# Patient Record
Sex: MALE | Race: WHITE | NOT HISPANIC OR LATINO | Employment: OTHER | URBAN - NONMETROPOLITAN AREA
[De-identification: names, ages, dates, MRNs, and addresses within clinical notes are randomized per-mention and may not be internally consistent; named-entity substitution may affect disease eponyms.]

---

## 2017-03-20 RX ORDER — PRASUGREL HCL 10 MG
10 TABLET ORAL DAILY
Qty: 30 TABLET | Refills: 5 | Status: SHIPPED | OUTPATIENT
Start: 2017-03-20 | End: 2017-06-26 | Stop reason: DRUGHIGH

## 2017-03-22 RX ORDER — PRASUGREL HCL 10 MG
10 TABLET ORAL DAILY
Qty: 90 TABLET | Refills: 3 | Status: CANCELLED | OUTPATIENT
Start: 2017-03-22

## 2017-03-22 NOTE — TELEPHONE ENCOUNTER
Pt is asking if he can d/c effient since its been over a year since the stent.  Either that or change it to the plavix so it will be cheaper.  The effient is putting him in the donut hole to quick..  Griffin Galindo CMA       Disregard the note about signing the rx for pt assist.  Pt did not qualify for pt assist, makes to much.  Thanks

## 2017-03-28 ENCOUNTER — HOSPITAL ENCOUNTER (OUTPATIENT)
Dept: ULTRASOUND IMAGING | Facility: HOSPITAL | Age: 77
Discharge: HOME | End: 2017-03-28
Admitting: PHYSICIAN ASSISTANT

## 2017-03-28 DIAGNOSIS — I71.40 AAA (ABDOMINAL AORTIC ANEURYSM) WITHOUT RUPTURE (HCC): ICD-10-CM

## 2017-03-28 PROCEDURE — 76775 US EXAM ABDO BACK WALL LIM: CPT

## 2017-04-26 ENCOUNTER — OFFICE VISIT (OUTPATIENT)
Dept: OTOLARYNGOLOGY | Facility: CLINIC | Age: 77
End: 2017-04-26

## 2017-04-26 VITALS
HEIGHT: 69 IN | WEIGHT: 199 LBS | TEMPERATURE: 97.6 F | SYSTOLIC BLOOD PRESSURE: 143 MMHG | DIASTOLIC BLOOD PRESSURE: 81 MMHG | BODY MASS INDEX: 29.47 KG/M2 | HEART RATE: 77 BPM

## 2017-04-26 DIAGNOSIS — R60.9 SUBMANDIBULAR GLAND SWELLING: Primary | ICD-10-CM

## 2017-04-26 PROCEDURE — 99213 OFFICE O/P EST LOW 20 MIN: CPT | Performed by: OTOLARYNGOLOGY

## 2017-04-26 NOTE — PROGRESS NOTES
Patient Care Team:  Adi Jack MD as PCP - General (Internal Medicine)  Edgar Ocasio MD as PCP - Family Medicine  ANN Shipley as Physician Assistant (Otolaryngology)  Terry Romo MD as Consulting Physician (Otolaryngology)    Chief Complaint   Patient presents with   • Follow-up     6 month follow up left salivary gland       Subjective   History of Present Illness:  Zeb Adams is a  77 y.o.  male who is here for follow up. He has had continued problems with swelling. The symptoms are localized to the left submandibular area. He has had variable symptoms. The symptoms have been intermittant for the last several years . There have been no identified factors that aggravate the symptoms . The symptoms are improved by antibiotics, steroids.  He denies  pain and fever.    Review of Systems:  Review of Systems   Constitutional: Negative for chills and fever.   HENT:        See HPI   Eyes: Negative.    Respiratory: Negative.    Cardiovascular: Negative.    Gastrointestinal: Negative.    Allergic/Immunologic: Negative.    Neurological: Negative.    Hematological: Negative.    Psychiatric/Behavioral: Negative.        Past History:  Past Medical History:   Diagnosis Date   • Allergic rhinitis    • Arthritis    • Asthma    • Benign neoplasm of submandibular gland    • BPH with urinary obstruction    • Chronic laryngitis    • Chronic rhinitis    • Epistaxis    • Esophageal stricture    • GERD (gastroesophageal reflux disease)    • Hiatal hernia    • Hypercholesterolemia    • Hypertension    • Impotence of organic origin    • LPRD (laryngopharyngeal reflux disease)    • Poor urinary stream    • RLS (restless legs syndrome)    • Sialoadenitis      Past Surgical History:   Procedure Laterality Date   • CORONARY ANGIOPLASTY WITH STENT PLACEMENT     • SUBMANDIBULAR GLAND EXCISION     • TONSILLECTOMY     • TURBINOPLASTY     • VASECTOMY       Family History   Problem Relation Age of  Onset   • Diabetes Father    • Heart failure Father    • Diabetes Brother    • Heart failure Brother      Social History   Substance Use Topics   • Smoking status: Former Smoker   • Smokeless tobacco: None      Comment: 30 yrs ago   • Alcohol use No       Current Outpatient Prescriptions:   •  aspirin 81 MG tablet, Take 81 mg by mouth daily.  , Disp: , Rfl:   •  atorvastatin (LIPITOR) 10 MG tablet, Daily., Disp: , Rfl:   •  cetirizine (ZyrTEC) 10 MG tablet, Take 10 mg by mouth Daily., Disp: , Rfl:   •  clonazePAM (KlonoPIN) 1 MG tablet, 2 (Two) Times a Day., Disp: , Rfl:   •  EFFIENT 10 MG tablet, Take 1 tablet by mouth Daily., Disp: 30 tablet, Rfl: 5  •  losartan (COZAAR) 100 MG tablet, , Disp: , Rfl:   •  pantoprazole (PROTONIX) 40 MG EC tablet, Take 1 tablet by mouth 2 times daily., Disp: , Rfl:   Allergies:  Penicillins and Sulfa antibiotics    Objective     Vital Signs:  Temp:  [97.6 °F (36.4 °C)] 97.6 °F (36.4 °C)  Heart Rate:  [77] 77  BP: (143)/(81) 143/81    Physical Exam:  CONSTITUTIONAL: well nourished, well-developed, alert, oriented, in no acute distress   COMMUNICATION AND VOICE: able to communicate normally, normal voice quality  HEAD: normocephalic, no lesions, atraumatic, no tenderness, no masses   FACE: appearance normal, no lesions, no tenderness, no deformities, facial motion symmetric  EYES: ocular motility normal, eyelids normal, orbits normal, no proptosis, conjunctiva normal , pupils equal, round   EARS:  Hearing: hearing to conversational voice intact bilaterally   External Ears: normal bilaterally, no lesions  NOSE:  External Nose: external nasal structure normal, no tenderness on palpation, no nasal discharge, no lesions, no evidence of trauma, nostrils patent   ORAL:  Lips: upper and lower lips without lesion   FLOOR OF MOUTH: minimal secretions  NECK:  Inspection and Palpation: left submandibular swelling and firmness  CHEST/RESPIRATORY: normal respiratory effort   CARDIOVASCULAR: no  cyanosis or edema   NEUROLOGICAL/PSYCHIATRIC: oriented to time, place and person, mood normal, affect appropriate, CN II-XII intact grossly    Assessment   1. Submandibular gland swelling        Plan   Medical and surgical options were discussed including observation, excision vs referral for sialoendoscopy. Risks, benefits and alternatives were discussed and questions were answered. After considering the options, the patient decided to proceed with observation  Continue current management plan.  -----INSTRUCTIONS-----  Warm compresses three times a day with massage.  Use saliva producing measures with sour candy or the use of lemon with water.    Return in about 6 months (around 10/26/2017).    Terry Romo MD  04/26/17  9:37 AM

## 2017-06-07 ENCOUNTER — OFFICE VISIT (OUTPATIENT)
Dept: GASTROENTEROLOGY | Age: 77
End: 2017-06-07
Payer: MEDICARE

## 2017-06-07 VITALS
SYSTOLIC BLOOD PRESSURE: 136 MMHG | BODY MASS INDEX: 28.35 KG/M2 | OXYGEN SATURATION: 98 % | WEIGHT: 198 LBS | DIASTOLIC BLOOD PRESSURE: 70 MMHG | HEIGHT: 70 IN | HEART RATE: 68 BPM

## 2017-06-07 DIAGNOSIS — K92.1 MELENA: Primary | ICD-10-CM

## 2017-06-07 DIAGNOSIS — R13.10 DYSPHAGIA, UNSPECIFIED: ICD-10-CM

## 2017-06-07 DIAGNOSIS — Z87.19 HISTORY OF ESOPHAGEAL STRICTURE: ICD-10-CM

## 2017-06-07 DIAGNOSIS — D50.9 IRON DEFICIENCY ANEMIA, UNSPECIFIED IRON DEFICIENCY ANEMIA TYPE: ICD-10-CM

## 2017-06-07 DIAGNOSIS — Z86.010 HISTORY OF COLON POLYPS: ICD-10-CM

## 2017-06-07 DIAGNOSIS — Z79.01 ANTICOAGULATED: ICD-10-CM

## 2017-06-07 DIAGNOSIS — R19.4 CHANGE IN BOWEL HABIT: ICD-10-CM

## 2017-06-07 DIAGNOSIS — K59.00 CONSTIPATION, UNSPECIFIED CONSTIPATION TYPE: ICD-10-CM

## 2017-06-07 DIAGNOSIS — K21.9 CHRONIC GERD: ICD-10-CM

## 2017-06-07 RX ORDER — CLONAZEPAM 2 MG/1
2 TABLET ORAL NIGHTLY PRN
COMMUNITY

## 2017-06-07 ASSESSMENT — ENCOUNTER SYMPTOMS
DIARRHEA: 0
BLOOD IN STOOL: 0
CHEST TIGHTNESS: 0
VOMITING: 0
ABDOMINAL PAIN: 0
CONSTIPATION: 0
COUGH: 0
VOICE CHANGE: 0
SHORTNESS OF BREATH: 0
ABDOMINAL DISTENTION: 0
NAUSEA: 0
SORE THROAT: 0
BACK PAIN: 0
RECTAL PAIN: 0

## 2017-06-09 ENCOUNTER — OFFICE VISIT (OUTPATIENT)
Dept: CARDIOLOGY | Facility: CLINIC | Age: 77
End: 2017-06-09

## 2017-06-09 VITALS
SYSTOLIC BLOOD PRESSURE: 134 MMHG | WEIGHT: 196 LBS | DIASTOLIC BLOOD PRESSURE: 68 MMHG | HEIGHT: 70 IN | BODY MASS INDEX: 28.06 KG/M2 | HEART RATE: 50 BPM

## 2017-06-09 DIAGNOSIS — I25.10 CORONARY ARTERY DISEASE INVOLVING NATIVE CORONARY ARTERY OF NATIVE HEART WITHOUT ANGINA PECTORIS: Primary | ICD-10-CM

## 2017-06-09 DIAGNOSIS — E78.2 MIXED HYPERLIPIDEMIA: ICD-10-CM

## 2017-06-09 PROCEDURE — 93000 ELECTROCARDIOGRAM COMPLETE: CPT | Performed by: INTERNAL MEDICINE

## 2017-06-09 PROCEDURE — 99214 OFFICE O/P EST MOD 30 MIN: CPT | Performed by: INTERNAL MEDICINE

## 2017-06-09 NOTE — PROGRESS NOTES
Referring Provider: Adi Jack MD    Reason for Follow-up Visit: CAD    Subjective .   Chief Complaint:   Chief Complaint   Patient presents with   • Follow-up     yearly   • Coronary Artery Disease     pt has stents and is on effient.  has chest pain every now and then that will be sharp that comes and goes fast.     • Surgical Clearance     for a colonoscopy by [facility].  will need to be off effient.       History of present illness:  Zeb Adams is a 77 y.o. yo male with history of CAD, s/p stent to RCA in the past. Has recently been found to be anemic and felt most likely do to GI bleeding. Needs cardiac clearance for upper and lower endoscopy. Denies chest pain        History  Past Medical History:   Diagnosis Date   • Allergic rhinitis    • Anxiety    • Arthritis    • Asthma    • Benign neoplasm of submandibular gland    • BPH with urinary obstruction    • Chest pain    • Chronic laryngitis    • Chronic rhinitis    • Epistaxis    • Esophageal stricture    • GERD (gastroesophageal reflux disease)    • Hiatal hernia    • Hypercholesterolemia    • Hypertension    • Impotence of organic origin    • LPRD (laryngopharyngeal reflux disease)    • Poor urinary stream    • Presence of stent in coronary artery in patient with coronary artery disease    • RLS (restless legs syndrome)    • Sialoadenitis    • SOB (shortness of breath) on exertion    ,   Past Surgical History:   Procedure Laterality Date   • CORONARY ANGIOPLASTY WITH STENT PLACEMENT     • SUBMANDIBULAR GLAND EXCISION     • TONSILLECTOMY     • TURBINOPLASTY     • VASECTOMY       and a reveral   ,   Family History   Problem Relation Age of Onset   • Diabetes Father    • Heart failure Father    • Diabetes Brother    • Heart failure Brother    • Heart attack Brother    • Coronary artery disease Other    • Heart attack Other    • Heart disease Mother    • Hyperlipidemia Mother    • No Known Problems Maternal Grandmother    • Heart disease  "Maternal Grandfather    • No Known Problems Paternal Grandmother    • No Known Problems Paternal Grandfather    ,   Social History   Substance Use Topics   • Smoking status: Former Smoker     Start date: 1956     Quit date: 1985   • Smokeless tobacco: Never Used      Comment: 30 yrs ago   • Alcohol use No   ,     Medications  Current Outpatient Prescriptions   Medication Sig Dispense Refill   • aspirin 81 MG tablet Take 81 mg by mouth daily.       • atorvastatin (LIPITOR) 10 MG tablet Daily.     • cetirizine (ZyrTEC) 10 MG tablet Take 10 mg by mouth Daily.     • clonazePAM (KlonoPIN) 1 MG tablet At Night As Needed.     • EFFIENT 10 MG tablet Take 1 tablet by mouth Daily. 30 tablet 5   • losartan (COZAAR) 100 MG tablet Take 100 mg by mouth Daily.     • pantoprazole (PROTONIX) 40 MG EC tablet Take 1 tablet by mouth 2 times daily.       No current facility-administered medications for this visit.        Allergies:  Penicillins and Sulfa antibiotics    Review of Systems  Review of Systems   Constitution: Positive for malaise/fatigue.   HENT: Negative for nosebleeds.    Cardiovascular: Positive for dyspnea on exertion. Negative for chest pain, claudication, irregular heartbeat, leg swelling, near-syncope, orthopnea, palpitations, paroxysmal nocturnal dyspnea and syncope.   Respiratory: Negative for cough, hemoptysis and shortness of breath.    Gastrointestinal: Negative for dysphagia, hematemesis and melena.   Genitourinary: Negative for hematuria.       Objective     Physical Exam:  /68 (BP Location: Left arm, Patient Position: Sitting, Cuff Size: Adult)  Pulse 50  Ht 70\" (177.8 cm)  Wt 196 lb (88.9 kg)  BMI 28.12 kg/m2  Physical Exam   Constitutional: He is oriented to person, place, and time. He appears well-nourished. No distress.   HENT:   Head: Normocephalic.   Eyes: No scleral icterus.   Neck: Normal range of motion. Neck supple.   Cardiovascular: Normal rate, regular rhythm and normal heart sounds.  " Exam reveals no gallop and no friction rub.    No murmur heard.  Pulmonary/Chest: Effort normal and breath sounds normal. No respiratory distress. He has no wheezes. He has no rales.   Abdominal: Soft. Bowel sounds are normal. He exhibits no distension. There is no tenderness.   Musculoskeletal: He exhibits no edema.   Neurological: He is alert and oriented to person, place, and time.   Skin: Skin is warm and dry. He is not diaphoretic. No erythema.   Psychiatric: He has a normal mood and affect. His behavior is normal.       Results Review:    ECG 12 Lead  Date/Time: 6/9/2017 11:10 AM  Performed by: SULEMAN GOLDSTEIN  Authorized by: SULEMAN GOLDSTEIN   Comparison: compared with previous ECG   Similar to previous ECG  Rhythm: sinus rhythm  Ectopy: infrequent PVCs  Rate: normal  Conduction: conduction normal  ST Segments: ST segments normal  T Waves: T waves normal  QRS axis: normal  Clinical impression: normal ECG            Office Visit on 12/16/2016   Component Date Value Ref Range Status   • Color 12/16/2016 Yellow  Yellow, Straw, Dark Yellow, Karen Final   • Clarity, UA 12/16/2016 Clear  Clear Final   • Glucose, UA 12/16/2016 Negative  Negative mg/dL Final   • Bilirubin 12/16/2016 Negative  Negative Final   • Ketones, UA 12/16/2016 Negative  Negative Final   • Specific Gravity  12/16/2016 1.020  1.005 - 1.030 Final   • Blood, UA 12/16/2016 Negative  Negative Final   • pH, Urine 12/16/2016 5.0  5.0 - 8.0 Final   • Protein, POC 12/16/2016 Negative  Negative mg/dL Final   • Urobilinogen, UA 12/16/2016 Normal  Normal Final   • Leukocytes 12/16/2016 Negative  Negative Final   • Nitrite, UA 12/16/2016 Negative  Negative Final       Assessment/Plan   Suleman was seen today for follow-up, coronary artery disease and surgical clearance.    Diagnoses and all orders for this visit:    Coronary artery disease involving native coronary artery of native heart without angina pectoris, ok to stop effient for 5-7 days prior to endoscopy.  Cont asa    Mixed hyperlipidemia, Patient's statin therapy is followed by PCP.    Anemia, for colonoscopy and upper endoscopy    Other orders  -     ECG 12 Lead

## 2017-06-13 ENCOUNTER — TELEPHONE (OUTPATIENT)
Dept: GASTROENTEROLOGY | Age: 77
End: 2017-06-13

## 2017-06-26 NOTE — TELEPHONE ENCOUNTER
Pt informed.  A new Rx will be sent to [facility] for Effient 5 mg 1 po qd.  Pt will only cut one in half at a time until they are gone then start the new Rx.  Pt stated understanding.  Griffin Galindo, CMA

## 2017-06-26 NOTE — TELEPHONE ENCOUNTER
Pt called asking if he can cut the effient 10 mg in half after he gets back on it after the colonoscopy and endoscopy.  He said you mention it at last ov.  Please advise.  Griffin Galindo, CMA

## 2017-06-27 RX ORDER — PRASUGREL 5 MG/1
5 TABLET, FILM COATED ORAL DAILY
Qty: 30 TABLET | Refills: 11 | Status: SHIPPED | OUTPATIENT
Start: 2017-06-27 | End: 2018-01-22

## 2017-07-07 ENCOUNTER — TELEPHONE (OUTPATIENT)
Dept: CARDIOLOGY | Facility: CLINIC | Age: 77
End: 2017-07-07

## 2017-07-07 NOTE — TELEPHONE ENCOUNTER
Pt saw ANN Partida at Dr. Jack's office for evaluation of red spots on legs.  Deb called asking if pt can come off the Effient and Aspirin because of extensive bruising and red spots on legs. Pt was stented March 2016.  Please advise.  Griffin Galindo, CMA

## 2017-07-13 ENCOUNTER — HOSPITAL ENCOUNTER (OUTPATIENT)
Age: 77
Setting detail: OUTPATIENT SURGERY
Discharge: HOME | End: 2017-07-13
Attending: INTERNAL MEDICINE | Admitting: INTERNAL MEDICINE
Payer: MEDICARE

## 2017-07-13 ENCOUNTER — ANESTHESIA (OUTPATIENT)
Dept: ENDOSCOPY | Age: 77
End: 2017-07-13
Payer: MEDICARE

## 2017-07-13 ENCOUNTER — ANESTHESIA EVENT (OUTPATIENT)
Dept: ENDOSCOPY | Age: 77
End: 2017-07-13
Payer: MEDICARE

## 2017-07-13 VITALS
SYSTOLIC BLOOD PRESSURE: 122 MMHG | HEART RATE: 52 BPM | OXYGEN SATURATION: 97 % | BODY MASS INDEX: 28.2 KG/M2 | WEIGHT: 197 LBS | RESPIRATION RATE: 18 BRPM | DIASTOLIC BLOOD PRESSURE: 78 MMHG | TEMPERATURE: 97.4 F | HEIGHT: 70 IN

## 2017-07-13 VITALS
RESPIRATION RATE: 17 BRPM | SYSTOLIC BLOOD PRESSURE: 112 MMHG | DIASTOLIC BLOOD PRESSURE: 63 MMHG | OXYGEN SATURATION: 92 %

## 2017-07-13 PROBLEM — R13.19 ESOPHAGEAL DYSPHAGIA: Status: ACTIVE | Noted: 2017-07-13

## 2017-07-13 PROCEDURE — 43255 EGD CONTROL BLEEDING ANY: CPT | Performed by: INTERNAL MEDICINE

## 2017-07-13 PROCEDURE — 3700000000 HC ANESTHESIA ATTENDED CARE: Performed by: INTERNAL MEDICINE

## 2017-07-13 PROCEDURE — 3700000001 HC ADD 15 MINUTES (ANESTHESIA): Performed by: INTERNAL MEDICINE

## 2017-07-13 PROCEDURE — 2720000001 HC MISC SURG SUPPLY STERILE $51-500: Performed by: INTERNAL MEDICINE

## 2017-07-13 PROCEDURE — 3609009900 HC COLONOSCOPY W/CONTROL BLEEDING ANY METHOD: Performed by: INTERNAL MEDICINE

## 2017-07-13 PROCEDURE — 2500000003 HC RX 250 WO HCPCS: Performed by: INTERNAL MEDICINE

## 2017-07-13 PROCEDURE — 2500000003 HC RX 250 WO HCPCS: Performed by: NURSE ANESTHETIST, CERTIFIED REGISTERED

## 2017-07-13 PROCEDURE — 2580000003 HC RX 258: Performed by: INTERNAL MEDICINE

## 2017-07-13 PROCEDURE — 7100000011 HC PHASE II RECOVERY - ADDTL 15 MIN: Performed by: INTERNAL MEDICINE

## 2017-07-13 PROCEDURE — 3609013200 HC EGD W/ ABLATION: Performed by: INTERNAL MEDICINE

## 2017-07-13 PROCEDURE — 7100000010 HC PHASE II RECOVERY - FIRST 15 MIN: Performed by: INTERNAL MEDICINE

## 2017-07-13 PROCEDURE — 6360000002 HC RX W HCPCS: Performed by: NURSE ANESTHETIST, CERTIFIED REGISTERED

## 2017-07-13 PROCEDURE — 3609012800 HC EGD DIAGNOSTIC ONLY: Performed by: INTERNAL MEDICINE

## 2017-07-13 PROCEDURE — 45382 COLONOSCOPY W/CONTROL BLEED: CPT | Performed by: INTERNAL MEDICINE

## 2017-07-13 RX ORDER — SODIUM CHLORIDE, SODIUM LACTATE, POTASSIUM CHLORIDE, CALCIUM CHLORIDE 600; 310; 30; 20 MG/100ML; MG/100ML; MG/100ML; MG/100ML
INJECTION, SOLUTION INTRAVENOUS CONTINUOUS
Status: DISCONTINUED | OUTPATIENT
Start: 2017-07-13 | End: 2017-07-13 | Stop reason: HOSPADM

## 2017-07-13 RX ORDER — LIDOCAINE HYDROCHLORIDE 10 MG/ML
INJECTION, SOLUTION EPIDURAL; INFILTRATION; INTRACAUDAL; PERINEURAL PRN
Status: DISCONTINUED | OUTPATIENT
Start: 2017-07-13 | End: 2017-07-13 | Stop reason: SDUPTHER

## 2017-07-13 RX ORDER — SUCRALFATE 1 G/1
1 TABLET ORAL 3 TIMES DAILY
Qty: 90 TABLET | Refills: 3 | Status: SHIPPED | OUTPATIENT
Start: 2017-07-13 | End: 2017-11-09

## 2017-07-13 RX ORDER — LIDOCAINE HYDROCHLORIDE 10 MG/ML
1 INJECTION, SOLUTION EPIDURAL; INFILTRATION; INTRACAUDAL; PERINEURAL ONCE
Status: COMPLETED | OUTPATIENT
Start: 2017-07-13 | End: 2017-07-13

## 2017-07-13 RX ORDER — PROPOFOL 10 MG/ML
INJECTION, EMULSION INTRAVENOUS PRN
Status: DISCONTINUED | OUTPATIENT
Start: 2017-07-13 | End: 2017-07-13 | Stop reason: SDUPTHER

## 2017-07-13 RX ADMIN — PROPOFOL 400 MG: 10 INJECTION, EMULSION INTRAVENOUS at 11:12

## 2017-07-13 RX ADMIN — LIDOCAINE HYDROCHLORIDE 1 ML: 10 INJECTION, SOLUTION EPIDURAL; INFILTRATION; INTRACAUDAL; PERINEURAL at 10:57

## 2017-07-13 RX ADMIN — LIDOCAINE HYDROCHLORIDE 5 ML: 10 INJECTION, SOLUTION EPIDURAL; INFILTRATION; INTRACAUDAL; PERINEURAL at 11:12

## 2017-07-13 RX ADMIN — SODIUM CHLORIDE, POTASSIUM CHLORIDE, SODIUM LACTATE AND CALCIUM CHLORIDE: 600; 310; 30; 20 INJECTION, SOLUTION INTRAVENOUS at 10:56

## 2017-07-13 ASSESSMENT — PAIN SCALES - GENERAL
PAINLEVEL_OUTOF10: 0
PAINLEVEL_OUTOF10: 0

## 2017-07-13 ASSESSMENT — PAIN - FUNCTIONAL ASSESSMENT: PAIN_FUNCTIONAL_ASSESSMENT: 0-10

## 2017-07-14 ENCOUNTER — TELEPHONE (OUTPATIENT)
Dept: GASTROENTEROLOGY | Age: 77
End: 2017-07-14

## 2017-08-10 ENCOUNTER — TELEPHONE (OUTPATIENT)
Dept: GASTROENTEROLOGY | Age: 77
End: 2017-08-10

## 2017-08-15 ENCOUNTER — TELEPHONE (OUTPATIENT)
Dept: GASTROENTEROLOGY | Age: 77
End: 2017-08-15

## 2017-08-16 ENCOUNTER — TELEPHONE (OUTPATIENT)
Dept: GASTROENTEROLOGY | Age: 77
End: 2017-08-16

## 2017-10-04 NOTE — TELEPHONE ENCOUNTER
Contact patient - let him know what has happened. We have heard from the company and they were unsuccessful in attempting to get the study from the reader. Let's get him rescheduled. Let him know he's not responsible for the cost of this one.

## 2017-10-04 NOTE — TELEPHONE ENCOUNTER
I called the patient and explained what happened and that we will get him rescheduled at no cost to him, he was very understanding and said things happen and he will be happy to reschedule no problem, I will forward this information to [address] [place] [address] Rd.   and have her call the patient to reschedule the M2A Pill Cam.  cma

## 2017-10-11 ENCOUNTER — TELEPHONE (OUTPATIENT)
Dept: GASTROENTEROLOGY | Age: 77
End: 2017-10-11

## 2017-10-11 NOTE — TELEPHONE ENCOUNTER
Called patient today to get him scheduled for the repeat M2A. He does not want to reschedule at this time, he and his wife both are having surgery next month. I told him I could get him in before the surgeries, but he does not want to at this time. He said possibly in December or January.   I have created a sched form and put in the folder to call later

## 2017-10-14 PROBLEM — R19.8 ALTERED BOWEL FUNCTION: Status: ACTIVE | Noted: 2017-06-07

## 2017-10-25 ENCOUNTER — OFFICE VISIT (OUTPATIENT)
Dept: OTOLARYNGOLOGY | Facility: CLINIC | Age: 77
End: 2017-10-25

## 2017-10-25 VITALS
DIASTOLIC BLOOD PRESSURE: 88 MMHG | SYSTOLIC BLOOD PRESSURE: 162 MMHG | BODY MASS INDEX: 27.94 KG/M2 | HEIGHT: 70 IN | TEMPERATURE: 97.9 F | WEIGHT: 195.2 LBS

## 2017-10-25 DIAGNOSIS — R60.9 SUBMANDIBULAR GLAND SWELLING: Primary | ICD-10-CM

## 2017-10-25 DIAGNOSIS — B02.29 POSTHERPETIC NEURALGIA: ICD-10-CM

## 2017-10-25 PROCEDURE — 99213 OFFICE O/P EST LOW 20 MIN: CPT | Performed by: OTOLARYNGOLOGY

## 2017-10-25 NOTE — PROGRESS NOTES
Patient Care Team:  Adi Jack MD as PCP - General (Internal Medicine)  Edgar Ocasio MD as PCP - Family Medicine  ANN Shipley as Physician Assistant (Otolaryngology)  Terry Romo MD as Consulting Physician (Otolaryngology)  Zeb Mark MD as Cardiologist (Cardiology)  JANE Casey as Referring Physician (Family Medicine)    Chief Complaint   Patient presents with   • Follow-up       Subjective   HPI   Zeb Adams is a  77 y.o. male who is here for follow up. He has had a recent outbreak of shingles on the right face and has had residual neuralgia. He tried Lyrica but could not tolerate it.     Review of Systems:  Reviewed per patient intake note    Past History:  Past Medical History:   Diagnosis Date   • Allergic rhinitis    • Anxiety    • Arthritis    • Asthma    • Benign neoplasm of submandibular gland    • BPH with urinary obstruction    • Chest pain    • Chronic laryngitis    • Chronic rhinitis    • Epistaxis    • Esophageal stricture    • GERD (gastroesophageal reflux disease)    • Hiatal hernia    • Hypercholesterolemia    • Hypertension    • Impotence of organic origin    • LPRD (laryngopharyngeal reflux disease)    • Poor urinary stream    • Presence of stent in coronary artery in patient with coronary artery disease    • RLS (restless legs syndrome)    • Sialoadenitis    • SOB (shortness of breath) on exertion      Past Surgical History:   Procedure Laterality Date   • CORONARY ANGIOPLASTY WITH STENT PLACEMENT     • SUBMANDIBULAR GLAND EXCISION     • TONSILLECTOMY     • TURBINOPLASTY     • VASECTOMY       and a reveral     Family History   Problem Relation Age of Onset   • Diabetes Father    • Heart failure Father    • Diabetes Brother    • Heart failure Brother    • Heart attack Brother    • Coronary artery disease Other    • Heart attack Other    • Heart disease Mother    • Hyperlipidemia Mother    • No Known Problems Maternal Grandmother    •  Heart disease Maternal Grandfather    • No Known Problems Paternal Grandmother    • No Known Problems Paternal Grandfather      Social History   Substance Use Topics   • Smoking status: Former Smoker     Start date: 1956     Quit date: 1985   • Smokeless tobacco: Never Used      Comment: 30 yrs ago   • Alcohol use No       Current Outpatient Prescriptions:   •  aspirin 81 MG tablet, Take 81 mg by mouth daily.  , Disp: , Rfl:   •  atorvastatin (LIPITOR) 10 MG tablet, Daily., Disp: , Rfl:   •  cetirizine (ZyrTEC) 10 MG tablet, Take 10 mg by mouth Daily., Disp: , Rfl:   •  clonazePAM (KlonoPIN) 1 MG tablet, At Night As Needed., Disp: , Rfl:   •  losartan (COZAAR) 100 MG tablet, Take 100 mg by mouth Daily., Disp: , Rfl:   •  pantoprazole (PROTONIX) 40 MG EC tablet, Take 1 tablet by mouth 2 times daily., Disp: , Rfl:   •  prasugrel (EFFIENT) 5 MG tablet, Take 1 tablet by mouth Daily., Disp: 30 tablet, Rfl: 11  Allergies:  Penicillins and Sulfa antibiotics    Objective     Vital Signs:  Temp:  [97.9 °F (36.6 °C)] 97.9 °F (36.6 °C)  BP: (162)/(88) 162/88    Physical Exam  CONSTITUTIONAL: well nourished, well-developed, alert, oriented, in no acute distress   COMMUNICATION AND VOICE: able to communicate normally, normal voice quality  HEAD: normocephalic, no lesions, atraumatic, no tenderness, no masses   FACE: appearance normal, no lesions, no tenderness, no deformities, facial motion symmetric  SALIVARY GLANDS: parotid glands with no swelling present, moderate left submandibular enlargement and firmness  EYES: ocular motility normal, eyelids normal, orbits normal, no proptosis, conjunctiva normal , pupils equal, round  HEARING: response to conversational voice normal bilaterally   EXTERNAL EARS: auricles without lesions  EXTERNAL NOSE: structure normal, no tenderness on palpation, no nasal discharge, no lesions, no evidence of trauma, nostrils patent  LIPS: structure normal, no tenderness on palpation, no lesions, no  evidence of trauma  NECK: neck appearance normal  LYMPH NODES: no lymphadenopathy  CHEST/RESPIRATORY: respiratory effort normal  CARDIOVASCULAR: extremities without cyanosis or edema, no overt jugulovenous distension present  NEUROLOGIC/PSYCHIATRIC: oriented appropriately for age, mood normal, affect appropriate, cranial nerves intact grossly unless specifically mentioned above         Assessment   1. Submandibular gland swelling    2. Postherpetic neuralgia        Plan   Medical and surgical options were discussed including observation and left submandibular gland excision. Risks, benefits and alternatives were discussed and questions were answered. After considering the options, the patient decided to proceed with observation.    QUALITY MEASURES  Body Mass Index Screening and Follow-Up Plan  Body mass index is 28.01 kg/(m^2).  High BMI Plan  General weight loss/lifestyle modification strategies discussed (elicit support from others; identify saboteurs; non-food rewards, etc).  Informal exercise measures discussed, e.g. taking stairs instead of elevator.      Tobacco Use: Screening and Cessation Intervention  Smoking status: Former Smoker                                                              Packs/day: 0.00      Years: 0.00         Start date: 1956     Quit date: 1985  Smokeless status: Never Used                      Comment: 30 yrs ago        Return in about 6 months (around 4/25/2018).    Terry Romo MD  10/25/17  1:09 PM

## 2017-10-25 NOTE — PROGRESS NOTES
Patient Intake Note    Review of Systems  Review of Systems   Constitutional: Negative for chills, fatigue and fever.   HENT:        See HPI   Respiratory: Positive for shortness of breath. Negative for cough, choking and wheezing.    Cardiovascular: Negative.    Gastrointestinal: Negative for constipation, diarrhea, nausea and vomiting.   Allergic/Immunologic: Positive for environmental allergies. Negative for food allergies.   Neurological: Positive for headaches. Negative for dizziness and light-headedness.   Hematological: Bruises/bleeds easily.   Psychiatric/Behavioral: Positive for sleep disturbance (night sweats).         Allison Aguirre  10/25/2017  9:13 AM

## 2017-11-09 ENCOUNTER — OFFICE VISIT (OUTPATIENT)
Dept: VASCULAR SURGERY | Age: 77
End: 2017-11-09
Payer: MEDICARE

## 2017-11-09 ENCOUNTER — HOSPITAL ENCOUNTER (OUTPATIENT)
Dept: VASCULAR LAB | Age: 77
Discharge: HOME | End: 2017-11-09
Payer: MEDICARE

## 2017-11-09 VITALS
TEMPERATURE: 97.5 F | DIASTOLIC BLOOD PRESSURE: 74 MMHG | HEART RATE: 74 BPM | SYSTOLIC BLOOD PRESSURE: 118 MMHG | RESPIRATION RATE: 18 BRPM

## 2017-11-09 DIAGNOSIS — I65.23 BILATERAL CAROTID ARTERY STENOSIS: Primary | ICD-10-CM

## 2017-11-09 DIAGNOSIS — I65.23 BILATERAL CAROTID ARTERY STENOSIS: ICD-10-CM

## 2017-11-09 PROCEDURE — 1123F ACP DISCUSS/DSCN MKR DOCD: CPT | Performed by: NURSE PRACTITIONER

## 2017-11-09 PROCEDURE — 99212 OFFICE O/P EST SF 10 MIN: CPT | Performed by: NURSE PRACTITIONER

## 2017-11-09 PROCEDURE — 1036F TOBACCO NON-USER: CPT | Performed by: NURSE PRACTITIONER

## 2017-11-09 PROCEDURE — G8598 ASA/ANTIPLAT THER USED: HCPCS | Performed by: NURSE PRACTITIONER

## 2017-11-09 PROCEDURE — G8484 FLU IMMUNIZE NO ADMIN: HCPCS | Performed by: NURSE PRACTITIONER

## 2017-11-09 PROCEDURE — 93880 EXTRACRANIAL BILAT STUDY: CPT

## 2017-11-09 PROCEDURE — 4040F PNEUMOC VAC/ADMIN/RCVD: CPT | Performed by: NURSE PRACTITIONER

## 2017-11-09 PROCEDURE — G8427 DOCREV CUR MEDS BY ELIG CLIN: HCPCS | Performed by: NURSE PRACTITIONER

## 2017-11-09 PROCEDURE — G8417 CALC BMI ABV UP PARAM F/U: HCPCS | Performed by: NURSE PRACTITIONER

## 2017-11-09 NOTE — PROGRESS NOTES
GASTROINTESTINAL ENDOSCOPY  2006    gastritis, nonobst Schatzki's ring, lg hiatal hernia    UPPER GASTROINTESTINAL ENDOSCOPY  2017    Dr Garcia/control of hemorrhage with gold probe cauterization of AVM-cecal avm, small bowel avm, hiatal hernia, margarito's erosions    UPPER GASTROINTESTINAL ENDOSCOPY  2017    Dr Garcia/control of hemorrhage with gold probe cauterization of AVM-cecal avm, small bowel avm, hiatal hernia, margarito's erosions    VASECTOMY      VASECTOMY  26 years ago    and had it reversed.  VASECTOMY REVERSAL       Family History   Problem Relation Age of Onset    Hypertension Mother     High Cholesterol Mother     Heart Failure Mother      blockages, attack    Heart Failure Father      blockages, attack,  66    Heart Failure Brother      blockages, attack,  64     High Cholesterol Brother     Stroke Brother     Heart Disease Brother     Hypertension Brother     Other Other      mother , age 80 fall    Colon Cancer Neg Hx     Colon Polyps Neg Hx     Esophageal Cancer Neg Hx     Liver Cancer Neg Hx     Liver Disease Neg Hx     Rectal Cancer Neg Hx     Stomach Cancer Neg Hx      Social History   Substance Use Topics    Smoking status: Former Smoker     Quit date: 1986    Smokeless tobacco: Never Used    Alcohol use No           Review of Systems    Constitutional  no significant activity change, appetite change, or unexpected weight change. No fever or chills. No diaphoresis or significant fatigue. HENT  no significant rhinorrhea or epistaxis. No tinnitus or significant hearing loss. Eyes  no sudden vision change or amaurosis. Respiratory  no significant shortness of breath, wheezing, or stridor. No apnea, cough, or chest tightness associated with shortness of breath. Cardiovascular  no chest pain, syncope, or significant dizziness. No palpitations or significant leg swelling. No claudication.   Gastrointestinal  no abdominal swelling or pain. No blood in stool. No severe constipation, diarrhea, nausea, or vomiting. Genitourinary  No difficulty urinating, dysuria, frequency, or urgency. No flank pain or hematuria. Musculoskeletal  no back pain, gait disturbance, or myalgia. Skin  no color change, rash, pallor, or new wound. Neurologic  no dizziness, facial asymmetry, or light headedness. No seizures. No speech difficulty or lateralizing weakness. Hematologic  no easy bruising or excessive bleeding. Psychiatric  no severe anxiety or nervousness. No confusion. All other review of systems are negative. Physical Exam    Vitals:    11/09/17 0853 11/09/17 0855   BP: 119/68 118/74   Site: Left Arm Right Arm   Position: Sitting Sitting   Cuff Size: Medium Adult Medium Adult   Pulse: 73 74   Resp: 18 18   Temp: 97.5 °F (36.4 °C)          Constitutional  well developed, well nourished. No diaphoresis or acute distress. HENT  head normocephalic. Right external ear canal appears normal.  Left external ear canal appears normal.  Septum appears midline. Eyes  conjunctiva normal.  EOMS normal.  No exudate. No icterus. Neck- ROM appears normal, no tracheal deviation. Cardiovascular  Regular rate and rhythm. Heart sounds are normal.  No murmur, rub, or gallop. Carotid pulses are 2+ to palpation bilaterally without bruit. Pulmonary  effort appears normal.  No respiratory distress. Lungs - Breath sounds normal. No wheezes or rales. Extremities - Radial and brachial pulses are 2+ to palpation bilaterally. Neurologic  alert and oriented X 3. Physiologic. Tongue midline. Face symmetric. Skin  warm, dry, and intact. No rash, erythema, or pallor.   Psychiatric  mood, affect, and behavior appear normal.  Judgment and thought processes appear normal.    Risk factors for atherosclerosis of all vascular beds have been reviewed with the patient including:  Family history, tobacco abuse in all forms,

## 2017-12-06 ENCOUNTER — TRANSCRIBE ORDERS (OUTPATIENT)
Dept: ADMINISTRATIVE | Facility: HOSPITAL | Age: 77
End: 2017-12-06

## 2017-12-06 DIAGNOSIS — R59.0 LOCALIZED ENLARGED LYMPH NODES: Primary | ICD-10-CM

## 2017-12-18 ENCOUNTER — OFFICE VISIT (OUTPATIENT)
Dept: UROLOGY | Facility: CLINIC | Age: 77
End: 2017-12-18

## 2017-12-18 VITALS
TEMPERATURE: 97.5 F | HEIGHT: 69 IN | SYSTOLIC BLOOD PRESSURE: 118 MMHG | DIASTOLIC BLOOD PRESSURE: 84 MMHG | WEIGHT: 200.2 LBS | BODY MASS INDEX: 29.65 KG/M2

## 2017-12-18 DIAGNOSIS — N28.1 RENAL CYST: ICD-10-CM

## 2017-12-18 DIAGNOSIS — N50.9 SCROTAL LESION: ICD-10-CM

## 2017-12-18 DIAGNOSIS — N13.8 BPH WITH URINARY OBSTRUCTION: Primary | ICD-10-CM

## 2017-12-18 DIAGNOSIS — N40.1 BPH WITH URINARY OBSTRUCTION: Primary | ICD-10-CM

## 2017-12-18 DIAGNOSIS — N52.9 IMPOTENCE OF ORGANIC ORIGIN: ICD-10-CM

## 2017-12-18 LAB
BILIRUB BLD-MCNC: NEGATIVE MG/DL
CLARITY, POC: CLEAR
COLOR UR: YELLOW
GLUCOSE UR STRIP-MCNC: NEGATIVE MG/DL
KETONES UR QL: NEGATIVE
LEUKOCYTE EST, POC: NEGATIVE
NITRITE UR-MCNC: NEGATIVE MG/ML
PH UR: 5 [PH] (ref 5–8)
PROT UR STRIP-MCNC: NEGATIVE MG/DL
RBC # UR STRIP: NEGATIVE /UL
SP GR UR: 1.02 (ref 1–1.03)
UROBILINOGEN UR QL: NORMAL

## 2017-12-18 PROCEDURE — 81003 URINALYSIS AUTO W/O SCOPE: CPT | Performed by: UROLOGY

## 2017-12-18 PROCEDURE — 99213 OFFICE O/P EST LOW 20 MIN: CPT | Performed by: UROLOGY

## 2017-12-18 NOTE — PROGRESS NOTES
Subjective    Mr. Adams is 77 y.o. male    Chief Complaint: BPH/ED    History of Present Illness     Benign Prostatic Hypertrophy  Patient complains of lower urinary tract symptoms. He reports frequency, incomplete emptying, intermittency, nocturia two times a night, straining and weak stream. He denies urgency. Patient states symptoms are of mild severity. Onset of symptoms was several years ago and was gradual in onset. His AUA Symptom Score is, 8/35.He reports a history of no complicating symptoms. He denies flank pain, gross hematuria, kidney stones and recurrent UTI.  Patient has tried Watchful waiting with improvement. Last PSA was not drawn .     Erectile Dysfunction  Patient complains of erectile dysfunction. Onset of dysfunction was several years ago and was gradual in onset.  Patient states the nature of difficulty is both attaining and maintaining erection. Full erections occur never. Partial erections occur never. Libido is not affected. Risk factors for ED include cardiovascular disease. Patient denies history of pelvic radiation.  Previous treatment of ED includes JULIA.    The following portions of the patient's history were reviewed and updated as appropriate: allergies, current medications, past family history, past medical history, past social history, past surgical history and problem list.    Review of Systems   Constitutional: Negative for chills and fever.   Gastrointestinal: Negative for abdominal pain, anal bleeding and blood in stool.   Genitourinary: Negative for flank pain and hematuria.         Current Outpatient Prescriptions:   •  aspirin 81 MG tablet, Take 81 mg by mouth daily.  , Disp: , Rfl:   •  atorvastatin (LIPITOR) 10 MG tablet, Daily., Disp: , Rfl:   •  cetirizine (ZyrTEC) 10 MG tablet, Take 10 mg by mouth Daily., Disp: , Rfl:   •  clonazePAM (KlonoPIN) 1 MG tablet, At Night As Needed., Disp: , Rfl:   •  losartan (COZAAR) 100 MG tablet, Take 100 mg by mouth Daily., Disp: , Rfl:    •  pantoprazole (PROTONIX) 40 MG EC tablet, Take 1 tablet by mouth 2 times daily., Disp: , Rfl:   •  prasugrel (EFFIENT) 5 MG tablet, Take 1 tablet by mouth Daily., Disp: 30 tablet, Rfl: 11    Past Medical History:   Diagnosis Date   • Allergic rhinitis    • Anxiety    • Arthritis    • Asthma    • Benign neoplasm of submandibular gland    • BPH with urinary obstruction    • Chest pain    • Chronic laryngitis    • Chronic rhinitis    • Epistaxis    • Esophageal stricture    • GERD (gastroesophageal reflux disease)    • Hiatal hernia    • Hypercholesterolemia    • Hypertension    • Impotence of organic origin    • LPRD (laryngopharyngeal reflux disease)    • Poor urinary stream    • Presence of stent in coronary artery in patient with coronary artery disease    • RLS (restless legs syndrome)    • Sialoadenitis    • SOB (shortness of breath) on exertion        Past Surgical History:   Procedure Laterality Date   • CORONARY ANGIOPLASTY WITH STENT PLACEMENT     • SUBMANDIBULAR GLAND EXCISION     • TONSILLECTOMY     • TURBINOPLASTY     • VASECTOMY       and a reveral       Social History     Social History   • Marital status:      Spouse name: N/A   • Number of children: N/A   • Years of education: N/A     Social History Main Topics   • Smoking status: Former Smoker     Start date: 1956     Quit date: 1985   • Smokeless tobacco: Never Used      Comment: 30 yrs ago   • Alcohol use No   • Drug use: No   • Sexual activity: Defer     Other Topics Concern   • None     Social History Narrative       Family History   Problem Relation Age of Onset   • Diabetes Father    • Heart failure Father    • Diabetes Brother    • Heart failure Brother    • Heart attack Brother    • Coronary artery disease Other    • Heart attack Other    • Heart disease Mother    • Hyperlipidemia Mother    • No Known Problems Maternal Grandmother    • Heart disease Maternal Grandfather    • No Known Problems Paternal Grandmother    • No Known  "Problems Paternal Grandfather        Objective    /84  Temp 97.5 °F (36.4 °C)  Ht 175.3 cm (69\")  Wt 90.8 kg (200 lb 3.2 oz)  BMI 29.56 kg/m2    Physical Exam   Constitutional: He is oriented to person, place, and time. He appears well-developed and well-nourished.   Pulmonary/Chest: Effort normal.   Abdominal: Soft. He exhibits no distension and no mass. There is no tenderness. There is no rebound and no guarding. No hernia.   Genitourinary: Penis normal. Rectal exam shows no mass, no tenderness and anal tone normal. Enlarged: for the age of the patient. Right testis shows no mass, no swelling and no tenderness. Left testis shows no mass, no swelling and no tenderness. No hypospadias. No discharge found.   Genitourinary Comments:  The urethral meatus normal in position without evidence of stricture. Epididymis without mass or tenderness. Vas Deferens is palpably normal. Scrotal < 5mm midline area of irritation   Neurological: He is alert and oriented to person, place, and time.   Vitals reviewed.          Results for orders placed or performed in visit on 12/18/17   POC Urinalysis Dipstick, Automated   Result Value Ref Range    Color Yellow Yellow, Straw, Dark Yellow, Karen    Clarity, UA Clear Clear    Glucose, UA Negative Negative, 1000 mg/dL (3+) mg/dL    Bilirubin Negative Negative    Ketones, UA Negative Negative    Specific Gravity  1.020 1.005 - 1.030    Blood, UA Negative Negative    pH, Urine 5.0 5.0 - 8.0    Protein, POC Negative Negative mg/dL    Urobilinogen, UA Normal Normal    Leukocytes Negative Negative    Nitrite, UA Negative Negative     Assessment and Plan    Diagnoses and all orders for this visit:    BPH with urinary obstruction  -     POC Urinalysis Dipstick, Automated    Impotence of organic origin    Renal cyst            Small area of irritation in the midline scrotum this is not a cyst.  He has been placing lotions or creams on this area I asked him to stop that.  His voiding " symptoms are stable.  He will follow-up in 1 year.

## 2018-01-05 ENCOUNTER — NURSE ONLY (OUTPATIENT)
Dept: GASTROENTEROLOGY | Age: 78
End: 2018-01-05
Payer: MEDICARE

## 2018-01-05 DIAGNOSIS — D50.9 IRON DEFICIENCY ANEMIA, UNSPECIFIED IRON DEFICIENCY ANEMIA TYPE: Primary | ICD-10-CM

## 2018-01-05 PROCEDURE — 91110 GI TRC IMG INTRAL ESOPH-ILE: CPT | Performed by: INTERNAL MEDICINE

## 2018-01-05 NOTE — PROGRESS NOTES
Patient presented to the office, as scheduled, for M2A pill cam ingestion and equipment placement. Instructions, risks, and benefits were discussed. All questions were answered and patient acknowledged understanding. Consent was signed and dated. The sensor belt was then placed around the mid abdomen and attached the recorder device to it and placed in shoulder holster. The pill cam was then opened and activated (light on recorder blinking blue). The patient successfully swallowed the pill cam with a small amount of water mixed with simethicone drops. When patient returns, the recorder will be uploaded to the computer and the appointed physician will be notified for the report to be read and resulted.        Cap ID: [id number]  Lot: [id number]    Instructions are as follows:   Clear liquids 2 hours after ingestion of pill cam  Light snack 4 hours after ingestion of pill cam  Avoid any MRI machines  Resume ADL's as normal throughout the day  Watch for Pill cam to be expelled, if not seen in stool further testing may be required   Return to office no later than 4:30pm  Call the office if the blue light stops blinking or all lights disappear [phone]

## 2018-01-08 ENCOUNTER — TELEPHONE (OUTPATIENT)
Dept: GASTROENTEROLOGY | Age: 78
End: 2018-01-08

## 2018-01-10 ENCOUNTER — HOSPITAL ENCOUNTER (OUTPATIENT)
Dept: CT IMAGING | Facility: HOSPITAL | Age: 78
Discharge: HOME | End: 2018-01-10
Attending: INTERNAL MEDICINE | Admitting: INTERNAL MEDICINE

## 2018-01-10 DIAGNOSIS — R59.0 LOCALIZED ENLARGED LYMPH NODES: ICD-10-CM

## 2018-01-10 LAB
CREAT BLD-MCNC: 1.56 MG/DL (ref 0.5–1.4)
CREAT BLDA-MCNC: 1.6 MG/DL (ref 0.6–1.3)
GFR SERPL CREATININE-BSD FRML MDRD: 43 ML/MIN/1.73

## 2018-01-10 PROCEDURE — 0 IOPAMIDOL 61 % SOLUTION: Performed by: INTERNAL MEDICINE

## 2018-01-10 PROCEDURE — 71260 CT THORAX DX C+: CPT

## 2018-01-10 PROCEDURE — 82565 ASSAY OF CREATININE: CPT

## 2018-01-10 PROCEDURE — 82565 ASSAY OF CREATININE: CPT | Performed by: INTERNAL MEDICINE

## 2018-01-10 RX ADMIN — IOPAMIDOL 100 ML: 612 INJECTION, SOLUTION INTRAVENOUS at 09:45

## 2018-01-17 ENCOUNTER — TELEPHONE (OUTPATIENT)
Dept: GASTROENTEROLOGY | Age: 78
End: 2018-01-17

## 2018-01-17 NOTE — TELEPHONE ENCOUNTER
Summary and Recommendations  no signs or stigmata of small bowel blood loss. if evidence of gi blood loss persist, I would consider a repeat EGD /  [address] as he did have evidence of blood loss on both of those procedures in 7/2017. certain areas may need to be  cauterized again. Patient called the office this AM from [phone] asking for his recent M2A Pill Cam results, see the above report I read to the patient. Patient told me he has not noticed any signs of blood loss and wanted me to know he has been able to d/c his blood thinner per his prescribing Doctor.  Vel cma

## 2018-01-22 ENCOUNTER — APPOINTMENT (OUTPATIENT)
Dept: PREADMISSION TESTING | Facility: HOSPITAL | Age: 78
End: 2018-01-22

## 2018-01-22 ENCOUNTER — HOSPITAL ENCOUNTER (OUTPATIENT)
Dept: GENERAL RADIOLOGY | Facility: HOSPITAL | Age: 78
Discharge: HOME | End: 2018-01-22
Admitting: INTERNAL MEDICINE

## 2018-01-22 VITALS
HEART RATE: 76 BPM | OXYGEN SATURATION: 97 % | BODY MASS INDEX: 29.03 KG/M2 | WEIGHT: 195.99 LBS | DIASTOLIC BLOOD PRESSURE: 86 MMHG | HEIGHT: 69 IN | SYSTOLIC BLOOD PRESSURE: 149 MMHG

## 2018-01-22 LAB
ANION GAP SERPL CALCULATED.3IONS-SCNC: 11 MMOL/L (ref 4–13)
BUN BLD-MCNC: 25 MG/DL (ref 5–21)
BUN/CREAT SERPL: 14.2 (ref 7–25)
CALCIUM SPEC-SCNC: 8.6 MG/DL (ref 8.4–10.4)
CHLORIDE SERPL-SCNC: 108 MMOL/L (ref 98–110)
CO2 SERPL-SCNC: 24 MMOL/L (ref 24–31)
CREAT BLD-MCNC: 1.76 MG/DL (ref 0.5–1.4)
DEPRECATED RDW RBC AUTO: 53.6 FL (ref 40–54)
ERYTHROCYTE [DISTWIDTH] IN BLOOD BY AUTOMATED COUNT: 16.9 % (ref 12–15)
GFR SERPL CREATININE-BSD FRML MDRD: 38 ML/MIN/1.73
GLUCOSE BLD-MCNC: 75 MG/DL (ref 70–100)
HCT VFR BLD AUTO: 33.5 % (ref 40–52)
HGB BLD-MCNC: 10.5 G/DL (ref 14–18)
MCH RBC QN AUTO: 27.2 PG (ref 28–32)
MCHC RBC AUTO-ENTMCNC: 31.3 G/DL (ref 33–36)
MCV RBC AUTO: 86.8 FL (ref 82–95)
PLATELET # BLD AUTO: 207 10*3/MM3 (ref 130–400)
PMV BLD AUTO: 11.3 FL (ref 6–12)
POTASSIUM BLD-SCNC: 3.8 MMOL/L (ref 3.5–5.3)
RBC # BLD AUTO: 3.86 10*6/MM3 (ref 4.8–5.9)
SODIUM BLD-SCNC: 143 MMOL/L (ref 135–145)
WBC NRBC COR # BLD: 5.02 10*3/MM3 (ref 4.8–10.8)

## 2018-01-22 PROCEDURE — 80048 BASIC METABOLIC PNL TOTAL CA: CPT | Performed by: INTERNAL MEDICINE

## 2018-01-22 PROCEDURE — 71046 X-RAY EXAM CHEST 2 VIEWS: CPT

## 2018-01-22 PROCEDURE — 93010 ELECTROCARDIOGRAM REPORT: CPT | Performed by: INTERNAL MEDICINE

## 2018-01-22 PROCEDURE — 93005 ELECTROCARDIOGRAM TRACING: CPT

## 2018-01-22 PROCEDURE — 36415 COLL VENOUS BLD VENIPUNCTURE: CPT

## 2018-01-22 PROCEDURE — 85027 COMPLETE CBC AUTOMATED: CPT | Performed by: INTERNAL MEDICINE

## 2018-01-22 NOTE — DISCHARGE INSTRUCTIONS
DAY OF SURGERY INSTRUCTIONS    Attending:   Cecilio Hurtado MD  Bronchoscopy With Endobronchial Ultrasound-N/A  Cecilio Hurtado MD    DATE OF SURGERY: 1-25-18    ARRIVAL TIME: AS DIRECTED BY OFFICE    DAY OF SURGERY TAKE ONLY THESE MEDICATIONS: NONE            BEFORE YOU COME TO THE HOSPITAL  (Pre-op instructions)  • Do not eat, drink, smoke or chew gum after midnight the night before surgery.  This also includes no mints.  • Morning of surgery take only the medicines you have been instructed with a sip of water unless otherwise instructed  by your physician.  • Do not shave, wear makeup or dark nail polish.  • Remove all jewelry including rings.  • Leave anything you consider valuable at home.  • Leave your suitcase in the car until after your surgery.  • Bring the following with you if applicable:  o Picture ID and insurance, Medicare or Medicaid cards  o Co-pay/deductible required by insurance (cash, check, credit card)  o Copy of advance directive, living will or power-of- documents if not brought to PAT  o CPAP or BIPAP mask and tubing  o Relaxation aids (MP3 player, book, magazine)  • On the day of surgery check in at registration located at the main entrance of the hospital.       Outpatient Surgery Guidelines, Adult  Outpatient procedures are those for which the person having the procedure is allowed to go home the same day as the procedure. Various procedures are done on an outpatient basis. You should follow some general guidelines if you will be having an outpatient procedure.  LET YOUR HEALTH CARE PROVIDER KNOW ABOUT:  · Any allergies you have.  · All medicines you are taking, including vitamins, herbs, eye drops, creams, and over-the-counter medicines.  · Previous problems you or members of your family have had with the use of anesthetics.  · Any blood disorders you have.  · Previous surgeries you have had.  · Medical conditions you have.  RISKS AND COMPLICATIONS  Your health care  provider will discuss possible risks and complications with you before surgery. Common risks and complications include:    · Problems due to the use of anesthetics.  · Blood loss and replacement (does not apply to minor surgical procedures).  · Temporary increase in pain due to surgery.  · Uncorrected pain or problems that the surgery was meant to correct.  · Infection.  · New damage.  BEFORE THE PROCEDURE  · Ask your health care provider about changing or stopping your regular medicines. You may need to stop taking certain medicines in the days or weeks before the procedure.  · Stop smoking at least 2 weeks before surgery. This lowers your risk for complications during and after surgery. Ask your health care provider for help with this if needed.  · Eat your usual meals and a light supper the day before surgery. Continue fluid intake. Do not drink alcohol.  · Do not eat or drink after midnight the night before your surgery.   · Arrange for someone to take you home and to stay with you for 24 hours after the procedure. Medicine given for your procedure may affect your ability to drive or to care for yourself.  · Call your health care provider's office if you develop an illness or problem that may prevent you from safely having your procedure.  AFTER THE PROCEDURE  After surgery, you will be taken to a recovery area, where your progress will be monitored. If there are no complications, you will be allowed to go home when you are awake, stable, and taking fluids well. You may have numbness around the surgical site. Healing will take some time. You will have tenderness at the surgical site and may have some swelling and bruising. You may also have some nausea.  HOME CARE INSTRUCTIONS  · Do not drive for 24 hours, or as directed by your health care provider. Do not drive while taking prescription pain medicines.  · Do not drink alcohol for 24 hours.  · Do not make important decisions or sign legal documents for 24  hours.  · You may resume a normal diet and activities as directed.  · Do not lift anything heavier than 10 pounds (4.5 kg) or play contact sports until your health care provider says it is okay.  · Change your bandages (dressings) as directed.  · Only take over-the-counter or prescription medicines as directed by your health care provider.  · Follow up with your health care provider as directed.  SEEK MEDICAL CARE IF:  · You have increased bleeding (more than a small spot) from the surgical site.  · You have redness, swelling, or increasing pain in the wound.  · You see pus coming from the wound.  · You have a fever.  · You notice a bad smell coming from the wound or dressing.  · You feel lightheaded or faint.  · You develop a rash.  · You have trouble breathing.  · You develop allergies.  MAKE SURE YOU:  · Understand these instructions.  · Will watch your condition.  · Will get help right away if you are not doing well or get worse.     This information is not intended to replace advice given to you by your health care provider. Make sure you discuss any questions you have with your health care provider.     Document Released: 09/12/2002 Document Revised: 05/03/2016 Document Reviewed: 05/22/2014  [employer] Interactive Patient Education ©2016 [employer]       Fall Prevention in Hospitals, Adult  As a hospital patient, your condition and the treatments you receive can increase your risk for falls. Some additional risk factors for falls in a hospital include:  · Being in an unfamiliar environment.  · Being on bed rest.  · Your surgery.  · Taking certain medicines.  · Your tubing requirements, such as intravenous (IV) therapy or catheters.  It is important that you learn how to decrease fall risks while at the hospital. Below are important tips that can help prevent falls.  SAFETY TIPS FOR PREVENTING FALLS  Talk about your risk of falling.  · Ask your health care provider why you are at risk for falling. Is it your  medicine, illness, tubing placement, or something else?  · Make a plan with your health care provider to keep you safe from falls.  · Ask your health care provider or pharmacist about side effects of your medicines. Some medicines can make you dizzy or affect your coordination.  Ask for help.  · Ask for help before getting out of bed. You may need to press your call button.  · Ask for assistance in getting safely to the toilet.  · Ask for a walker or cane to be put at your bedside. Ask that most of the side rails on your bed be placed up before your health care provider leaves the room.  · Ask family or friends to sit with you.  · Ask for things that are out of your reach, such as your glasses, hearing aids, telephone, bedside table, or call button.  Follow these tips to avoid falling:  · Stay lying or seated, rather than standing, while waiting for help.  · Wear rubber-soled slippers or shoes whenever you walk in the hospital.  · Avoid quick, sudden movements.  ¨ Change positions slowly.  ¨ Sit on the side of your bed before standing.  ¨ Stand up slowly and wait before you start to walk.  · Let your health care provider know if there is a spill on the floor.  · Pay careful attention to the medical equipment, electrical cords, and tubes around you.  · When you need help, use your call button by your bed or in the bathroom. Wait for one of your health care providers to help you.  · If you feel dizzy or unsure of your footing, return to bed and wait for assistance.  · Avoid being distracted by the TV, telephone, or another person in your room.  · Do not lean or support yourself on rolling objects, such as IV poles or bedside tables.     This information is not intended to replace advice given to you by your health care provider. Make sure you discuss any questions you have with your health care provider.     Document Released: 12/15/2001 Document Revised: 01/08/2016 Document Reviewed: 08/25/2013  [employer]  Patient Education ©2016 [employer]       Surgical Site Infections FAQs  What is a Surgical Site Infection (SSI)?  A surgical site infection is an infection that occurs after surgery in the part of the body where the surgery took place. Most patients who have surgery do not develop an infection. However, infections develop in about 1 to 3 out of every 100 patients who have surgery.  Some of the common symptoms of a surgical site infection are:  · Redness and pain around the area where you had surgery  · Drainage of cloudy fluid from your surgical wound  · Fever  Can SSIs be treated?  Yes. Most surgical site infections can be treated with antibiotics. The antibiotic given to you depends on the bacteria (germs) causing the infection. Sometimes patients with SSIs also need another surgery to treat the infection.  What are some of the things that hospitals are doing to prevent SSIs?  To prevent SSIs, doctors, nurses, and other healthcare providers:  · Clean their hands and arms up to their elbows with an antiseptic agent just before the surgery.  · Clean their hands with soap and water or an alcohol-based hand rub before and after caring for each patient.  · May remove some of your hair immediately before your surgery using electric clippers if the hair is in the same area where the procedure will occur. They should not shave you with a razor.  · Wear special hair covers, masks, gowns, and gloves during surgery to keep the surgery area clean.  · Give you antibiotics before your surgery starts. In most cases, you should get antibiotics within 60 minutes before the surgery starts and the antibiotics should be stopped within 24 hours after surgery.  · Clean the skin at the site of your surgery with a special soap that kills germs.  What can I do to help prevent SSIs?  Before your surgery:  · Tell your doctor about other medical problems you may have. Health problems such as allergies, diabetes, and obesity could affect  your surgery and your treatment.  · Quit smoking. Patients who smoke get more infections. Talk to your doctor about how you can quit before your surgery.  · Do not shave near where you will have surgery. Shaving with a razor can irritate your skin and make it easier to develop an infection.  At the time of your surgery:  · Speak up if someone tries to shave you with a razor before surgery. Ask why you need to be shaved and talk with your surgeon if you have any concerns.  · Ask if you will get antibiotics before surgery.  After your surgery:  · Make sure that your healthcare providers clean their hands before examining you, either with soap and water or an alcohol-based hand rub.  · If you do not see your providers clean their hands, please ask them to do so.  · Family and friends who visit you should not touch the surgical wound or dressings.  · Family and friends should clean their hands with soap and water or an alcohol-based hand rub before and after visiting you. If you do not see them clean their hands, ask them to clean their hands.  What do I need to do when I go home from the hospital?  · Before you go home, your doctor or nurse should explain everything you need to know about taking care of your wound. Make sure you understand how to care for your wound before you leave the hospital.  · Always clean your hands before and after caring for your wound.  · Before you go home, make sure you know who to contact if you have questions or problems after you get home.  · If you have any symptoms of an infection, such as redness and pain at the surgery site, drainage, or fever, call your doctor immediately.  If you have additional questions, please ask your doctor or nurse.  Developed and co-sponsored by The Society for Healthcare Epidemiology of [place] (SHEA); Infectious Diseases Society of [place] (IDSA); American Hospital Association; Association for Professionals in Infection Control and Epidemiology (APIC);  Centers for Disease Control and Prevention (CDC); and The Joint Commission.     This information is not intended to replace advice given to you by your health care provider. Make sure you discuss any questions you have with your health care provider.     Document Released: 12/23/2014 Document Revised: 01/08/2016 Document Reviewed: 03/02/2016  [employer] Interactive Patient Education ©2016 [employer]     PATIENT/FAMILY/RESPONSIBLE PARTY VERBALIZES UNDERSTANDING OF ABOVE EDUCATION.  COPY OF PAIN SCALE GIVEN AND REVIEWED WITH VERBALIZED UNDERSTANDING.

## 2018-01-25 ENCOUNTER — ANESTHESIA (OUTPATIENT)
Dept: PERIOP | Facility: HOSPITAL | Age: 78
End: 2018-01-25

## 2018-01-25 ENCOUNTER — ANESTHESIA EVENT (OUTPATIENT)
Dept: PERIOP | Facility: HOSPITAL | Age: 78
End: 2018-01-25

## 2018-01-25 ENCOUNTER — HOSPITAL ENCOUNTER (OUTPATIENT)
Facility: HOSPITAL | Age: 78
Setting detail: HOSPITAL OUTPATIENT SURGERY
Discharge: HOME | End: 2018-01-25
Attending: INTERNAL MEDICINE | Admitting: INTERNAL MEDICINE

## 2018-01-25 VITALS
RESPIRATION RATE: 16 BRPM | TEMPERATURE: 98 F | SYSTOLIC BLOOD PRESSURE: 125 MMHG | OXYGEN SATURATION: 97 % | DIASTOLIC BLOOD PRESSURE: 76 MMHG | HEART RATE: 71 BPM

## 2018-01-25 DIAGNOSIS — R59.0 MEDIASTINAL ADENOPATHY: ICD-10-CM

## 2018-01-25 LAB — KOH PREP NAIL: NORMAL

## 2018-01-25 PROCEDURE — 88312 SPECIAL STAINS GROUP 1: CPT | Performed by: INTERNAL MEDICINE

## 2018-01-25 PROCEDURE — 87102 FUNGUS ISOLATION CULTURE: CPT | Performed by: INTERNAL MEDICINE

## 2018-01-25 PROCEDURE — 25010000002 FENTANYL CITRATE (PF) 100 MCG/2ML SOLUTION: Performed by: NURSE ANESTHETIST, CERTIFIED REGISTERED

## 2018-01-25 PROCEDURE — 87220 TISSUE EXAM FOR FUNGI: CPT | Performed by: INTERNAL MEDICINE

## 2018-01-25 PROCEDURE — 88334 PATH CONSLTJ SURG CYTO XM EA: CPT | Performed by: INTERNAL MEDICINE

## 2018-01-25 PROCEDURE — 25010000002 PROPOFOL 10 MG/ML EMULSION: Performed by: NURSE ANESTHETIST, CERTIFIED REGISTERED

## 2018-01-25 PROCEDURE — 87116 MYCOBACTERIA CULTURE: CPT | Performed by: INTERNAL MEDICINE

## 2018-01-25 PROCEDURE — 87205 SMEAR GRAM STAIN: CPT | Performed by: INTERNAL MEDICINE

## 2018-01-25 PROCEDURE — 88305 TISSUE EXAM BY PATHOLOGIST: CPT | Performed by: INTERNAL MEDICINE

## 2018-01-25 PROCEDURE — 88173 CYTOPATH EVAL FNA REPORT: CPT | Performed by: INTERNAL MEDICINE

## 2018-01-25 PROCEDURE — C1726 CATH, BAL DIL, NON-VASCULAR: HCPCS | Performed by: INTERNAL MEDICINE

## 2018-01-25 PROCEDURE — 25010000002 SUCCINYLCHOLINE PER 20 MG: Performed by: NURSE ANESTHETIST, CERTIFIED REGISTERED

## 2018-01-25 PROCEDURE — 25010000002 ONDANSETRON PER 1 MG: Performed by: NURSE ANESTHETIST, CERTIFIED REGISTERED

## 2018-01-25 PROCEDURE — 88172 CYTP DX EVAL FNA 1ST EA SITE: CPT | Performed by: INTERNAL MEDICINE

## 2018-01-25 PROCEDURE — 88184 FLOWCYTOMETRY/ TC 1 MARKER: CPT | Performed by: INTERNAL MEDICINE

## 2018-01-25 PROCEDURE — 88185 FLOWCYTOMETRY/TC ADD-ON: CPT | Performed by: INTERNAL MEDICINE

## 2018-01-25 PROCEDURE — 87070 CULTURE OTHR SPECIMN AEROBIC: CPT | Performed by: INTERNAL MEDICINE

## 2018-01-25 PROCEDURE — 87206 SMEAR FLUORESCENT/ACID STAI: CPT | Performed by: INTERNAL MEDICINE

## 2018-01-25 PROCEDURE — 88104 CYTOPATH FL NONGYN SMEARS: CPT | Performed by: INTERNAL MEDICINE

## 2018-01-25 RX ORDER — FLUMAZENIL 0.1 MG/ML
0.2 INJECTION INTRAVENOUS AS NEEDED
Status: DISCONTINUED | OUTPATIENT
Start: 2018-01-25 | End: 2018-01-25 | Stop reason: HOSPADM

## 2018-01-25 RX ORDER — SODIUM CHLORIDE, SODIUM LACTATE, POTASSIUM CHLORIDE, CALCIUM CHLORIDE 600; 310; 30; 20 MG/100ML; MG/100ML; MG/100ML; MG/100ML
100 INJECTION, SOLUTION INTRAVENOUS CONTINUOUS
Status: DISCONTINUED | OUTPATIENT
Start: 2018-01-25 | End: 2018-01-25 | Stop reason: HOSPADM

## 2018-01-25 RX ORDER — ONDANSETRON 2 MG/ML
INJECTION INTRAMUSCULAR; INTRAVENOUS AS NEEDED
Status: DISCONTINUED | OUTPATIENT
Start: 2018-01-25 | End: 2018-01-25 | Stop reason: SURG

## 2018-01-25 RX ORDER — IPRATROPIUM BROMIDE AND ALBUTEROL SULFATE 2.5; .5 MG/3ML; MG/3ML
3 SOLUTION RESPIRATORY (INHALATION) ONCE AS NEEDED
Status: DISCONTINUED | OUTPATIENT
Start: 2018-01-25 | End: 2018-01-25 | Stop reason: HOSPADM

## 2018-01-25 RX ORDER — ONDANSETRON 2 MG/ML
4 INJECTION INTRAMUSCULAR; INTRAVENOUS AS NEEDED
Status: DISCONTINUED | OUTPATIENT
Start: 2018-01-25 | End: 2018-01-25 | Stop reason: HOSPADM

## 2018-01-25 RX ORDER — MEPERIDINE HYDROCHLORIDE 25 MG/ML
12.5 INJECTION INTRAMUSCULAR; INTRAVENOUS; SUBCUTANEOUS
Status: DISCONTINUED | OUTPATIENT
Start: 2018-01-25 | End: 2018-01-25 | Stop reason: HOSPADM

## 2018-01-25 RX ORDER — SODIUM CHLORIDE, SODIUM LACTATE, POTASSIUM CHLORIDE, CALCIUM CHLORIDE 600; 310; 30; 20 MG/100ML; MG/100ML; MG/100ML; MG/100ML
1000 INJECTION, SOLUTION INTRAVENOUS CONTINUOUS
Status: DISCONTINUED | OUTPATIENT
Start: 2018-01-25 | End: 2018-01-25 | Stop reason: HOSPADM

## 2018-01-25 RX ORDER — SODIUM CHLORIDE 0.9 % (FLUSH) 0.9 %
3 SYRINGE (ML) INJECTION AS NEEDED
Status: DISCONTINUED | OUTPATIENT
Start: 2018-01-25 | End: 2018-01-25 | Stop reason: HOSPADM

## 2018-01-25 RX ORDER — NALOXONE HCL 0.4 MG/ML
0.04 VIAL (ML) INJECTION AS NEEDED
Status: DISCONTINUED | OUTPATIENT
Start: 2018-01-25 | End: 2018-01-25 | Stop reason: HOSPADM

## 2018-01-25 RX ORDER — SODIUM CHLORIDE 0.9 % (FLUSH) 0.9 %
1-10 SYRINGE (ML) INJECTION AS NEEDED
Status: DISCONTINUED | OUTPATIENT
Start: 2018-01-25 | End: 2018-01-25 | Stop reason: HOSPADM

## 2018-01-25 RX ORDER — LIDOCAINE HYDROCHLORIDE 20 MG/ML
INJECTION, SOLUTION INFILTRATION; PERINEURAL AS NEEDED
Status: DISCONTINUED | OUTPATIENT
Start: 2018-01-25 | End: 2018-01-25 | Stop reason: SURG

## 2018-01-25 RX ORDER — SUCCINYLCHOLINE CHLORIDE 20 MG/ML
INJECTION INTRAMUSCULAR; INTRAVENOUS AS NEEDED
Status: DISCONTINUED | OUTPATIENT
Start: 2018-01-25 | End: 2018-01-25 | Stop reason: SURG

## 2018-01-25 RX ORDER — HYDRALAZINE HYDROCHLORIDE 20 MG/ML
5 INJECTION INTRAMUSCULAR; INTRAVENOUS
Status: DISCONTINUED | OUTPATIENT
Start: 2018-01-25 | End: 2018-01-25 | Stop reason: HOSPADM

## 2018-01-25 RX ORDER — FENTANYL CITRATE 50 UG/ML
INJECTION, SOLUTION INTRAMUSCULAR; INTRAVENOUS AS NEEDED
Status: DISCONTINUED | OUTPATIENT
Start: 2018-01-25 | End: 2018-01-25 | Stop reason: SURG

## 2018-01-25 RX ORDER — MORPHINE SULFATE 2 MG/ML
2 INJECTION, SOLUTION INTRAMUSCULAR; INTRAVENOUS AS NEEDED
Status: DISCONTINUED | OUTPATIENT
Start: 2018-01-25 | End: 2018-01-25 | Stop reason: HOSPADM

## 2018-01-25 RX ORDER — PROPOFOL 10 MG/ML
VIAL (ML) INTRAVENOUS AS NEEDED
Status: DISCONTINUED | OUTPATIENT
Start: 2018-01-25 | End: 2018-01-25 | Stop reason: SURG

## 2018-01-25 RX ORDER — ROCURONIUM BROMIDE 10 MG/ML
INJECTION, SOLUTION INTRAVENOUS AS NEEDED
Status: DISCONTINUED | OUTPATIENT
Start: 2018-01-25 | End: 2018-01-25 | Stop reason: SURG

## 2018-01-25 RX ORDER — LIDOCAINE HYDROCHLORIDE 20 MG/ML
5 INJECTION, SOLUTION EPIDURAL; INFILTRATION; INTRACAUDAL; PERINEURAL ONCE
Status: COMPLETED | OUTPATIENT
Start: 2018-01-25 | End: 2018-01-25

## 2018-01-25 RX ORDER — METOCLOPRAMIDE HYDROCHLORIDE 5 MG/ML
5 INJECTION INTRAMUSCULAR; INTRAVENOUS
Status: DISCONTINUED | OUTPATIENT
Start: 2018-01-25 | End: 2018-01-25 | Stop reason: HOSPADM

## 2018-01-25 RX ORDER — LABETALOL HYDROCHLORIDE 5 MG/ML
5 INJECTION, SOLUTION INTRAVENOUS
Status: DISCONTINUED | OUTPATIENT
Start: 2018-01-25 | End: 2018-01-25 | Stop reason: HOSPADM

## 2018-01-25 RX ADMIN — LIDOCAINE HYDROCHLORIDE 0.5 ML: 10 INJECTION, SOLUTION EPIDURAL; INFILTRATION; INTRACAUDAL; PERINEURAL at 11:35

## 2018-01-25 RX ADMIN — PROPOFOL 150 MG: 10 INJECTION, EMULSION INTRAVENOUS at 13:08

## 2018-01-25 RX ADMIN — FENTANYL CITRATE 50 MCG: 50 INJECTION, SOLUTION INTRAMUSCULAR; INTRAVENOUS at 13:23

## 2018-01-25 RX ADMIN — SODIUM CHLORIDE, POTASSIUM CHLORIDE, SODIUM LACTATE AND CALCIUM CHLORIDE 1000 ML: 600; 310; 30; 20 INJECTION, SOLUTION INTRAVENOUS at 11:36

## 2018-01-25 RX ADMIN — ROCURONIUM BROMIDE 10 MG: 10 INJECTION INTRAVENOUS at 13:08

## 2018-01-25 RX ADMIN — LIDOCAINE HYDROCHLORIDE 100 MG: 20 INJECTION, SOLUTION INFILTRATION; PERINEURAL at 13:08

## 2018-01-25 RX ADMIN — FENTANYL CITRATE 50 MCG: 50 INJECTION, SOLUTION INTRAMUSCULAR; INTRAVENOUS at 13:06

## 2018-01-25 RX ADMIN — SUCCINYLCHOLINE CHLORIDE 100 MG: 20 INJECTION, SOLUTION INTRAMUSCULAR; INTRAVENOUS at 13:08

## 2018-01-25 RX ADMIN — ONDANSETRON HYDROCHLORIDE 4 MG: 2 SOLUTION INTRAMUSCULAR; INTRAVENOUS at 13:40

## 2018-01-25 RX ADMIN — LIDOCAINE HYDROCHLORIDE 5 ML: 20 INJECTION, SOLUTION EPIDURAL; INFILTRATION; INTRACAUDAL; PERINEURAL at 12:56

## 2018-01-25 NOTE — DISCHARGE INSTRUCTIONS

## 2018-01-25 NOTE — PLAN OF CARE
Problem: Patient Care Overview (Adult)  Goal: Plan of Care Review  Outcome: Ongoing (interventions implemented as appropriate)   01/25/18 1225   Coping/Psychosocial Response Interventions   Plan Of Care Reviewed With patient   Patient Care Overview   Progress no change       Problem: Perioperative Period (Pediatric)  Goal: Signs and Symptoms of Listed Potential Problems Will be Absent or Manageable (Perioperative Period)  Outcome: Ongoing (interventions implemented as appropriate)   01/25/18 1225   Perioperative Period   Problems Assessed (Perioperative Period) pain;perioperative injury;infection;situational response   Problems Present (Perioperative Period) situational response

## 2018-01-25 NOTE — OP NOTE
Bronchoscopy Procedure Note    Date of Operation: 01/25/18    Pre-op Diagnosis: Mediastinal adenopathy  Post-op Diagnosis: Same    Surgeon: Cecilio Hurtado MD    Mallampati class: 1  ASA class: 2    Anesthesia:General.    Operation: Flexible fiberoptic bronchoscopy, diagnostic without c-arm    with endobronchial ultrasonography  with TBNA mediastinal node was performed    Findings: The patient's airways were unremarkable.  A station 7 node was localized with endobronchial ultrasound and multiple transbronchial needle aspirates were obtained.    Specimen:   ID Type Source Tests Collected by Time Destination   A :  Tissue Bronchus TISSUE EXAM Cecilio Hurtado MD 1/25/2018 1340        Estimated Blood Loss: Minimal    Complications: None    Indications and History:  The patient is a 77 y.o.  male with thoracic adenopathy which had been noted on previous imaging studies but appeared to be progressing.  The risks, benefits, complications, treatment options and expected outcomes were discussed with the patient.  The possibilities of reaction to medication, pulmonary aspiration, perforation of a viscus, bleeding, failure to diagnose a condition and creating a complication requiring transfusion or operation were discussed with the patient who freely signed the consent.  Time out was taken prior to the procedure.    Description of Procedure:    Patient was bronchoscoped in the holding area adjacent to surgery under general endotracheal anesthesia.  After general endotracheal anesthesia was obtained, the standard bronchoscope was passed via the endotracheal tube into the distal trachea.  The distal trachea and aurelio were unremarkable.  The left upper, left lower, right upper, right middle, and right lower lobe also unremarkable.  The standard bronchoscope was then removed in the EBUS bronchoscope was then passed into the distal trachea.  A station 7 lymph node was localized with endobronchial ultrasound and  multiple transbronchial needle aspirates were obtained.  The EBUS bronchoscope was then removed the balloon intact and the standard bronchoscope was passed back into the distal trachea via the patient's endotracheal tube and bronchial washings were obtained.  The patient toward the procedure well and there were no immediate complications.        The Patient was taken to the PACU in satisfactory condition.      Cecilio Hurtado MD at 1:41 PM, 1/25/2018

## 2018-01-25 NOTE — PLAN OF CARE
Problem: Patient Care Overview (Adult)  Goal: Plan of Care Review  Outcome: Outcome(s) achieved Date Met: 01/25/18 01/25/18 6369   Coping/Psychosocial Response Interventions   Plan Of Care Reviewed With patient;spouse   Patient Care Overview   Progress improving   Outcome Evaluation   Outcome Summary/Follow up Plan Patient meets discharge criteria and is ready for discharge.     Goal: Adult Individualization and Mutuality  Outcome: Outcome(s) achieved Date Met: 01/25/18    Goal: Discharge Needs Assessment  Outcome: Outcome(s) achieved Date Met: 01/25/18      Problem: Perioperative Period (Pediatric)  Goal: Signs and Symptoms of Listed Potential Problems Will be Absent or Manageable (Perioperative Period)  Outcome: Outcome(s) achieved Date Met: 01/25/18

## 2018-01-25 NOTE — PLAN OF CARE
Problem: Patient Care Overview (Adult)  Goal: Plan of Care Review  Outcome: Ongoing (interventions implemented as appropriate)   01/25/18 1323   Patient Care Overview   Progress improving   Outcome Evaluation   Outcome Summary/Follow up Plan pt tolerating procedure well

## 2018-01-25 NOTE — PLAN OF CARE
Problem: Patient Care Overview (Adult)  Goal: Plan of Care Review  Outcome: Ongoing (interventions implemented as appropriate)   01/25/18 1414   Coping/Psychosocial Response Interventions   Plan Of Care Reviewed With patient   Patient Care Overview   Progress improving   Outcome Evaluation   Outcome Summary/Follow up Plan Alert, no complaints. PACU dc criteria met.       Problem: Perioperative Period (Pediatric)  Goal: Signs and Symptoms of Listed Potential Problems Will be Absent or Manageable (Perioperative Period)  Outcome: Ongoing (interventions implemented as appropriate)

## 2018-01-27 LAB
BACTERIA SPEC RESP CULT: NORMAL
BACTERIA SPEC RESP CULT: NORMAL
GRAM STN SPEC: NORMAL

## 2018-01-29 NOTE — PROGRESS NOTES
Pill cam has been imported into EPIC, under , from [employer]. Patient has been billed for [phone].

## 2018-02-01 ENCOUNTER — TRANSCRIBE ORDERS (OUTPATIENT)
Dept: ADMINISTRATIVE | Facility: HOSPITAL | Age: 78
End: 2018-02-01

## 2018-02-01 DIAGNOSIS — R59.0 MEDIASTINAL LYMPHADENOPATHY: Primary | ICD-10-CM

## 2018-02-06 NOTE — ANESTHESIA POSTPROCEDURE EVALUATION
Patient: Zeb Adams    Procedure Summary     Date Anesthesia Start Anesthesia Stop Room / Location    01/25/18 1300 1348 [facility] / [facility]       Procedure Diagnosis Surgeon Provider    BRONCHOSCOPY WITH ENDOBRONCHIAL ULTRASOUND (Bilateral Bronchus) (MEDIASTINAL ADENOPATHY ) MD Drew Warren CRNA          Anesthesia Type: general  Last vitals  BP   125/76 (01/25/18 1445)   Temp   98 °F (36.7 °C) (01/25/18 1415)   Pulse   71 (01/25/18 1445)   Resp   16 (01/25/18 1445)     SpO2   97 % (01/25/18 1445)     Post Anesthesia Care and Evaluation    Anesthetic complications: No anesthetic complications    Comments: Patient discharged from PACU per protocol and RN.   Blood pressure 125/76, pulse 71, temperature 98 °F (36.7 °C), temperature source Temporal Artery , resp. rate 16, SpO2 97 %.

## 2018-02-09 ENCOUNTER — TRANSCRIBE ORDERS (OUTPATIENT)
Dept: ADMINISTRATIVE | Facility: HOSPITAL | Age: 78
End: 2018-02-09

## 2018-02-09 DIAGNOSIS — R11.0 NAUSEA: Primary | ICD-10-CM

## 2018-02-14 ENCOUNTER — HOSPITAL ENCOUNTER (OUTPATIENT)
Dept: ULTRASOUND IMAGING | Facility: HOSPITAL | Age: 78
Discharge: HOME | End: 2018-02-14
Attending: INTERNAL MEDICINE | Admitting: INTERNAL MEDICINE

## 2018-02-14 ENCOUNTER — HOSPITAL ENCOUNTER (OUTPATIENT)
Dept: NUCLEAR MEDICINE | Facility: HOSPITAL | Age: 78
Discharge: HOME | End: 2018-02-14
Attending: INTERNAL MEDICINE

## 2018-02-14 DIAGNOSIS — R11.0 NAUSEA: ICD-10-CM

## 2018-02-14 PROCEDURE — 0 TECHNETIUM TC 99M MEBROFENIN KIT: Performed by: INTERNAL MEDICINE

## 2018-02-14 PROCEDURE — A9537 TC99M MEBROFENIN: HCPCS | Performed by: INTERNAL MEDICINE

## 2018-02-14 PROCEDURE — 78227 HEPATOBIL SYST IMAGE W/DRUG: CPT

## 2018-02-14 PROCEDURE — 76705 ECHO EXAM OF ABDOMEN: CPT

## 2018-02-14 RX ORDER — KIT FOR THE PREPARATION OF TECHNETIUM TC 99M MEBROFENIN 45 MG/10ML
1 INJECTION, POWDER, LYOPHILIZED, FOR SOLUTION INTRAVENOUS
Status: COMPLETED | OUTPATIENT
Start: 2018-02-14 | End: 2018-02-14

## 2018-02-14 RX ADMIN — MEBROFENIN 1 DOSE: 45 INJECTION, POWDER, LYOPHILIZED, FOR SOLUTION INTRAVENOUS at 11:00

## 2018-02-28 ENCOUNTER — HOSPITAL ENCOUNTER (OUTPATIENT)
Dept: PULMONOLOGY | Facility: HOSPITAL | Age: 78
Discharge: HOME | End: 2018-02-28
Attending: INTERNAL MEDICINE | Admitting: INTERNAL MEDICINE

## 2018-02-28 DIAGNOSIS — R59.0 MEDIASTINAL LYMPHADENOPATHY: ICD-10-CM

## 2018-02-28 PROCEDURE — 94060 EVALUATION OF WHEEZING: CPT

## 2018-02-28 PROCEDURE — 94729 DIFFUSING CAPACITY: CPT

## 2018-02-28 PROCEDURE — 94727 GAS DIL/WSHOT DETER LNG VOL: CPT

## 2018-02-28 PROCEDURE — 63710000001 ALBUTEROL PER 1 MG: Performed by: INTERNAL MEDICINE

## 2018-02-28 PROCEDURE — A9270 NON-COVERED ITEM OR SERVICE: HCPCS | Performed by: INTERNAL MEDICINE

## 2018-02-28 RX ORDER — ALBUTEROL SULFATE 2.5 MG/3ML
2.5 SOLUTION RESPIRATORY (INHALATION) ONCE
Status: COMPLETED | OUTPATIENT
Start: 2018-02-28 | End: 2018-02-28

## 2018-02-28 RX ADMIN — ALBUTEROL SULFATE 2.5 MG: 2.5 SOLUTION RESPIRATORY (INHALATION) at 14:37

## 2018-03-08 LAB
FUNGUS WND CULT: NORMAL
MYCOBACTERIUM SPEC CULT: NORMAL
NIGHT BLUE STAIN TISS: NORMAL
NIGHT BLUE STAIN TISS: NORMAL

## 2018-04-23 NOTE — PROGRESS NOTES
Terry Romo MD     Chief Complaint   Patient presents with   • Follow-up     Submandibular gland swelling   • Laryngitis       HPI   Zeb Adams is a  78 y.o. male who is here for follow up. He is status post resection of the right submandibular gland in the past. He has had difficulty with chronic left sided submandibular gland swelling. He hsa had stable left submandibular gland swelling. He has had health issues with a reaction to Lyrica recently.    Review of Systems:  Reviewed per patient intake note    Past History:  Past medical and surgical history, family history and social history reviewed and updated when appropriate.  Current medications and allergies reviewed and updated when appropriate.  Allergies:  Lyrica [pregabalin]; Penicillins; and Sulfa antibiotics    Vital Signs:   Temp:  [97.1 °F (36.2 °C)] 97.1 °F (36.2 °C)  BP: (112)/(72) 112/72    Physical Exam   CONSTITUTIONAL: well nourished, well-developed, alert, oriented, in no acute distress   COMMUNICATION AND VOICE: able to communicate normally, normal voice quality  HEAD: normocephalic, no lesions, atraumatic, no tenderness, no masses   FACE: appearance normal, no lesions, no tenderness, no deformities, facial motion symmetric  SALIVARY GLANDS: left submandibular gland enlargement and firmness- stable, right gland resected  EYES: ocular motility normal, eyelids normal, orbits normal, no proptosis, conjunctiva normal , pupils equal, round  HEARING: response to conversational voice normal bilaterally   EXTERNAL EARS: auricles without lesions  EXTERNAL NOSE: structure normal, no tenderness on palpation, no nasal discharge, no lesions, no evidence of trauma, nostrils patent  LIPS: structure normal, no tenderness on palpation, no lesions, no evidence of trauma  NECK: neck appearance normal  LYMPH NODES: no lymphadenopathy  CHEST/RESPIRATORY: respiratory effort normal  CARDIOVASCULAR: extremities without cyanosis or edema, no overt  jugulovenous distension present  NEUROLOGIC/PSYCHIATRIC: oriented appropriately for age, mood normal, affect appropriate, cranial nerves intact grossly unless specifically mentioned above         Assessment   1. Submandibular gland swelling        Plan    Conservative management.    Return in about 1 year (around 4/25/2019).    Terry Romo MD  04/25/18  10:12 AM

## 2018-04-25 ENCOUNTER — OFFICE VISIT (OUTPATIENT)
Dept: OTOLARYNGOLOGY | Facility: CLINIC | Age: 78
End: 2018-04-25

## 2018-04-25 VITALS
DIASTOLIC BLOOD PRESSURE: 72 MMHG | TEMPERATURE: 97.1 F | BODY MASS INDEX: 26.96 KG/M2 | HEIGHT: 69 IN | WEIGHT: 182 LBS | SYSTOLIC BLOOD PRESSURE: 112 MMHG

## 2018-04-25 DIAGNOSIS — R60.9 SUBMANDIBULAR GLAND SWELLING: Primary | ICD-10-CM

## 2018-04-25 PROCEDURE — 99213 OFFICE O/P EST LOW 20 MIN: CPT | Performed by: OTOLARYNGOLOGY

## 2018-04-25 NOTE — PROGRESS NOTES
Allison Aguirre   Patient Intake Note    Review of Systems  Review of Systems   Constitutional: Positive for fatigue. Negative for chills and fever.   HENT:        See HPI   Respiratory: Positive for cough, choking and wheezing. Negative for shortness of breath.    Cardiovascular: Negative.    Gastrointestinal: Negative for constipation, diarrhea, nausea and vomiting.   Allergic/Immunologic: Negative for environmental allergies and food allergies.   Neurological: Negative for dizziness, light-headedness and headaches.   Hematological: Does not bruise/bleed easily.   Psychiatric/Behavioral: Negative for sleep disturbance.       QUALITY MEASURES    Body Mass Index Screening and Follow-Up Plan  Body mass index is 27.27 kg/m².  Patient's Body mass index is 27.27 kg/m². BMI is above normal parameters. Follow-up plan includes:  exercise counseling.    Tobacco Use: Screening and Cessation Intervention  Smoking status: Former Smoker                                                              Packs/day: 0.00      Years: 0.00         Start date: 1956     Quit date: 1985  Smokeless tobacco: Never Used                      Comment: 30 yrs ago        Allison Aguirre  4/25/2018  9:41 AM

## 2018-04-27 ENCOUNTER — TRANSCRIBE ORDERS (OUTPATIENT)
Dept: ADMINISTRATIVE | Facility: HOSPITAL | Age: 78
End: 2018-04-27

## 2018-04-27 DIAGNOSIS — R63.4 ABNORMAL WEIGHT LOSS: ICD-10-CM

## 2018-04-27 DIAGNOSIS — R59.9 SWELLING OF LYMPH NODES: Primary | ICD-10-CM

## 2018-04-27 DIAGNOSIS — D72.819 LEUKOPENIA, UNSPECIFIED TYPE: ICD-10-CM

## 2018-04-30 ENCOUNTER — HOSPITAL ENCOUNTER (OUTPATIENT)
Dept: CT IMAGING | Facility: HOSPITAL | Age: 78
Discharge: HOME | End: 2018-04-30
Attending: INTERNAL MEDICINE | Admitting: INTERNAL MEDICINE

## 2018-04-30 DIAGNOSIS — D72.819 LEUKOPENIA, UNSPECIFIED TYPE: ICD-10-CM

## 2018-04-30 DIAGNOSIS — R63.4 ABNORMAL WEIGHT LOSS: ICD-10-CM

## 2018-04-30 DIAGNOSIS — R59.9 SWELLING OF LYMPH NODES: ICD-10-CM

## 2018-04-30 LAB
CREAT BLD-MCNC: 1.96 MG/DL (ref 0.5–1.4)
CREAT BLDA-MCNC: 1.9 MG/DL (ref 0.6–1.3)
GFR SERPL CREATININE-BSD FRML MDRD: 33 ML/MIN/1.73

## 2018-04-30 PROCEDURE — 25010000002 IOPAMIDOL 61 % SOLUTION: Performed by: INTERNAL MEDICINE

## 2018-04-30 PROCEDURE — 0 IOHEXOL 300 MG/ML SOLUTION: Performed by: INTERNAL MEDICINE

## 2018-04-30 PROCEDURE — 82565 ASSAY OF CREATININE: CPT | Performed by: INTERNAL MEDICINE

## 2018-04-30 PROCEDURE — 70491 CT SOFT TISSUE NECK W/DYE: CPT

## 2018-04-30 PROCEDURE — 74177 CT ABD & PELVIS W/CONTRAST: CPT

## 2018-04-30 PROCEDURE — 82565 ASSAY OF CREATININE: CPT

## 2018-04-30 RX ADMIN — IOHEXOL 50 ML: 300 INJECTION, SOLUTION INTRAVENOUS at 09:00

## 2018-04-30 RX ADMIN — IOPAMIDOL 75 ML: 612 INJECTION, SOLUTION INTRAVENOUS at 09:00

## 2018-05-01 ENCOUNTER — TRANSCRIBE ORDERS (OUTPATIENT)
Dept: ADMINISTRATIVE | Facility: HOSPITAL | Age: 78
End: 2018-05-01

## 2018-05-01 ENCOUNTER — TELEPHONE (OUTPATIENT)
Dept: CARDIOLOGY | Facility: CLINIC | Age: 78
End: 2018-05-01

## 2018-05-01 NOTE — TELEPHONE ENCOUNTER
Pt s/p cath w/ placement of 2 TAYLOR on 3/11/16. Dr. Noyola wanting to know if it is ok for pt to have MRI? Please advise.

## 2018-05-02 ENCOUNTER — TELEPHONE (OUTPATIENT)
Dept: CARDIOLOGY | Facility: CLINIC | Age: 78
End: 2018-05-02

## 2018-05-02 NOTE — TELEPHONE ENCOUNTER
Maria Antonia at Dr. Noyola's office called asking if pts stents are MRI compatible.  Please advise.  Griffin Galindo, CMA

## 2018-05-03 ENCOUNTER — TRANSCRIBE ORDERS (OUTPATIENT)
Dept: ADMINISTRATIVE | Facility: HOSPITAL | Age: 78
End: 2018-05-03

## 2018-05-03 DIAGNOSIS — R19.09 OTHER INTRA-ABDOMINAL AND PELVIC SWELLING, MASS AND LUMP: Primary | ICD-10-CM

## 2018-05-08 ENCOUNTER — HOSPITAL ENCOUNTER (OUTPATIENT)
Dept: MRI IMAGING | Facility: HOSPITAL | Age: 78
Discharge: HOME | End: 2018-05-08
Attending: INTERNAL MEDICINE

## 2018-05-08 ENCOUNTER — HOSPITAL ENCOUNTER (OUTPATIENT)
Dept: MRI IMAGING | Facility: HOSPITAL | Age: 78
Discharge: HOME | End: 2018-05-08
Attending: INTERNAL MEDICINE | Admitting: INTERNAL MEDICINE

## 2018-05-08 DIAGNOSIS — R19.09 OTHER INTRA-ABDOMINAL AND PELVIC SWELLING, MASS AND LUMP: ICD-10-CM

## 2018-05-08 PROCEDURE — 72197 MRI PELVIS W/O & W/DYE: CPT

## 2018-05-08 PROCEDURE — A9577 INJ MULTIHANCE: HCPCS | Performed by: INTERNAL MEDICINE

## 2018-05-08 PROCEDURE — 74183 MRI ABD W/O CNTR FLWD CNTR: CPT

## 2018-05-08 PROCEDURE — 0 GADOBENATE DIMEGLUMINE 529 MG/ML SOLUTION: Performed by: INTERNAL MEDICINE

## 2018-05-08 RX ADMIN — GADOBENATE DIMEGLUMINE 18 ML: 529 INJECTION, SOLUTION INTRAVENOUS at 12:10

## 2018-05-08 RX ADMIN — GADOBENATE DIMEGLUMINE 0.1 ML: 529 INJECTION, SOLUTION INTRAVENOUS at 12:10

## 2018-06-04 ENCOUNTER — APPOINTMENT (OUTPATIENT)
Dept: LAB | Facility: HOSPITAL | Age: 78
End: 2018-06-04
Attending: INTERNAL MEDICINE

## 2018-06-04 ENCOUNTER — TRANSCRIBE ORDERS (OUTPATIENT)
Dept: ADMINISTRATIVE | Facility: HOSPITAL | Age: 78
End: 2018-06-04

## 2018-06-04 DIAGNOSIS — R31.9 HEMATURIA, UNSPECIFIED TYPE: Primary | ICD-10-CM

## 2018-06-04 LAB
BILIRUB UR QL STRIP: NEGATIVE
CLARITY UR: CLEAR
COLOR UR: YELLOW
GLUCOSE UR STRIP-MCNC: NEGATIVE MG/DL
HGB UR QL STRIP.AUTO: NEGATIVE
KETONES UR QL STRIP: NEGATIVE
LEUKOCYTE ESTERASE UR QL STRIP.AUTO: NEGATIVE
NITRITE UR QL STRIP: NEGATIVE
PH UR STRIP.AUTO: <=5 [PH] (ref 5–8)
PROT UR QL STRIP: NEGATIVE
SP GR UR STRIP: 1.02 (ref 1–1.03)
UROBILINOGEN UR QL STRIP: NORMAL

## 2018-06-04 PROCEDURE — 87086 URINE CULTURE/COLONY COUNT: CPT | Performed by: INTERNAL MEDICINE

## 2018-06-04 PROCEDURE — 81003 URINALYSIS AUTO W/O SCOPE: CPT | Performed by: INTERNAL MEDICINE

## 2018-06-06 LAB — BACTERIA SPEC AEROBE CULT: NORMAL

## 2018-06-11 ENCOUNTER — OFFICE VISIT (OUTPATIENT)
Dept: CARDIOLOGY | Facility: CLINIC | Age: 78
End: 2018-06-11

## 2018-06-11 VITALS
HEIGHT: 70 IN | DIASTOLIC BLOOD PRESSURE: 76 MMHG | OXYGEN SATURATION: 99 % | HEART RATE: 81 BPM | BODY MASS INDEX: 25.48 KG/M2 | SYSTOLIC BLOOD PRESSURE: 130 MMHG | WEIGHT: 178 LBS

## 2018-06-11 DIAGNOSIS — E78.2 MIXED HYPERLIPIDEMIA: ICD-10-CM

## 2018-06-11 DIAGNOSIS — I25.10 CORONARY ARTERY DISEASE INVOLVING NATIVE CORONARY ARTERY OF NATIVE HEART WITHOUT ANGINA PECTORIS: Primary | ICD-10-CM

## 2018-06-11 DIAGNOSIS — B02.29 POSTHERPETIC NEURALGIA: ICD-10-CM

## 2018-06-11 PROCEDURE — 99213 OFFICE O/P EST LOW 20 MIN: CPT | Performed by: INTERNAL MEDICINE

## 2018-06-11 PROCEDURE — 93000 ELECTROCARDIOGRAM COMPLETE: CPT | Performed by: INTERNAL MEDICINE

## 2018-06-11 RX ORDER — AMLODIPINE BESYLATE 5 MG/1
5 TABLET ORAL DAILY
COMMUNITY

## 2018-06-11 NOTE — PROGRESS NOTES
Referring Provider: Adi Jack MD    Reason for Follow-up Visit: CAD    Subjective .   Chief Complaint:   Chief Complaint   Patient presents with   • Follow-up     yearly   • Coronary Artery Disease     doing well with heart.  having some other issues.  no chest pain.  has sob and a lot of fatigue.   • Hyperlipidemia     gets this checked by pcp.  will get his next labs done in July.       History of present illness:  Zeb Adams is a 78 y.o. yo male with history of CAD, s/p stent placement to the RCA in the past. Denies any chest pain. Has a linear mass in the back that is scheduled for evaluation at [facility]. Recovering from shingles       History  Past Medical History:   Diagnosis Date   • Allergic rhinitis    • Anxiety    • Arthritis    • Asthma    • Benign neoplasm of submandibular gland    • BPH with urinary obstruction    • Chest pain    • Chronic laryngitis    • Chronic rhinitis    • Epistaxis    • Esophageal stricture    • GERD (gastroesophageal reflux disease)    • Hiatal hernia    • Histoplasmosis    • Hypercholesterolemia    • Hypertension    • Impotence of organic origin    • LPRD (laryngopharyngeal reflux disease)    • Lung collapse    • Mediastinal adenopathy    • Poor urinary stream    • Presence of stent in coronary artery in patient with coronary artery disease    • RLS (restless legs syndrome)    • Sialoadenitis    • Skin cancer     SCALP   • SOB (shortness of breath) on exertion    • Stroke     sometime in his life he has had a mini stroke found on a ct scan ewcently   ,   Past Surgical History:   Procedure Laterality Date   • BONE MARROW ASPIRATE W/ LUMBAR PUNCTURE     • BRONCHOSCOPY Bilateral 1/25/2018    Procedure: BRONCHOSCOPY WITH ENDOBRONCHIAL ULTRASOUND;  Surgeon: Cecilio Hurtado MD;  Location: [facility];  Service:    • CARDIAC SURGERY     • CORONARY ANGIOPLASTY WITH STENT PLACEMENT  2016   • SUBMANDIBULAR GLAND EXCISION     • TONSILLECTOMY     • TURBINOPLASTY     •  VASECTOMY       and a reveral   ,   Family History   Problem Relation Age of Onset   • Diabetes Father    • Heart failure Father    • Diabetes Brother    • Heart failure Brother    • Heart attack Brother    • Coronary artery disease Other    • Heart attack Other    • Heart disease Mother    • Hyperlipidemia Mother    • No Known Problems Maternal Grandmother    • Heart disease Maternal Grandfather    • No Known Problems Paternal Grandmother    • No Known Problems Paternal Grandfather    ,   Social History   Substance Use Topics   • Smoking status: Former Smoker     Start date: 1956     Quit date: 1985   • Smokeless tobacco: Never Used      Comment: 30 yrs ago   • Alcohol use No   ,     Medications  Current Outpatient Prescriptions   Medication Sig Dispense Refill   • amLODIPine (NORVASC) 5 MG tablet Take 5 mg by mouth Daily.     • aspirin 81 MG tablet Take 81 mg by mouth daily.  LAST DOSE FRIDAY JAN19TH     • atorvastatin (LIPITOR) 10 MG tablet Take 10 mg by mouth Daily.     • cetirizine (ZyrTEC) 10 MG tablet Take 10 mg by mouth Daily As Needed for Allergies.     • clonazePAM (KlonoPIN) 1 MG tablet Take 1 mg by mouth At Night As Needed (RSL).     • pantoprazole (PROTONIX) 40 MG EC tablet Take 1 tablet by mouth 2 times daily.     • TESTOSTERONE IM Inject  into the shoulder, thigh, or buttocks.     • losartan (COZAAR) 100 MG tablet Take 100 mg by mouth Daily.     • Multiple Vitamins-Minerals (OCUVITE PO) Take 1 tablet by mouth Daily. ON HOLD SINCE JAN 19TH       No current facility-administered medications for this visit.        Allergies:  Lyrica [pregabalin]; Penicillins; and Sulfa antibiotics    Review of Systems  Review of Systems   HENT: Negative for nosebleeds.    Cardiovascular: Positive for dyspnea on exertion. Negative for chest pain, claudication, irregular heartbeat, leg swelling, near-syncope, orthopnea, palpitations, paroxysmal nocturnal dyspnea and syncope.   Respiratory: Positive for shortness of breath.  "Negative for cough and hemoptysis.    Gastrointestinal: Negative for dysphagia, hematemesis and melena.   Genitourinary: Negative for hematuria.   All other systems reviewed and are negative.      Objective     Physical Exam:  /76 (BP Location: Left arm, Patient Position: Sitting, Cuff Size: Adult)   Pulse 81   Ht 176.5 cm (69.5\")   Wt 80.7 kg (178 lb)   SpO2 99%   BMI 25.91 kg/m²   Physical Exam   Constitutional: He is oriented to person, place, and time. He appears well-nourished. No distress.   HENT:   Head: Normocephalic.   Eyes: No scleral icterus.   Neck: Normal range of motion. Neck supple.   Cardiovascular: Normal rate, regular rhythm and normal heart sounds.  Exam reveals no gallop and no friction rub.    No murmur heard.  Pulmonary/Chest: Effort normal and breath sounds normal. No respiratory distress. He has no wheezes. He has no rales.   Abdominal: Soft. Bowel sounds are normal. He exhibits no distension. There is no tenderness.   Musculoskeletal: He exhibits no edema.   Neurological: He is alert and oriented to person, place, and time.   Skin: Skin is warm and dry. He is not diaphoretic. No erythema.   Psychiatric: He has a normal mood and affect. His behavior is normal.       Results Review:    ECG 12 Lead  Date/Time: 6/11/2018 9:58 AM  Performed by: SULEMAN GOLDSTEIN  Authorized by: SULEMAN GOLDSTEIN   Comparison: compared with previous ECG   Similar to previous ECG  Rhythm: sinus rhythm  Rate: normal  Conduction: conduction normal  ST Segments: ST segments normal  T Waves: T waves normal  QRS axis: normal  Clinical impression: normal ECG            Transcribe Orders on 06/04/2018   Component Date Value Ref Range Status   • Color, UA 06/04/2018 Yellow  Yellow, Straw Final   • Appearance, UA 06/04/2018 Clear  Clear Final   • pH, UA 06/04/2018 <=5.0  5.0 - 8.0 Final   • Specific Gravity, UA 06/04/2018 1.018  1.005 - 1.030 Final   • Glucose, UA 06/04/2018 Negative  Negative Final   • Ketones, UA " 06/04/2018 Negative  Negative Final   • Bilirubin, UA 06/04/2018 Negative  Negative Final   • Blood, UA 06/04/2018 Negative  Negative Final   • Protein, UA 06/04/2018 Negative  Negative Final   • Leuk Esterase, UA 06/04/2018 Negative  Negative Final   • Nitrite, UA 06/04/2018 Negative  Negative Final   • Urobilinogen, UA 06/04/2018 0.2 E.U./dL  0.2 - 1.0 E.U./dL Final   • Urine Culture 06/04/2018 No growth at 2 days   Final       Assessment/Plan   Zeb was seen today for follow-up, coronary artery disease and hyperlipidemia.    Diagnoses and all orders for this visit:    Coronary artery disease involving native coronary artery of native heart without angina pectoris, The patient denies chest pain, shortness of breath, dyspnea on exertion, orthopnea, PND, edema. There is no evidence of ongoing ischemia    Mixed hyperlipidemia, Patient's statin therapy is followed by PCP.    Postherpetic neuralgia, improving    Other orders  -     ECG 12 Lead        Patient's Body mass index is 25.91 kg/m². BMI is above normal parameters. Recommendations include: exercise counseling.

## 2018-06-13 ENCOUNTER — HOSPITAL ENCOUNTER (OUTPATIENT)
Dept: CT IMAGING | Facility: HOSPITAL | Age: 78
Discharge: HOME | End: 2018-06-13
Attending: INTERNAL MEDICINE | Admitting: INTERNAL MEDICINE

## 2018-06-13 ENCOUNTER — TRANSCRIBE ORDERS (OUTPATIENT)
Dept: ADMINISTRATIVE | Facility: HOSPITAL | Age: 78
End: 2018-06-13

## 2018-06-13 DIAGNOSIS — R59.9 ENLARGED LYMPH NODE: Primary | ICD-10-CM

## 2018-06-13 DIAGNOSIS — R59.9 ENLARGED LYMPH NODE: ICD-10-CM

## 2018-06-13 LAB
CREAT BLD-MCNC: 1.68 MG/DL (ref 0.5–1.4)
CREAT BLDA-MCNC: 1.9 MG/DL (ref 0.6–1.3)
GFR SERPL CREATININE-BSD FRML MDRD: 40 ML/MIN/1.73

## 2018-06-13 PROCEDURE — 82565 ASSAY OF CREATININE: CPT | Performed by: INTERNAL MEDICINE

## 2018-06-13 PROCEDURE — 25010000002 IOPAMIDOL 61 % SOLUTION: Performed by: INTERNAL MEDICINE

## 2018-06-13 PROCEDURE — 71260 CT THORAX DX C+: CPT

## 2018-06-13 PROCEDURE — 82565 ASSAY OF CREATININE: CPT

## 2018-06-13 RX ADMIN — IOPAMIDOL 82 ML: 612 INJECTION, SOLUTION INTRAVENOUS at 15:15

## 2018-06-29 ENCOUNTER — OFFICE VISIT (OUTPATIENT)
Dept: OTOLARYNGOLOGY | Facility: CLINIC | Age: 78
End: 2018-06-29

## 2018-06-29 VITALS
TEMPERATURE: 97.8 F | HEIGHT: 69 IN | BODY MASS INDEX: 26.07 KG/M2 | WEIGHT: 176 LBS | DIASTOLIC BLOOD PRESSURE: 84 MMHG | SYSTOLIC BLOOD PRESSURE: 142 MMHG

## 2018-06-29 DIAGNOSIS — D17.79 LIPOMA OF OTHER SPECIFIED SITES: ICD-10-CM

## 2018-06-29 DIAGNOSIS — K11.20 SUBMANDIBULAR SIALOADENITIS: Primary | ICD-10-CM

## 2018-06-29 PROCEDURE — 99213 OFFICE O/P EST LOW 20 MIN: CPT | Performed by: OTOLARYNGOLOGY

## 2018-06-29 NOTE — PROGRESS NOTES
Allison Aguirre   Patient Intake Note    Review of Systems  Review of Systems   Constitutional: Negative for chills, fatigue and fever.   HENT:        See HPI   Respiratory: Negative for cough, choking, shortness of breath and wheezing.    Cardiovascular: Negative.    Gastrointestinal: Negative for constipation, diarrhea, nausea and vomiting.   Allergic/Immunologic: Negative for environmental allergies and food allergies.   Neurological: Negative for dizziness, light-headedness and headaches.   Hematological: Does not bruise/bleed easily.   Psychiatric/Behavioral: Negative for sleep disturbance.       QUALITY MEASURES    Body Mass Index Screening and Follow-Up Plan  Body mass index is 25.99 kg/m².      Tobacco Use: Screening and Cessation Intervention  Smoking status: Former Smoker                                                              Packs/day: 0.00      Years: 0.00         Start date: 1956     Quit date: 1985  Smokeless tobacco: Never Used                      Comment: 30 yrs ago        Allison Aguirre  6/29/2018  10:55 AM

## 2018-06-29 NOTE — PROGRESS NOTES
Terry Romo MD     Chief Complaint   Patient presents with   • Swollen Glands     Submandibular gland swelling, left side       HPI   Zeb Adams is a  78 y.o. male who is here for follow up. 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  He is status post resection of the right gland which showed inflammatory changes.  We have been observing the left side for at least the last 5 years.  He has episodes of the gland going up and down in size.  It does respond to antibiotics and steroids in the past.  Most recently, he has been evaluated for a retroperitoneal lipomatous mass and has seen [place] doctors for this.  He has been evaluated by Dr. Arthur and Dr. Doyle.  They were concerned that the left gland might need to be resected prior to treatment of their area.  Oriented the notes from Dr. Doyle, they were going to observe the mass and repeat the CT scan to see if there is any progression prior to engaging in aggressive surgery.    Review of Systems:  Reviewed per patient intake note    Past History:  Past medical and surgical history, family history and social history reviewed and updated when appropriate.  Current medications and allergies reviewed and updated when appropriate.  Allergies:  Lyrica [pregabalin]; Penicillins; and Sulfa antibiotics    Vital Signs:   Temp:  [97.8 °F (36.6 °C)] 97.8 °F (36.6 °C)  BP: (142)/(84) 142/84    Physical Exam   CONSTITUTIONAL: well nourished, well-developed, alert, oriented, in no acute distress   COMMUNICATION AND VOICE: able to communicate normally, normal voice quality  HEAD: normocephalic, no lesions, atraumatic, no tenderness, no masses   FACE: appearance normal, no lesions, no tenderness, no deformities, facial motion symmetric  SALIVARY GLANDS: There is continued enlargement of the left submandibular gland which to my evaluation is actually slightly less prominent than previous examinations.  It is  firm in nature but circumscribed.  He is status post resection of the right submandibular gland  EYES: ocular motility normal, eyelids normal, orbits normal, no proptosis, conjunctiva normal , pupils equal, round  HEARING: response to conversational voice normal bilaterally   EXTERNAL EARS: auricles without lesions  EXTERNAL NOSE: structure normal, no tenderness on palpation, no nasal discharge, no lesions, no evidence of trauma, nostrils patent  LIPS: structure normal, no tenderness on palpation, no lesions, no evidence of trauma  NECK: As above described in salivary glands  LYMPH NODES: no lymphadenopathy  CHEST/RESPIRATORY: respiratory effort normal  CARDIOVASCULAR: extremities without cyanosis or edema, no overt jugulovenous distension present  NEUROLOGIC/PSYCHIATRIC: oriented appropriately for age, mood normal, affect appropriate, cranial nerves intact grossly unless specifically mentioned above         Assessment   1. Submandibular sialoadenitis    2. Lipoma of other specified sites        Plan    I do not feel that there is any overt change from previous examinations of the last 5 years of the submandibular gland.  However, we will try to contact [place] to see if they feel strongly that the gland needs to be removed.  If so, we can get him set up to have the resection of the other gland.  I discussed with him that this may put him at risk to have more dryness of his mouth.  If they do not feel strongly about resection, we will continue to observe it.    Return for follow up as previously scheduled.    Terry Romo MD  06/29/18  11:44 AM

## 2018-07-27 LAB
CYTO UR: NORMAL
LAB AP CASE REPORT: NORMAL
Lab: NORMAL
Lab: NORMAL
PATH REPORT.FINAL DX SPEC: NORMAL
PATH REPORT.GROSS SPEC: NORMAL

## 2018-08-02 ENCOUNTER — TRANSCRIBE ORDERS (OUTPATIENT)
Dept: ADMINISTRATIVE | Facility: HOSPITAL | Age: 78
End: 2018-08-02

## 2018-08-02 DIAGNOSIS — R19.09 OTHER INTRA-ABDOMINAL AND PELVIC SWELLING, MASS AND LUMP: ICD-10-CM

## 2018-08-02 DIAGNOSIS — R59.9 SWELLING OF LYMPH NODES: Primary | ICD-10-CM

## 2018-08-03 ENCOUNTER — HOSPITAL ENCOUNTER (OUTPATIENT)
Dept: CT IMAGING | Facility: HOSPITAL | Age: 78
Discharge: HOME | End: 2018-08-03
Attending: INTERNAL MEDICINE | Admitting: INTERNAL MEDICINE

## 2018-08-03 DIAGNOSIS — R19.09 OTHER INTRA-ABDOMINAL AND PELVIC SWELLING, MASS AND LUMP: ICD-10-CM

## 2018-08-03 DIAGNOSIS — R59.9 SWELLING OF LYMPH NODES: ICD-10-CM

## 2018-08-03 PROCEDURE — 74176 CT ABD & PELVIS W/O CONTRAST: CPT

## 2018-08-03 PROCEDURE — 71250 CT THORAX DX C-: CPT

## 2018-08-15 ENCOUNTER — APPOINTMENT (OUTPATIENT)
Dept: CT IMAGING | Facility: HOSPITAL | Age: 78
End: 2018-08-15
Attending: INTERNAL MEDICINE

## 2018-08-22 ENCOUNTER — TRANSCRIBE ORDERS (OUTPATIENT)
Dept: SLEEP MEDICINE | Facility: HOSPITAL | Age: 78
End: 2018-08-22

## 2018-08-22 DIAGNOSIS — R19.09 GROIN SWELLING: ICD-10-CM

## 2018-08-22 DIAGNOSIS — R59.9 SWELLING OF LYMPH NODES: Primary | ICD-10-CM

## 2018-09-06 ENCOUNTER — HOSPITAL ENCOUNTER (OUTPATIENT)
Dept: GENERAL RADIOLOGY | Facility: HOSPITAL | Age: 78
Discharge: HOME | End: 2018-09-06
Admitting: PHYSICIAN ASSISTANT

## 2018-09-06 ENCOUNTER — TRANSCRIBE ORDERS (OUTPATIENT)
Dept: ADMINISTRATIVE | Facility: HOSPITAL | Age: 78
End: 2018-09-06

## 2018-09-06 DIAGNOSIS — R07.9 CHEST PAIN, UNSPECIFIED TYPE: ICD-10-CM

## 2018-09-06 DIAGNOSIS — R07.9 CHEST PAIN, UNSPECIFIED TYPE: Primary | ICD-10-CM

## 2018-09-06 PROCEDURE — 71101 X-RAY EXAM UNILAT RIBS/CHEST: CPT

## 2018-09-14 ENCOUNTER — APPOINTMENT (OUTPATIENT)
Dept: CT IMAGING | Facility: HOSPITAL | Age: 78
End: 2018-09-14

## 2018-09-14 ENCOUNTER — HOSPITAL ENCOUNTER (EMERGENCY)
Facility: HOSPITAL | Age: 78
Discharge: HOME | End: 2018-09-14
Attending: EMERGENCY MEDICINE | Admitting: EMERGENCY MEDICINE

## 2018-09-14 VITALS
SYSTOLIC BLOOD PRESSURE: 144 MMHG | OXYGEN SATURATION: 98 % | RESPIRATION RATE: 15 BRPM | BODY MASS INDEX: 25.92 KG/M2 | WEIGHT: 175 LBS | TEMPERATURE: 98.1 F | HEART RATE: 73 BPM | HEIGHT: 69 IN | DIASTOLIC BLOOD PRESSURE: 85 MMHG

## 2018-09-14 DIAGNOSIS — N20.1 LEFT URETERAL STONE: ICD-10-CM

## 2018-09-14 DIAGNOSIS — N18.9 CHRONIC RENAL IMPAIRMENT, UNSPECIFIED CKD STAGE: ICD-10-CM

## 2018-09-14 DIAGNOSIS — R10.32 LEFT LOWER QUADRANT PAIN: Primary | ICD-10-CM

## 2018-09-14 LAB
ALBUMIN SERPL-MCNC: 3.7 G/DL (ref 3.5–5)
ALBUMIN/GLOB SERPL: 0.6 G/DL (ref 1.1–2.5)
ALP SERPL-CCNC: 93 U/L (ref 24–120)
ALT SERPL W P-5'-P-CCNC: 23 U/L (ref 0–54)
ANION GAP SERPL CALCULATED.3IONS-SCNC: 9 MMOL/L (ref 4–13)
APTT PPP: 27.6 SECONDS (ref 24.1–34.8)
AST SERPL-CCNC: 34 U/L (ref 7–45)
BACTERIA UR QL AUTO: ABNORMAL /HPF
BASOPHILS # BLD AUTO: 0.03 10*3/MM3 (ref 0–0.2)
BASOPHILS NFR BLD AUTO: 0.4 % (ref 0–2)
BILIRUB SERPL-MCNC: 0.6 MG/DL (ref 0.1–1)
BILIRUB UR QL STRIP: NEGATIVE
BUN BLD-MCNC: 32 MG/DL (ref 5–21)
BUN/CREAT SERPL: 16.4 (ref 7–25)
CALCIUM SPEC-SCNC: 8.8 MG/DL (ref 8.4–10.4)
CHLORIDE SERPL-SCNC: 105 MMOL/L (ref 98–110)
CLARITY UR: CLEAR
CO2 SERPL-SCNC: 24 MMOL/L (ref 24–31)
COLOR UR: YELLOW
CREAT BLD-MCNC: 1.95 MG/DL (ref 0.5–1.4)
DEPRECATED RDW RBC AUTO: 48.9 FL (ref 40–54)
EOSINOPHIL # BLD AUTO: 0.08 10*3/MM3 (ref 0–0.7)
EOSINOPHIL NFR BLD AUTO: 1 % (ref 0–4)
ERYTHROCYTE [DISTWIDTH] IN BLOOD BY AUTOMATED COUNT: 15.8 % (ref 12–15)
GFR SERPL CREATININE-BSD FRML MDRD: 33 ML/MIN/1.73
GLOBULIN UR ELPH-MCNC: 5.9 GM/DL
GLUCOSE BLD-MCNC: 112 MG/DL (ref 70–100)
GLUCOSE UR STRIP-MCNC: NEGATIVE MG/DL
HCT VFR BLD AUTO: 36.8 % (ref 40–52)
HGB BLD-MCNC: 12.1 G/DL (ref 14–18)
HGB UR QL STRIP.AUTO: ABNORMAL
HYALINE CASTS UR QL AUTO: ABNORMAL /LPF
IMM GRANULOCYTES # BLD: 0.05 10*3/MM3 (ref 0–0.03)
IMM GRANULOCYTES NFR BLD: 0.6 % (ref 0–5)
INR PPP: 1.12 (ref 0.91–1.09)
KETONES UR QL STRIP: NEGATIVE
LEUKOCYTE ESTERASE UR QL STRIP.AUTO: NEGATIVE
LIPASE SERPL-CCNC: 79 U/L (ref 23–203)
LYMPHOCYTES # BLD AUTO: 0.37 10*3/MM3 (ref 0.72–4.86)
LYMPHOCYTES NFR BLD AUTO: 4.7 % (ref 15–45)
MCH RBC QN AUTO: 28.9 PG (ref 28–32)
MCHC RBC AUTO-ENTMCNC: 32.9 G/DL (ref 33–36)
MCV RBC AUTO: 87.8 FL (ref 82–95)
MONOCYTES # BLD AUTO: 0.94 10*3/MM3 (ref 0.19–1.3)
MONOCYTES NFR BLD AUTO: 11.8 % (ref 4–12)
NEUTROPHILS # BLD AUTO: 6.48 10*3/MM3 (ref 1.87–8.4)
NEUTROPHILS NFR BLD AUTO: 81.5 % (ref 39–78)
NITRITE UR QL STRIP: NEGATIVE
NRBC BLD MANUAL-RTO: 0 /100 WBC (ref 0–0)
PH UR STRIP.AUTO: 6 [PH] (ref 5–8)
PLATELET # BLD AUTO: 267 10*3/MM3 (ref 130–400)
PMV BLD AUTO: 10.4 FL (ref 6–12)
POTASSIUM BLD-SCNC: 4.2 MMOL/L (ref 3.5–5.3)
PROT SERPL-MCNC: 9.6 G/DL (ref 6.3–8.7)
PROT UR QL STRIP: ABNORMAL
PROTHROMBIN TIME: 14.8 SECONDS (ref 11.9–14.6)
RBC # BLD AUTO: 4.19 10*6/MM3 (ref 4.8–5.9)
RBC # UR: ABNORMAL /HPF
REF LAB TEST METHOD: ABNORMAL
SODIUM BLD-SCNC: 138 MMOL/L (ref 135–145)
SP GR UR STRIP: 1.02 (ref 1–1.03)
SQUAMOUS #/AREA URNS HPF: ABNORMAL /HPF
TROPONIN I SERPL-MCNC: <0.012 NG/ML (ref 0–0.03)
UROBILINOGEN UR QL STRIP: ABNORMAL
WBC NRBC COR # BLD: 7.95 10*3/MM3 (ref 4.8–10.8)
WBC UR QL AUTO: ABNORMAL /HPF

## 2018-09-14 PROCEDURE — 85610 PROTHROMBIN TIME: CPT | Performed by: EMERGENCY MEDICINE

## 2018-09-14 PROCEDURE — 93010 ELECTROCARDIOGRAM REPORT: CPT | Performed by: INTERNAL MEDICINE

## 2018-09-14 PROCEDURE — 74176 CT ABD & PELVIS W/O CONTRAST: CPT

## 2018-09-14 PROCEDURE — 25010000002 HYDROMORPHONE PER 4 MG: Performed by: EMERGENCY MEDICINE

## 2018-09-14 PROCEDURE — 25010000002 ONDANSETRON PER 1 MG: Performed by: EMERGENCY MEDICINE

## 2018-09-14 PROCEDURE — 80053 COMPREHEN METABOLIC PANEL: CPT | Performed by: EMERGENCY MEDICINE

## 2018-09-14 PROCEDURE — 99284 EMERGENCY DEPT VISIT MOD MDM: CPT

## 2018-09-14 PROCEDURE — 85025 COMPLETE CBC W/AUTO DIFF WBC: CPT | Performed by: EMERGENCY MEDICINE

## 2018-09-14 PROCEDURE — 84484 ASSAY OF TROPONIN QUANT: CPT | Performed by: EMERGENCY MEDICINE

## 2018-09-14 PROCEDURE — 25010000002 IOPAMIDOL 61 % SOLUTION: Performed by: EMERGENCY MEDICINE

## 2018-09-14 PROCEDURE — 96375 TX/PRO/DX INJ NEW DRUG ADDON: CPT

## 2018-09-14 PROCEDURE — 96376 TX/PRO/DX INJ SAME DRUG ADON: CPT

## 2018-09-14 PROCEDURE — 83690 ASSAY OF LIPASE: CPT | Performed by: EMERGENCY MEDICINE

## 2018-09-14 PROCEDURE — 81001 URINALYSIS AUTO W/SCOPE: CPT | Performed by: EMERGENCY MEDICINE

## 2018-09-14 PROCEDURE — 96374 THER/PROPH/DIAG INJ IV PUSH: CPT

## 2018-09-14 PROCEDURE — 85730 THROMBOPLASTIN TIME PARTIAL: CPT | Performed by: EMERGENCY MEDICINE

## 2018-09-14 PROCEDURE — 93005 ELECTROCARDIOGRAM TRACING: CPT | Performed by: EMERGENCY MEDICINE

## 2018-09-14 RX ORDER — ONDANSETRON 2 MG/ML
4 INJECTION INTRAMUSCULAR; INTRAVENOUS ONCE
Status: COMPLETED | OUTPATIENT
Start: 2018-09-14 | End: 2018-09-14

## 2018-09-14 RX ORDER — HYDROMORPHONE HYDROCHLORIDE 1 MG/ML
1 INJECTION, SOLUTION INTRAMUSCULAR; INTRAVENOUS; SUBCUTANEOUS ONCE
Status: COMPLETED | OUTPATIENT
Start: 2018-09-14 | End: 2018-09-14

## 2018-09-14 RX ORDER — HYDROCODONE BITARTRATE AND ACETAMINOPHEN 7.5; 325 MG/1; MG/1
1 TABLET ORAL EVERY 8 HOURS PRN
Qty: 12 TABLET | Refills: 0 | Status: ON HOLD | OUTPATIENT
Start: 2018-09-14 | End: 2019-03-26

## 2018-09-14 RX ORDER — ONDANSETRON 4 MG/1
4 TABLET, ORALLY DISINTEGRATING ORAL EVERY 8 HOURS PRN
Qty: 12 TABLET | Refills: 0 | Status: ON HOLD | OUTPATIENT
Start: 2018-09-14 | End: 2019-03-26

## 2018-09-14 RX ADMIN — IOPAMIDOL 100 ML: 612 INJECTION, SOLUTION INTRAVENOUS at 18:35

## 2018-09-14 RX ADMIN — HYDROMORPHONE HYDROCHLORIDE 1 MG: 1 INJECTION, SOLUTION INTRAMUSCULAR; INTRAVENOUS; SUBCUTANEOUS at 17:51

## 2018-09-14 RX ADMIN — HYDROMORPHONE HYDROCHLORIDE 1 MG: 1 INJECTION, SOLUTION INTRAMUSCULAR; INTRAVENOUS; SUBCUTANEOUS at 18:55

## 2018-09-14 RX ADMIN — ONDANSETRON HYDROCHLORIDE 4 MG: 2 INJECTION, SOLUTION INTRAMUSCULAR; INTRAVENOUS at 17:14

## 2018-09-14 RX ADMIN — SODIUM CHLORIDE 1000 ML: 9 INJECTION, SOLUTION INTRAVENOUS at 17:51

## 2018-09-14 RX ADMIN — ONDANSETRON HYDROCHLORIDE 4 MG: 2 INJECTION, SOLUTION INTRAMUSCULAR; INTRAVENOUS at 18:53

## 2018-09-14 NOTE — ED PROVIDER NOTES
Subjective   78-year-old male presenting to the emergency department with abdominal pain.  Patient has a history of hypertension hyperlipidemia CAD as well as recent diagnosis of malignancy concerning for left-sided renal carcinoma patient is supposed to follow-up in [place] this coming week.  Patient states that today he started to have left-sided abdominal pain radiating from the left flank as well as nausea and vomiting.  Patient denies any chest pain shortness breath fevers but does say he has been feeling cold.            Review of Systems   Constitutional: Negative.    HENT: Negative.    Eyes: Negative.    Respiratory: Negative.    Cardiovascular: Negative.    Gastrointestinal: Positive for abdominal pain, nausea and vomiting.   Endocrine: Negative.    Genitourinary: Negative.    Musculoskeletal: Negative.    Skin: Negative.    Allergic/Immunologic: Negative.    Neurological: Negative.    Hematological: Negative.    Psychiatric/Behavioral: Negative.    All other systems reviewed and are negative.      Past Medical History:   Diagnosis Date   • Allergic rhinitis    • Anxiety    • Arthritis    • Asthma    • Benign neoplasm of submandibular gland    • BPH with urinary obstruction    • Chest pain    • Chronic laryngitis    • Chronic rhinitis    • Epistaxis    • Esophageal stricture    • GERD (gastroesophageal reflux disease)    • Hiatal hernia    • Histoplasmosis    • Hypercholesterolemia    • Hypertension    • Impotence of organic origin    • Kidney disease     stage 3   • LPRD (laryngopharyngeal reflux disease)    • Lung collapse    • Mediastinal adenopathy    • Poor urinary stream    • Presence of stent in coronary artery in patient with coronary artery disease    • RLS (restless legs syndrome)    • Sialoadenitis    • Skin cancer     SCALP   • SOB (shortness of breath) on exertion    • Stroke (CMS/HCC)     sometime in his life he has had a mini stroke found on a ct scan ewcently       Allergies   Allergen  Reactions   • Lyrica [Pregabalin] Shortness Of Breath   • Penicillins Rash   • Sulfa Antibiotics Rash       Past Surgical History:   Procedure Laterality Date   • BONE MARROW ASPIRATE W/ LUMBAR PUNCTURE     • BRONCHOSCOPY Bilateral 1/25/2018    Procedure: BRONCHOSCOPY WITH ENDOBRONCHIAL ULTRASOUND;  Surgeon: Cecilio Hurtado MD;  Location: [facility];  Service:    • CARDIAC SURGERY     • CORONARY ANGIOPLASTY WITH STENT PLACEMENT  2016   • SUBMANDIBULAR GLAND EXCISION     • TONSILLECTOMY     • TURBINOPLASTY     • VASECTOMY       and a reveral       Family History   Problem Relation Age of Onset   • Diabetes Father    • Heart failure Father    • Diabetes Brother    • Heart failure Brother    • Heart attack Brother    • Coronary artery disease Other    • Heart attack Other    • Heart disease Mother    • Hyperlipidemia Mother    • No Known Problems Maternal Grandmother    • Heart disease Maternal Grandfather    • No Known Problems Paternal Grandmother    • No Known Problems Paternal Grandfather        Social History     Social History   • Marital status:      Social History Main Topics   • Smoking status: Former Smoker     Start date: 1956     Quit date: 1985   • Smokeless tobacco: Never Used      Comment: 30 yrs ago   • Alcohol use No   • Drug use: No   • Sexual activity: Defer     Other Topics Concern   • Not on file           Objective   Physical Exam   Constitutional: He is oriented to person, place, and time. He appears well-developed and well-nourished.   HENT:   Head: Normocephalic and atraumatic.   Nose: Nose normal.   Eyes: Pupils are equal, round, and reactive to light. EOM are normal.   Neck: Normal range of motion. Neck supple.   Cardiovascular: Normal rate, regular rhythm and normal heart sounds.    Pulmonary/Chest: Effort normal and breath sounds normal.   Abdominal: Soft. He exhibits no distension. There is tenderness. There is no rebound and no guarding.   Musculoskeletal: Normal range of  motion.   Neurological: He is alert and oriented to person, place, and time. No cranial nerve deficit or sensory deficit.   Skin: Skin is warm and dry. Capillary refill takes less than 2 seconds.   Psychiatric: He has a normal mood and affect. His behavior is normal.   Nursing note and vitals reviewed.      Procedures           ED Course  ED Course as of Sep 14 2002   Fri Sep 14, 2018   1818 Patient remains well.  Awaiting CT abdomen and pelvis.  Patient we signed out to Dr. Gardiner.  [AP]      ED Course User Index  [AP] Yumiko Hudson MD      Lab Results (last 24 hours)     Procedure Component Value Units Date/Time    CBC & Differential [[id number]] Collected:  09/14/18 1704    Specimen:  Blood Updated:  09/14/18 1711    Narrative:       The following orders were created for panel order CBC & Differential.  Procedure                               Abnormality         Status                     ---------                               -----------         ------                     CBC Auto Differential[[id number]]        Abnormal            Final result                 Please view results for these tests on the individual orders.    Comprehensive Metabolic Panel [[id number]]  (Abnormal) Collected:  09/14/18 1704    Specimen:  Blood Updated:  09/14/18 1721     Glucose 112 (H) mg/dL      BUN 32 (H) mg/dL      Creatinine 1.95 (H) mg/dL      Sodium 138 mmol/L      Potassium 4.2 mmol/L      Chloride 105 mmol/L      CO2 24.0 mmol/L      Calcium 8.8 mg/dL      Total Protein 9.6 (H) g/dL      Albumin 3.70 g/dL      ALT (SGPT) 23 U/L      AST (SGOT) 34 U/L      Alkaline Phosphatase 93 U/L      Total Bilirubin 0.6 mg/dL      eGFR Non African Amer 33 (L) mL/min/1.73      Globulin 5.9 gm/dL      A/G Ratio 0.6 (L) g/dL      BUN/Creatinine Ratio 16.4     Anion Gap 9.0 mmol/L     Narrative:       The MDRD GFR formula is only valid for adults with stable renal function between ages 18 and 70.    Protime-INR [[id number]]  (Abnormal)  Collected:  09/14/18 1704    Specimen:  Blood Updated:  09/14/18 1719     Protime 14.8 (H) Seconds      INR 1.12 (H)    aPTT [[id number]]  (Normal) Collected:  09/14/18 1704    Specimen:  Blood Updated:  09/14/18 1719     PTT 27.6 seconds     Lipase [[id number]]  (Normal) Collected:  09/14/18 1704    Specimen:  Blood Updated:  09/14/18 1721     Lipase 79 U/L     Troponin [[id number]]  (Normal) Collected:  09/14/18 1704    Specimen:  Blood Updated:  09/14/18 1733     Troponin I <0.012 ng/mL     CBC Auto Differential [[id number]]  (Abnormal) Collected:  09/14/18 1704    Specimen:  Blood Updated:  09/14/18 1711     WBC 7.95 10*3/mm3      RBC 4.19 (L) 10*6/mm3      Hemoglobin 12.1 (L) g/dL      Hematocrit 36.8 (L) %      MCV 87.8 fL      MCH 28.9 pg      MCHC 32.9 (L) g/dL      RDW 15.8 (H) %      RDW-SD 48.9 fl      MPV 10.4 fL      Platelets 267 10*3/mm3      Neutrophil % 81.5 (H) %      Lymphocyte % 4.7 (L) %      Monocyte % 11.8 %      Eosinophil % 1.0 %      Basophil % 0.4 %      Immature Grans % 0.6 %      Neutrophils, Absolute 6.48 10*3/mm3      Lymphocytes, Absolute 0.37 (L) 10*3/mm3      Monocytes, Absolute 0.94 10*3/mm3      Eosinophils, Absolute 0.08 10*3/mm3      Basophils, Absolute 0.03 10*3/mm3      Immature Grans, Absolute 0.05 (H) 10*3/mm3      nRBC 0.0 /100 WBC     Urinalysis With Culture If Indicated - Urine, Clean Catch [[id number]]  (Abnormal) Collected:  09/14/18 1822    Specimen:  Urine from Urine, Clean Catch Updated:  09/14/18 1830     Color, UA Yellow     Appearance, UA Clear     pH, UA 6.0     Specific Gravity, UA 1.019     Glucose, UA Negative     Ketones, UA Negative     Bilirubin, UA Negative     Blood, UA Moderate (2+) (A)     Protein, UA Trace (A)     Leuk Esterase, UA Negative     Nitrite, UA Negative     Urobilinogen, UA 0.2 E.U./dL    Urinalysis, Microscopic Only - Urine, Clean Catch [[id number]]  (Abnormal) Collected:  09/14/18 1822    Specimen:  Urine from Urine, Clean Catch Updated:   09/14/18 1830     RBC, UA 21-30 (A) /HPF      WBC, UA 0-2 (A) /HPF      Bacteria, UA None Seen /HPF      Squamous Epithelial Cells, UA None Seen /HPF      Hyaline Casts, UA None Seen /LPF      Methodology Automated Microscopy        CT Abdomen Pelvis Without Contrast   Final Result   1. 2-3 mm partially obstructing stone in the mid left ureter with   associated mild hydroureteronephrosis and perinephric stranding.    2. Otherwise stable exam.           This report was finalized on 09/14/2018 19:40 by Dr Ramiro Hampton, .        Pain controlled with treatment here in the ER.  Labs showed elevated BUN and creatinine that was baseline for the patient as well as hematuria.  No signs of infection.  CT scan showed a 2-3 mm partially obstructing stone in the mid left ureter with associated mild hydroureteronephrosis.  Patient is appropriate for outpatient treatment.  He will be discharged home with a short course of Lortab and Zofran.  Flomax held due to sulfa allergy.  Patient has an appointment at [facility] on Monday with his nephrologist and is also to follow up with urology as well.  Return for any worsening or new pain, fever, vomiting or other concerns.    [facility] report was not available.  I believe the medical necessity for and safety in prescribing the controlled substance substantially outweighs the risk of unlawful use or diversion of the controlled substance.            MDM      Final diagnoses:   Left lower quadrant pain   Left ureteral stone   Chronic renal impairment, unspecified CKD stage            Agueda Taylor MD  09/14/18 2004

## 2018-09-15 NOTE — ED NOTES
Dr. Taylor at bedside to discuss results with patient and family.     Radha Conrad, RN  09/14/18 1949

## 2018-09-28 ENCOUNTER — TRANSCRIBE ORDERS (OUTPATIENT)
Dept: ADMINISTRATIVE | Facility: HOSPITAL | Age: 78
End: 2018-09-28

## 2018-09-28 DIAGNOSIS — Z01.810 PRE-OPERATIVE CARDIOVASCULAR EXAMINATION: Primary | ICD-10-CM

## 2018-09-28 DIAGNOSIS — I25.10 ARTERIOSCLEROTIC CARDIOVASCULAR DISEASE (ASCVD): ICD-10-CM

## 2018-10-02 ENCOUNTER — APPOINTMENT (OUTPATIENT)
Dept: CARDIOLOGY | Facility: HOSPITAL | Age: 78
End: 2018-10-02

## 2018-10-02 ENCOUNTER — HOSPITAL ENCOUNTER (OUTPATIENT)
Dept: CARDIOLOGY | Facility: HOSPITAL | Age: 78
End: 2018-10-02

## 2018-10-02 ENCOUNTER — TRANSCRIBE ORDERS (OUTPATIENT)
Dept: ADMINISTRATIVE | Facility: HOSPITAL | Age: 78
End: 2018-10-02

## 2018-10-02 ENCOUNTER — HOSPITAL ENCOUNTER (OUTPATIENT)
Dept: CARDIOLOGY | Facility: HOSPITAL | Age: 78
Discharge: HOME | End: 2018-10-02
Admitting: INTERNAL MEDICINE

## 2018-10-02 VITALS
SYSTOLIC BLOOD PRESSURE: 138 MMHG | HEIGHT: 69 IN | DIASTOLIC BLOOD PRESSURE: 76 MMHG | HEART RATE: 78 BPM | WEIGHT: 175.04 LBS | BODY MASS INDEX: 25.93 KG/M2

## 2018-10-02 DIAGNOSIS — C49.9 LIPOSARCOMA (HCC): Primary | ICD-10-CM

## 2018-10-02 DIAGNOSIS — I25.10 CAD, MULTIPLE VESSEL: ICD-10-CM

## 2018-10-02 DIAGNOSIS — I10 HYPERTENSION, UNSPECIFIED TYPE: ICD-10-CM

## 2018-10-02 LAB
BH CV STRESS BP STAGE 1: NORMAL
BH CV STRESS BP STAGE 2: NORMAL
BH CV STRESS DURATION MIN STAGE 1: 3
BH CV STRESS DURATION MIN STAGE 2: 2
BH CV STRESS DURATION SEC STAGE 1: 0
BH CV STRESS DURATION SEC STAGE 2: 4
BH CV STRESS GRADE STAGE 1: 10
BH CV STRESS GRADE STAGE 2: 12
BH CV STRESS HR STAGE 1: 107
BH CV STRESS HR STAGE 2: 123
BH CV STRESS METS STAGE 1: 5
BH CV STRESS METS STAGE 2: 7.5
BH CV STRESS PROTOCOL 1: NORMAL
BH CV STRESS RECOVERY BP: NORMAL MMHG
BH CV STRESS RECOVERY HR: 81 BPM
BH CV STRESS SPEED STAGE 1: 1.7
BH CV STRESS SPEED STAGE 2: 2.5
BH CV STRESS STAGE 1: 1
BH CV STRESS STAGE 2: 2
MAXIMAL PREDICTED HEART RATE: 142 BPM
PERCENT MAX PREDICTED HR: 86.62 %
STRESS BASELINE BP: NORMAL MMHG
STRESS BASELINE HR: 78 BPM
STRESS PERCENT HR: 102 %
STRESS POST ESTIMATED WORKLOAD: 7.5 METS
STRESS POST EXERCISE DUR MIN: 5 MIN
STRESS POST EXERCISE DUR SEC: 4 SEC
STRESS POST PEAK BP: NORMAL MMHG
STRESS POST PEAK HR: 123 BPM
STRESS TARGET HR: 121 BPM

## 2018-10-02 PROCEDURE — 93018 CV STRESS TEST I&R ONLY: CPT | Performed by: INTERNAL MEDICINE

## 2018-10-02 PROCEDURE — 93352 ADMIN ECG CONTRAST AGENT: CPT | Performed by: INTERNAL MEDICINE

## 2018-10-02 PROCEDURE — 93017 CV STRESS TEST TRACING ONLY: CPT

## 2018-10-02 PROCEDURE — 93350 STRESS TTE ONLY: CPT

## 2018-10-02 PROCEDURE — 25010000002 PERFLUTREN 6.52 MG/ML SUSPENSION: Performed by: INTERNAL MEDICINE

## 2018-10-02 PROCEDURE — 93350 STRESS TTE ONLY: CPT | Performed by: INTERNAL MEDICINE

## 2018-10-02 RX ADMIN — PERFLUTREN 8.48 MG: 6.52 INJECTION, SUSPENSION INTRAVENOUS at 09:51

## 2018-10-08 ENCOUNTER — TRANSCRIBE ORDERS (OUTPATIENT)
Dept: ADMINISTRATIVE | Facility: HOSPITAL | Age: 78
End: 2018-10-08

## 2018-10-08 DIAGNOSIS — R50.9 FEVER, UNSPECIFIED FEVER CAUSE: Primary | ICD-10-CM

## 2018-10-28 ENCOUNTER — HOSPITAL ENCOUNTER (EMERGENCY)
Facility: HOSPITAL | Age: 78
Discharge: HOME | End: 2018-10-29
Attending: EMERGENCY MEDICINE | Admitting: EMERGENCY MEDICINE

## 2018-10-28 ENCOUNTER — APPOINTMENT (OUTPATIENT)
Dept: GENERAL RADIOLOGY | Facility: HOSPITAL | Age: 78
End: 2018-10-28

## 2018-10-28 DIAGNOSIS — R09.1 PLEURISY: Primary | ICD-10-CM

## 2018-10-28 LAB
ALBUMIN SERPL-MCNC: 3.3 G/DL (ref 3.5–5)
ALBUMIN/GLOB SERPL: 0.6 G/DL (ref 1.1–2.5)
ALP SERPL-CCNC: 104 U/L (ref 24–120)
ALT SERPL W P-5'-P-CCNC: 15 U/L (ref 0–54)
ANION GAP SERPL CALCULATED.3IONS-SCNC: 10 MMOL/L (ref 4–13)
AST SERPL-CCNC: 40 U/L (ref 7–45)
BASOPHILS # BLD AUTO: 0.03 10*3/MM3 (ref 0–0.2)
BASOPHILS NFR BLD AUTO: 0.6 % (ref 0–2)
BILIRUB SERPL-MCNC: 0.5 MG/DL (ref 0.1–1)
BUN BLD-MCNC: 27 MG/DL (ref 5–21)
BUN/CREAT SERPL: 14.7 (ref 7–25)
CALCIUM SPEC-SCNC: 8.6 MG/DL (ref 8.4–10.4)
CHLORIDE SERPL-SCNC: 104 MMOL/L (ref 98–110)
CO2 SERPL-SCNC: 25 MMOL/L (ref 24–31)
CREAT BLD-MCNC: 1.84 MG/DL (ref 0.5–1.4)
DEPRECATED RDW RBC AUTO: 50 FL (ref 40–54)
EOSINOPHIL # BLD AUTO: 0.59 10*3/MM3 (ref 0–0.7)
EOSINOPHIL NFR BLD AUTO: 11.5 % (ref 0–4)
ERYTHROCYTE [DISTWIDTH] IN BLOOD BY AUTOMATED COUNT: 15.7 % (ref 12–15)
GFR SERPL CREATININE-BSD FRML MDRD: 36 ML/MIN/1.73
GLOBULIN UR ELPH-MCNC: 5.5 GM/DL
GLUCOSE BLD-MCNC: 132 MG/DL (ref 70–100)
HCT VFR BLD AUTO: 29.9 % (ref 40–52)
HGB BLD-MCNC: 9.7 G/DL (ref 14–18)
IMM GRANULOCYTES # BLD: 0.02 10*3/MM3 (ref 0–0.03)
IMM GRANULOCYTES NFR BLD: 0.4 % (ref 0–5)
INR PPP: 1.13 (ref 0.91–1.09)
LYMPHOCYTES # BLD AUTO: 0.3 10*3/MM3 (ref 0.72–4.86)
LYMPHOCYTES NFR BLD AUTO: 5.8 % (ref 15–45)
MCH RBC QN AUTO: 28.4 PG (ref 28–32)
MCHC RBC AUTO-ENTMCNC: 32.4 G/DL (ref 33–36)
MCV RBC AUTO: 87.4 FL (ref 82–95)
MONOCYTES # BLD AUTO: 0.66 10*3/MM3 (ref 0.19–1.3)
MONOCYTES NFR BLD AUTO: 12.9 % (ref 4–12)
NEUTROPHILS # BLD AUTO: 3.53 10*3/MM3 (ref 1.87–8.4)
NEUTROPHILS NFR BLD AUTO: 68.8 % (ref 39–78)
NRBC BLD MANUAL-RTO: 0 /100 WBC (ref 0–0)
PLATELET # BLD AUTO: 208 10*3/MM3 (ref 130–400)
PMV BLD AUTO: 10.9 FL (ref 6–12)
POTASSIUM BLD-SCNC: 3.7 MMOL/L (ref 3.5–5.3)
PROT SERPL-MCNC: 8.8 G/DL (ref 6.3–8.7)
PROTHROMBIN TIME: 14.9 SECONDS (ref 11.9–14.6)
RBC # BLD AUTO: 3.42 10*6/MM3 (ref 4.8–5.9)
SODIUM BLD-SCNC: 139 MMOL/L (ref 135–145)
WBC NRBC COR # BLD: 5.13 10*3/MM3 (ref 4.8–10.8)

## 2018-10-28 PROCEDURE — 93010 ELECTROCARDIOGRAM REPORT: CPT | Performed by: INTERNAL MEDICINE

## 2018-10-28 PROCEDURE — 93005 ELECTROCARDIOGRAM TRACING: CPT

## 2018-10-28 PROCEDURE — 82803 BLOOD GASES ANY COMBINATION: CPT

## 2018-10-28 PROCEDURE — 93005 ELECTROCARDIOGRAM TRACING: CPT | Performed by: EMERGENCY MEDICINE

## 2018-10-28 PROCEDURE — 85025 COMPLETE CBC W/AUTO DIFF WBC: CPT

## 2018-10-28 PROCEDURE — 99284 EMERGENCY DEPT VISIT MOD MDM: CPT

## 2018-10-28 PROCEDURE — 71045 X-RAY EXAM CHEST 1 VIEW: CPT

## 2018-10-28 PROCEDURE — 80053 COMPREHEN METABOLIC PANEL: CPT

## 2018-10-28 PROCEDURE — 36600 WITHDRAWAL OF ARTERIAL BLOOD: CPT

## 2018-10-28 PROCEDURE — 85610 PROTHROMBIN TIME: CPT

## 2018-10-28 PROCEDURE — 84484 ASSAY OF TROPONIN QUANT: CPT

## 2018-10-28 PROCEDURE — 83880 ASSAY OF NATRIURETIC PEPTIDE: CPT | Performed by: EMERGENCY MEDICINE

## 2018-10-28 RX ORDER — ASPIRIN 81 MG/1
324 TABLET, CHEWABLE ORAL ONCE
Status: DISCONTINUED | OUTPATIENT
Start: 2018-10-28 | End: 2018-10-29 | Stop reason: HOSPADM

## 2018-10-28 RX ORDER — SODIUM CHLORIDE 0.9 % (FLUSH) 0.9 %
10 SYRINGE (ML) INJECTION AS NEEDED
Status: DISCONTINUED | OUTPATIENT
Start: 2018-10-28 | End: 2018-10-29 | Stop reason: HOSPADM

## 2018-10-29 ENCOUNTER — APPOINTMENT (OUTPATIENT)
Dept: GENERAL RADIOLOGY | Facility: HOSPITAL | Age: 78
End: 2018-10-29

## 2018-10-29 ENCOUNTER — APPOINTMENT (OUTPATIENT)
Dept: NUCLEAR MEDICINE | Facility: HOSPITAL | Age: 78
End: 2018-10-29

## 2018-10-29 VITALS
HEART RATE: 75 BPM | SYSTOLIC BLOOD PRESSURE: 122 MMHG | RESPIRATION RATE: 20 BRPM | TEMPERATURE: 98.6 F | OXYGEN SATURATION: 94 % | HEIGHT: 69 IN | DIASTOLIC BLOOD PRESSURE: 90 MMHG | WEIGHT: 171 LBS | BODY MASS INDEX: 25.33 KG/M2

## 2018-10-29 LAB
ARTERIAL PATENCY WRIST A: POSITIVE
ATMOSPHERIC PRESS: 753 MMHG
BASE EXCESS BLDA CALC-SCNC: -2.5 MMOL/L (ref 0–2)
BDY SITE: ABNORMAL
BODY TEMPERATURE: 37 C
GAS FLOW AIRWAY: 2 LPM
HCO3 BLDA-SCNC: 21.2 MMOL/L (ref 20–26)
HOLD SPECIMEN: NORMAL
HOLD SPECIMEN: NORMAL
Lab: ABNORMAL
MODALITY: ABNORMAL
NT-PROBNP SERPL-MCNC: 82.4 PG/ML (ref 0–1800)
PCO2 BLDA: 32 MM HG (ref 35–45)
PH BLDA: 7.43 PH UNITS (ref 7.35–7.45)
PO2 BLDA: 80.1 MM HG (ref 83–108)
SAO2 % BLDCOA: 96.7 % (ref 94–99)
TROPONIN I SERPL-MCNC: <0.012 NG/ML (ref 0–0.03)
TROPONIN I SERPL-MCNC: <0.012 NG/ML (ref 0–0.03)
VENTILATOR MODE: ABNORMAL
WHOLE BLOOD HOLD SPECIMEN: NORMAL
WHOLE BLOOD HOLD SPECIMEN: NORMAL

## 2018-10-29 PROCEDURE — 0 TECHNETIUM ALBUMIN AGGREGATED: Performed by: PHYSICIAN ASSISTANT

## 2018-10-29 PROCEDURE — 25010000002 MORPHINE SULFATE (PF) 2 MG/ML SOLUTION: Performed by: EMERGENCY MEDICINE

## 2018-10-29 PROCEDURE — A9540 TC99M MAA: HCPCS | Performed by: PHYSICIAN ASSISTANT

## 2018-10-29 PROCEDURE — 93005 ELECTROCARDIOGRAM TRACING: CPT | Performed by: EMERGENCY MEDICINE

## 2018-10-29 PROCEDURE — 96375 TX/PRO/DX INJ NEW DRUG ADDON: CPT

## 2018-10-29 PROCEDURE — 78582 LUNG VENTILAT&PERFUS IMAGING: CPT

## 2018-10-29 PROCEDURE — 25010000002 ONDANSETRON PER 1 MG: Performed by: EMERGENCY MEDICINE

## 2018-10-29 PROCEDURE — 84484 ASSAY OF TROPONIN QUANT: CPT | Performed by: EMERGENCY MEDICINE

## 2018-10-29 PROCEDURE — 71100 X-RAY EXAM RIBS UNI 2 VIEWS: CPT

## 2018-10-29 PROCEDURE — 93010 ELECTROCARDIOGRAM REPORT: CPT | Performed by: INTERNAL MEDICINE

## 2018-10-29 PROCEDURE — 96374 THER/PROPH/DIAG INJ IV PUSH: CPT

## 2018-10-29 PROCEDURE — 0 XENON XE 133: Performed by: PHYSICIAN ASSISTANT

## 2018-10-29 PROCEDURE — A9558 XE133 XENON 10MCI: HCPCS | Performed by: PHYSICIAN ASSISTANT

## 2018-10-29 RX ORDER — ONDANSETRON 2 MG/ML
4 INJECTION INTRAMUSCULAR; INTRAVENOUS ONCE
Status: COMPLETED | OUTPATIENT
Start: 2018-10-29 | End: 2018-10-29

## 2018-10-29 RX ORDER — MEPERIDINE HYDROCHLORIDE 25 MG/ML
25 INJECTION INTRAMUSCULAR; INTRAVENOUS; SUBCUTANEOUS ONCE
Status: COMPLETED | OUTPATIENT
Start: 2018-10-29 | End: 2018-10-29

## 2018-10-29 RX ORDER — HYDROCODONE BITARTRATE AND ACETAMINOPHEN 7.5; 325 MG/1; MG/1
1 TABLET ORAL EVERY 8 HOURS PRN
Qty: 12 TABLET | Refills: 0 | Status: ON HOLD | OUTPATIENT
Start: 2018-10-29 | End: 2019-03-26

## 2018-10-29 RX ORDER — ONDANSETRON 4 MG/1
4 TABLET, FILM COATED ORAL EVERY 8 HOURS PRN
Qty: 12 TABLET | Refills: 0 | Status: ON HOLD | OUTPATIENT
Start: 2018-10-29 | End: 2019-03-26

## 2018-10-29 RX ORDER — MORPHINE SULFATE 2 MG/ML
2 INJECTION, SOLUTION INTRAMUSCULAR; INTRAVENOUS ONCE
Status: COMPLETED | OUTPATIENT
Start: 2018-10-29 | End: 2018-10-29

## 2018-10-29 RX ADMIN — XENON XE-133 14 MILLICURIE: 10 GAS RESPIRATORY (INHALATION) at 01:10

## 2018-10-29 RX ADMIN — MEPERIDINE HYDROCHLORIDE 25 MG: 25 INJECTION INTRAMUSCULAR; INTRAVENOUS; SUBCUTANEOUS at 01:43

## 2018-10-29 RX ADMIN — MORPHINE SULFATE 2 MG: 2 INJECTION, SOLUTION INTRAMUSCULAR; INTRAVENOUS at 00:31

## 2018-10-29 RX ADMIN — Medication 1 DOSE: at 01:14

## 2018-10-29 RX ADMIN — ONDANSETRON HYDROCHLORIDE 4 MG: 2 SOLUTION INTRAMUSCULAR; INTRAVENOUS at 00:31

## 2018-11-12 ENCOUNTER — HOSPITAL ENCOUNTER (OUTPATIENT)
Dept: PREOP | Facility: HOSPITAL | Age: 78
Discharge: HOME | End: 2018-11-12
Admitting: PHYSICIAN ASSISTANT

## 2018-11-12 ENCOUNTER — HOSPITAL ENCOUNTER (EMERGENCY)
Facility: HOSPITAL | Age: 78
Discharge: ANOTHER HOSPITAL | End: 2018-11-12
Attending: EMERGENCY MEDICINE | Admitting: EMERGENCY MEDICINE

## 2018-11-12 ENCOUNTER — TRANSCRIBE ORDERS (OUTPATIENT)
Dept: ADMINISTRATIVE | Facility: HOSPITAL | Age: 78
End: 2018-11-12

## 2018-11-12 ENCOUNTER — APPOINTMENT (OUTPATIENT)
Dept: LAB | Facility: HOSPITAL | Age: 78
End: 2018-11-12

## 2018-11-12 VITALS
WEIGHT: 161 LBS | OXYGEN SATURATION: 98 % | RESPIRATION RATE: 16 BRPM | HEIGHT: 69 IN | TEMPERATURE: 98.2 F | HEART RATE: 78 BPM | BODY MASS INDEX: 23.85 KG/M2 | DIASTOLIC BLOOD PRESSURE: 67 MMHG | SYSTOLIC BLOOD PRESSURE: 124 MMHG

## 2018-11-12 DIAGNOSIS — I12.9 HYPERTENSIVE NEPHROPATHY: ICD-10-CM

## 2018-11-12 DIAGNOSIS — C65.2 MALIGNANT NEOPLASM OF LEFT RENAL PELVIS (HCC): Primary | ICD-10-CM

## 2018-11-12 DIAGNOSIS — N17.9 ACUTE KIDNEY INJURY (HCC): Primary | ICD-10-CM

## 2018-11-12 DIAGNOSIS — R34 OLIGURIA AND ANURIA: ICD-10-CM

## 2018-11-12 DIAGNOSIS — N18.30 CHRONIC KIDNEY DISEASE, STAGE III (MODERATE) (HCC): ICD-10-CM

## 2018-11-12 LAB
ALBUMIN SERPL-MCNC: 3.5 G/DL (ref 3.5–5)
ALBUMIN/GLOB SERPL: 0.7 G/DL (ref 1.1–2.5)
ALP SERPL-CCNC: 108 U/L (ref 24–120)
ALT SERPL W P-5'-P-CCNC: <15 U/L (ref 0–54)
ANION GAP SERPL CALCULATED.3IONS-SCNC: 12 MMOL/L (ref 4–13)
ANION GAP SERPL CALCULATED.3IONS-SCNC: 13 MMOL/L (ref 4–13)
APTT PPP: 32.2 SECONDS (ref 24.1–34.8)
AST SERPL-CCNC: 44 U/L (ref 7–45)
BACTERIA UR QL AUTO: ABNORMAL /HPF
BASOPHILS # BLD AUTO: 0.03 10*3/MM3 (ref 0–0.2)
BASOPHILS # BLD AUTO: 0.03 10*3/MM3 (ref 0–0.2)
BASOPHILS NFR BLD AUTO: 0.5 % (ref 0–2)
BASOPHILS NFR BLD AUTO: 0.5 % (ref 0–2)
BILIRUB SERPL-MCNC: 0.5 MG/DL (ref 0.1–1)
BILIRUB UR QL STRIP: NEGATIVE
BUN BLD-MCNC: 47 MG/DL (ref 5–21)
BUN BLD-MCNC: 47 MG/DL (ref 5–21)
BUN/CREAT SERPL: 7.4 (ref 7–25)
BUN/CREAT SERPL: 7.5 (ref 7–25)
CALCIUM SPEC-SCNC: 8.8 MG/DL (ref 8.4–10.4)
CALCIUM SPEC-SCNC: 8.9 MG/DL (ref 8.4–10.4)
CHLORIDE SERPL-SCNC: 95 MMOL/L (ref 98–110)
CHLORIDE SERPL-SCNC: 95 MMOL/L (ref 98–110)
CLARITY UR: CLEAR
CO2 SERPL-SCNC: 22 MMOL/L (ref 24–31)
CO2 SERPL-SCNC: 23 MMOL/L (ref 24–31)
COLOR UR: YELLOW
CREAT BLD-MCNC: 6.26 MG/DL (ref 0.5–1.4)
CREAT BLD-MCNC: 6.34 MG/DL (ref 0.5–1.4)
DEPRECATED RDW RBC AUTO: 50.4 FL (ref 40–54)
DEPRECATED RDW RBC AUTO: 50.4 FL (ref 40–54)
EOSINOPHIL # BLD AUTO: 0.3 10*3/MM3 (ref 0–0.7)
EOSINOPHIL # BLD AUTO: 0.36 10*3/MM3 (ref 0–0.7)
EOSINOPHIL NFR BLD AUTO: 5 % (ref 0–4)
EOSINOPHIL NFR BLD AUTO: 5.7 % (ref 0–4)
ERYTHROCYTE [DISTWIDTH] IN BLOOD BY AUTOMATED COUNT: 15.9 % (ref 12–15)
ERYTHROCYTE [DISTWIDTH] IN BLOOD BY AUTOMATED COUNT: 16 % (ref 12–15)
GFR SERPL CREATININE-BSD FRML MDRD: 9 ML/MIN/1.73
GFR SERPL CREATININE-BSD FRML MDRD: 9 ML/MIN/1.73
GFR SERPL CREATININE-BSD FRML MDRD: ABNORMAL ML/MIN/1.73
GFR SERPL CREATININE-BSD FRML MDRD: ABNORMAL ML/MIN/1.73
GLOBULIN UR ELPH-MCNC: 5 GM/DL
GLUCOSE BLD-MCNC: 104 MG/DL (ref 70–100)
GLUCOSE BLD-MCNC: 99 MG/DL (ref 70–100)
GLUCOSE UR STRIP-MCNC: NEGATIVE MG/DL
HCT VFR BLD AUTO: 29.4 % (ref 40–52)
HCT VFR BLD AUTO: 31.5 % (ref 40–52)
HGB BLD-MCNC: 10.4 G/DL (ref 14–18)
HGB BLD-MCNC: 9.6 G/DL (ref 14–18)
HGB UR QL STRIP.AUTO: ABNORMAL
HYALINE CASTS UR QL AUTO: ABNORMAL /LPF
IMM GRANULOCYTES # BLD: 0.03 10*3/MM3 (ref 0–0.03)
IMM GRANULOCYTES # BLD: 0.03 10*3/MM3 (ref 0–0.03)
IMM GRANULOCYTES NFR BLD: 0.5 % (ref 0–5)
IMM GRANULOCYTES NFR BLD: 0.5 % (ref 0–5)
INR PPP: 1.31 (ref 0.91–1.09)
KETONES UR QL STRIP: NEGATIVE
LEUKOCYTE ESTERASE UR QL STRIP.AUTO: NEGATIVE
LYMPHOCYTES # BLD AUTO: 0.25 10*3/MM3 (ref 0.72–4.86)
LYMPHOCYTES # BLD AUTO: 0.26 10*3/MM3 (ref 0.72–4.86)
LYMPHOCYTES NFR BLD AUTO: 3.9 % (ref 15–45)
LYMPHOCYTES NFR BLD AUTO: 4.3 % (ref 15–45)
MCH RBC QN AUTO: 28.2 PG (ref 28–32)
MCH RBC QN AUTO: 28.4 PG (ref 28–32)
MCHC RBC AUTO-ENTMCNC: 32.7 G/DL (ref 33–36)
MCHC RBC AUTO-ENTMCNC: 33 G/DL (ref 33–36)
MCV RBC AUTO: 86.1 FL (ref 82–95)
MCV RBC AUTO: 86.5 FL (ref 82–95)
MONOCYTES # BLD AUTO: 0.55 10*3/MM3 (ref 0.19–1.3)
MONOCYTES # BLD AUTO: 0.73 10*3/MM3 (ref 0.19–1.3)
MONOCYTES NFR BLD AUTO: 11.5 % (ref 4–12)
MONOCYTES NFR BLD AUTO: 9.2 % (ref 4–12)
NEUTROPHILS # BLD AUTO: 4.81 10*3/MM3 (ref 1.87–8.4)
NEUTROPHILS # BLD AUTO: 4.96 10*3/MM3 (ref 1.87–8.4)
NEUTROPHILS NFR BLD AUTO: 77.9 % (ref 39–78)
NEUTROPHILS NFR BLD AUTO: 80.5 % (ref 39–78)
NITRITE UR QL STRIP: NEGATIVE
NRBC BLD MANUAL-RTO: 0 /100 WBC (ref 0–0)
NRBC BLD MANUAL-RTO: 0 /100 WBC (ref 0–0)
PH UR STRIP.AUTO: 5.5 [PH] (ref 5–8)
PLATELET # BLD AUTO: 345 10*3/MM3 (ref 130–400)
PLATELET # BLD AUTO: 352 10*3/MM3 (ref 130–400)
PMV BLD AUTO: 10.4 FL (ref 6–12)
PMV BLD AUTO: 10.6 FL (ref 6–12)
POTASSIUM BLD-SCNC: 4.6 MMOL/L (ref 3.5–5.3)
POTASSIUM BLD-SCNC: 4.9 MMOL/L (ref 3.5–5.3)
PROT SERPL-MCNC: 8.5 G/DL (ref 6.3–8.7)
PROT UR QL STRIP: ABNORMAL
PROTHROMBIN TIME: 16.7 SECONDS (ref 11.9–14.6)
RBC # BLD AUTO: 3.4 10*6/MM3 (ref 4.8–5.9)
RBC # BLD AUTO: 3.66 10*6/MM3 (ref 4.8–5.9)
RBC # UR: ABNORMAL /HPF
REF LAB TEST METHOD: ABNORMAL
SODIUM BLD-SCNC: 130 MMOL/L (ref 135–145)
SODIUM BLD-SCNC: 130 MMOL/L (ref 135–145)
SP GR UR STRIP: 1.02 (ref 1–1.03)
SQUAMOUS #/AREA URNS HPF: ABNORMAL /HPF
UROBILINOGEN UR QL STRIP: ABNORMAL
WBC NRBC COR # BLD: 5.98 10*3/MM3 (ref 4.8–10.8)
WBC NRBC COR # BLD: 6.36 10*3/MM3 (ref 4.8–10.8)
WBC UR QL AUTO: ABNORMAL /HPF

## 2018-11-12 PROCEDURE — 80053 COMPREHEN METABOLIC PANEL: CPT | Performed by: PHYSICIAN ASSISTANT

## 2018-11-12 PROCEDURE — 99284 EMERGENCY DEPT VISIT MOD MDM: CPT

## 2018-11-12 PROCEDURE — 85730 THROMBOPLASTIN TIME PARTIAL: CPT | Performed by: EMERGENCY MEDICINE

## 2018-11-12 PROCEDURE — 36415 COLL VENOUS BLD VENIPUNCTURE: CPT

## 2018-11-12 PROCEDURE — 81001 URINALYSIS AUTO W/SCOPE: CPT | Performed by: EMERGENCY MEDICINE

## 2018-11-12 PROCEDURE — 85025 COMPLETE CBC W/AUTO DIFF WBC: CPT | Performed by: EMERGENCY MEDICINE

## 2018-11-12 PROCEDURE — 51798 US URINE CAPACITY MEASURE: CPT

## 2018-11-12 PROCEDURE — 96360 HYDRATION IV INFUSION INIT: CPT

## 2018-11-12 PROCEDURE — 85610 PROTHROMBIN TIME: CPT | Performed by: EMERGENCY MEDICINE

## 2018-11-12 PROCEDURE — 85025 COMPLETE CBC W/AUTO DIFF WBC: CPT | Performed by: PHYSICIAN ASSISTANT

## 2018-11-12 RX ORDER — SODIUM CHLORIDE 0.9 % (FLUSH) 0.9 %
10 SYRINGE (ML) INJECTION AS NEEDED
Status: DISCONTINUED | OUTPATIENT
Start: 2018-11-12 | End: 2018-11-12 | Stop reason: HOSPADM

## 2018-11-12 RX ADMIN — SODIUM CHLORIDE 500 ML: 9 INJECTION, SOLUTION INTRAVENOUS at 17:40

## 2018-11-12 NOTE — ED PROVIDER NOTES
Subjective   History of Present Illness    78-year-old male presenting with decreased urination.    Patient with a recently complicated surgical course. Patient underwent a nephrectomy at [facility] for a possible malignancy. Since discharge patient has had decreased fluid intake and decreased urination. Patient reports normal postoperative pain without any acute worsening.    Patient had outpatient labs drawn today and was noted that he had a new onset renal failure and so was referred to the emergency department for further evaluation.    Patient denies any associated fevers, chills, diarrhea, confusion, chest pain, trouble breathing    Review of Systems   Constitutional: Positive for activity change, appetite change and fatigue. Negative for fever.   HENT: Negative for nosebleeds.    Eyes: Negative for redness.   Respiratory: Negative for shortness of breath.    Cardiovascular: Negative for chest pain.   Gastrointestinal: Negative for abdominal pain.   Genitourinary: Positive for decreased urine volume. Negative for flank pain.   Musculoskeletal: Negative for gait problem.   Skin: Positive for wound.   Neurological: Negative for syncope and weakness.   Psychiatric/Behavioral: The patient is not hyperactive.        Past Medical History:   Diagnosis Date   • Allergic rhinitis    • Anxiety    • Arthritis    • Asthma    • Benign neoplasm of submandibular gland    • BPH with urinary obstruction    • Chest pain    • Chronic laryngitis    • Chronic rhinitis    • Epistaxis    • Esophageal stricture    • GERD (gastroesophageal reflux disease)    • Hiatal hernia    • Histoplasmosis    • Hypercholesterolemia    • Hypertension    • Impotence of organic origin    • Kidney disease     stage 3   • LPRD (laryngopharyngeal reflux disease)    • Lung collapse    • Mediastinal adenopathy    • Poor urinary stream    • Presence of stent in coronary artery in patient with coronary artery disease    • RLS (restless legs  syndrome)    • Sialoadenitis    • Skin cancer     SCALP   • SOB (shortness of breath) on exertion    • Stroke (CMS/HCC)     sometime in his life he has had a mini stroke found on a ct scan ewcently       Allergies   Allergen Reactions   • Lyrica [Pregabalin] Shortness Of Breath   • Penicillins Rash   • Sulfa Antibiotics Rash       Past Surgical History:   Procedure Laterality Date   • BONE MARROW ASPIRATE W/ LUMBAR PUNCTURE     • CARDIAC SURGERY     • CORONARY ANGIOPLASTY WITH STENT PLACEMENT     • NEPHRECTOMY     • SUBMANDIBULAR GLAND EXCISION     • TONSILLECTOMY     • TURBINOPLASTY     • VASECTOMY       and a reveral       Family History   Problem Relation Age of Onset   • Diabetes Father    • Heart failure Father    • Diabetes Brother    • Heart failure Brother    • Heart attack Brother    • Coronary artery disease Other    • Heart attack Other    • Heart disease Mother    • Hyperlipidemia Mother    • No Known Problems Maternal Grandmother    • Heart disease Maternal Grandfather    • No Known Problems Paternal Grandmother    • No Known Problems Paternal Grandfather        Social History     Socioeconomic History   • Marital status:      Spouse name: Not on file   • Number of children: Not on file   • Years of education: Not on file   • Highest education level: Not on file   Tobacco Use   • Smoking status: Former Smoker     Start date:      Last attempt to quit:      Years since quittin.8   • Smokeless tobacco: Never Used   • Tobacco comment: 30 yrs ago   Substance and Sexual Activity   • Alcohol use: No   • Drug use: No   • Sexual activity: Defer           Objective   Physical Exam   Constitutional: He is oriented to person, place, and time. He appears well-developed and well-nourished.   HENT:   Head: Normocephalic and atraumatic.   Eyes: Conjunctivae are normal.   Neck: Normal range of motion.   Cardiovascular: Normal rate, regular rhythm and intact distal pulses.   Pulmonary/Chest:  Effort normal and breath sounds normal. No respiratory distress.   Abdominal: Soft. He exhibits no distension. There is no tenderness.   Midline surgical scar with staples in place. No areas of fluctuance or discharge   Musculoskeletal: He exhibits no edema.   Neurological: He is alert and oriented to person, place, and time.   Skin: Skin is warm and dry.   Psychiatric: He has a normal mood and affect.   Nursing note and vitals reviewed.      Procedures           ED Course                  MDM  Number of Diagnoses or Management Options  Diagnosis management comments: 88-year-old male presenting with acute renal failure. Differential includes obstructive intrinsic prerenal etiologies., Complicated surgical course and will need specialized care from his surgeons at [facility]. Patient surgery recommended transfer, which was arranged. Patient and family agreeable to transfer.       Amount and/or Complexity of Data Reviewed  Clinical lab tests: reviewed  Decide to obtain previous medical records or to obtain history from someone other than the patient: yes  Discuss the patient with other providers: yes    Risk of Complications, Morbidity, and/or Mortality  Presenting problems: high    Patient Progress  Patient progress: stable        Final diagnoses:   Acute kidney injury (CMS/[facility])            Tono Grayson MD  11/12/18 [id number]

## 2018-11-15 ENCOUNTER — TRANSCRIBE ORDERS (OUTPATIENT)
Dept: ADMINISTRATIVE | Facility: HOSPITAL | Age: 78
End: 2018-11-15

## 2018-11-15 DIAGNOSIS — C48.0 MALIGNANT NEOPLASM OF RETROPERITONEUM (HCC): Primary | ICD-10-CM

## 2018-11-16 ENCOUNTER — APPOINTMENT (OUTPATIENT)
Dept: LAB | Facility: HOSPITAL | Age: 78
End: 2018-11-16

## 2018-11-16 ENCOUNTER — TRANSCRIBE ORDERS (OUTPATIENT)
Dept: LAB | Facility: HOSPITAL | Age: 78
End: 2018-11-16

## 2018-11-16 DIAGNOSIS — C48.0 MALIGNANT NEOPLASM OF RETROPERITONEUM (HCC): Primary | ICD-10-CM

## 2018-11-16 LAB
ALBUMIN SERPL-MCNC: 3.4 G/DL (ref 3.5–5)
ALBUMIN/GLOB SERPL: 0.7 G/DL (ref 1.1–2.5)
ALP SERPL-CCNC: 101 U/L (ref 24–120)
ALT SERPL W P-5'-P-CCNC: <15 U/L (ref 0–54)
ANION GAP SERPL CALCULATED.3IONS-SCNC: 10 MMOL/L (ref 4–13)
AST SERPL-CCNC: 30 U/L (ref 7–45)
BASOPHILS # BLD AUTO: 0.03 10*3/MM3 (ref 0–0.2)
BASOPHILS NFR BLD AUTO: 0.6 % (ref 0–2)
BILIRUB SERPL-MCNC: 0.6 MG/DL (ref 0.1–1)
BUN BLD-MCNC: 36 MG/DL (ref 5–21)
BUN/CREAT SERPL: 11.4 (ref 7–25)
CALCIUM SPEC-SCNC: 8.6 MG/DL (ref 8.4–10.4)
CHLORIDE SERPL-SCNC: 98 MMOL/L (ref 98–110)
CO2 SERPL-SCNC: 25 MMOL/L (ref 24–31)
CREAT BLD-MCNC: 3.15 MG/DL (ref 0.5–1.4)
DEPRECATED RDW RBC AUTO: 50.1 FL (ref 40–54)
EOSINOPHIL # BLD AUTO: 0.24 10*3/MM3 (ref 0–0.7)
EOSINOPHIL NFR BLD AUTO: 5.1 % (ref 0–4)
ERYTHROCYTE [DISTWIDTH] IN BLOOD BY AUTOMATED COUNT: 15.9 % (ref 12–15)
GFR SERPL CREATININE-BSD FRML MDRD: 19 ML/MIN/1.73
GLOBULIN UR ELPH-MCNC: 4.7 GM/DL
GLUCOSE BLD-MCNC: 103 MG/DL (ref 70–100)
HCT VFR BLD AUTO: 28.4 % (ref 40–52)
HGB BLD-MCNC: 9.4 G/DL (ref 14–18)
IMM GRANULOCYTES # BLD: 0.03 10*3/MM3 (ref 0–0.03)
IMM GRANULOCYTES NFR BLD: 0.6 % (ref 0–5)
LYMPHOCYTES # BLD AUTO: 0.24 10*3/MM3 (ref 0.72–4.86)
LYMPHOCYTES NFR BLD AUTO: 5.1 % (ref 15–45)
MCH RBC QN AUTO: 28.5 PG (ref 28–32)
MCHC RBC AUTO-ENTMCNC: 33.1 G/DL (ref 33–36)
MCV RBC AUTO: 86.1 FL (ref 82–95)
MONOCYTES # BLD AUTO: 0.59 10*3/MM3 (ref 0.19–1.3)
MONOCYTES NFR BLD AUTO: 12.5 % (ref 4–12)
NEUTROPHILS # BLD AUTO: 3.58 10*3/MM3 (ref 1.87–8.4)
NEUTROPHILS NFR BLD AUTO: 76.1 % (ref 39–78)
NRBC BLD MANUAL-RTO: 0 /100 WBC (ref 0–0)
PLATELET # BLD AUTO: 264 10*3/MM3 (ref 130–400)
PMV BLD AUTO: 10.6 FL (ref 6–12)
POTASSIUM BLD-SCNC: 4.4 MMOL/L (ref 3.5–5.3)
PROT SERPL-MCNC: 8.1 G/DL (ref 6.3–8.7)
RBC # BLD AUTO: 3.3 10*6/MM3 (ref 4.8–5.9)
SODIUM BLD-SCNC: 133 MMOL/L (ref 135–145)
WBC NRBC COR # BLD: 4.71 10*3/MM3 (ref 4.8–10.8)

## 2018-11-16 PROCEDURE — 80053 COMPREHEN METABOLIC PANEL: CPT | Performed by: TRANSPLANT SURGERY

## 2018-11-16 PROCEDURE — 36415 COLL VENOUS BLD VENIPUNCTURE: CPT

## 2018-11-16 PROCEDURE — 85025 COMPLETE CBC W/AUTO DIFF WBC: CPT | Performed by: TRANSPLANT SURGERY

## 2018-11-23 ENCOUNTER — APPOINTMENT (OUTPATIENT)
Dept: CT IMAGING | Facility: HOSPITAL | Age: 78
End: 2018-11-23

## 2018-11-23 ENCOUNTER — HOSPITAL ENCOUNTER (EMERGENCY)
Facility: HOSPITAL | Age: 78
Discharge: ANOTHER HOSPITAL | End: 2018-11-23
Admitting: EMERGENCY MEDICINE

## 2018-11-23 VITALS
RESPIRATION RATE: 16 BRPM | SYSTOLIC BLOOD PRESSURE: 138 MMHG | HEART RATE: 90 BPM | WEIGHT: 161 LBS | TEMPERATURE: 97.5 F | HEIGHT: 69 IN | OXYGEN SATURATION: 96 % | DIASTOLIC BLOOD PRESSURE: 79 MMHG | BODY MASS INDEX: 23.85 KG/M2

## 2018-11-23 DIAGNOSIS — N20.0 KIDNEY STONE: ICD-10-CM

## 2018-11-23 DIAGNOSIS — N28.82 DILATION OF RIGHT URETER: Primary | ICD-10-CM

## 2018-11-23 LAB
ALBUMIN SERPL-MCNC: 3.2 G/DL (ref 3.5–5)
ALBUMIN/GLOB SERPL: 0.7 G/DL (ref 1.1–2.5)
ALP SERPL-CCNC: 94 U/L (ref 24–120)
ALT SERPL W P-5'-P-CCNC: <15 U/L (ref 0–54)
ANION GAP SERPL CALCULATED.3IONS-SCNC: 11 MMOL/L (ref 4–13)
AST SERPL-CCNC: 30 U/L (ref 7–45)
BASOPHILS # BLD AUTO: 0.04 10*3/MM3 (ref 0–0.2)
BASOPHILS NFR BLD AUTO: 0.9 % (ref 0–2)
BILIRUB SERPL-MCNC: 0.5 MG/DL (ref 0.1–1)
BUN BLD-MCNC: 34 MG/DL (ref 5–21)
BUN/CREAT SERPL: 9 (ref 7–25)
CALCIUM SPEC-SCNC: 8.8 MG/DL (ref 8.4–10.4)
CHLORIDE SERPL-SCNC: 98 MMOL/L (ref 98–110)
CO2 SERPL-SCNC: 25 MMOL/L (ref 24–31)
CREAT BLD-MCNC: 3.76 MG/DL (ref 0.5–1.4)
DEPRECATED RDW RBC AUTO: 47.8 FL (ref 40–54)
EOSINOPHIL # BLD AUTO: 0.36 10*3/MM3 (ref 0–0.7)
EOSINOPHIL NFR BLD AUTO: 8.4 % (ref 0–4)
ERYTHROCYTE [DISTWIDTH] IN BLOOD BY AUTOMATED COUNT: 15.6 % (ref 12–15)
GFR SERPL CREATININE-BSD FRML MDRD: 16 ML/MIN/1.73
GLOBULIN UR ELPH-MCNC: 4.5 GM/DL
GLUCOSE BLD-MCNC: 93 MG/DL (ref 70–100)
HCT VFR BLD AUTO: 26.6 % (ref 40–52)
HGB BLD-MCNC: 8.8 G/DL (ref 14–18)
HOLD SPECIMEN: NORMAL
HOLD SPECIMEN: NORMAL
LYMPHOCYTES # BLD AUTO: 0.21 10*3/MM3 (ref 0.72–4.86)
LYMPHOCYTES NFR BLD AUTO: 4.9 % (ref 15–45)
MCH RBC QN AUTO: 27.9 PG (ref 28–32)
MCHC RBC AUTO-ENTMCNC: 33.1 G/DL (ref 33–36)
MCV RBC AUTO: 84.4 FL (ref 82–95)
MONOCYTES # BLD AUTO: 0.62 10*3/MM3 (ref 0.19–1.3)
MONOCYTES NFR BLD AUTO: 14.4 % (ref 4–12)
NEUTROPHILS # BLD AUTO: 3.04 10*3/MM3 (ref 1.87–8.4)
NEUTROPHILS NFR BLD AUTO: 70.7 % (ref 39–78)
PLATELET # BLD AUTO: 116 10*3/MM3 (ref 130–400)
PMV BLD AUTO: 11.6 FL (ref 6–12)
POTASSIUM BLD-SCNC: 4.1 MMOL/L (ref 3.5–5.3)
PROT SERPL-MCNC: 7.7 G/DL (ref 6.3–8.7)
RBC # BLD AUTO: 3.15 10*6/MM3 (ref 4.8–5.9)
SODIUM BLD-SCNC: 134 MMOL/L (ref 135–145)
WBC NRBC COR # BLD: 4.3 10*3/MM3 (ref 4.8–10.8)
WHOLE BLOOD HOLD SPECIMEN: NORMAL
WHOLE BLOOD HOLD SPECIMEN: NORMAL

## 2018-11-23 PROCEDURE — 80053 COMPREHEN METABOLIC PANEL: CPT | Performed by: NURSE PRACTITIONER

## 2018-11-23 PROCEDURE — 99284 EMERGENCY DEPT VISIT MOD MDM: CPT

## 2018-11-23 PROCEDURE — 96360 HYDRATION IV INFUSION INIT: CPT

## 2018-11-23 PROCEDURE — 74176 CT ABD & PELVIS W/O CONTRAST: CPT

## 2018-11-23 PROCEDURE — 85025 COMPLETE CBC W/AUTO DIFF WBC: CPT | Performed by: NURSE PRACTITIONER

## 2018-11-23 RX ORDER — SODIUM CHLORIDE 0.9 % (FLUSH) 0.9 %
10 SYRINGE (ML) INJECTION AS NEEDED
Status: DISCONTINUED | OUTPATIENT
Start: 2018-11-23 | End: 2018-11-23 | Stop reason: HOSPADM

## 2018-11-23 RX ORDER — DIAZEPAM 5 MG/1
5 TABLET ORAL ONCE
Status: COMPLETED | OUTPATIENT
Start: 2018-11-23 | End: 2018-11-23

## 2018-11-23 RX ORDER — HYDROCODONE BITARTRATE AND ACETAMINOPHEN 5; 325 MG/1; MG/1
1 TABLET ORAL ONCE
Status: COMPLETED | OUTPATIENT
Start: 2018-11-23 | End: 2018-11-23

## 2018-11-23 RX ADMIN — DIAZEPAM 5 MG: 5 TABLET ORAL at 14:31

## 2018-11-23 RX ADMIN — HYDROCODONE BITARTRATE AND ACETAMINOPHEN 1 TABLET: 5; 325 TABLET ORAL at 13:53

## 2018-11-23 RX ADMIN — SODIUM CHLORIDE 1000 ML: 9 INJECTION, SOLUTION INTRAVENOUS at 13:00

## 2018-11-23 NOTE — ED NOTES
Catheter clamped to collect urine specimen. Will continue to monitor.     Indy Garcia, RN  11/23/18 0035

## 2018-11-23 NOTE — ED PROVIDER NOTES
"Subjective   Mr. Adams is a 78-year-old male patient who presents today with complaints of sudden decrease in urinary output and feeling of bladder distention/uncomfortable sensation to bilateral flanks since last night.  Patient reports he felt nauseous about 8 PM, drink a bottle of water.  He did go to bed, around midnight he noted he did not have any urinary output in his Mclaughlin catheter and drank another bottle of water.  He woke up again this morning with very minimal output in the urinary catheter.  At 8:30 they attempted to contact the managing physicians, due to the holiday weekend they have yet to have a telephone call back.  They decided to emergency room as he still not had urinary output.  Patient is currently being managed by [facility].  He underwent a nephrectomy October 31.  He had become dehydrated  after surgery and saw here on 11/12, finding acute kidney injury and at that time he had a small kidney stone which he did past as well as some urinary retention.  He was transferred to [facility] again released on November 13.  He is been following with his primary care office for repeat blood work to ensure kidney function returning to normal.  Patient family states that he has made good progress.  He does have an appointment next week to have the Mclaughlin catheter removed.  Patient states he normally will have \"a full bad\" overnight.  Patient and family bedside and reported that he was encouraged to drink water for any complaints of nausea as this could be signs of worsening kidney function and dehydration.        History provided by:  Relative and patient      Review of Systems   Constitutional: Negative for chills, fatigue and fever.   HENT: Negative for congestion, rhinorrhea, sore throat and voice change.    Eyes: Negative for discharge and visual disturbance.   Respiratory: Negative for cough, shortness of breath and wheezing.    Cardiovascular: Negative for chest pain. "   Gastrointestinal: Positive for abdominal pain (bilateral flank pain/bladder pain). Negative for diarrhea, nausea and vomiting.   Endocrine: Negative.    Genitourinary: Positive for decreased urine volume. Negative for difficulty urinating.   Musculoskeletal: Negative.  Negative for neck pain.   Skin: Negative.  Negative for wound.   Allergic/Immunologic: Negative.    Neurological: Negative.  Negative for weakness and headaches.   Hematological: Negative.    Psychiatric/Behavioral: Negative.        Past Medical History:   Diagnosis Date   • Allergic rhinitis    • Anxiety    • Arthritis    • Asthma    • Benign neoplasm of submandibular gland    • BPH with urinary obstruction    • Chest pain    • Chronic laryngitis    • Chronic rhinitis    • Epistaxis    • Esophageal stricture    • GERD (gastroesophageal reflux disease)    • Hiatal hernia    • Histoplasmosis    • Hypercholesterolemia    • Hypertension    • Impotence of organic origin    • Kidney disease     stage 3   • LPRD (laryngopharyngeal reflux disease)    • Lung collapse    • Mediastinal adenopathy    • Poor urinary stream    • Presence of stent in coronary artery in patient with coronary artery disease    • RLS (restless legs syndrome)    • Sialoadenitis    • Skin cancer     SCALP   • SOB (shortness of breath) on exertion    • Stroke (CMS/HCC)     sometime in his life he has had a mini stroke found on a ct scan ewcently       Allergies   Allergen Reactions   • Lyrica [Pregabalin] Shortness Of Breath   • Penicillins Rash   • Sulfa Antibiotics Rash       Past Surgical History:   Procedure Laterality Date   • BONE MARROW ASPIRATE W/ LUMBAR PUNCTURE     • CARDIAC SURGERY     • CORONARY ANGIOPLASTY WITH STENT PLACEMENT  2016   • NEPHRECTOMY     • SUBMANDIBULAR GLAND EXCISION     • TONSILLECTOMY     • TURBINOPLASTY     • VASECTOMY       and a reveral       Family History   Problem Relation Age of Onset   • Diabetes Father    • Heart failure Father    • Diabetes  "Brother    • Heart failure Brother    • Heart attack Brother    • Coronary artery disease Other    • Heart attack Other    • Heart disease Mother    • Hyperlipidemia Mother    • No Known Problems Maternal Grandmother    • Heart disease Maternal Grandfather    • No Known Problems Paternal Grandmother    • No Known Problems Paternal Grandfather        Social History     Socioeconomic History   • Marital status:      Spouse name: Not on file   • Number of children: Not on file   • Years of education: Not on file   • Highest education level: Not on file   Tobacco Use   • Smoking status: Former Smoker     Start date:      Last attempt to quit: 1985     Years since quittin.9   • Smokeless tobacco: Never Used   • Tobacco comment: 30 yrs ago   Substance and Sexual Activity   • Alcohol use: No   • Drug use: No   • Sexual activity: Defer       /79   Pulse 90   Temp 97.5 °F (36.4 °C)   Resp 16   Ht 175.3 cm (69\")   Wt 73 kg (161 lb)   SpO2 96%   BMI 23.78 kg/m²       Objective   Physical Exam   Constitutional: He is oriented to person, place, and time. He appears well-developed and well-nourished.   HENT:   Head: Normocephalic and atraumatic.   Right Ear: External ear normal.   Left Ear: External ear normal.   Nose: Nose normal.   Mouth/Throat: Oropharynx is clear and moist.   Eyes: EOM are normal. Pupils are equal, round, and reactive to light.   Neck: Normal range of motion. Neck supple.   Cardiovascular: Normal rate and regular rhythm.   Pulmonary/Chest: Effort normal and breath sounds normal.   Abdominal: Soft. Bowel sounds are normal. He exhibits no distension. There is tenderness (bilateral flank).   Healing midline incision with steri strips in place. No indication of infection   Genitourinary: Penis normal.   Genitourinary Comments: Mclaughlin cath in place   Musculoskeletal: Normal range of motion.   Neurological: He is alert and oriented to person, place, and time.   Skin: Skin is warm and " dry.   Nursing note and vitals reviewed.      Procedures  Labs Reviewed   COMPREHENSIVE METABOLIC PANEL - Abnormal; Notable for the following components:       Result Value    BUN 34 (*)     Creatinine 3.76 (*)     Sodium 134 (*)     Albumin 3.20 (*)     eGFR Non  Amer 16 (*)     A/G Ratio 0.7 (*)     All other components within normal limits    Narrative:     The MDRD GFR formula is only valid for adults with stable renal function between ages 18 and 70.   CBC WITH AUTO DIFFERENTIAL - Abnormal; Notable for the following components:    WBC 4.30 (*)     RBC 3.15 (*)     Hemoglobin 8.8 (*)     Hematocrit 26.6 (*)     MCH 27.9 (*)     RDW 15.6 (*)     Platelets 116 (*)     Lymphocyte % 4.9 (*)     Monocyte % 14.4 (*)     Eosinophil % 8.4 (*)     Lymphocytes, Absolute 0.21 (*)     All other components within normal limits   RAINBOW DRAW    Narrative:     The following orders were created for panel order [facility] Draw.  Procedure                               Abnormality         Status                     ---------                               -----------         ------                     Light Blue Top[[id number]]                                   Final result               Green Top (Gel)[[id number]]                                  Final result               Lavender Top[[id number]]                                     Final result               Red Top[[id number]]                                          Final result                 Please view results for these tests on the individual orders.   URINALYSIS W/ MICROSCOPIC IF INDICATED (NO CULTURE)   OSMOLALITY, URINE   SODIUM, URINE, RANDOM   CBC AND DIFFERENTIAL    Narrative:     The following orders were created for panel order CBC & Differential.  Procedure                               Abnormality         Status                     ---------                               -----------         ------                     Manual Differential[[id number]]                                                          CBC Auto Differential[[id number]]        Abnormal            Final result                 Please view results for these tests on the individual orders.   LIGHT BLUE TOP   GREEN TOP   LAVENDER TOP   RED TOP     CT Abdomen Pelvis Without Contrast   Final Result   1. There is a tiny 2 mm stone in the distal right ureter with mild to   moderate dilatation of the right ureter and mild dilatation of the right   renal collecting system. There are nonobstructive stones also in the   right kidney. There is a 4 cm probable cyst in the upper pole right   kidney.   2. Interim left nephrectomy. Stranding of fat in the retroperitoneum is   likely related to fairly recent surgery.   3. Diverticulosis of the colon. There is inflammation of the fat around   the mid to distal descending colon. This is close to the prior surgical   site and could represent postoperative change. Diverticulitis is not   excluded.   4. There is a 3.5 cm abdominal aortic aneurysm. Other nonacute findings,   as discussed above.       The full report of this exam was immediately signed and available to the   emergency room. The patient is currently in the emergency room.   This report was finalized on 11/23/2018 11:54 by Dr. Julio Delarosa MD.                 ED Course  ED Course as of Nov 23 1439   Fri Nov 23, 2018   1255 BUN: (!) 34 [BA]   1255 Discussed with Dr. Hudson. Will discuss with urology  [BA]   1300 Discussed with Dr. Jerez, Does not feel patient candidate to stay at facility given history and current problem. Will notify urology at [facility]  [BA]   1330 Dr. Hudson discussed with [facility] urology. Will accept direct transfer and plan to take to OR for stone procedure. Patient and family updated, in agreement with plan.  [BA]      ED Course User Index  [BA] Shilpa Mejia, APRN                  [facility]      Final diagnoses:   Dilation of right ureter   Kidney stone            Shilpa Mejia,  APRN  11/23/18 1431

## 2018-11-23 NOTE — ED NOTES
Attempted to call report to Radha Parikh RN stated she would call back     Indy Garcia RN  11/23/18 4950

## 2018-11-29 DIAGNOSIS — I65.23 BILATERAL CAROTID ARTERY STENOSIS: Primary | ICD-10-CM

## 2018-12-03 ENCOUNTER — OFFICE VISIT (OUTPATIENT)
Dept: VASCULAR SURGERY | Age: 78
End: 2018-12-03
Payer: MEDICARE

## 2018-12-03 ENCOUNTER — HOSPITAL ENCOUNTER (OUTPATIENT)
Dept: VASCULAR LAB | Age: 78
Discharge: HOME | End: 2018-12-03
Payer: MEDICARE

## 2018-12-03 VITALS
SYSTOLIC BLOOD PRESSURE: 122 MMHG | RESPIRATION RATE: 18 BRPM | OXYGEN SATURATION: 98 % | DIASTOLIC BLOOD PRESSURE: 68 MMHG | HEART RATE: 87 BPM

## 2018-12-03 DIAGNOSIS — I65.23 BILATERAL CAROTID ARTERY STENOSIS: Primary | ICD-10-CM

## 2018-12-03 DIAGNOSIS — I65.23 BILATERAL CAROTID ARTERY STENOSIS: ICD-10-CM

## 2018-12-03 PROCEDURE — G8427 DOCREV CUR MEDS BY ELIG CLIN: HCPCS | Performed by: PHYSICIAN ASSISTANT

## 2018-12-03 PROCEDURE — 1101F PT FALLS ASSESS-DOCD LE1/YR: CPT | Performed by: PHYSICIAN ASSISTANT

## 2018-12-03 PROCEDURE — G8598 ASA/ANTIPLAT THER USED: HCPCS | Performed by: PHYSICIAN ASSISTANT

## 2018-12-03 PROCEDURE — G8484 FLU IMMUNIZE NO ADMIN: HCPCS | Performed by: PHYSICIAN ASSISTANT

## 2018-12-03 PROCEDURE — 93880 EXTRACRANIAL BILAT STUDY: CPT

## 2018-12-03 PROCEDURE — 99213 OFFICE O/P EST LOW 20 MIN: CPT | Performed by: PHYSICIAN ASSISTANT

## 2018-12-03 PROCEDURE — 1036F TOBACCO NON-USER: CPT | Performed by: PHYSICIAN ASSISTANT

## 2018-12-03 PROCEDURE — 1123F ACP DISCUSS/DSCN MKR DOCD: CPT | Performed by: PHYSICIAN ASSISTANT

## 2018-12-03 PROCEDURE — G8421 BMI NOT CALCULATED: HCPCS | Performed by: PHYSICIAN ASSISTANT

## 2018-12-03 PROCEDURE — 4040F PNEUMOC VAC/ADMIN/RCVD: CPT | Performed by: PHYSICIAN ASSISTANT

## 2018-12-03 RX ORDER — SENNA AND DOCUSATE SODIUM 50; 8.6 MG/1; MG/1
2 TABLET, FILM COATED ORAL 2 TIMES DAILY
COMMUNITY
End: 2020-02-10

## 2018-12-03 RX ORDER — TAMSULOSIN HYDROCHLORIDE 0.4 MG/1
0.4 CAPSULE ORAL DAILY
COMMUNITY
End: 2020-02-10

## 2018-12-03 RX ORDER — AMLODIPINE BESYLATE 5 MG/1
5 TABLET ORAL DAILY
COMMUNITY

## 2018-12-03 RX ORDER — LACTULOSE 10 G/15ML
20 SOLUTION ORAL 3 TIMES DAILY PRN
COMMUNITY
End: 2020-02-10

## 2018-12-03 RX ORDER — FINASTERIDE 5 MG/1
5 TABLET, FILM COATED ORAL DAILY
COMMUNITY
End: 2020-02-10

## 2018-12-03 NOTE — PROGRESS NOTES
tablet Take 5 mg by mouth daily      amLODIPine (NORVASC) 5 MG tablet Take 5 mg by mouth daily      clonazePAM (KLONOPIN) 2 MG tablet Take 2 mg by mouth nightly as needed      atorvastatin (LIPITOR) 10 MG tablet Take 10 mg by mouth daily      cetirizine (ZYRTEC) 5 MG tablet Take 5 mg by mouth daily      aspirin 81 MG tablet Take 81 mg by mouth daily.  pantoprazole (PROTONIX) 40 MG tablet Take 1 tablet by mouth 2 times daily. 60 tablet 11     No current facility-administered medications for this visit. Current Outpatient Prescriptions on File Prior to Visit   Medication Sig Dispense Refill    clonazePAM (KLONOPIN) 2 MG tablet Take 2 mg by mouth nightly as needed      atorvastatin (LIPITOR) 10 MG tablet Take 10 mg by mouth daily      cetirizine (ZYRTEC) 5 MG tablet Take 5 mg by mouth daily      aspirin 81 MG tablet Take 81 mg by mouth daily.  pantoprazole (PROTONIX) 40 MG tablet Take 1 tablet by mouth 2 times daily. 60 tablet 11     No current facility-administered medications on file prior to visit. Allergies: Lyrica [pregabalin];  Penicillins; and Sulfa antibiotics  Past Medical History:   Diagnosis Date    Allergic rhinitis     Asthma     Cancer ([facility] [address]     skin-non-melanoma    Carotid artery occlusion     Colon polyps     GERD (gastroesophageal reflux disease)     HIGH CHOLESTEROL     Hypertension     Iron deficiency anemia     Osteoarthritis     Restless leg syndrome     Rheumatoid arthritis ([facility] [address]     Skin cancer     on left ear lobe     Past Surgical History:   Procedure Laterality Date    COLONOSCOPY  9/15/2009    : negative    COLONOSCOPY  2006, 2003    hyperplastic colon polyps     COLONOSCOPY  11/2012    minimal diverticula, no polyps (5yr)    CORONARY ANGIOPLASTY WITH STENT PLACEMENT  2016    2 stents    OK COLSC FLEXIBLE W/CONTROL BLEEDING ANY METHOD N/A 7/13/2017    Dr Osullivan-w/control of hemorrage, gold probe cautery of avm-internal or pallor. Psychiatric - mood, affect, and behavior appear normal.  Judgment and thought processes appear normal.    Risk factors for atherosclerosis of all vascular beds have been reviewed with the patient including:  Family history, tobacco abuse in all forms, elevated cholesterol, hyperlipidemia, and diabetes. Doppler results:         Impression        There is smooth plaque visualized in the bilateral internal carotid    artery(ies).    There is less than 50% stenosis in the right internal carotid artery.   Eva Clipper is less than 50% stenosis of the left internal carotid artery.    There is normal antegrade flow in the bilateral vertebral artery(ies). Options have been discussed with the patient including continued medical management. Patient has opted to proceed with continued medical management. Assessment    1. Bilateral carotid artery stenosis          Plan    Start/Continue ASA EC 81 mg daily  Strongly encourage start/continue statin therapy  Recommend no smoking  Patient instructed to call or proceed to the emergency room with any symptoms of lateralizing weakness, loss of vision in one eye, or episodes slurred speech.     Follow up in  12  Month(s) with a repeat CDU

## 2019-01-15 LAB
ALBUMIN SERPL-MCNC: 3.2 G/DL (ref 3.5–5.2)
ALP BLD-CCNC: 79 U/L (ref 40–130)
ALT SERPL-CCNC: 5 U/L (ref 5–41)
ANION GAP SERPL CALCULATED.3IONS-SCNC: 13 MMOL/L (ref 7–19)
AST SERPL-CCNC: 5 U/L (ref 5–40)
BASOPHILS ABSOLUTE: 0 K/UL (ref 0–0.2)
BASOPHILS RELATIVE PERCENT: 0.5 % (ref 0–1)
BILIRUB SERPL-MCNC: 0.3 MG/DL (ref 0.2–1.2)
BUN BLDV-MCNC: 35 MG/DL (ref 8–23)
C-REACTIVE PROTEIN: 1.79 MG/DL (ref 0–0.5)
CALCIUM SERPL-MCNC: 8.3 MG/DL (ref 8.8–10.2)
CHLORIDE BLD-SCNC: 105 MMOL/L (ref 98–111)
CO2: 19 MMOL/L (ref 22–29)
CREAT SERPL-MCNC: 2.5 MG/DL (ref 0.5–1.2)
EOSINOPHILS ABSOLUTE: 0.2 K/UL (ref 0–0.6)
EOSINOPHILS RELATIVE PERCENT: 3.8 % (ref 0–5)
GFR NON-AFRICAN AMERICAN: 25
GLUCOSE BLD-MCNC: 110 MG/DL (ref 74–109)
HCT VFR BLD CALC: 23.6 % (ref 42–52)
HEMOGLOBIN: 7.3 G/DL (ref 14–18)
LYMPHOCYTES ABSOLUTE: 0.4 K/UL (ref 1.1–4.5)
LYMPHOCYTES RELATIVE PERCENT: 6.3 % (ref 20–40)
MCH RBC QN AUTO: 28.1 PG (ref 27–31)
MCHC RBC AUTO-ENTMCNC: 30.9 G/DL (ref 33–37)
MCV RBC AUTO: 90.8 FL (ref 80–94)
MONOCYTES ABSOLUTE: 0.6 K/UL (ref 0–0.9)
MONOCYTES RELATIVE PERCENT: 11.1 % (ref 0–10)
NEUTROPHILS ABSOLUTE: 4.3 K/UL (ref 1.5–7.5)
NEUTROPHILS RELATIVE PERCENT: 77.6 % (ref 50–65)
PDW BLD-RTO: 15.7 % (ref 11.5–14.5)
PLATELET # BLD: 190 K/UL (ref 130–400)
PMV BLD AUTO: 11.5 FL (ref 9.4–12.4)
POTASSIUM SERPL-SCNC: 4.5 MMOL/L (ref 3.5–5)
RBC # BLD: 2.6 M/UL (ref 4.7–6.1)
SEDIMENTATION RATE, ERYTHROCYTE: 109 MM/HR (ref 0–15)
SODIUM BLD-SCNC: 137 MMOL/L (ref 136–145)
TOTAL PROTEIN: 8.4 G/DL (ref 6.6–8.7)
WBC # BLD: 5.6 K/UL (ref 4.8–10.8)

## 2019-01-17 LAB
IGG 1: 1530 MG/DL (ref 240–1118)
IGG 2: 421 MG/DL (ref 124–549)
IGG 3: 110 MG/DL (ref 21–134)
IGG 4: 821 MG/DL (ref 1–123)

## 2019-01-18 LAB
QUANTI TB GOLD PLUS: NEGATIVE
QUANTI TB1 MINUS NIL: 0 IU/ML (ref 0–0.34)
QUANTI TB2 MINUS NIL: 0 IU/ML (ref 0–0.34)
QUANTIFERON MITOGEN: 1.81 IU/ML
QUANTIFERON NIL: 0.03 IU/ML

## 2019-01-21 LAB
BASOPHILS ABSOLUTE: 0.1 K/UL (ref 0–0.2)
BASOPHILS RELATIVE PERCENT: 0.9 % (ref 0–1)
EOSINOPHILS ABSOLUTE: 0.3 K/UL (ref 0–0.6)
EOSINOPHILS RELATIVE PERCENT: 4.3 % (ref 0–5)
FERRITIN: 35.7 NG/ML (ref 30–400)
FOLATE: >20 NG/ML (ref 4.5–32.2)
HCT VFR BLD CALC: 26.2 % (ref 42–52)
HEMOGLOBIN: 8 G/DL (ref 14–18)
IRON: 28 UG/DL (ref 59–158)
LYMPHOCYTES ABSOLUTE: 0.3 K/UL (ref 1.1–4.5)
LYMPHOCYTES RELATIVE PERCENT: 4.3 % (ref 20–40)
MCH RBC QN AUTO: 27.5 PG (ref 27–31)
MCHC RBC AUTO-ENTMCNC: 30.5 G/DL (ref 33–37)
MCV RBC AUTO: 90 FL (ref 80–94)
MONOCYTES ABSOLUTE: 0.6 K/UL (ref 0–0.9)
MONOCYTES RELATIVE PERCENT: 10.6 % (ref 0–10)
NEUTROPHILS ABSOLUTE: 4.6 K/UL (ref 1.5–7.5)
NEUTROPHILS RELATIVE PERCENT: 78.9 % (ref 50–65)
PDW BLD-RTO: 15.5 % (ref 11.5–14.5)
PLATELET # BLD: 237 K/UL (ref 130–400)
PMV BLD AUTO: 11 FL (ref 9.4–12.4)
RBC # BLD: 2.91 M/UL (ref 4.7–6.1)
RETICULOCYTE ABSOLUTE COUNT: 0.05 M/UL (ref 0.03–0.12)
RETICULOCYTE COUNT PCT: 1.73 % (ref 0.5–1.5)
TOTAL IRON BINDING CAPACITY: 258 UG/DL (ref 250–400)
VITAMIN B-12: 565 PG/ML (ref 211–946)
WBC # BLD: 5.9 K/UL (ref 4.8–10.8)

## 2019-02-21 ENCOUNTER — TRANSCRIBE ORDERS (OUTPATIENT)
Dept: ADMINISTRATIVE | Facility: HOSPITAL | Age: 79
End: 2019-02-21

## 2019-02-21 DIAGNOSIS — R91.1 LUNG NODULE: Primary | ICD-10-CM

## 2019-02-22 ENCOUNTER — HOSPITAL ENCOUNTER (OUTPATIENT)
Dept: CT IMAGING | Facility: HOSPITAL | Age: 79
Discharge: HOME | End: 2019-02-22
Admitting: INTERNAL MEDICINE

## 2019-02-22 ENCOUNTER — APPOINTMENT (OUTPATIENT)
Dept: CT IMAGING | Facility: HOSPITAL | Age: 79
End: 2019-02-22

## 2019-02-22 PROCEDURE — 74176 CT ABD & PELVIS W/O CONTRAST: CPT

## 2019-02-22 PROCEDURE — 71250 CT THORAX DX C-: CPT

## 2019-02-22 PROCEDURE — 0 IOHEXOL 300 MG/ML SOLUTION: Performed by: INTERNAL MEDICINE

## 2019-02-22 RX ADMIN — IOHEXOL 50 ML: 300 INJECTION, SOLUTION INTRAVENOUS at 13:26

## 2019-03-26 ENCOUNTER — APPOINTMENT (OUTPATIENT)
Dept: GENERAL RADIOLOGY | Facility: HOSPITAL | Age: 79
End: 2019-03-26

## 2019-03-26 ENCOUNTER — APPOINTMENT (OUTPATIENT)
Dept: CT IMAGING | Facility: HOSPITAL | Age: 79
End: 2019-03-26

## 2019-03-26 ENCOUNTER — HOSPITAL ENCOUNTER (OUTPATIENT)
Facility: HOSPITAL | Age: 79
Setting detail: OBSERVATION
Discharge: HOME | End: 2019-03-27
Attending: EMERGENCY MEDICINE | Admitting: SPECIALIST
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
## 2019-03-26 DIAGNOSIS — S62.511A CLOSED DISPLACED FRACTURE OF PROXIMAL PHALANX OF RIGHT THUMB, INITIAL ENCOUNTER: ICD-10-CM

## 2019-03-26 DIAGNOSIS — S22.42XA CLOSED FRACTURE OF MULTIPLE RIBS OF LEFT SIDE, INITIAL ENCOUNTER: ICD-10-CM

## 2019-03-26 DIAGNOSIS — T07.XXXA ABRASIONS OF MULTIPLE SITES: ICD-10-CM

## 2019-03-26 LAB
ALBUMIN SERPL-MCNC: 3.7 G/DL (ref 3.5–5)
ALBUMIN/GLOB SERPL: 1.2 G/DL (ref 1.1–2.5)
ALP SERPL-CCNC: 52 U/L (ref 24–120)
ALT SERPL W P-5'-P-CCNC: 36 U/L (ref 0–54)
ANION GAP SERPL CALCULATED.3IONS-SCNC: 8 MMOL/L (ref 4–13)
ANISOCYTOSIS BLD QL: ABNORMAL
AST SERPL-CCNC: 43 U/L (ref 7–45)
BASOPHILS # BLD MANUAL: 0.1 10*3/MM3 (ref 0–0.2)
BASOPHILS NFR BLD AUTO: 1 % (ref 0–2)
BILIRUB SERPL-MCNC: 0.6 MG/DL (ref 0.1–1)
BUN BLD-MCNC: 37 MG/DL (ref 5–21)
BUN/CREAT SERPL: 19.9 (ref 7–25)
CALCIUM SPEC-SCNC: 9 MG/DL (ref 8.4–10.4)
CHLORIDE SERPL-SCNC: 103 MMOL/L (ref 98–110)
CO2 SERPL-SCNC: 24 MMOL/L (ref 24–31)
CREAT BLD-MCNC: 1.86 MG/DL (ref 0.5–1.4)
DEPRECATED RDW RBC AUTO: 74.9 FL (ref 40–54)
ERYTHROCYTE [DISTWIDTH] IN BLOOD BY AUTOMATED COUNT: 23.7 % (ref 12–15)
GFR SERPL CREATININE-BSD FRML MDRD: 35 ML/MIN/1.73
GIANT PLATELETS: ABNORMAL
GLOBULIN UR ELPH-MCNC: 3 GM/DL
GLUCOSE BLD-MCNC: 144 MG/DL (ref 70–100)
HCT VFR BLD AUTO: 32.3 % (ref 40–52)
HGB BLD-MCNC: 10.2 G/DL (ref 14–18)
LYMPHOCYTES # BLD MANUAL: 0.1 10*3/MM3 (ref 0.72–4.86)
LYMPHOCYTES NFR BLD MANUAL: 1 % (ref 15–45)
LYMPHOCYTES NFR BLD MANUAL: 5.1 % (ref 4–12)
MCH RBC QN AUTO: 28.6 PG (ref 28–32)
MCHC RBC AUTO-ENTMCNC: 31.6 G/DL (ref 33–36)
MCV RBC AUTO: 90.5 FL (ref 82–95)
METAMYELOCYTES NFR BLD MANUAL: 3 % (ref 0–0)
MICROCYTES BLD QL: ABNORMAL
MONOCYTES # BLD AUTO: 0.52 10*3/MM3 (ref 0.19–1.3)
NEUTROPHILS # BLD AUTO: 9.25 10*3/MM3 (ref 1.87–8.4)
NEUTROPHILS NFR BLD MANUAL: 88.9 % (ref 39–78)
NEUTS BAND NFR BLD MANUAL: 1 % (ref 0–10)
PLATELET # BLD AUTO: 196 10*3/MM3 (ref 130–400)
PMV BLD AUTO: 11.3 FL (ref 6–12)
POIKILOCYTOSIS BLD QL SMEAR: ABNORMAL
POTASSIUM BLD-SCNC: 5 MMOL/L (ref 3.5–5.3)
PROT SERPL-MCNC: 6.7 G/DL (ref 6.3–8.7)
RBC # BLD AUTO: 3.57 10*6/MM3 (ref 4.8–5.9)
SODIUM BLD-SCNC: 135 MMOL/L (ref 135–145)
WBC MORPH BLD: NORMAL
WBC NRBC COR # BLD: 10.29 10*3/MM3 (ref 4.8–10.8)

## 2019-03-26 PROCEDURE — 94760 N-INVAS EAR/PLS OXIMETRY 1: CPT

## 2019-03-26 PROCEDURE — 85007 BL SMEAR W/DIFF WBC COUNT: CPT | Performed by: EMERGENCY MEDICINE

## 2019-03-26 PROCEDURE — 93010 ELECTROCARDIOGRAM REPORT: CPT | Performed by: INTERNAL MEDICINE

## 2019-03-26 PROCEDURE — 70450 CT HEAD/BRAIN W/O DYE: CPT

## 2019-03-26 PROCEDURE — 73130 X-RAY EXAM OF HAND: CPT

## 2019-03-26 PROCEDURE — 94799 UNLISTED PULMONARY SVC/PX: CPT

## 2019-03-26 PROCEDURE — G0378 HOSPITAL OBSERVATION PER HR: HCPCS

## 2019-03-26 PROCEDURE — 74176 CT ABD & PELVIS W/O CONTRAST: CPT

## 2019-03-26 PROCEDURE — 25010000002 FENTANYL CITRATE (PF) 100 MCG/2ML SOLUTION: Performed by: EMERGENCY MEDICINE

## 2019-03-26 PROCEDURE — 25010000002 ONDANSETRON PER 1 MG: Performed by: EMERGENCY MEDICINE

## 2019-03-26 PROCEDURE — 25010000002 TDAP 5-2.5-18.5 LF-MCG/0.5 SUSPENSION: Performed by: EMERGENCY MEDICINE

## 2019-03-26 PROCEDURE — 96374 THER/PROPH/DIAG INJ IV PUSH: CPT

## 2019-03-26 PROCEDURE — 80053 COMPREHEN METABOLIC PANEL: CPT | Performed by: EMERGENCY MEDICINE

## 2019-03-26 PROCEDURE — 90715 TDAP VACCINE 7 YRS/> IM: CPT | Performed by: EMERGENCY MEDICINE

## 2019-03-26 PROCEDURE — 99284 EMERGENCY DEPT VISIT MOD MDM: CPT

## 2019-03-26 PROCEDURE — 85025 COMPLETE CBC W/AUTO DIFF WBC: CPT | Performed by: EMERGENCY MEDICINE

## 2019-03-26 PROCEDURE — 71250 CT THORAX DX C-: CPT

## 2019-03-26 PROCEDURE — 90471 IMMUNIZATION ADMIN: CPT | Performed by: EMERGENCY MEDICINE

## 2019-03-26 PROCEDURE — 73562 X-RAY EXAM OF KNEE 3: CPT

## 2019-03-26 PROCEDURE — 25010000002 MORPHINE PER 10 MG: Performed by: SPECIALIST

## 2019-03-26 PROCEDURE — 93005 ELECTROCARDIOGRAM TRACING: CPT | Performed by: EMERGENCY MEDICINE

## 2019-03-26 PROCEDURE — 96375 TX/PRO/DX INJ NEW DRUG ADDON: CPT

## 2019-03-26 PROCEDURE — 25810000003 SODIUM CHLORIDE 0.9 % WITH KCL 20 MEQ 20-0.9 MEQ/L-% SOLUTION: Performed by: SPECIALIST

## 2019-03-26 RX ORDER — HYDROCODONE BITARTRATE AND ACETAMINOPHEN 7.5; 325 MG/1; MG/1
1 TABLET ORAL EVERY 4 HOURS PRN
Status: DISCONTINUED | OUTPATIENT
Start: 2019-03-26 | End: 2019-03-27 | Stop reason: HOSPADM

## 2019-03-26 RX ORDER — NALOXONE HCL 0.4 MG/ML
0.4 VIAL (ML) INJECTION
Status: DISCONTINUED | OUTPATIENT
Start: 2019-03-26 | End: 2019-03-27 | Stop reason: HOSPADM

## 2019-03-26 RX ORDER — MELATONIN
1000 DAILY
COMMUNITY
End: 2021-06-04 | Stop reason: DRUGHIGH

## 2019-03-26 RX ORDER — FENTANYL CITRATE 50 UG/ML
25 INJECTION, SOLUTION INTRAMUSCULAR; INTRAVENOUS ONCE
Status: COMPLETED | OUTPATIENT
Start: 2019-03-26 | End: 2019-03-26

## 2019-03-26 RX ORDER — SODIUM CHLORIDE 0.9 % (FLUSH) 0.9 %
3-10 SYRINGE (ML) INJECTION AS NEEDED
Status: DISCONTINUED | OUTPATIENT
Start: 2019-03-26 | End: 2019-03-27 | Stop reason: HOSPADM

## 2019-03-26 RX ORDER — MORPHINE SULFATE 2 MG/ML
2 INJECTION, SOLUTION INTRAMUSCULAR; INTRAVENOUS
Status: DISCONTINUED | OUTPATIENT
Start: 2019-03-26 | End: 2019-03-27 | Stop reason: HOSPADM

## 2019-03-26 RX ORDER — PHENOL 1.4 %
2000 AEROSOL, SPRAY (ML) MUCOUS MEMBRANE DAILY
COMMUNITY
End: 2022-06-09

## 2019-03-26 RX ORDER — PREDNISONE 20 MG/1
30 TABLET ORAL DAILY
COMMUNITY
Start: 2019-03-26 | End: 2019-03-29

## 2019-03-26 RX ORDER — HYDROCODONE BITARTRATE AND ACETAMINOPHEN 10; 325 MG/1; MG/1
1 TABLET ORAL EVERY 4 HOURS PRN
Status: DISCONTINUED | OUTPATIENT
Start: 2019-03-26 | End: 2019-03-27 | Stop reason: HOSPADM

## 2019-03-26 RX ORDER — SODIUM CHLORIDE 0.9 % (FLUSH) 0.9 %
3 SYRINGE (ML) INJECTION EVERY 12 HOURS SCHEDULED
Status: DISCONTINUED | OUTPATIENT
Start: 2019-03-26 | End: 2019-03-26

## 2019-03-26 RX ORDER — ONDANSETRON 2 MG/ML
4 INJECTION INTRAMUSCULAR; INTRAVENOUS ONCE
Status: COMPLETED | OUTPATIENT
Start: 2019-03-26 | End: 2019-03-26

## 2019-03-26 RX ORDER — MEPERIDINE HYDROCHLORIDE 25 MG/ML
25 INJECTION INTRAMUSCULAR; INTRAVENOUS; SUBCUTANEOUS ONCE
Status: COMPLETED | OUTPATIENT
Start: 2019-03-26 | End: 2019-03-26

## 2019-03-26 RX ORDER — SODIUM CHLORIDE AND POTASSIUM CHLORIDE 150; 900 MG/100ML; MG/100ML
75 INJECTION, SOLUTION INTRAVENOUS CONTINUOUS
Status: DISCONTINUED | OUTPATIENT
Start: 2019-03-26 | End: 2019-03-26

## 2019-03-26 RX ADMIN — MEPERIDINE HYDROCHLORIDE 25 MG: 25 INJECTION INTRAMUSCULAR; INTRAVENOUS; SUBCUTANEOUS at 16:01

## 2019-03-26 RX ADMIN — FENTANYL CITRATE 25 MCG: 50 INJECTION INTRAMUSCULAR; INTRAVENOUS at 14:24

## 2019-03-26 RX ADMIN — TETANUS TOXOID, REDUCED DIPHTHERIA TOXOID AND ACELLULAR PERTUSSIS VACCINE, ADSORBED 0.5 ML: 5; 2.5; 8; 8; 2.5 SUSPENSION INTRAMUSCULAR at 16:02

## 2019-03-26 RX ADMIN — ONDANSETRON HYDROCHLORIDE 4 MG: 2 SOLUTION INTRAMUSCULAR; INTRAVENOUS at 14:25

## 2019-03-26 RX ADMIN — HYDROCODONE BITARTRATE AND ACETAMINOPHEN 1 TABLET: 10; 325 TABLET ORAL at 21:03

## 2019-03-26 RX ADMIN — MORPHINE SULFATE 4 MG: 4 INJECTION INTRAVENOUS at 19:47

## 2019-03-27 ENCOUNTER — APPOINTMENT (OUTPATIENT)
Dept: GENERAL RADIOLOGY | Facility: HOSPITAL | Age: 79
End: 2019-03-27

## 2019-03-27 VITALS
RESPIRATION RATE: 16 BRPM | HEIGHT: 69 IN | DIASTOLIC BLOOD PRESSURE: 75 MMHG | SYSTOLIC BLOOD PRESSURE: 126 MMHG | BODY MASS INDEX: 27.4 KG/M2 | WEIGHT: 185 LBS | OXYGEN SATURATION: 94 % | TEMPERATURE: 98 F | HEART RATE: 78 BPM

## 2019-03-27 LAB
ANION GAP SERPL CALCULATED.3IONS-SCNC: 9 MMOL/L (ref 4–13)
BASOPHILS # BLD AUTO: 0.04 10*3/MM3 (ref 0–0.2)
BASOPHILS NFR BLD AUTO: 0.4 % (ref 0–2)
BUN BLD-MCNC: 37 MG/DL (ref 5–21)
BUN/CREAT SERPL: 20.1 (ref 7–25)
CALCIUM SPEC-SCNC: 9.1 MG/DL (ref 8.4–10.4)
CHLORIDE SERPL-SCNC: 101 MMOL/L (ref 98–110)
CO2 SERPL-SCNC: 25 MMOL/L (ref 24–31)
CREAT BLD-MCNC: 1.84 MG/DL (ref 0.5–1.4)
DEPRECATED RDW RBC AUTO: 74.4 FL (ref 40–54)
EOSINOPHIL # BLD AUTO: 0.02 10*3/MM3 (ref 0–0.7)
EOSINOPHIL NFR BLD AUTO: 0.2 % (ref 0–4)
ERYTHROCYTE [DISTWIDTH] IN BLOOD BY AUTOMATED COUNT: 24.2 % (ref 12–15)
GFR SERPL CREATININE-BSD FRML MDRD: 36 ML/MIN/1.73
GLUCOSE BLD-MCNC: 77 MG/DL (ref 70–100)
HCT VFR BLD AUTO: 31.3 % (ref 40–52)
HGB BLD-MCNC: 9.9 G/DL (ref 14–18)
IMM GRANULOCYTES # BLD AUTO: 0.27 10*3/MM3 (ref 0–0.05)
IMM GRANULOCYTES NFR BLD AUTO: 2.7 % (ref 0–5)
LYMPHOCYTES # BLD AUTO: 0.44 10*3/MM3 (ref 0.72–4.86)
LYMPHOCYTES NFR BLD AUTO: 4.4 % (ref 15–45)
MCH RBC QN AUTO: 27.9 PG (ref 28–32)
MCHC RBC AUTO-ENTMCNC: 31.6 G/DL (ref 33–36)
MCV RBC AUTO: 88.2 FL (ref 82–95)
MONOCYTES # BLD AUTO: 1.01 10*3/MM3 (ref 0.19–1.3)
MONOCYTES NFR BLD AUTO: 10.2 % (ref 4–12)
NEUTROPHILS # BLD AUTO: 8.14 10*3/MM3 (ref 1.87–8.4)
NEUTROPHILS NFR BLD AUTO: 82.1 % (ref 39–78)
NRBC BLD AUTO-RTO: 0 /100 WBC (ref 0–0)
PLATELET # BLD AUTO: 194 10*3/MM3 (ref 130–400)
PMV BLD AUTO: 10.5 FL (ref 6–12)
POTASSIUM BLD-SCNC: 5.1 MMOL/L (ref 3.5–5.3)
RBC # BLD AUTO: 3.55 10*6/MM3 (ref 4.8–5.9)
SODIUM BLD-SCNC: 135 MMOL/L (ref 135–145)
WBC NRBC COR # BLD: 9.92 10*3/MM3 (ref 4.8–10.8)

## 2019-03-27 PROCEDURE — 71045 X-RAY EXAM CHEST 1 VIEW: CPT

## 2019-03-27 PROCEDURE — 25010000002 MORPHINE PER 10 MG: Performed by: SPECIALIST

## 2019-03-27 PROCEDURE — G0378 HOSPITAL OBSERVATION PER HR: HCPCS

## 2019-03-27 PROCEDURE — 94760 N-INVAS EAR/PLS OXIMETRY 1: CPT

## 2019-03-27 PROCEDURE — 94799 UNLISTED PULMONARY SVC/PX: CPT

## 2019-03-27 PROCEDURE — 85025 COMPLETE CBC W/AUTO DIFF WBC: CPT | Performed by: SPECIALIST

## 2019-03-27 PROCEDURE — 96376 TX/PRO/DX INJ SAME DRUG ADON: CPT

## 2019-03-27 PROCEDURE — 80048 BASIC METABOLIC PNL TOTAL CA: CPT | Performed by: SPECIALIST

## 2019-03-27 RX ORDER — OXYCODONE AND ACETAMINOPHEN 10; 325 MG/1; MG/1
1 TABLET ORAL EVERY 4 HOURS PRN
Qty: 30 TABLET | Refills: 0 | OUTPATIENT
Start: 2019-03-27 | End: 2019-05-16

## 2019-03-27 RX ADMIN — MORPHINE SULFATE 4 MG: 4 INJECTION INTRAVENOUS at 00:15

## 2019-03-27 RX ADMIN — HYDROCODONE BITARTRATE AND ACETAMINOPHEN 1 TABLET: 10; 325 TABLET ORAL at 13:07

## 2019-03-27 RX ADMIN — HYDROCODONE BITARTRATE AND ACETAMINOPHEN 1 TABLET: 10; 325 TABLET ORAL at 08:58

## 2019-04-11 ENCOUNTER — HOSPITAL ENCOUNTER (OUTPATIENT)
Dept: GENERAL RADIOLOGY | Age: 79
Discharge: HOME | End: 2019-04-11
Payer: MEDICARE

## 2019-04-11 DIAGNOSIS — R05.9 COUGH: ICD-10-CM

## 2019-04-11 PROCEDURE — 71046 X-RAY EXAM CHEST 2 VIEWS: CPT

## 2019-05-01 ENCOUNTER — OFFICE VISIT (OUTPATIENT)
Dept: OTOLARYNGOLOGY | Facility: CLINIC | Age: 79
End: 2019-05-01

## 2019-05-01 VITALS
BODY MASS INDEX: 28.11 KG/M2 | HEART RATE: 71 BPM | WEIGHT: 189.8 LBS | OXYGEN SATURATION: 98 % | DIASTOLIC BLOOD PRESSURE: 83 MMHG | HEIGHT: 69 IN | TEMPERATURE: 98 F | SYSTOLIC BLOOD PRESSURE: 134 MMHG

## 2019-05-01 DIAGNOSIS — K11.20 SUBMANDIBULAR SIALOADENITIS: Primary | ICD-10-CM

## 2019-05-01 DIAGNOSIS — D89.89 IGG4 RELATED DISEASE (HCC): ICD-10-CM

## 2019-05-01 DIAGNOSIS — R60.9 SUBMANDIBULAR GLAND SWELLING: ICD-10-CM

## 2019-05-01 PROBLEM — D89.84 IGG4 RELATED DISEASE: Status: ACTIVE | Noted: 2019-05-01

## 2019-05-01 PROCEDURE — 99213 OFFICE O/P EST LOW 20 MIN: CPT | Performed by: OTOLARYNGOLOGY

## 2019-05-01 RX ORDER — HYDROCODONE BITARTRATE AND ACETAMINOPHEN 5; 325 MG/1; MG/1
TABLET ORAL
COMMUNITY
Start: 2019-03-13 | End: 2019-05-16

## 2019-05-01 NOTE — PROGRESS NOTES
Mona Eagle MA   Patient Intake Note    Review of Systems  Review of Systems   Constitutional: Negative for chills and fever.   HENT:        See HPI   Eyes: Negative for pain and itching.   Respiratory: Negative for cough and shortness of breath.    Cardiovascular: Negative.    Gastrointestinal: Negative for constipation, diarrhea, nausea and vomiting.   Genitourinary: Negative for frequency.   Allergic/Immunologic: Negative for food allergies.   Hematological: Bruises/bleeds easily.   All other systems reviewed and are negative.      QUALITY MEASURES    Body Mass Index Screening and Follow-Up Plan  Body mass index is 28.03 kg/m².  Patient's Body mass index is 28.03 kg/m². BMI is above normal parameters. Recommendations include: referral to primary care.    Tobacco Use: Screening and Cessation Intervention  Social History    Tobacco Use      Smoking status: Former Smoker        Start date:         Quit date: 1985        Years since quittin.3      Smokeless tobacco: Never Used      Tobacco comment: 30 yrs ago        Mona Eagle MA  2019  8:56 AM

## 2019-05-13 ENCOUNTER — TRANSCRIBE ORDERS (OUTPATIENT)
Dept: ADMINISTRATIVE | Facility: HOSPITAL | Age: 79
End: 2019-05-13

## 2019-05-13 DIAGNOSIS — M25.461 SWELLING OF RIGHT KNEE JOINT: ICD-10-CM

## 2019-05-13 DIAGNOSIS — M79.604 RIGHT LEG PAIN: Primary | ICD-10-CM

## 2019-05-13 DIAGNOSIS — N18.4 CHRONIC KIDNEY DISEASE, STAGE IV (SEVERE) (HCC): ICD-10-CM

## 2019-05-16 ENCOUNTER — HOSPITAL ENCOUNTER (OUTPATIENT)
Dept: ULTRASOUND IMAGING | Facility: HOSPITAL | Age: 79
Discharge: HOME | End: 2019-05-16
Admitting: INTERNAL MEDICINE

## 2019-05-16 ENCOUNTER — HOSPITAL ENCOUNTER (OUTPATIENT)
Dept: GENERAL RADIOLOGY | Facility: HOSPITAL | Age: 79
Discharge: HOME | End: 2019-05-16

## 2019-05-16 ENCOUNTER — TRANSCRIBE ORDERS (OUTPATIENT)
Dept: ADMINISTRATIVE | Facility: HOSPITAL | Age: 79
End: 2019-05-16

## 2019-05-16 ENCOUNTER — HOSPITAL ENCOUNTER (EMERGENCY)
Facility: HOSPITAL | Age: 79
Discharge: HOME | End: 2019-05-16
Attending: EMERGENCY MEDICINE | Admitting: EMERGENCY MEDICINE

## 2019-05-16 VITALS
TEMPERATURE: 98 F | HEART RATE: 84 BPM | BODY MASS INDEX: 27.4 KG/M2 | RESPIRATION RATE: 16 BRPM | SYSTOLIC BLOOD PRESSURE: 149 MMHG | DIASTOLIC BLOOD PRESSURE: 86 MMHG | HEIGHT: 69 IN | OXYGEN SATURATION: 97 % | WEIGHT: 185 LBS

## 2019-05-16 DIAGNOSIS — N18.4 CHRONIC KIDNEY DISEASE, STAGE IV (SEVERE) (HCC): ICD-10-CM

## 2019-05-16 DIAGNOSIS — I82.401 ACUTE DEEP VEIN THROMBOSIS (DVT) OF RIGHT LOWER EXTREMITY, UNSPECIFIED VEIN (HCC): Primary | ICD-10-CM

## 2019-05-16 DIAGNOSIS — N18.4 CHRONIC KIDNEY DISEASE, STAGE IV (SEVERE) (HCC): Primary | ICD-10-CM

## 2019-05-16 DIAGNOSIS — M79.604 RIGHT LEG PAIN: ICD-10-CM

## 2019-05-16 LAB
ANION GAP SERPL CALCULATED.3IONS-SCNC: 10 MMOL/L (ref 4–13)
APTT PPP: 29.5 SECONDS (ref 24.1–35)
BASOPHILS # BLD AUTO: 0.05 10*3/MM3 (ref 0–0.2)
BASOPHILS NFR BLD AUTO: 0.6 % (ref 0–2)
BUN BLD-MCNC: 24 MG/DL (ref 5–21)
BUN/CREAT SERPL: 13.2 (ref 7–25)
CALCIUM SPEC-SCNC: 9.8 MG/DL (ref 8.4–10.4)
CHLORIDE SERPL-SCNC: 101 MMOL/L (ref 98–110)
CO2 SERPL-SCNC: 28 MMOL/L (ref 24–31)
CREAT BLD-MCNC: 1.82 MG/DL (ref 0.5–1.4)
DEPRECATED RDW RBC AUTO: 66.7 FL (ref 40–54)
EOSINOPHIL # BLD AUTO: 0.05 10*3/MM3 (ref 0–0.7)
EOSINOPHIL NFR BLD AUTO: 0.6 % (ref 0–4)
ERYTHROCYTE [DISTWIDTH] IN BLOOD BY AUTOMATED COUNT: 19.7 % (ref 12–15)
GFR SERPL CREATININE-BSD FRML MDRD: 36 ML/MIN/1.73
GLUCOSE BLD-MCNC: 88 MG/DL (ref 70–100)
HCT VFR BLD AUTO: 37.8 % (ref 40–52)
HGB BLD-MCNC: 12.5 G/DL (ref 14–18)
HOLD SPECIMEN: NORMAL
HOLD SPECIMEN: NORMAL
IMM GRANULOCYTES # BLD AUTO: 0.19 10*3/MM3 (ref 0–0.05)
IMM GRANULOCYTES NFR BLD AUTO: 2.2 % (ref 0–5)
INR PPP: 1.04 (ref 0.91–1.09)
LYMPHOCYTES # BLD AUTO: 0.5 10*3/MM3 (ref 0.72–4.86)
LYMPHOCYTES NFR BLD AUTO: 5.7 % (ref 15–45)
MCH RBC QN AUTO: 30.5 PG (ref 28–32)
MCHC RBC AUTO-ENTMCNC: 33.1 G/DL (ref 33–36)
MCV RBC AUTO: 92.2 FL (ref 82–95)
MONOCYTES # BLD AUTO: 0.92 10*3/MM3 (ref 0.19–1.3)
MONOCYTES NFR BLD AUTO: 10.5 % (ref 4–12)
NEUTROPHILS # BLD AUTO: 7.08 10*3/MM3 (ref 1.87–8.4)
NEUTROPHILS NFR BLD AUTO: 80.4 % (ref 39–78)
NRBC BLD AUTO-RTO: 0 /100 WBC (ref 0–0.2)
PLATELET # BLD AUTO: 296 10*3/MM3 (ref 130–400)
PMV BLD AUTO: 9.7 FL (ref 6–12)
POTASSIUM BLD-SCNC: 3.6 MMOL/L (ref 3.5–5.3)
PROTHROMBIN TIME: 13.9 SECONDS (ref 11.9–14.6)
RBC # BLD AUTO: 4.1 10*6/MM3 (ref 4.8–5.9)
SODIUM BLD-SCNC: 139 MMOL/L (ref 135–145)
WBC NRBC COR # BLD: 8.79 10*3/MM3 (ref 4.8–10.8)
WHOLE BLOOD HOLD SPECIMEN: NORMAL
WHOLE BLOOD HOLD SPECIMEN: NORMAL

## 2019-05-16 PROCEDURE — 85730 THROMBOPLASTIN TIME PARTIAL: CPT | Performed by: EMERGENCY MEDICINE

## 2019-05-16 PROCEDURE — 85610 PROTHROMBIN TIME: CPT | Performed by: EMERGENCY MEDICINE

## 2019-05-16 PROCEDURE — 93971 EXTREMITY STUDY: CPT

## 2019-05-16 PROCEDURE — 99284 EMERGENCY DEPT VISIT MOD MDM: CPT

## 2019-05-16 PROCEDURE — 73564 X-RAY EXAM KNEE 4 OR MORE: CPT

## 2019-05-16 PROCEDURE — 85025 COMPLETE CBC W/AUTO DIFF WBC: CPT | Performed by: EMERGENCY MEDICINE

## 2019-05-16 PROCEDURE — 93971 EXTREMITY STUDY: CPT | Performed by: SURGERY

## 2019-05-16 PROCEDURE — 80048 BASIC METABOLIC PNL TOTAL CA: CPT | Performed by: EMERGENCY MEDICINE

## 2019-05-16 PROCEDURE — 72110 X-RAY EXAM L-2 SPINE 4/>VWS: CPT

## 2019-05-16 RX ORDER — HYDROCODONE BITARTRATE AND ACETAMINOPHEN 7.5; 325 MG/1; MG/1
1 TABLET ORAL EVERY 8 HOURS PRN
Qty: 9 TABLET | Refills: 0 | Status: SHIPPED | OUTPATIENT
Start: 2019-05-16

## 2019-05-16 RX ORDER — ACETAMINOPHEN 500 MG
1000 TABLET ORAL ONCE
Status: COMPLETED | OUTPATIENT
Start: 2019-05-16 | End: 2019-05-16

## 2019-05-16 RX ADMIN — ACETAMINOPHEN 1000 MG: 500 TABLET, FILM COATED ORAL at 10:41

## 2019-05-18 ENCOUNTER — TRANSCRIBE ORDERS (OUTPATIENT)
Dept: ADMINISTRATIVE | Facility: HOSPITAL | Age: 79
End: 2019-05-18

## 2019-05-18 DIAGNOSIS — M54.16 LUMBAR RADICULOPATHY: Primary | ICD-10-CM

## 2019-05-20 ENCOUNTER — TRANSCRIBE ORDERS (OUTPATIENT)
Dept: ADMINISTRATIVE | Facility: HOSPITAL | Age: 79
End: 2019-05-20

## 2019-05-20 ENCOUNTER — HOSPITAL ENCOUNTER (OUTPATIENT)
Dept: GENERAL RADIOLOGY | Facility: HOSPITAL | Age: 79
Discharge: HOME | End: 2019-05-20
Admitting: INTERNAL MEDICINE

## 2019-05-20 DIAGNOSIS — M54.16 LUMBAR RADICULOPATHY: ICD-10-CM

## 2019-05-20 DIAGNOSIS — M54.16 LUMBAR RADICULOPATHY: Primary | ICD-10-CM

## 2019-05-20 PROCEDURE — 73502 X-RAY EXAM HIP UNI 2-3 VIEWS: CPT

## 2019-05-22 ENCOUNTER — TRANSCRIBE ORDERS (OUTPATIENT)
Dept: ADMINISTRATIVE | Facility: HOSPITAL | Age: 79
End: 2019-05-22

## 2019-05-22 DIAGNOSIS — I82.401 ACUTE EMBOLISM AND THROMBOSIS OF DEEP VEIN OF RIGHT LOWER EXTREMITY (HCC): Primary | ICD-10-CM

## 2019-05-22 DIAGNOSIS — I82.811 EMBOLISM AND THROMBOSIS OF SUPERFICIAL VEIN OF RIGHT LOWER EXTREMITY: ICD-10-CM

## 2019-06-17 ENCOUNTER — OFFICE VISIT (OUTPATIENT)
Dept: CARDIOLOGY | Facility: CLINIC | Age: 79
End: 2019-06-17

## 2019-06-17 VITALS
OXYGEN SATURATION: 99 % | HEIGHT: 69 IN | BODY MASS INDEX: 28.73 KG/M2 | WEIGHT: 194 LBS | HEART RATE: 80 BPM | SYSTOLIC BLOOD PRESSURE: 125 MMHG | DIASTOLIC BLOOD PRESSURE: 70 MMHG

## 2019-06-17 DIAGNOSIS — I25.10 CORONARY ARTERY DISEASE INVOLVING NATIVE CORONARY ARTERY OF NATIVE HEART WITHOUT ANGINA PECTORIS: Primary | ICD-10-CM

## 2019-06-17 DIAGNOSIS — E78.2 MIXED HYPERLIPIDEMIA: ICD-10-CM

## 2019-06-17 DIAGNOSIS — I82.531 CHRONIC DEEP VEIN THROMBOSIS (DVT) OF POPLITEAL VEIN OF RIGHT LOWER EXTREMITY (HCC): ICD-10-CM

## 2019-06-17 DIAGNOSIS — D89.89 IGG4 RELATED DISEASE (HCC): ICD-10-CM

## 2019-06-17 PROBLEM — I82.509 CHRONIC DEEP VEIN THROMBOSIS (DVT) (HCC): Status: ACTIVE | Noted: 2019-06-17

## 2019-06-17 PROCEDURE — 99214 OFFICE O/P EST MOD 30 MIN: CPT | Performed by: INTERNAL MEDICINE

## 2019-06-17 PROCEDURE — 93000 ELECTROCARDIOGRAM COMPLETE: CPT | Performed by: INTERNAL MEDICINE

## 2019-06-17 RX ORDER — PREDNISONE 1 MG/1
3 TABLET ORAL DAILY
COMMUNITY

## 2019-06-17 NOTE — PROGRESS NOTES
Referring Provider: Adi Jack MD    Reason for Follow-up Visit: CAD    Subjective .   Chief Complaint:   Chief Complaint   Patient presents with   • Follow-up     yearly   • Coronary Artery Disease     pt states he is doing well with his heart.  no complaints.   • Hyperlipidemia     pt had labs at PCP last Janurary 2019  LDL 93  he is on Liptor 20 mg.   • Shortness of Breath     pt states with all the problems with his kidney he is having some sob, iron is low and he has a blood clot taking  Eliquis       History of present illness:  Zeb Adams is a 79 y.o. yo male with history of CAD, s/p TAYLOR to the RCA in 2016. 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 He denies any CP or SOB        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)    • Impotence of organic origin    • Kidney disease     stage 3   • LPRD (laryngopharyngeal reflux disease)    • Lung collapse    • Mediastinal adenopathy    • Poor urinary stream    • Presence of stent in coronary artery in patient with coronary artery disease    • RLS (restless legs syndrome)    • Sialoadenitis    • Skin cancer     SCALP   • SOB (shortness of breath) on exertion    • Stroke (CMS/HCC)     sometime in his life he has had a mini stroke found on a ct scan ewcently   ,   Past Surgical History:   Procedure Laterality Date   • BONE MARROW ASPIRATE W/ LUMBAR PUNCTURE     • BRONCHOSCOPY Bilateral 1/25/2018    Procedure: BRONCHOSCOPY WITH ENDOBRONCHIAL ULTRASOUND;  Surgeon: Cecilio Hurtado MD;  Location: [facility] OR;   Service:    • CARDIAC SURGERY     • CORONARY ANGIOPLASTY WITH STENT PLACEMENT     • CORONARY ANGIOPLASTY WITH STENT PLACEMENT     • KIDNEY SURGERY     • NEPHRECTOMY     • NEPHRECTOMY RADICAL Left    • SUBMANDIBULAR GLAND EXCISION     • TONSILLECTOMY     • TURBINOPLASTY     • VASECTOMY       and a reveral   ,   Family History   Problem Relation Age of Onset   • Diabetes Father    • Heart failure Father    • Diabetes Brother    • Heart failure Brother    • Heart attack Brother    • Coronary artery disease Other    • Heart attack Other    • Heart disease Mother    • Hyperlipidemia Mother    • No Known Problems Maternal Grandmother    • Heart disease Maternal Grandfather    • No Known Problems Paternal Grandmother    • No Known Problems Paternal Grandfather    ,   Social History     Tobacco Use   • Smoking status: Former Smoker     Start date:      Last attempt to quit:      Years since quittin.4   • Smokeless tobacco: Never Used   • Tobacco comment: 30 yrs ago   Substance Use Topics   • Alcohol use: No   • Drug use: No   ,     Medications  Current Outpatient Medications   Medication Sig Dispense Refill   • amLODIPine (NORVASC) 5 MG tablet Take 5 mg by mouth Daily.     • apixaban (ELIQUIS) 5 MG tablet tablet Take 5 mg by mouth 2 (Two) Times a Day.     • aspirin 81 MG tablet Take 81 mg by mouth daily.  LAST DOSE      • atorvastatin (LIPITOR) 10 MG tablet Take 10 mg by mouth Daily.     • calcium carbonate (OS-JEAN PAUL) 600 MG tablet Take 2,000 mg by mouth Daily.     • cetirizine (ZyrTEC) 10 MG tablet Take 10 mg by mouth Daily As Needed for Allergies.     • cholecalciferol (VITAMIN D3) 1000 units tablet Take 1,000 Units by mouth Daily.     • clonazePAM (KlonoPIN) 1 MG tablet Take 1 mg by mouth At Night As Needed (RSL).     • HYDROcodone-acetaminophen (NORCO) 7.5-325 MG per tablet Take 1 tablet by mouth Every 8 (Eight) Hours As Needed for Severe Pain . 9 tablet 0   • pantoprazole (PROTONIX) 40 MG  "EC tablet Take 1 tablet by mouth 2 times daily.     • predniSONE (DELTASONE) 10 MG tablet Take 10 mg by mouth Daily.       No current facility-administered medications for this visit.        Allergies:  Lyrica [pregabalin]; Penicillins; and Sulfa antibiotics    Review of Systems  Review of Systems   HENT: Negative for nosebleeds.    Cardiovascular: Negative for chest pain, claudication, dyspnea on exertion, irregular heartbeat, leg swelling, near-syncope, orthopnea, palpitations, paroxysmal nocturnal dyspnea and syncope.   Respiratory: Negative for cough, hemoptysis and shortness of breath.    Gastrointestinal: Negative for dysphagia, hematemesis and melena.   Genitourinary: Negative for hematuria.   Neurological: Positive for paresthesias (right lower extremity sciatica).   All other systems reviewed and are negative.      Objective     Physical Exam:  /70 (BP Location: Left arm, Patient Position: Sitting, Cuff Size: Adult)   Pulse 80   Ht 175.3 cm (69\")   Wt 88 kg (194 lb)   SpO2 99%   BMI 28.65 kg/m²   Physical Exam   Constitutional: He is oriented to person, place, and time. He appears well-nourished. No distress.   HENT:   Head: Normocephalic.   Eyes: No scleral icterus.   Neck: Normal range of motion. Neck supple.   Cardiovascular: Normal rate, regular rhythm and normal heart sounds. Exam reveals no gallop and no friction rub.   No murmur heard.  Pulmonary/Chest: Effort normal and breath sounds normal. No respiratory distress. He has no wheezes. He has no rales.   Abdominal: Soft. Bowel sounds are normal. He exhibits no distension. There is no tenderness.   Musculoskeletal: He exhibits no edema.   Neurological: He is alert and oriented to person, place, and time.   Skin: Skin is warm and dry. He is not diaphoretic. No erythema.   Psychiatric: He has a normal mood and affect. His behavior is normal.       Results Review:    ECG 12 Lead  Date/Time: 6/17/2019 8:37 AM  Performed by: Zeb Mark, " MD  Authorized by: Zeb Mark MD   Comparison: compared with previous ECG   Similar to previous ECG  Rhythm: sinus rhythm  Rate: normal  Conduction: conduction normal  ST Segments: ST segments normal  T Waves: T waves normal  QRS axis: normal    Clinical impression: normal ECG            Admission on 05/16/2019, Discharged on 05/16/2019   Component Date Value Ref Range Status   • Extra Tube 05/16/2019 hold for add-on   Final    Auto resulted   • Extra Tube 05/16/2019 Hold for add-ons.   Final    Auto resulted.   • Extra Tube 05/16/2019 hold for add-on   Final    Auto resulted   • Extra Tube 05/16/2019 Hold for add-ons.   Final    Auto resulted.   • Protime 05/16/2019 13.9  11.9 - 14.6 Seconds Final   • INR 05/16/2019 1.04  0.91 - 1.09 Final   • PTT 05/16/2019 29.5  24.1 - 35.0 seconds Final   • Glucose 05/16/2019 88  70 - 100 mg/dL Final   • BUN 05/16/2019 24* 5 - 21 mg/dL Final   • Creatinine 05/16/2019 1.82* 0.50 - 1.40 mg/dL Final   • Sodium 05/16/2019 139  135 - 145 mmol/L Final   • Potassium 05/16/2019 3.6  3.5 - 5.3 mmol/L Final   • Chloride 05/16/2019 101  98 - 110 mmol/L Final   • CO2 05/16/2019 28.0  24.0 - 31.0 mmol/L Final   • Calcium 05/16/2019 9.8  8.4 - 10.4 mg/dL Final   • eGFR Non African Amer 05/16/2019 36* >60 mL/min/1.73 Final   • BUN/Creatinine Ratio 05/16/2019 13.2  7.0 - 25.0 Final   • Anion Gap 05/16/2019 10.0  4.0 - 13.0 mmol/L Final   • WBC 05/16/2019 8.79  4.80 - 10.80 10*3/mm3 Final   • RBC 05/16/2019 4.10* 4.80 - 5.90 10*6/mm3 Final   • Hemoglobin 05/16/2019 12.5* 14.0 - 18.0 g/dL Final   • Hematocrit 05/16/2019 37.8* 40.0 - 52.0 % Final   • MCV 05/16/2019 92.2  82.0 - 95.0 fL Final   • MCH 05/16/2019 30.5  28.0 - 32.0 pg Final   • MCHC 05/16/2019 33.1  33.0 - 36.0 g/dL Final   • RDW 05/16/2019 19.7* 12.0 - 15.0 % Final   • RDW-SD 05/16/2019 66.7* 40.0 - 54.0 fl Final   • MPV 05/16/2019 9.7  6.0 - 12.0 fL Final   • Platelets 05/16/2019 296  130 - 400 10*3/mm3 Final   • Neutrophil %  05/16/2019 80.4* 39.0 - 78.0 % Final   • Lymphocyte % 05/16/2019 5.7* 15.0 - 45.0 % Final   • Monocyte % 05/16/2019 10.5  4.0 - 12.0 % Final   • Eosinophil % 05/16/2019 0.6  0.0 - 4.0 % Final   • Basophil % 05/16/2019 0.6  0.0 - 2.0 % Final   • Immature Grans % 05/16/2019 2.2  0.0 - 5.0 % Final   • Neutrophils, Absolute 05/16/2019 7.08  1.87 - 8.40 10*3/mm3 Final   • Lymphocytes, Absolute 05/16/2019 0.50* 0.72 - 4.86 10*3/mm3 Final   • Monocytes, Absolute 05/16/2019 0.92  0.19 - 1.30 10*3/mm3 Final   • Eosinophils, Absolute 05/16/2019 0.05  0.00 - 0.70 10*3/mm3 Final   • Basophils, Absolute 05/16/2019 0.05  0.00 - 0.20 10*3/mm3 Final   • Immature Grans, Absolute 05/16/2019 0.19* 0.00 - 0.05 10*3/mm3 Final   • nRBC 05/16/2019 0.0  0.0 - 0.2 /100 WBC Final       Assessment/Plan   Zeb was seen today for follow-up, coronary artery disease, hyperlipidemia and shortness of breath.    Diagnoses and all orders for this visit:    Coronary artery disease involving native coronary artery of native heart without angina pectoris, The patient denies chest pain, dyspnea on exertion, orthopnea, PND, edema. There is no evidence of ongoing ischemia    Mixed hyperlipidemia, LDL is a little high, managed by Dr. Jack    Chronic deep vein thrombosis (DVT) of popliteal vein of right lower extremity (CMS/HCC), on eliquis, followed by Dr. 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 BMI is within normal parameters. No follow-up required..

## 2019-06-20 ENCOUNTER — TRANSCRIBE ORDERS (OUTPATIENT)
Dept: ADMINISTRATIVE | Facility: HOSPITAL | Age: 79
End: 2019-06-20

## 2019-06-20 DIAGNOSIS — H05.113: Primary | ICD-10-CM

## 2019-06-24 ENCOUNTER — TRANSCRIBE ORDERS (OUTPATIENT)
Dept: ADMINISTRATIVE | Facility: HOSPITAL | Age: 79
End: 2019-06-24

## 2019-06-24 DIAGNOSIS — H05.113: Primary | ICD-10-CM

## 2019-06-25 ENCOUNTER — HOSPITAL ENCOUNTER (OUTPATIENT)
Dept: MRI IMAGING | Facility: HOSPITAL | Age: 79
Discharge: HOME | End: 2019-06-25
Admitting: INTERNAL MEDICINE

## 2019-06-25 ENCOUNTER — HOSPITAL ENCOUNTER (OUTPATIENT)
Dept: MRI IMAGING | Facility: HOSPITAL | Age: 79
Discharge: HOME | End: 2019-06-25

## 2019-06-25 DIAGNOSIS — H05.113: ICD-10-CM

## 2019-06-25 PROCEDURE — 70540 MRI ORBIT/FACE/NECK W/O DYE: CPT

## 2019-06-25 PROCEDURE — 70551 MRI BRAIN STEM W/O DYE: CPT

## 2019-07-03 ENCOUNTER — TRANSCRIBE ORDERS (OUTPATIENT)
Dept: ADMINISTRATIVE | Facility: HOSPITAL | Age: 79
End: 2019-07-03

## 2019-07-03 ENCOUNTER — HOSPITAL ENCOUNTER (OUTPATIENT)
Dept: INFUSION THERAPY | Age: 79
Discharge: HOME | End: 2019-07-03
Payer: MEDICARE

## 2019-07-03 DIAGNOSIS — R59.9 ENLARGED LYMPH NODE: ICD-10-CM

## 2019-07-03 DIAGNOSIS — R91.1 PULMONARY NODULE: Primary | ICD-10-CM

## 2019-07-03 PROCEDURE — 83550 IRON BINDING TEST: CPT

## 2019-07-03 PROCEDURE — 80053 COMPREHEN METABOLIC PANEL: CPT

## 2019-07-03 PROCEDURE — 36415 COLL VENOUS BLD VENIPUNCTURE: CPT

## 2019-07-03 PROCEDURE — 82728 ASSAY OF FERRITIN: CPT

## 2019-07-03 PROCEDURE — 83540 ASSAY OF IRON: CPT

## 2019-07-03 PROCEDURE — 99211 OFF/OP EST MAY X REQ PHY/QHP: CPT

## 2019-07-03 PROCEDURE — 85025 COMPLETE CBC W/AUTO DIFF WBC: CPT

## 2019-07-08 RX ORDER — METHYLPREDNISOLONE SODIUM SUCCINATE 125 MG/2ML
125 INJECTION, POWDER, LYOPHILIZED, FOR SOLUTION INTRAMUSCULAR; INTRAVENOUS PRN
Status: CANCELLED | OUTPATIENT
Start: 2019-07-10

## 2019-07-08 RX ORDER — DIPHENHYDRAMINE HYDROCHLORIDE 50 MG/ML
50 INJECTION INTRAMUSCULAR; INTRAVENOUS PRN
Status: CANCELLED | OUTPATIENT
Start: 2019-07-10

## 2019-07-10 ENCOUNTER — HOSPITAL ENCOUNTER (OUTPATIENT)
Dept: INFUSION THERAPY | Age: 79
Discharge: HOME | End: 2019-07-10
Payer: MEDICARE

## 2019-07-10 DIAGNOSIS — D50.0 IRON DEFICIENCY ANEMIA DUE TO CHRONIC BLOOD LOSS: Primary | ICD-10-CM

## 2019-07-10 DIAGNOSIS — D50.9 IRON DEFICIENCY ANEMIA, UNSPECIFIED IRON DEFICIENCY ANEMIA TYPE: ICD-10-CM

## 2019-07-10 PROCEDURE — 6360000002 HC RX W HCPCS: Performed by: INTERNAL MEDICINE

## 2019-07-10 PROCEDURE — 85025 COMPLETE CBC W/AUTO DIFF WBC: CPT

## 2019-07-10 PROCEDURE — 2580000003 HC RX 258: Performed by: INTERNAL MEDICINE

## 2019-07-10 PROCEDURE — 96365 THER/PROPH/DIAG IV INF INIT: CPT

## 2019-07-10 RX ORDER — METHYLPREDNISOLONE SODIUM SUCCINATE 125 MG/2ML
125 INJECTION, POWDER, LYOPHILIZED, FOR SOLUTION INTRAMUSCULAR; INTRAVENOUS PRN
Status: CANCELLED | OUTPATIENT
Start: 2019-07-17

## 2019-07-10 RX ORDER — DIPHENHYDRAMINE HYDROCHLORIDE 50 MG/ML
50 INJECTION INTRAMUSCULAR; INTRAVENOUS PRN
Status: CANCELLED | OUTPATIENT
Start: 2019-07-17

## 2019-07-17 ENCOUNTER — HOSPITAL ENCOUNTER (OUTPATIENT)
Dept: INFUSION THERAPY | Age: 79
Discharge: HOME | End: 2019-07-17
Payer: MEDICARE

## 2019-07-17 DIAGNOSIS — D50.0 IRON DEFICIENCY ANEMIA DUE TO CHRONIC BLOOD LOSS: Primary | ICD-10-CM

## 2019-07-17 DIAGNOSIS — D50.9 IRON DEFICIENCY ANEMIA, UNSPECIFIED IRON DEFICIENCY ANEMIA TYPE: ICD-10-CM

## 2019-07-17 PROCEDURE — 6360000002 HC RX W HCPCS: Performed by: INTERNAL MEDICINE

## 2019-07-17 PROCEDURE — 96365 THER/PROPH/DIAG IV INF INIT: CPT

## 2019-07-17 PROCEDURE — 85025 COMPLETE CBC W/AUTO DIFF WBC: CPT

## 2019-07-17 PROCEDURE — 2580000003 HC RX 258: Performed by: INTERNAL MEDICINE

## 2019-07-17 RX ORDER — DIPHENHYDRAMINE HYDROCHLORIDE 50 MG/ML
50 INJECTION INTRAMUSCULAR; INTRAVENOUS PRN
Status: DISCONTINUED | OUTPATIENT
Start: 2019-07-17 | End: 2019-07-19 | Stop reason: HOSPADM

## 2019-07-17 RX ORDER — DIPHENHYDRAMINE HYDROCHLORIDE 50 MG/ML
50 INJECTION INTRAMUSCULAR; INTRAVENOUS PRN
Status: CANCELLED | OUTPATIENT
Start: 2019-07-17

## 2019-07-17 RX ORDER — METHYLPREDNISOLONE SODIUM SUCCINATE 125 MG/2ML
125 INJECTION, POWDER, LYOPHILIZED, FOR SOLUTION INTRAMUSCULAR; INTRAVENOUS PRN
Status: CANCELLED | OUTPATIENT
Start: 2019-07-17

## 2019-07-17 RX ORDER — METHYLPREDNISOLONE SODIUM SUCCINATE 125 MG/2ML
125 INJECTION, POWDER, LYOPHILIZED, FOR SOLUTION INTRAMUSCULAR; INTRAVENOUS PRN
Status: DISCONTINUED | OUTPATIENT
Start: 2019-07-17 | End: 2019-07-19 | Stop reason: HOSPADM

## 2019-08-01 ENCOUNTER — HOSPITAL ENCOUNTER (OUTPATIENT)
Dept: ULTRASOUND IMAGING | Facility: HOSPITAL | Age: 79
Discharge: HOME | End: 2019-08-01
Admitting: INTERNAL MEDICINE

## 2019-08-01 DIAGNOSIS — I82.811 EMBOLISM AND THROMBOSIS OF SUPERFICIAL VEIN OF RIGHT LOWER EXTREMITY: ICD-10-CM

## 2019-08-01 PROCEDURE — 93971 EXTREMITY STUDY: CPT | Performed by: SURGERY

## 2019-08-01 PROCEDURE — 93971 EXTREMITY STUDY: CPT

## 2019-10-02 VITALS
BODY MASS INDEX: 28.63 KG/M2 | DIASTOLIC BLOOD PRESSURE: 80 MMHG | HEART RATE: 88 BPM | HEIGHT: 70 IN | WEIGHT: 200 LBS | SYSTOLIC BLOOD PRESSURE: 146 MMHG

## 2019-10-02 DIAGNOSIS — M06.9 RHEUMATOID ARTHRITIS, INVOLVING UNSPECIFIED SITE, UNSPECIFIED RHEUMATOID FACTOR PRESENCE: ICD-10-CM

## 2019-10-02 DIAGNOSIS — D47.2 MONOCLONAL GAMMOPATHY: ICD-10-CM

## 2019-10-02 DIAGNOSIS — D69.6 THROMBOCYTOPENIA, UNSPECIFIED (HCC): ICD-10-CM

## 2019-10-02 DIAGNOSIS — N18.30 CHRONIC KIDNEY DISEASE, STAGE III (MODERATE) (HCC): ICD-10-CM

## 2019-10-02 DIAGNOSIS — R63.4 WEIGHT LOSS, ABNORMAL: ICD-10-CM

## 2019-10-02 DIAGNOSIS — D72.821 MONOCYTOSIS (SYMPTOMATIC): ICD-10-CM

## 2019-10-02 DIAGNOSIS — D80.3 SELECTIVE DEFICIENCY OF IMMUNOGLOBULIN G (IGG) SUBCLASSES (HCC): ICD-10-CM

## 2019-10-02 DIAGNOSIS — D72.819 LEUKOPENIA, UNSPECIFIED TYPE: ICD-10-CM

## 2019-10-02 DIAGNOSIS — D50.8 OTHER IRON DEFICIENCY ANEMIAS: ICD-10-CM

## 2019-10-02 DIAGNOSIS — D64.9 ANEMIA, UNSPECIFIED TYPE: ICD-10-CM

## 2019-10-02 DIAGNOSIS — R59.9 ENLARGED LYMPH NODES, UNSPECIFIED: ICD-10-CM

## 2019-10-02 DIAGNOSIS — D64.9 NORMOCYTIC NORMOCHROMIC ANEMIA: ICD-10-CM

## 2019-10-03 ENCOUNTER — LAB (OUTPATIENT)
Dept: LAB | Facility: HOSPITAL | Age: 79
End: 2019-10-03

## 2019-10-03 ENCOUNTER — TRANSCRIBE ORDERS (OUTPATIENT)
Dept: ADMINISTRATIVE | Facility: HOSPITAL | Age: 79
End: 2019-10-03

## 2019-10-03 ENCOUNTER — HOSPITAL ENCOUNTER (OUTPATIENT)
Dept: CT IMAGING | Facility: HOSPITAL | Age: 79
Discharge: HOME | End: 2019-10-03
Admitting: INTERNAL MEDICINE

## 2019-10-03 DIAGNOSIS — N18.30 CHRONIC KIDNEY DISEASE, STAGE III (MODERATE) (HCC): ICD-10-CM

## 2019-10-03 DIAGNOSIS — I10 ESSENTIAL HYPERTENSION, MALIGNANT: ICD-10-CM

## 2019-10-03 DIAGNOSIS — E55.9 VITAMIN D DEFICIENCY DISEASE: ICD-10-CM

## 2019-10-03 DIAGNOSIS — N18.30 CHRONIC KIDNEY DISEASE, STAGE III (MODERATE) (HCC): Primary | ICD-10-CM

## 2019-10-03 LAB
25(OH)D3 SERPL-MCNC: 72.7 NG/ML (ref 30–100)
ALBUMIN SERPL-MCNC: 4.6 G/DL (ref 3.5–5.2)
ALBUMIN/GLOB SERPL: 1.7 G/DL
ALP SERPL-CCNC: 75 U/L (ref 39–117)
ALT SERPL W P-5'-P-CCNC: 15 U/L (ref 1–41)
ANION GAP SERPL CALCULATED.3IONS-SCNC: 14 MMOL/L (ref 5–15)
AST SERPL-CCNC: 14 U/L (ref 1–40)
BILIRUB SERPL-MCNC: 0.5 MG/DL (ref 0.2–1.2)
BILIRUB UR QL STRIP: NEGATIVE
BUN BLD-MCNC: 25 MG/DL (ref 8–23)
BUN/CREAT SERPL: 12.6 (ref 7–25)
CALCIUM SPEC-SCNC: 9.3 MG/DL (ref 8.6–10.5)
CHLORIDE SERPL-SCNC: 102 MMOL/L (ref 98–107)
CLARITY UR: CLEAR
CO2 SERPL-SCNC: 25 MMOL/L (ref 22–29)
COLOR UR: YELLOW
CREAT BLD-MCNC: 1.98 MG/DL (ref 0.76–1.27)
CREAT UR-MCNC: 107 MG/DL
CRP SERPL-MCNC: 0.2 MG/DL (ref 0–0.5)
DEPRECATED RDW RBC AUTO: 55.3 FL (ref 37–54)
EOSINOPHIL # BLD MANUAL: 0.1 10*3/MM3 (ref 0–0.4)
EOSINOPHIL NFR BLD MANUAL: 1 % (ref 0.3–6.2)
ERYTHROCYTE [DISTWIDTH] IN BLOOD BY AUTOMATED COUNT: 17 % (ref 12.3–15.4)
ERYTHROCYTE [SEDIMENTATION RATE] IN BLOOD: 21 MM/HR (ref 0–20)
GFR SERPL CREATININE-BSD FRML MDRD: 33 ML/MIN/1.73
GLOBULIN UR ELPH-MCNC: 2.7 GM/DL
GLUCOSE BLD-MCNC: 92 MG/DL (ref 65–99)
GLUCOSE UR STRIP-MCNC: NEGATIVE MG/DL
HCT VFR BLD AUTO: 38.1 % (ref 37.5–51)
HGB BLD-MCNC: 12.1 G/DL (ref 13–17.7)
HGB UR QL STRIP.AUTO: NEGATIVE
KETONES UR QL STRIP: NEGATIVE
LEUKOCYTE ESTERASE UR QL STRIP.AUTO: NEGATIVE
LYMPHOCYTES # BLD MANUAL: 0.62 10*3/MM3 (ref 0.7–3.1)
LYMPHOCYTES NFR BLD MANUAL: 4 % (ref 5–12)
LYMPHOCYTES NFR BLD MANUAL: 6 % (ref 19.6–45.3)
MAGNESIUM SERPL-MCNC: 2 MG/DL (ref 1.6–2.4)
MCH RBC QN AUTO: 28 PG (ref 26.6–33)
MCHC RBC AUTO-ENTMCNC: 31.8 G/DL (ref 31.5–35.7)
MCV RBC AUTO: 88.2 FL (ref 79–97)
MONOCYTES # BLD AUTO: 0.41 10*3/MM3 (ref 0.1–0.9)
NEUTROPHILS # BLD AUTO: 9.15 10*3/MM3 (ref 1.7–7)
NEUTROPHILS NFR BLD MANUAL: 89 % (ref 42.7–76)
NITRITE UR QL STRIP: NEGATIVE
PH UR STRIP.AUTO: 5.5 [PH] (ref 5–8)
PHOSPHATE SERPL-MCNC: 3.2 MG/DL (ref 2.5–4.5)
PLAT MORPH BLD: NORMAL
PLATELET # BLD AUTO: 212 10*3/MM3 (ref 140–450)
PMV BLD AUTO: 10.7 FL (ref 6–12)
POTASSIUM BLD-SCNC: 4.2 MMOL/L (ref 3.5–5.2)
PROT SERPL-MCNC: 7.3 G/DL (ref 6–8.5)
PROT UR QL STRIP: NEGATIVE
PROT UR-MCNC: 11 MG/DL
RBC # BLD AUTO: 4.32 10*6/MM3 (ref 4.14–5.8)
RBC MORPH BLD: NORMAL
SODIUM BLD-SCNC: 141 MMOL/L (ref 136–145)
SP GR UR STRIP: 1.02 (ref 1–1.03)
URATE SERPL-MCNC: 6.8 MG/DL (ref 3.4–7)
UROBILINOGEN UR QL STRIP: NORMAL
WBC MORPH BLD: NORMAL
WBC NRBC COR # BLD: 10.28 10*3/MM3 (ref 3.4–10.8)

## 2019-10-03 PROCEDURE — 85007 BL SMEAR W/DIFF WBC COUNT: CPT

## 2019-10-03 PROCEDURE — 82306 VITAMIN D 25 HYDROXY: CPT | Performed by: INTERNAL MEDICINE

## 2019-10-03 PROCEDURE — 84100 ASSAY OF PHOSPHORUS: CPT | Performed by: INTERNAL MEDICINE

## 2019-10-03 PROCEDURE — 81003 URINALYSIS AUTO W/O SCOPE: CPT | Performed by: INTERNAL MEDICINE

## 2019-10-03 PROCEDURE — 82565 ASSAY OF CREATININE: CPT | Performed by: INTERNAL MEDICINE

## 2019-10-03 PROCEDURE — 84550 ASSAY OF BLOOD/URIC ACID: CPT | Performed by: INTERNAL MEDICINE

## 2019-10-03 PROCEDURE — 80053 COMPREHEN METABOLIC PANEL: CPT | Performed by: INTERNAL MEDICINE

## 2019-10-03 PROCEDURE — 71250 CT THORAX DX C-: CPT

## 2019-10-03 PROCEDURE — 85652 RBC SED RATE AUTOMATED: CPT | Performed by: INTERNAL MEDICINE

## 2019-10-03 PROCEDURE — 85027 COMPLETE CBC AUTOMATED: CPT | Performed by: INTERNAL MEDICINE

## 2019-10-03 PROCEDURE — 86140 C-REACTIVE PROTEIN: CPT | Performed by: INTERNAL MEDICINE

## 2019-10-03 PROCEDURE — 82310 ASSAY OF CALCIUM: CPT | Performed by: INTERNAL MEDICINE

## 2019-10-03 PROCEDURE — 36415 COLL VENOUS BLD VENIPUNCTURE: CPT

## 2019-10-03 PROCEDURE — 84156 ASSAY OF PROTEIN URINE: CPT | Performed by: INTERNAL MEDICINE

## 2019-10-03 PROCEDURE — 82570 ASSAY OF URINE CREATININE: CPT | Performed by: INTERNAL MEDICINE

## 2019-10-03 PROCEDURE — 83735 ASSAY OF MAGNESIUM: CPT | Performed by: INTERNAL MEDICINE

## 2019-10-03 PROCEDURE — 83970 ASSAY OF PARATHORMONE: CPT | Performed by: INTERNAL MEDICINE

## 2019-10-03 PROCEDURE — 74176 CT ABD & PELVIS W/O CONTRAST: CPT

## 2019-10-04 LAB
CALCIUM SERPL-MCNC: 9.5 MG/DL (ref 8.6–10.2)
CREAT SERPL-MCNC: 2.11 MG/DL (ref 0.76–1.27)
INTACT PTH: ABNORMAL
PHOSPHORUS,INORGANIC: 3.3 MG/DL (ref 2.5–4.5)
PTH-INTACT SERPL-MCNC: 22 PG/ML (ref 15–65)

## 2019-10-10 ENCOUNTER — HOSPITAL ENCOUNTER (OUTPATIENT)
Dept: INFUSION THERAPY | Age: 79
Discharge: HOME | End: 2019-10-10
Payer: MEDICARE

## 2019-10-10 ENCOUNTER — OFFICE VISIT (OUTPATIENT)
Dept: HEMATOLOGY | Age: 79
End: 2019-10-10
Payer: MEDICARE

## 2019-10-10 VITALS
DIASTOLIC BLOOD PRESSURE: 78 MMHG | HEIGHT: 70 IN | BODY MASS INDEX: 28.69 KG/M2 | SYSTOLIC BLOOD PRESSURE: 134 MMHG | OXYGEN SATURATION: 98 % | WEIGHT: 200.4 LBS | HEART RATE: 79 BPM

## 2019-10-10 DIAGNOSIS — D80.3 SELECTIVE DEFICIENCY OF IGG (HCC): ICD-10-CM

## 2019-10-10 DIAGNOSIS — N18.30 CHRONIC KIDNEY DISEASE, STAGE III (MODERATE) (HCC): ICD-10-CM

## 2019-10-10 DIAGNOSIS — D64.9 ANEMIA, UNSPECIFIED TYPE: Primary | ICD-10-CM

## 2019-10-10 DIAGNOSIS — D47.2 MONOCLONAL GAMMOPATHIES: ICD-10-CM

## 2019-10-10 DIAGNOSIS — D69.6 THROMBOCYTOPENIA, UNSPECIFIED (HCC): ICD-10-CM

## 2019-10-10 DIAGNOSIS — D64.9 ANEMIA, UNSPECIFIED TYPE: ICD-10-CM

## 2019-10-10 PROCEDURE — G8419 CALC BMI OUT NRM PARAM NOF/U: HCPCS | Performed by: INTERNAL MEDICINE

## 2019-10-10 PROCEDURE — G8427 DOCREV CUR MEDS BY ELIG CLIN: HCPCS | Performed by: INTERNAL MEDICINE

## 2019-10-10 PROCEDURE — 83550 IRON BINDING TEST: CPT

## 2019-10-10 PROCEDURE — 1123F ACP DISCUSS/DSCN MKR DOCD: CPT | Performed by: INTERNAL MEDICINE

## 2019-10-10 PROCEDURE — G8598 ASA/ANTIPLAT THER USED: HCPCS | Performed by: INTERNAL MEDICINE

## 2019-10-10 PROCEDURE — 4040F PNEUMOC VAC/ADMIN/RCVD: CPT | Performed by: INTERNAL MEDICINE

## 2019-10-10 PROCEDURE — 1036F TOBACCO NON-USER: CPT | Performed by: INTERNAL MEDICINE

## 2019-10-10 PROCEDURE — 83540 ASSAY OF IRON: CPT

## 2019-10-10 PROCEDURE — 85025 COMPLETE CBC W/AUTO DIFF WBC: CPT

## 2019-10-10 PROCEDURE — 82728 ASSAY OF FERRITIN: CPT

## 2019-10-10 PROCEDURE — G8484 FLU IMMUNIZE NO ADMIN: HCPCS | Performed by: INTERNAL MEDICINE

## 2019-10-10 PROCEDURE — 99214 OFFICE O/P EST MOD 30 MIN: CPT | Performed by: INTERNAL MEDICINE

## 2019-10-10 RX ORDER — HYDROCODONE BITARTRATE AND ACETAMINOPHEN 7.5; 325 MG/1; MG/1
1 TABLET ORAL EVERY 6 HOURS PRN
COMMUNITY

## 2019-10-10 RX ORDER — PREDNISONE 1 MG/1
3 TABLET ORAL DAILY
COMMUNITY

## 2019-12-16 ENCOUNTER — OFFICE VISIT (OUTPATIENT)
Dept: VASCULAR SURGERY | Age: 79
End: 2019-12-16
Payer: MEDICARE

## 2019-12-16 ENCOUNTER — HOSPITAL ENCOUNTER (OUTPATIENT)
Dept: VASCULAR LAB | Age: 79
Discharge: HOME | End: 2019-12-16
Payer: MEDICARE

## 2019-12-16 VITALS
BODY MASS INDEX: 29.33 KG/M2 | HEART RATE: 75 BPM | DIASTOLIC BLOOD PRESSURE: 81 MMHG | WEIGHT: 198 LBS | RESPIRATION RATE: 18 BRPM | HEIGHT: 69 IN | SYSTOLIC BLOOD PRESSURE: 132 MMHG

## 2019-12-16 DIAGNOSIS — I71.40 AAA (ABDOMINAL AORTIC ANEURYSM) WITHOUT RUPTURE: ICD-10-CM

## 2019-12-16 DIAGNOSIS — I65.23 BILATERAL CAROTID ARTERY STENOSIS: ICD-10-CM

## 2019-12-16 DIAGNOSIS — I65.23 BILATERAL CAROTID ARTERY STENOSIS: Primary | ICD-10-CM

## 2019-12-16 PROCEDURE — 1036F TOBACCO NON-USER: CPT | Performed by: PHYSICIAN ASSISTANT

## 2019-12-16 PROCEDURE — G8419 CALC BMI OUT NRM PARAM NOF/U: HCPCS | Performed by: PHYSICIAN ASSISTANT

## 2019-12-16 PROCEDURE — 4040F PNEUMOC VAC/ADMIN/RCVD: CPT | Performed by: PHYSICIAN ASSISTANT

## 2019-12-16 PROCEDURE — 99212 OFFICE O/P EST SF 10 MIN: CPT | Performed by: PHYSICIAN ASSISTANT

## 2019-12-16 PROCEDURE — 93880 EXTRACRANIAL BILAT STUDY: CPT

## 2019-12-16 PROCEDURE — G8598 ASA/ANTIPLAT THER USED: HCPCS | Performed by: PHYSICIAN ASSISTANT

## 2019-12-16 PROCEDURE — 1123F ACP DISCUSS/DSCN MKR DOCD: CPT | Performed by: PHYSICIAN ASSISTANT

## 2019-12-16 PROCEDURE — G8427 DOCREV CUR MEDS BY ELIG CLIN: HCPCS | Performed by: PHYSICIAN ASSISTANT

## 2019-12-16 PROCEDURE — G8484 FLU IMMUNIZE NO ADMIN: HCPCS | Performed by: PHYSICIAN ASSISTANT

## 2019-12-16 RX ORDER — PREDNISONE 1 MG/1
TABLET ORAL
COMMUNITY
Start: 2019-11-14 | End: 2020-02-10

## 2020-02-03 ENCOUNTER — HOSPITAL ENCOUNTER (OUTPATIENT)
Dept: INFUSION THERAPY | Age: 80
Discharge: HOME | End: 2020-02-03
Payer: MEDICARE

## 2020-02-03 DIAGNOSIS — D64.9 ANEMIA, UNSPECIFIED TYPE: ICD-10-CM

## 2020-02-03 PROCEDURE — 83550 IRON BINDING TEST: CPT

## 2020-02-03 PROCEDURE — 82728 ASSAY OF FERRITIN: CPT

## 2020-02-03 PROCEDURE — 80053 COMPREHEN METABOLIC PANEL: CPT

## 2020-02-03 PROCEDURE — 83540 ASSAY OF IRON: CPT

## 2020-02-05 NOTE — PROGRESS NOTES
B cell population     IN RETROSPECT,  Frahana Morales has a history of FNA of a right neck mass performed 12/23/2011 by Dr. Jim Nava. Pathology revealed no malignant cells, with fragments of benign lymphoid tissue and a few benign salivary gland acinar fragments. On 3/23/2012 excision of a right submandibular gland was performed by Dr. 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 There was no evidence of epithelial malignancy. FLOW cytometry revealed no evidence of B-cell or T-cell lymphoma. On exam, left submandibular lymphadenopathy is appreciated and perhaps shotty axillary lymphadenopathy. CBC 4/26/2018 showed a normal WBC of 4.52, though with persistent monocytosis at 22.8%. Hemoglobin is 10.8 with MCV 91.0 and platelet count 695,897. Farhana Morales' main complaint had been of hoarseness and progressive cough, which occurs daily. He dates symptoms back to August, 2017 after having an allergic reaction to Lyrica for post-herpetic neuralgia. In addition to hoarseness and cough, Farhana Morales also reports weight loss of 24 pounds over the previous 4-6 weeks prior to presentation at this office, night sweats and chills. He was also referred to [facility] by Dr. Kari Zeng regarding persistent pulmonary symptoms. Given the abnormal CBC finding, adenopathy and symptomatology work up was also requested, and he was referred to this office.     Serology 4/26/2018  Serum Fe - 32  TIBC - 220  Fe Sat - 15%  Ferritin - 45  B12 - 744  Folate > 20  Retic - 1.6 %  LDH -175  B2 M - 7 (0.6-2.4)  QI - IgG-5051 (700-1600), IgM 170 ()  SPEP - no M spike, TP - 10.2 (6-8.5), globulin - 7.5 (2.2-3.9)  Free serum light chains - kappa 930.3 (3.3-19.4), lambda 228 (5.7-26.3), K/L ratio 4.08 (0.26-1.65)    Bone marrow aspiration and biopsy on 4/30/2018 was normocellular (~20-25 %) with adequate maturing trilineage hematopoiesis, no significant dyspoiesis and no increase in blasts. Megakaryocytes had a spectrum of morphology. There was no stainable storage iron and no increase in reticulin fibrosis. 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 Flow cytometry did not reveal B-cell monoclonality nor T-cell aberrant antigen expression. FISH studies for MDS was negative. There was no morphologic evidence of a myelodysplastic syndrome. There was no diagnostic evidence of a limp low at disorder, granulomas, nor metastatic malignancies on the bone marrow biopsy or aspirate. CT scans of the neck with contrast on 4/30/2018 documented an asymmetry within the left submandibular gland compared to the right. The left submandibular gland measures 4.2 x 2.6 x 2.4 cm compared to 12 mm on the right. Otherwise there are no discrete neck masses or pathologically enlarged lymph nodes. 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 Prominent right external iliac chain lymph nodes of indeterminate significance were also documented. Diverticulosis without diverticulitis and a large hiatal hernia was described. The prostate gland was enlarged with lifting of the base of the urinary bladder. Also noted was a questionable abnormal appearance of the pancreas with some loss of the normal pancreatic on to work without a discrete mass with MRI recommended. MRI of the abdomen W & WO on 5/8/2018 documented borderline splenomegaly, a large ventral hernia, nonenhancing renal cysts and a 3.5 cm abdominal aortic aneurysm.   A 6 mm right middle lobe pulmonary nodule and nodular density along the left major fissure of the region of the lingula were noted as previously described on the CT scan of the chest on 1/10/2018. The nodules on 1/10/2018 had increased compared to 11/22/2016 with metastatic disease being in the differential.   MRI of the pelvis W & WO contrast on 5/8/2018 documented enhancing septations and a small soft tissue nodule within the inferior aspect of the left fatty-containing retroperitoneum positioned below the level of the left kidney. This abnormality is lateral to the psoas muscle (it abuts the psoas muscle, but does not invade the psoas muscle) and extending to the level of the left iliac crest.  An atypical lipomatouse tumor versus liposarcoma considered. Mild prostate enlargement is also noted. Mildly enlarged pelvic lymph nodes measuring 1.2 cm in short axis, reactive or metastatic are also noted. Jacob Forbes saw Dr. Prudence Kaye and Dr. Aakash Cantu and reviewed Jacob Forbes' case with sarcoma tumor board at [facility] on 6/8/2018. 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 Unfortunately, a curative resection would require sacrifice of the left kidney leading to progression of CKD and possibly dialysis. Recommendation by Dr Kaylah Mccain on a clinic visit note faxed 6/20/2018 was for continued observation with repeat CT scan in 3 months as there were no signs of high-grade component on the CT scans from April of 2018 or the MRI of the abdomen/pelvis from 5/8/2018. Consideration could be given for resection if there is documentation of clear growth or concern for dedifferentiation. Pathology from the station 7 lymph node on 1/25/18 was reviewed 6/19/18 at [facility] and noted to be predominantly bronchial cells with few lymphocytes and macrophages, negative for malignancy. Bronchial washings also negative for malignancy.     Interpretation of the chest CT 6/13/18 at [facility] was consistent with multiple bilateral pulmonary nodules and 11/23/18 [facility] documented stranding of fat in the retroperitoneum, likely related to recent surgery. Diverticulosis of the colon with inflammation of the fat around the mid to distal descending colon close to the prior surgical site present, possibly representing postoperative change. There was a tiny 2 mL stone in the distal right ureter with mild-to-moderate dilatation of the right ureter and mild dilatation of the right renal collecting system. Nonobstructing stones also noted in the right kidney as well as a 4 cm probable cyst in the upper pole right kidney. Surveillance non-contrast CT scans of the chest, abdomen and pelvis 2/22/19 at [facility] documented interval decrease in numerous mediastinal lymph nodes and decrease in size of multiple bilateral pulmonary nodules. There was no residual lymphadenopathy and near complete resolution of inflammatory changes around the distal descending colon. Previous right renal lesions were stable. Leigh Zamora is followed by Dr. 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 He completed prednisone 40 mg daily ×1 month on 2/23/19, and 30 mg daily ×4 weeks on 3/25/19. Leigh Zamora will take 20 mg ×4 weeks, then 10 mg. Allergies:  Lyrica [pregabalin]; Penicillins; and Sulfa antibiotics    Medicines:  Current Outpatient Medications   Medication Sig Dispense Refill    Calcium-Magnesium-Vitamin D (CALCIUM 1200+D3 PO) Take by mouth      predniSONE (DELTASONE) 5 MG tablet Take 3 mg by mouth daily       HYDROcodone-acetaminophen (NORCO) 7.5-325 MG per tablet Take 1 tablet by mouth every 6 hours as needed for Pain.       vitamin D (CHOLECALCIFEROL) 1000 UNIT TABS tablet Take 1,000 Units by mouth daily      amLODIPine (NORVASC) 5 MG tablet Take 5 mg by mouth daily      clonazePAM (KLONOPIN) 2 MG tablet Take 2 mg by mouth nightly as needed      atorvastatin (LIPITOR) 10 MG tablet Take 10 mg by mouth daily      cetirizine (ZYRTEC) 5 MG tablet Take 5 mg by mouth daily      aspirin 81 MG tablet Take 81 mg by mouth daily.  pantoprazole (PROTONIX) 40 MG tablet Take 1 tablet by mouth 2 times daily. 60 tablet 11     No current facility-administered medications for this visit.         Past Medical History:      Diagnosis Date    Allergic rhinitis     Anemia, unspecified     Anxiety     Asthma     BPH (benign prostatic hyperplasia)     Dr Toshia Burnham CAD (coronary artery disease)     Dr Katie William [facility]     skin-non-melanoma    Carotid artery stenosis     Orene Comas, APRN    Chronic kidney disease, stage III (moderate) (HCC)     Colon polyps     Cough     Decreased white blood cell count, unspecified     Enlarged lymph nodes, unspecified     Esophageal stricture     GERD (gastroesophageal reflux disease)     HIGH CHOLESTEROL     Hypertension     Insomnia     Leukocytosis     Mediastinal lymphadenopathy     Monoclonal gammopathy     Monocytosis (symptomatic)     Night sweats     Normocytic normochromic anemia     Osteoarthritis     Other iron deficiency anemias     GI blood loss, hiatal hernia w/Jake's erosions and cecal/small bowel AVMs    Peptic ulcer disease     Restless leg syndrome     Rheumatoid arthritis (HCC)     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 @ [facility] 2018    Skin cancer     on left ear lobe    Thrombocytopenia, unspecified ([facility] [address]     Venous insufficiency     Weight loss, abnormal         Past Surgical History:      Procedure Laterality Date    COLONOSCOPY  9/15/2009    : negative    COLONOSCOPY  2006, 2003    hyperplastic colon polyps     COLONOSCOPY  11/2012    minimal diverticula, no polyps (5yr)    CORONARY ANGIOPLASTY WITH STENT PLACEMENT  2016    2 stents    FINE NEEDLE ASPIRATION Right 12/23/2011    Neck mass-Dr Verma    NM COLSC FLEXIBLE W/CONTROL BLEEDING ANY METHOD N/A 7/13/2017     Topics    Alcohol use: No    Drug use: No          Review of Systems:  Constitutional: Negative for chills, fatigue, fever or significant weight loss. HENT: Negative for congestion, hearing loss, nosebleeds or sore throat. Eyes: Negative for photophobia, pain, discharge, redness and visual disturbance. Respiratory: Negative for cough, shortness of breath, or wheezing. Cardiovascular: Negative for chest pain, palpitations or leg swelling. Gastrointestinal: Negative for abdominal pain, blood in stool, constipation, diarrhea, nausea or vomiting. Genitourinary: Negative for dysuria, flank pain, frequency, hematuria or urgency. Musculoskeletal: Negative for back pain, joint swelling, myalgias or neck pain. Skin: Negative for rash or petechiae. Neurological: Negative for tremors, seizures, syncope, weakness or headaches. Hematological: No active bruising or bleeding. Psychiatric/Behavioral: Negative for hallucinations. Objective:  Vital Signs: Blood pressure (!) 140/78, pulse 84, height 5' 9\" (1.753 m), weight 208 lb 11.2 oz (94.7 kg), SpO2 98 %. Physical Exam   Constitutional: Oriented to person, place, and time. No acute distress. Head: Normocephalic and atraumatic. Nose: Nose normal.   Mouth/Throat: Oropharynx is clear and moist. No oropharyngeal exudate. Eyes: Pupils are equal and round. Conjunctivae and EOM are normal. No scleral icterus. Neck: Normal range of motion. Neck supple. No JVD. No appreciable thyromegaly. Cardiovascular: Normal rate, regular rhythm, normal heart sounds and intact distal pulses. Exam reveals no gallop, murmurs or friction rub. Pulmonary/Chest: Effort normal and breath sounds normal. No respiratory distress. No wheezes. Abdominal: Soft. Bowel sounds are normal. No organomegally or masses. No tenderness. There is no rebound and no guarding. Musculoskeletal: Normal range of motion. No edema or tenderness.    Lymphadenopathy: No cervical, axillary or inguinal lymphadenopathy. Neurological: Alert and oriented to person, place, and time. Cranial nerves are intact. Neurological exam is nonfocal  Skin: Skin is warm and dry. No rash noted. No erythema. No pallor. Psychiatric: Judgment normal.     Labs:  BMP: No results for input(s): NA, K, CL, CO2, PHOS, BUN, CREATININE, CALCIUM in the last 72 hours. CBC:   Recent Labs     06/10/20  0814   WBC 6.73   HGB 12.4*   HCT 39.3*   MCV 92.3*        LIVER PROFILE: No results for input(s): AST, ALT, LIPASE, BILIDIR, BILITOT, ALKPHOS in the last 72 hours. Invalid input(s): AMYLASE,  ALB  PT/INR: No results for input(s): PROTIME, INR in the last 72 hours. APTT: No results for input(s): APTT in the last 72 hours. BNP:  No results for input(s): BNP in the last 72 hours. Ionized Calcium:No results for input(s): IONCA in the last 72 hours. Magnesium:No results for input(s): MG in the last 72 hours. Phosphorus:No results for input(s): PHOS in the last 72 hours. HgbA1C: No results for input(s): LABA1C in the last 72 hours. Hepatic: No results for input(s): ALKPHOS, ALT, AST, PROT, BILITOT, BILIDIR, LABALBU in the last 72 hours. Lactic Acid: No results for input(s): LACTA in the last 72 hours. Troponin: No results for input(s): CKTOTAL, CKMB, TROPONINT in the last 72 hours. ABGs: No results for input(s): PH, PCO2, PO2, HCO3, O2SAT in the last 72 hours. CRP:  No results for input(s): CRP in the last 72 hours. Sed Rate:  No results for input(s): SEDRATE in the last 72 hours. Cultures:   No results for input(s): CULTURE in the last 72 hours. Radiology reports as per the Radiologist  Radiology:     Diane Smith 82- 10/3/2019 8:32 AM CDT  HISTORY: PULMONARY NODULE; R91.1-Solitary pulmonary nodule  COMPARISON: CT chest dated 03/26/2019  DOSE LENGTH PRODUCT: 422 mGy cm. Automated exposure control was also  utilized to decrease patient radiation dose.   TECHNIQUE: Serial helical tomographic images of the chest were acquired. Multiplanar reformatted images were provided for review. FINDINGS:  The imaged portion of the neck and thyroid gland is unremarkable. Right lower lobe nodule measuring 7 mm medially (image 119, series 3). An adjacent 5 mm nodule medially and inferiorly (image 126, series 3)  was not seen on prior examination. Left lower lobe nodule, measuring 3  mm (image 111, series 3), grossly unchanged given differences in  techniques. A left lower lobe nodule more superiorly measuring 5 mm  (image 97, series 3) appears also unchanged. 7 mm nodule in the right  middle lobe, unchanged. No pleural effusion is present. The trachea and  bronchial tree are patent. Coronary artery disease. Atherosclerotic disease. The heart is within  normal limits in size. Moderate hiatal hernia. Great vessels, and  pulmonary vessels are otherwise unremarkable in appearance within limits  of a noncontrast study. There is no pericardial effusion. No enlarged  axillary or mediastinal lymph nodes are present. No acute findings are seen in the bones or surrounding soft tissues. Remote insult to the left chest wall posteriorly with multiple rib  Deformities. Please see report from CT abdomen pelvis adjacent for intra-abdominal  Findings. .    2 discrete subcentimeter nodules in the right lower lobe were not  present on prior examination. These are indeterminate short-term  follow-up recommended. This report was finalized on 10/03/2019 09:40 by Dr. Margarita Fernandez MD.      ___________________________________________________________________________      Reggy Justice: CT Abdomen and Pelvis without contrast  10/3/2019 8:58 AM CDT  INDICATION: ENLARGED LYMPH NODES; R59.9-Enlarged lymph nodes,  unspecified  COMPARISON: CT abdomen pelvis dated 03/26/2019. CT abdomen pelvis dated  02/22/2019.   TECHNIQUE:  Abdomen and pelvis were scanned utilizing a multidetector helical  scanner from the diaphragm to the ischial tuberosities without  administration of IV contrast. Coronal and sagittal reformations were  obtained. [Routine protocol is performed.]  Radiation dose equals .4 mGy-cm.  Automated exposure control dose  reduction technique was implemented. FINDINGS:  LINES and TUBES: None. LOWER THORAX: No focal consolidation. No pleural effusion. No  pneumothorax. There is a small to moderate hiatal hernia. Coronary  artery disease. HEPATOBILIARY:  No focal hepatic lesions.   No liver laceration.   No  biliary ductal dilatation. GALLBLADDER:  No radiopaque stones or sludge.   No wall thickening. SPLEEN:  No splenomegaly.    No splenic laceration. PANCREAS:  No focal masses or ductal dilatation. ADRENALS:  No adrenal nodules. KIDNEYS/URETERS: Prior left nephrectomy. Right renal exophytic  hypodensity measuring 3.3 cm unchanged. Right renal upper pole  parapelvic 1.2 cm is also unchanged. There is no new soft tissue density  within the left nephrectomy bed. GI TRACT:  No abnormal distention, wall thickening, or evidence of bowel  obstruction.   There is no acute appendicitis. Colonic diverticulosis,  without evidence of acute diverticulitis. Darlean Cork PELVIC ORGANS/BLADDER:  No acute pathology. Nonspecific mild wall  thickening of bladder, a chronic finding. Darlean Cork LYMPH NODES:  There is no inguinal canal, pelvic wall, retroperitoneal  or mesenteric lymphadenopathy by size criteria. .   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 Atherosclerotic disease. Darlean Cork PERITONEUM / Vallery Mcmechen free air or fluid. BONES:  There is no acute osseous pathology. No obvious suspicious lytic  or blastic lesion identified. Darlean Cork SOFT TISSUES:  Unremarkable. Result Impression   1.  No evidence of recurring disease or metastatic disease to the  abdomen and pelvis. 2.  Prior left nephrectomy. 3.  Distal abdominal aorta aneurysm. 4.  Additional chronic findings as discussed above.     This report was finalized on 10/03/2019 09:27 by Dr. Jaye Harry MD         ASSESSMENT AND PLAN:  Joni Russ is a 78year old  gentleman managed with primary and secondary diagnoses as outlined:  · Resection of left retroperitoneal mass and radical left nephrectomy by Dr. Bharath Naqvi at [facility] on 10/31/2018. Pathology revealed IgG4 related disease. · Multifactorial anemia  · IgG kappa MGUS  · Right lower extremity DVT on Eliquis by Dr. Hallie Morris    He is currently receiving Prednisone 5 mg/day by Dr. Beckey Kawasaki for IgG4 related disease. Recently, Ammy Frye has had issues of right hip pain. This is being managed by orthopedics at the orthopedic clinic with a recent right hip injection. He has had imaging of the area. I am suspicious he may have avascular necrosis associated with the chronic prednisone use. This is being addressed at the orthopedic clinic and with Dr. Veronique Lei. Ammy Frye comes today in followup as above with multiple other developments that we will be outlined in the assessment and plan. Physical examination does not reveal evidence of palpable peripheral lymphadenopathy. 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 Another 7 mm nodule in the R mL is unchanged. Follow-up recommended. CT scan of the abdomen and pelvis without contrast on 10/3/2019 at \[facility]\"" did not reveal evidence of recurrent disease or metastatic disease to the abdomen or pelvis.   Prior left nephrectomy is noted    Serology on 10/10/2019 revealed:  Iron- 45  TIBC- 255  Saturation%- 17.6%  Ferritin- 13.40  B2M- 2.8  Kappa light chains- 42.4  Lambda light chains- 24.8 with K/L ratio retina. Dr. Bella Estrada has him set up for surgery later on this month. I have encouraged him to keep his appointments and do everything he can to get this taken care of.      Denis Miranda am scribing for Marimar Abdullahi MD. Electronically signed by [facility], PAULA on 0/28/1466 at 0:58 AM       Juan Son was seen today for anemia. Diagnoses and all orders for this visit:    Monoclonal gammopathy  -     Comprehensive Metabolic Panel; Future  -     Iron and TIBC; Future  -     Ferritin; Future  -     IgG 4; Future  -     Immunoglobulins, Quantitative; Future    Other iron deficiency anemias  -     Comprehensive Metabolic Panel; Future  -     Iron and TIBC; Future  -     Ferritin; Future  -     IgG 4; Future  -     Immunoglobulins, Quantitative; Future    Anemia, unspecified type         Orders Placed This Encounter   Procedures    Comprehensive Metabolic Panel     Standing Status:   Future     Standing Expiration Date:   2/10/2021    Iron and TIBC     Standing Status:   Future     Standing Expiration Date:   2/10/2021     Order Specific Question:   Is Patient Fasting? Answer:   no     Order Specific Question:   No of Hours? Answer:   na    Ferritin     Standing Status:   Future     Standing Expiration Date:   2/10/2021    IgG 4     Standing Status:   Future     Standing Expiration Date:   2/10/2021    Immunoglobulins, Quantitative     Standing Status:   Future     Standing Expiration Date:   2/10/2021         Return in about 4 months (around 6/10/2020). I, Dr. Suzy Gordon, personally performed the services described in this documentation as scribed by [facility] LPN in my presence, and it is both accurate and complete.

## 2020-02-10 ENCOUNTER — OFFICE VISIT (OUTPATIENT)
Dept: HEMATOLOGY | Age: 80
End: 2020-02-10
Payer: MEDICARE

## 2020-02-10 ENCOUNTER — HOSPITAL ENCOUNTER (OUTPATIENT)
Dept: INFUSION THERAPY | Age: 80
Discharge: HOME | End: 2020-02-10
Payer: MEDICARE

## 2020-02-10 VITALS
BODY MASS INDEX: 30.91 KG/M2 | WEIGHT: 208.7 LBS | DIASTOLIC BLOOD PRESSURE: 78 MMHG | OXYGEN SATURATION: 98 % | HEIGHT: 69 IN | SYSTOLIC BLOOD PRESSURE: 140 MMHG | HEART RATE: 84 BPM

## 2020-02-10 DIAGNOSIS — D64.9 ANEMIA, UNSPECIFIED TYPE: ICD-10-CM

## 2020-02-10 PROCEDURE — 1036F TOBACCO NON-USER: CPT | Performed by: INTERNAL MEDICINE

## 2020-02-10 PROCEDURE — 99212 OFFICE O/P EST SF 10 MIN: CPT

## 2020-02-10 PROCEDURE — 1123F ACP DISCUSS/DSCN MKR DOCD: CPT | Performed by: INTERNAL MEDICINE

## 2020-02-10 PROCEDURE — 99215 OFFICE O/P EST HI 40 MIN: CPT | Performed by: INTERNAL MEDICINE

## 2020-02-10 PROCEDURE — 4040F PNEUMOC VAC/ADMIN/RCVD: CPT | Performed by: INTERNAL MEDICINE

## 2020-02-10 PROCEDURE — G8427 DOCREV CUR MEDS BY ELIG CLIN: HCPCS | Performed by: INTERNAL MEDICINE

## 2020-02-10 PROCEDURE — G8417 CALC BMI ABV UP PARAM F/U: HCPCS | Performed by: INTERNAL MEDICINE

## 2020-02-10 PROCEDURE — G8484 FLU IMMUNIZE NO ADMIN: HCPCS | Performed by: INTERNAL MEDICINE

## 2020-02-10 PROCEDURE — 85025 COMPLETE CBC W/AUTO DIFF WBC: CPT

## 2020-03-03 RX ORDER — SODIUM CHLORIDE 0.9 % (FLUSH) 0.9 %
10 SYRINGE (ML) INJECTION PRN
Status: CANCELLED | OUTPATIENT
Start: 2020-03-09

## 2020-03-03 RX ORDER — METHYLPREDNISOLONE SODIUM SUCCINATE 125 MG/2ML
125 INJECTION, POWDER, LYOPHILIZED, FOR SOLUTION INTRAMUSCULAR; INTRAVENOUS ONCE
Status: CANCELLED | OUTPATIENT
Start: 2020-03-09

## 2020-03-03 RX ORDER — SODIUM CHLORIDE 0.9 % (FLUSH) 0.9 %
5 SYRINGE (ML) INJECTION PRN
Status: CANCELLED | OUTPATIENT
Start: 2020-03-09

## 2020-03-03 RX ORDER — HEPARIN SODIUM (PORCINE) LOCK FLUSH IV SOLN 100 UNIT/ML 100 UNIT/ML
500 SOLUTION INTRAVENOUS PRN
Status: CANCELLED | OUTPATIENT
Start: 2020-03-09

## 2020-03-03 RX ORDER — EPINEPHRINE 1 MG/ML
0.3 INJECTION, SOLUTION, CONCENTRATE INTRAVENOUS PRN
Status: CANCELLED | OUTPATIENT
Start: 2020-03-09

## 2020-03-03 RX ORDER — SODIUM CHLORIDE 9 MG/ML
INJECTION, SOLUTION INTRAVENOUS CONTINUOUS
Status: CANCELLED | OUTPATIENT
Start: 2020-03-09

## 2020-03-03 RX ORDER — DIPHENHYDRAMINE HYDROCHLORIDE 50 MG/ML
50 INJECTION INTRAMUSCULAR; INTRAVENOUS ONCE
Status: CANCELLED | OUTPATIENT
Start: 2020-03-09

## 2020-03-13 ENCOUNTER — HOSPITAL ENCOUNTER (OUTPATIENT)
Dept: INFUSION THERAPY | Age: 80
Setting detail: INFUSION SERIES
Discharge: HOME | End: 2020-03-13
Payer: MEDICARE

## 2020-03-13 VITALS
TEMPERATURE: 98.3 F | BODY MASS INDEX: 29.62 KG/M2 | DIASTOLIC BLOOD PRESSURE: 78 MMHG | WEIGHT: 200 LBS | RESPIRATION RATE: 18 BRPM | OXYGEN SATURATION: 99 % | HEART RATE: 75 BPM | HEIGHT: 69 IN | SYSTOLIC BLOOD PRESSURE: 136 MMHG

## 2020-03-13 DIAGNOSIS — D50.0 IRON DEFICIENCY ANEMIA DUE TO CHRONIC BLOOD LOSS: ICD-10-CM

## 2020-03-13 DIAGNOSIS — D50.9 IRON DEFICIENCY ANEMIA, UNSPECIFIED IRON DEFICIENCY ANEMIA TYPE: ICD-10-CM

## 2020-03-13 DIAGNOSIS — D50.8 OTHER IRON DEFICIENCY ANEMIAS: Primary | ICD-10-CM

## 2020-03-13 PROCEDURE — 2580000003 HC RX 258: Performed by: INTERNAL MEDICINE

## 2020-03-13 PROCEDURE — 96365 THER/PROPH/DIAG IV INF INIT: CPT

## 2020-03-13 PROCEDURE — 6360000002 HC RX W HCPCS: Performed by: INTERNAL MEDICINE

## 2020-03-13 RX ORDER — SODIUM CHLORIDE 9 MG/ML
INJECTION, SOLUTION INTRAVENOUS CONTINUOUS
Status: ACTIVE | OUTPATIENT
Start: 2020-03-13 | End: 2020-03-13

## 2020-03-13 RX ORDER — HEPARIN SODIUM (PORCINE) LOCK FLUSH IV SOLN 100 UNIT/ML 100 UNIT/ML
500 SOLUTION INTRAVENOUS PRN
Status: CANCELLED | OUTPATIENT
Start: 2020-03-20

## 2020-03-13 RX ORDER — SODIUM CHLORIDE 9 MG/ML
INJECTION, SOLUTION INTRAVENOUS CONTINUOUS
Status: CANCELLED | OUTPATIENT
Start: 2020-03-20

## 2020-03-13 RX ORDER — SODIUM CHLORIDE 0.9 % (FLUSH) 0.9 %
5 SYRINGE (ML) INJECTION PRN
Status: CANCELLED | OUTPATIENT
Start: 2020-03-20

## 2020-03-13 RX ORDER — SODIUM CHLORIDE 0.9 % (FLUSH) 0.9 %
10 SYRINGE (ML) INJECTION PRN
Status: CANCELLED | OUTPATIENT
Start: 2020-03-20

## 2020-03-13 RX ORDER — EPINEPHRINE 1 MG/ML
0.3 INJECTION, SOLUTION, CONCENTRATE INTRAVENOUS PRN
Status: CANCELLED | OUTPATIENT
Start: 2020-03-20

## 2020-03-13 RX ORDER — DIPHENHYDRAMINE HYDROCHLORIDE 50 MG/ML
50 INJECTION INTRAMUSCULAR; INTRAVENOUS ONCE
Status: CANCELLED | OUTPATIENT
Start: 2020-03-20

## 2020-03-13 RX ORDER — METHYLPREDNISOLONE SODIUM SUCCINATE 125 MG/2ML
125 INJECTION, POWDER, LYOPHILIZED, FOR SOLUTION INTRAMUSCULAR; INTRAVENOUS ONCE
Status: CANCELLED | OUTPATIENT
Start: 2020-03-20

## 2020-03-13 RX ADMIN — FERRIC CARBOXYMALTOSE INJECTION 750 MG: 50 INJECTION, SOLUTION INTRAVENOUS at 11:42

## 2020-03-20 ENCOUNTER — HOSPITAL ENCOUNTER (OUTPATIENT)
Dept: INFUSION THERAPY | Age: 80
Setting detail: INFUSION SERIES
Discharge: HOME | End: 2020-03-20
Payer: MEDICARE

## 2020-03-20 VITALS
OXYGEN SATURATION: 100 % | SYSTOLIC BLOOD PRESSURE: 143 MMHG | HEART RATE: 81 BPM | TEMPERATURE: 97.1 F | RESPIRATION RATE: 18 BRPM | DIASTOLIC BLOOD PRESSURE: 82 MMHG

## 2020-03-20 DIAGNOSIS — D50.9 IRON DEFICIENCY ANEMIA, UNSPECIFIED IRON DEFICIENCY ANEMIA TYPE: ICD-10-CM

## 2020-03-20 DIAGNOSIS — D50.0 IRON DEFICIENCY ANEMIA DUE TO CHRONIC BLOOD LOSS: ICD-10-CM

## 2020-03-20 DIAGNOSIS — D50.8 OTHER IRON DEFICIENCY ANEMIAS: Primary | ICD-10-CM

## 2020-03-20 PROCEDURE — 2580000003 HC RX 258: Performed by: INTERNAL MEDICINE

## 2020-03-20 PROCEDURE — 96365 THER/PROPH/DIAG IV INF INIT: CPT

## 2020-03-20 PROCEDURE — 6360000002 HC RX W HCPCS: Performed by: INTERNAL MEDICINE

## 2020-03-20 RX ORDER — SODIUM CHLORIDE 0.9 % (FLUSH) 0.9 %
10 SYRINGE (ML) INJECTION PRN
Status: CANCELLED | OUTPATIENT
Start: 2020-03-20

## 2020-03-20 RX ORDER — SODIUM CHLORIDE 9 MG/ML
INJECTION, SOLUTION INTRAVENOUS CONTINUOUS
Status: CANCELLED | OUTPATIENT
Start: 2020-03-20

## 2020-03-20 RX ORDER — DIPHENHYDRAMINE HYDROCHLORIDE 50 MG/ML
50 INJECTION INTRAMUSCULAR; INTRAVENOUS ONCE
Status: CANCELLED | OUTPATIENT
Start: 2020-03-20

## 2020-03-20 RX ORDER — EPINEPHRINE 1 MG/ML
0.3 INJECTION, SOLUTION, CONCENTRATE INTRAVENOUS PRN
Status: CANCELLED | OUTPATIENT
Start: 2020-03-20

## 2020-03-20 RX ORDER — HEPARIN SODIUM (PORCINE) LOCK FLUSH IV SOLN 100 UNIT/ML 100 UNIT/ML
500 SOLUTION INTRAVENOUS PRN
Status: CANCELLED | OUTPATIENT
Start: 2020-03-20

## 2020-03-20 RX ORDER — SODIUM CHLORIDE 9 MG/ML
INJECTION, SOLUTION INTRAVENOUS CONTINUOUS
Status: ACTIVE | OUTPATIENT
Start: 2020-03-20 | End: 2020-03-20

## 2020-03-20 RX ORDER — SODIUM CHLORIDE 0.9 % (FLUSH) 0.9 %
5 SYRINGE (ML) INJECTION PRN
Status: CANCELLED | OUTPATIENT
Start: 2020-03-20

## 2020-03-20 RX ORDER — METHYLPREDNISOLONE SODIUM SUCCINATE 125 MG/2ML
125 INJECTION, POWDER, LYOPHILIZED, FOR SOLUTION INTRAMUSCULAR; INTRAVENOUS ONCE
Status: CANCELLED | OUTPATIENT
Start: 2020-03-20

## 2020-03-20 RX ADMIN — FERRIC CARBOXYMALTOSE INJECTION 750 MG: 50 INJECTION, SOLUTION INTRAVENOUS at 11:04

## 2020-03-25 PROBLEM — K21.9 GERD (GASTROESOPHAGEAL REFLUX DISEASE): Status: RESOLVED | Noted: 2020-03-25 | Resolved: 2020-03-24

## 2020-06-03 ENCOUNTER — OFFICE VISIT (OUTPATIENT)
Dept: CARDIOLOGY | Facility: CLINIC | Age: 80
End: 2020-06-03

## 2020-06-03 VITALS
SYSTOLIC BLOOD PRESSURE: 134 MMHG | OXYGEN SATURATION: 98 % | WEIGHT: 201 LBS | BODY MASS INDEX: 29.77 KG/M2 | DIASTOLIC BLOOD PRESSURE: 70 MMHG | HEART RATE: 76 BPM | HEIGHT: 69 IN

## 2020-06-03 DIAGNOSIS — I25.10 CORONARY ARTERY DISEASE INVOLVING NATIVE CORONARY ARTERY OF NATIVE HEART WITHOUT ANGINA PECTORIS: Primary | ICD-10-CM

## 2020-06-03 DIAGNOSIS — I71.40 AAA (ABDOMINAL AORTIC ANEURYSM) WITHOUT RUPTURE (HCC): ICD-10-CM

## 2020-06-03 DIAGNOSIS — E78.2 MIXED HYPERLIPIDEMIA: ICD-10-CM

## 2020-06-03 PROCEDURE — 99214 OFFICE O/P EST MOD 30 MIN: CPT | Performed by: INTERNAL MEDICINE

## 2020-06-03 PROCEDURE — 93000 ELECTROCARDIOGRAM COMPLETE: CPT | Performed by: INTERNAL MEDICINE

## 2020-06-03 NOTE — PROGRESS NOTES
Referring Provider: Adi Jack MD    Reason for Follow-up Visit: CAD    Subjective .   Chief Complaint:   Chief Complaint   Patient presents with   • Follow-up     yearly   • Coronary Artery Disease     pt states he is doing well with no complaints of symptoms except the sob.  this is better than it was in the past.   • Hyperlipidemia     last labs done 1/2019  LDL 93  will need a lab order.  he is on Lipitor 10 mg       History of present illness:  Zeb Adams is a 80 y.o. yo male with history of CAD, s/p TAYLOR in the past. Denies any CP. 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)    • Impotence of organic origin    • Kidney disease     stage 3   • LPRD (laryngopharyngeal reflux disease)    • Lung collapse    • Mediastinal adenopathy    • Poor urinary stream    • Presence of stent in coronary artery in patient with coronary artery disease    • RLS (restless legs syndrome)    • Sialoadenitis    • Skin cancer     SCALP   • SOB (shortness of breath) on exertion    • Stroke (CMS/HCC)     sometime in his life he has had a mini stroke found on a ct scan ewcently   ,   Past Surgical History:   Procedure Laterality Date   • BONE MARROW ASPIRATE W/ LUMBAR PUNCTURE     • BRONCHOSCOPY Bilateral 1/25/2018    Procedure: BRONCHOSCOPY WITH ENDOBRONCHIAL ULTRASOUND;  Surgeon: Cecilio Hurtado MD;  Location: [facility] OR;  Service:    • CARDIAC SURGERY     • CORONARY ANGIOPLASTY WITH STENT PLACEMENT  2016   •  CORONARY ANGIOPLASTY WITH STENT PLACEMENT     • KIDNEY SURGERY     • NEPHRECTOMY     • NEPHRECTOMY RADICAL Left    • SUBMANDIBULAR GLAND EXCISION     • TONSILLECTOMY     • TURBINOPLASTY     • VASECTOMY       and a reveral   ,   Family History   Problem Relation Age of Onset   • Diabetes Father    • Heart failure Father    • Diabetes Brother    • Heart failure Brother    • Heart attack Brother    • Coronary artery disease Other    • Heart attack Other    • Heart disease Mother    • Hyperlipidemia Mother    • No Known Problems Maternal Grandmother    • Heart disease Maternal Grandfather    • No Known Problems Paternal Grandmother    • No Known Problems Paternal Grandfather    ,   Social History     Tobacco Use   • Smoking status: Former Smoker     Start date:      Last attempt to quit:      Years since quittin.4   • Smokeless tobacco: Never Used   • Tobacco comment: 30 yrs ago   Substance Use Topics   • Alcohol use: Never     Frequency: Never   • Drug use: Never   ,     Medications  Current Outpatient Medications   Medication Sig Dispense Refill   • amLODIPine (NORVASC) 5 MG tablet Take 5 mg by mouth Daily.     • aspirin 81 MG tablet Take 81 mg by mouth daily.  LAST DOSE      • atorvastatin (LIPITOR) 10 MG tablet Take 10 mg by mouth Daily.     • calcium carbonate (OS-JEAN PAUL) 600 MG tablet Take 2,000 mg by mouth Daily.     • cetirizine (ZyrTEC) 10 MG tablet Take 10 mg by mouth Daily As Needed for Allergies.     • cholecalciferol (VITAMIN D3) 1000 units tablet Take 1,000 Units by mouth Daily.     • clonazePAM (KlonoPIN) 1 MG tablet Take 1 mg by mouth At Night As Needed (RSL).     • HYDROcodone-acetaminophen (NORCO) 7.5-325 MG per tablet Take 1 tablet by mouth Every 8 (Eight) Hours As Needed for Severe Pain . 9 tablet 0   • pantoprazole (PROTONIX) 40 MG EC tablet Take 1 tablet by mouth 2 times daily.     • predniSONE (DELTASONE) 10 MG tablet Take 10 mg by mouth Daily.       No current  "facility-administered medications for this visit.        Allergies:  Lyrica [pregabalin]; Penicillins; and Sulfa antibiotics    Review of Systems  Review of Systems   HENT: Negative for nosebleeds.    Cardiovascular: Positive for dyspnea on exertion. Negative for chest pain, claudication, irregular heartbeat, leg swelling, near-syncope, orthopnea, palpitations, paroxysmal nocturnal dyspnea and syncope.   Respiratory: Negative for cough, hemoptysis and shortness of breath.    Gastrointestinal: Negative for dysphagia, hematemesis and melena.   Genitourinary: Negative for hematuria.   All other systems reviewed and are negative.      Objective     Physical Exam:  /70   Pulse 76   Ht 175.3 cm (69\")   Wt 91.2 kg (201 lb)   SpO2 98%   BMI 29.68 kg/m²   Physical Exam   Constitutional: He is oriented to person, place, and time. He appears well-nourished. No distress.   HENT:   Head: Normocephalic.   Eyes: No scleral icterus.   Neck: Normal range of motion. Neck supple.   Cardiovascular: Normal rate, regular rhythm and normal heart sounds. Exam reveals no gallop and no friction rub.   No murmur heard.  Pulmonary/Chest: Effort normal and breath sounds normal. No respiratory distress. He has no wheezes. He has no rales.   Abdominal: Soft. Bowel sounds are normal. He exhibits no distension. There is no tenderness.   Musculoskeletal: He exhibits no edema.   Neurological: He is alert and oriented to person, place, and time.   Skin: Skin is warm and dry. He is not diaphoretic. No erythema.   Psychiatric: He has a normal mood and affect. His behavior is normal.       Results Review:    ECG 12 Lead  Date/Time: 6/3/2020 2:31 PM  Performed by: Zeb Mark MD  Authorized by: Zeb Mark MD   Comparison: compared with previous ECG   Similar to previous ECG  Rhythm: sinus rhythm  Rate: normal  Conduction: conduction normal  ST Segments: ST segments normal  T Waves: T waves normal  QRS axis: normal    Clinical impression: " normal ECG            Orders Only on 10/03/2019   Component Date Value Ref Range Status   • Phosphorus 10/03/2019 3.2  2.5 - 4.5 mg/dL Final   • Magnesium 10/03/2019 2.0  1.6 - 2.4 mg/dL Final   • Uric Acid 10/03/2019 6.8  3.4 - 7.0 mg/dL Final   • Total Protein, Urine 10/03/2019 11.0  mg/dL Final   • Creatinine, Urine 10/03/2019 107.0  mg/dL Final   • Sed Rate 10/03/2019 21* 0 - 20 mm/hr Final   • 25 Hydroxy, Vitamin D 10/03/2019 72.7  30.0 - 100.0 ng/ml Final   • Calcium 10/03/2019 9.5  8.6 - 10.2 mg/dL Final   • Phosphorus 10/03/2019 3.3  2.5 - 4.5 mg/dL Final   • Creatinine 10/03/2019 2.11* 0.76 - 1.27 mg/dL Final   • eGFR Non African Am 10/03/2019 29* >59 mL/min/1.73 Final   • eGFR African Am 10/03/2019 33* >59 mL/min/1.73 Final   • PTH, Intact 10/03/2019 22  15 - 65 pg/mL Final   • PTH, Intact 10/03/2019 Comment   Final    Interpretation                 Intact PTH    Calcium                                  (pg/mL)      (mg/dL)  Normal                          15 - 65     8.6 - 10.2  Primary Hyperparathyroidism         >65          >10.2  Secondary Hyperparathyroidism       >65          <10.2  Non-Parathyroid Hypercalcemia       <65          >10.2  Hypoparathyroidism                  <15          < 8.6  Non-Parathyroid Hypocalcemia    15 - 65          < 8.6   • C-Reactive Protein 10/03/2019 0.20  0.00 - 0.50 mg/dL Final   • WBC 10/03/2019 10.28  3.40 - 10.80 10*3/mm3 Final   • RBC 10/03/2019 4.32  4.14 - 5.80 10*6/mm3 Final   • Hemoglobin 10/03/2019 12.1* 13.0 - 17.7 g/dL Final   • Hematocrit 10/03/2019 38.1  37.5 - 51.0 % Final   • MCV 10/03/2019 88.2  79.0 - 97.0 fL Final   • MCH 10/03/2019 28.0  26.6 - 33.0 pg Final   • MCHC 10/03/2019 31.8  31.5 - 35.7 g/dL Final   • RDW 10/03/2019 17.0* 12.3 - 15.4 % Final   • RDW-SD 10/03/2019 55.3* 37.0 - 54.0 fl Final   • MPV 10/03/2019 10.7  6.0 - 12.0 fL Final   • Platelets 10/03/2019 212  140 - 450 10*3/mm3 Final   • Neutrophil % 10/03/2019 89.0* 42.7 - 76.0 % Final    • Lymphocyte % 10/03/2019 6.0* 19.6 - 45.3 % Final   • Monocyte % 10/03/2019 4.0* 5.0 - 12.0 % Final   • Eosinophil % 10/03/2019 1.0  0.3 - 6.2 % Final   • Neutrophils Absolute 10/03/2019 9.15* 1.70 - 7.00 10*3/mm3 Final   • Lymphocytes Absolute 10/03/2019 0.62* 0.70 - 3.10 10*3/mm3 Final   • Monocytes Absolute 10/03/2019 0.41  0.10 - 0.90 10*3/mm3 Final   • Eosinophils Absolute 10/03/2019 0.10  0.00 - 0.40 10*3/mm3 Final   • RBC Morphology 10/03/2019 Normal  Normal Final   • WBC Morphology 10/03/2019 Normal  Normal Final   • Platelet Morphology 10/03/2019 Normal  Normal Final       Assessment/Plan   Zeb was seen today for follow-up, coronary artery disease and hyperlipidemia.    Diagnoses and all orders for this visit:    Mixed hyperlipidemia, needs to be checked  -     Lipid Panel; Future  -     Comprehensive Metabolic Panel; Future    Coronary artery disease involving native coronary artery of native heart without angina pectoris, The patient denies chest pain, shortness of breath, dyspnea on exertion, orthopnea, PND, edema. There is no evidence of ongoing ischemia    AAA, needs to have follow up abdominal US      Patient's Body mass index is 29.68 kg/m². BMI is within normal parameters. No follow-up required..

## 2020-06-08 ENCOUNTER — LAB (OUTPATIENT)
Dept: LAB | Facility: HOSPITAL | Age: 80
End: 2020-06-08

## 2020-06-08 ENCOUNTER — HOSPITAL ENCOUNTER (OUTPATIENT)
Dept: ULTRASOUND IMAGING | Facility: HOSPITAL | Age: 80
Discharge: HOME | End: 2020-06-08
Admitting: INTERNAL MEDICINE

## 2020-06-08 DIAGNOSIS — E78.2 MIXED HYPERLIPIDEMIA: ICD-10-CM

## 2020-06-08 DIAGNOSIS — I71.40 AAA (ABDOMINAL AORTIC ANEURYSM) WITHOUT RUPTURE (HCC): ICD-10-CM

## 2020-06-08 LAB
ALBUMIN SERPL-MCNC: 4.3 G/DL (ref 3.5–5.2)
ALBUMIN/GLOB SERPL: 1.5 G/DL
ALP SERPL-CCNC: 74 U/L (ref 39–117)
ALT SERPL W P-5'-P-CCNC: 19 U/L (ref 1–41)
ANION GAP SERPL CALCULATED.3IONS-SCNC: 12.4 MMOL/L (ref 5–15)
AST SERPL-CCNC: 18 U/L (ref 1–40)
BILIRUB SERPL-MCNC: 0.5 MG/DL (ref 0.2–1.2)
BUN BLD-MCNC: 26 MG/DL (ref 8–23)
BUN/CREAT SERPL: 12.5 (ref 7–25)
CALCIUM SPEC-SCNC: 9.3 MG/DL (ref 8.6–10.5)
CHLORIDE SERPL-SCNC: 101 MMOL/L (ref 98–107)
CHOLEST SERPL-MCNC: 152 MG/DL (ref 0–200)
CO2 SERPL-SCNC: 23.6 MMOL/L (ref 22–29)
CREAT BLD-MCNC: 2.08 MG/DL (ref 0.76–1.27)
GFR SERPL CREATININE-BSD FRML MDRD: 31 ML/MIN/1.73
GLOBULIN UR ELPH-MCNC: 2.9 GM/DL
GLUCOSE BLD-MCNC: 82 MG/DL (ref 65–99)
HDLC SERPL-MCNC: 38 MG/DL (ref 40–60)
LDLC SERPL CALC-MCNC: 88 MG/DL (ref 0–100)
LDLC/HDLC SERPL: 2.31 {RATIO}
POTASSIUM BLD-SCNC: 4.2 MMOL/L (ref 3.5–5.2)
PROT SERPL-MCNC: 7.2 G/DL (ref 6–8.5)
SODIUM BLD-SCNC: 137 MMOL/L (ref 136–145)
TRIGL SERPL-MCNC: 131 MG/DL (ref 0–150)
VLDLC SERPL-MCNC: 26.2 MG/DL (ref 5–40)

## 2020-06-08 PROCEDURE — 80061 LIPID PANEL: CPT | Performed by: INTERNAL MEDICINE

## 2020-06-08 PROCEDURE — 80053 COMPREHEN METABOLIC PANEL: CPT | Performed by: INTERNAL MEDICINE

## 2020-06-08 PROCEDURE — 76775 US EXAM ABDO BACK WALL LIM: CPT

## 2020-06-09 NOTE — PROGRESS NOTES
Patient:  Hetal Daly  YOB: 1940  Date of Service: 6/10/2020  MRN: [id number]    Primary Care Physician: Chrissy Moore MD    Chief Complaint   Patient presents with    Follow-up     Monoclonal gammopathy       Patient Seen, Chart, Consults notes, Labs, Radiology studies reviewed. Subjective:  Natalya Gaspar is a [de-identified]year old  gentleman managed with primary and secondary diagnoses as outlined:  · Resection of left retroperitoneal mass and radical left nephrectomy by Dr. Krishna Dies Dr. Maxx Arboleda at [facility] on 10/31/2018. Pathology revealed IgG4 related disease. · Multifactorial anemia  · IgG kappa MGUS  · Right lower extremity DVT on Eliquis bid by Dr. Kelvin Carterr    He is currently receiving Prednisone 3 mg/day by Dr. Mariann Falk for IgG4 related disease. Vasiliy Hatfield comes today in followup. HEMATOLOGIC HISTORY: Leukopenia, Anemia, adenopathy  Natalya Gaspar was seen in initial hematologic consultation on 4/26/2018, referred by Dr. Lewis Martinez for evaluation of leukopenia and anemia. CBC on 4/19/2018 showed a WBC of 3.37, hemoglobin 9.7 with MCV 87.8 and platelet count 784,616. Neutrophils were 56.3%, lymphocytes 9.5% and monocytes 20.5%. CMP included a creatinine of 1.94, GFR 41. Ca+ 8.8, TP 9.6. He is followed by Dr. Mariann Falk for rheumatoid arthritis and possible Sjogren disease. He has a known history of CKD, as well as iron deficiency anemia from GI blood loss. Endoscopy 7/13/2017 by Dr. Gualberto Osullivan revealed a small bowel AVM that was cauterized. A moderate sized hiatal hernia with mild Jake's erosions was noted as well. Colonoscopy 7/13/17 was appreciable for cecal AVM, also cauterized as well as internal hemorrhoids. M2A capsule endoscopy 1/3/2018 by Dr. Nuzhat Bonilla revealed one irritated area in the colon, though no signs of bleeding or stigmata of small bowel blood loss.  Should evidence of GI blood loss persists, consideration should be given for repeat EGD/colonoscopy due to glandular fibrosis and heterogeneous lymphoid predominant inflammatory cell population with significant number of plasma cells and eosinophils. There was no evidence of epithelial malignancy. FLOW cytometry revealed no evidence of B-cell or T-cell lymphoma. On exam, left submandibular lymphadenopathy is appreciated and perhaps shotty axillary lymphadenopathy. CBC 4/26/2018 showed a normal WBC of 4.52, though with persistent monocytosis at 22.8%. Hemoglobin is 10.8 with MCV 91.0 and platelet count 788,360. Enrique Jimenez' main complaint had been of hoarseness and progressive cough, which occurs daily. He dates symptoms back to August, 2017 after having an allergic reaction to Lyrica for post-herpetic neuralgia. In addition to hoarseness and cough, Enrique Jimenez also reports weight loss of 24 pounds over the previous 4-6 weeks prior to presentation at this office, night sweats and chills. He was also referred to [address] [place] [address] by Dr. Neetu Li regarding persistent pulmonary symptoms. Given the abnormal CBC finding, adenopathy and symptomatology work up was also requested, and he was referred to this office. Serology 4/26/2018  Serum Fe - 32  TIBC - 220  Fe Sat - 15%  Ferritin - 45  B12 - 744  Folate > 20  Retic - 1.6 %  LDH -175  B2 M - 7 (0.6-2.4)  QI - IgG-5051 (700-1600), IgM 170 ()  SPEP - no M spike, TP - 10.2 (6-8.5), globulin - 7.5 (2.2-3.9)  Free serum light chains - kappa 930.3 (3.3-19.4), lambda 228 (5.7-26.3), K/L ratio 4.08 (0.26-1.65)    Bone marrow aspiration and biopsy on 4/30/2018 was normocellular (~20-25 %) with adequate maturing trilineage hematopoiesis, no significant dyspoiesis and no increase in blasts. Megakaryocytes had a spectrum of morphology. There was no stainable storage iron and no increase in reticulin fibrosis. 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   Flow cytometry did not reveal B-cell monoclonality nor T-cell aberrant antigen expression. FISH studies for MDS was negative. There was no morphologic evidence of a myelodysplastic syndrome. There was no diagnostic evidence of a limp low at disorder, granulomas, nor metastatic malignancies on the bone marrow biopsy or aspirate. CT scans of the neck with contrast on 4/30/2018 documented an asymmetry within the left submandibular gland compared to the right. The left submandibular gland measures 4.2 x 2.6 x 2.4 cm compared to 12 mm on the right. Otherwise there are no discrete neck masses or pathologically enlarged lymph nodes. 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 Prominent right external iliac chain lymph nodes of indeterminate significance were also documented. Diverticulosis without diverticulitis and a large hiatal hernia was described. The prostate gland was enlarged with lifting of the base of the urinary bladder. Also noted was a questionable abnormal appearance of the pancreas with some loss of the normal pancreatic on to work without a discrete mass with MRI recommended. MRI of the abdomen W & WO on 5/8/2018 documented borderline splenomegaly, a large ventral hernia, nonenhancing renal cysts and a 3.5 cm abdominal aortic aneurysm. A 6 mm right middle lobe pulmonary nodule and nodular density along the left major fissure of the region of the lingula were noted as previously described on the CT scan of the chest on 1/10/2018.  The nodules on 1/10/2018 had increased compared to 11/22/2016 with metastatic disease being in the differential.   MRI of the pelvis W & WO contrast on 5/8/2018 documented enhancing septations and a small soft tissue nodule within the inferior aspect of the left fatty-containing retroperitoneum positioned below the level of the left kidney. This abnormality is lateral to the psoas muscle (it abuts the psoas muscle, but does not invade the psoas muscle) and extending to the level of the left iliac crest.  An atypical lipomatouse tumor versus liposarcoma considered. Mild prostate enlargement is also noted. Mildly enlarged pelvic lymph nodes measuring 1.2 cm in short axis, reactive or metastatic are also noted. Flyzack Arriola saw Dr. Regino Ortiz and Dr. Jannette Morales and reviewed Fly Arriola' case with sarcoma tumor board at [facility] on 6/8/2018. 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 Unfortunately, a curative resection would require sacrifice of the left kidney leading to progression of CKD and possibly dialysis. Recommendation by Dr Dale Guzman on a clinic visit note faxed 6/20/2018 was for continued observation with repeat CT scan in 3 months as there were no signs of high-grade component on the CT scans from April of 2018 or the MRI of the abdomen/pelvis from 5/8/2018. Consideration could be given for resection if there is documentation of clear growth or concern for dedifferentiation. Pathology from the station 7 lymph node on 1/25/18 was reviewed 6/19/18 at [facility] and noted to be predominantly bronchial cells with few lymphocytes and macrophages, negative for malignancy. Bronchial washings also negative for malignancy. Interpretation of the chest CT 6/13/18 at [facility] was consistent with multiple bilateral pulmonary nodules and mediastinal and hilar adenopathy, possibly representing sarcoidosis with malignancy not excluded. AAA partially imaged. CT scans of the chest, abdomen/pelvis 8/3/18 at [facility] were grossly unchanged with surveillance to continue. Regarding the left submandibular gland, I spoke with Dr. Erich Verma on 6/27/2018.      Dr. Rocio Ji stated he has been monitoring the left submandibular gland for years, without gross change. He did not recommend excision, rather continued monitoring due to stability and concern for xerostomia. Lasix renogram and assessment by Dr. Ilya Duncan was completed in in September, 2018. Dr. Bobbetta Fabry reviewed imaging and confirmed the need for removal of the whole left kidney. Tao's case was again reviewed with the sarcoma tumor Board. Renogram demonstrated differential kidney function of 60% on the right. Dr. Ilya Duncan estimated his final GFR may be 25-30, thus likely avoiding hemodialysis postoperatively. Left nephrectomy appeared more reasonable given recent stone disease and less function. Vasiliy Hatfield underwent resection of the left retroperitoneal mass with radical left nephrectomy on 10/31/18 by Dr. Krishna Dies Dr. Maxx Arboleda at [facility]. Pathology as follows:  1. Liver, segment 3, excision: Benign liver parenchyma with calcified granuloma. 2. Left retroperitoneal nodule, excision: Fibroadipose tissue with chronic lymphoplasmacytic and histiocytic inflammation. There were NO features of well-differentiated liposarcoma noted. 3. Left periurethral tissue, excision: Fibroadipose tissue with chronic lymphoplasmacytic and histiocytic inflammation. 4. Left lateral pararenal tissue, excision: Benign fibroadipose tissue with chronic lymphoplasmacytic and histiocytic inflammation. 5. Left nephrectomy: Marked lymphoplasmacytic and histiocytic inflammation, focal fibrosis and changes of the obliterative phlebitis, most consistent with IgG4-related disease. Postoperative noncontrast abdominal/pelvic CT 11/23/18 at [facility] documented stranding of fat in the retroperitoneum, likely related to recent surgery. Diverticulosis of the colon with inflammation of the fat around the mid to distal descending colon close to the prior surgical site present, possibly representing postoperative change.   There was a tiny 2 mL stone in the distal right ureter with mild-to-moderate dilatation of the right ureter and mild dilatation of the right renal collecting system. Nonobstructing stones also noted in the right kidney as well as a 4 cm probable cyst in the upper pole right kidney. Surveillance non-contrast CT scans of the chest, abdomen and pelvis 2/22/19 at [facility] documented interval decrease in numerous mediastinal lymph nodes and decrease in size of multiple bilateral pulmonary nodules. There was no residual lymphadenopathy and near complete resolution of inflammatory changes around the distal descending colon. Previous right renal lesions were stable. Carrie Hopkins is followed by Dr. Geneva Devries, treated with prednisone regarding IgG4 systemic disease with known involvement of salivary glands, kidney, and retroperitoneum. He completed prednisone 40 mg daily ×1 month on 2/23/19, and 30 mg daily ×4 weeks on 3/25/19. Carrie Hopkins will take 20 mg ×4 weeks, then 10 mg. Allergies:  Lyrica [pregabalin]; Penicillins; and Sulfa antibiotics    Medicines:  Current Outpatient Medications   Medication Sig Dispense Refill    Calcium-Magnesium-Vitamin D (CALCIUM 1200+D3 PO) Take by mouth      predniSONE (DELTASONE) 5 MG tablet Take 3 mg by mouth daily       HYDROcodone-acetaminophen (NORCO) 7.5-325 MG per tablet Take 1 tablet by mouth every 6 hours as needed for Pain.  vitamin D (CHOLECALCIFEROL) 1000 UNIT TABS tablet Take 1,000 Units by mouth daily      amLODIPine (NORVASC) 5 MG tablet Take 5 mg by mouth daily      clonazePAM (KLONOPIN) 2 MG tablet Take 2 mg by mouth nightly as needed      atorvastatin (LIPITOR) 10 MG tablet Take 10 mg by mouth daily      cetirizine (ZYRTEC) 5 MG tablet Take 5 mg by mouth daily      aspirin 81 MG tablet Take 81 mg by mouth daily.  pantoprazole (PROTONIX) 40 MG tablet Take 1 tablet by mouth 2 times daily. 60 tablet 11     No current facility-administered medications for this visit.         Past Medical History:      Diagnosis Date    Allergic rhinitis     Anemia, unspecified     Anxiety     Asthma     BPH (benign prostatic hyperplasia)     Dr Fara De Los Santos CAD (coronary artery disease)     Dr Vincent Valera [facility])     skin-non-melanoma    Carotid artery stenosis     YVON Coleman    Chronic kidney disease, stage III (moderate) (HCC)     Colon polyps     Cough     Decreased white blood cell count, unspecified     Enlarged lymph nodes, unspecified     Esophageal stricture     GERD (gastroesophageal reflux disease)     HIGH CHOLESTEROL     Hypertension     Insomnia     Leukocytosis     Mediastinal lymphadenopathy     Monoclonal gammopathy     Monocytosis (symptomatic)     Night sweats     Normocytic normochromic anemia     Osteoarthritis     Other iron deficiency anemias     GI blood loss, hiatal hernia w/Jake's erosions and cecal/small bowel AVMs    Peptic ulcer disease     Restless leg syndrome     Rheumatoid arthritis ([facility] [address]     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 @ [facility] 2018    Skin cancer     on left ear lobe    Thrombocytopenia, unspecified ([address]     Venous insufficiency     Weight loss, abnormal         Past Surgical History:      Procedure Laterality Date    COLONOSCOPY  9/15/2009    : negative    COLONOSCOPY  2006, 2003    hyperplastic colon polyps     COLONOSCOPY  11/2012    minimal diverticula, no polyps (5yr)    CORONARY ANGIOPLASTY WITH STENT PLACEMENT  2016    2 stents    FINE NEEDLE ASPIRATION Right 12/23/2011    Neck mass-Dr Verma    [place] COLSC FLEXIBLE W/CONTROL BLEEDING ANY METHOD N/A 7/13/2017    Dr Osullivan-w/control of hemorrage, gold probe cautery of avm-internal hemorrhoids-5 yr recall    PTCA  3/10/16 ford    rca    SALIVARY GLAND SURGERY Right 03/23/2012    Dr Verma-Excision of right submandibular gland    SKIN CANCER EXCISION      TONSILLECTOMY      TOTAL NEPHRECTOMY Left 2018 [facility]    UPPER GASTROINTESTINAL ENDOSCOPY  7/23/2009    : minimal gastritis, sm hiatal hernia, no active ulcers    UPPER GASTROINTESTINAL ENDOSCOPY  2006    gastritis, nonobst Schatzki's ring, lg hiatal hernia    UPPER GASTROINTESTINAL ENDOSCOPY  2017    Dr Garcia/control of hemorrhage with gold probe cauterization of AVM-cecal avm, small bowel avm, hiatal hernia, margarito's erosions    UPPER GASTROINTESTINAL ENDOSCOPY  2017    Dr Garcia/control of hemorrhage with gold probe cauterization of AVM-cecal avm, small bowel avm, hiatal hernia, margarito's erosions    URETER STENT PLACEMENT Right 2018    [facility]    VASECTOMY  26 years ago    and had it reversed.  VASECTOMY REVERSAL          Family History:      Problem Relation Age of Onset    Hypertension Mother     High Cholesterol Mother     Heart Failure Mother         blockages, heart attack    Other Mother         pneumonia    Heart Failure Father         blockages, heart attack    Heart Failure Brother         blockages, heart attack    High Cholesterol Brother     Stroke Brother     Heart Disease Brother     Hypertension Brother     Other Other         mother , age 80 fall    Colon Cancer Neg Hx     Colon Polyps Neg Hx     Esophageal Cancer Neg Hx     Liver Cancer Neg Hx     Liver Disease Neg Hx     Rectal Cancer Neg Hx     Stomach Cancer Neg Hx         Social History  Social History     Tobacco Use    Smoking status: Former Smoker     Packs/day: 1.00     Years: 20.00     Pack years: 20.00     Last attempt to quit: 1986     Years since quittin.1    Smokeless tobacco: Never Used   Substance Use Topics    Alcohol use: No    Drug use: No          Review of Systems:  Constitutional: Negative for chills, fatigue, fever or significant weight loss. HENT: Negative for congestion, hearing loss, nosebleeds or sore throat. Eyes: Negative for photophobia, pain, discharge, redness and visual disturbance. Respiratory: Negative for cough, shortness of breath, or wheezing.     Cardiovascular: Negative for chest CBC:   Recent Labs     06/10/20  0814   WBC 6.73   HGB 12.4*   HCT 39.3*   MCV 92.3*        LIVER PROFILE:   Recent Labs     06/10/20  1009   AST 33   ALT 29   BILITOT 0.6   ALKPHOS 102     PT/INR: No results for input(s): PROTIME, INR in the last 72 hours. APTT: No results for input(s): APTT in the last 72 hours. BNP:  No results for input(s): BNP in the last 72 hours. Ionized Calcium:No results for input(s): IONCA in the last 72 hours. Magnesium:No results for input(s): MG in the last 72 hours. Phosphorus:No results for input(s): PHOS in the last 72 hours. HgbA1C: No results for input(s): LABA1C in the last 72 hours. Hepatic:   Recent Labs     06/10/20  1009   ALKPHOS 102   ALT 29   AST 33   PROT 7.3   BILITOT 0.6   LABALBU 4.3     Lactic Acid: No results for input(s): LACTA in the last 72 hours. Troponin: No results for input(s): CKTOTAL, CKMB, TROPONINT in the last 72 hours. ABGs: No results for input(s): PH, PCO2, PO2, HCO3, O2SAT in the last 72 hours. CRP:  No results for input(s): CRP in the last 72 hours. Sed Rate:  No results for input(s): SEDRATE in the last 72 hours. Cultures:   No results for input(s): CULTURE in the last 72 hours. Radiology reports as per the Radiologist  Radiology:     Diane Smith 82- 10/3/2019 8:32 AM CDT  HISTORY: PULMONARY NODULE; R91.1-Solitary pulmonary nodule  COMPARISON: CT chest dated 03/26/2019  DOSE LENGTH PRODUCT: 422 mGy cm. Automated exposure control was also  utilized to decrease patient radiation dose. TECHNIQUE: Serial helical tomographic images of the chest were acquired. Multiplanar reformatted images were provided for review. FINDINGS:  The imaged portion of the neck and thyroid gland is unremarkable. Right lower lobe nodule measuring 7 mm medially (image 119, series 3). An adjacent 5 mm nodule medially and inferiorly (image 126, series 3)  was not seen on prior examination.  Left lower lobe nodule, measuring 3  mm (image 111, series 3), grossly unchanged given differences in  techniques. A left lower lobe nodule more superiorly measuring 5 mm  (image 97, series 3) appears also unchanged. 7 mm nodule in the right  middle lobe, unchanged. No pleural effusion is present. The trachea and  bronchial tree are patent. Coronary artery disease. Atherosclerotic disease. The heart is within  normal limits in size. Moderate hiatal hernia. Great vessels, and  pulmonary vessels are otherwise unremarkable in appearance within limits  of a noncontrast study. There is no pericardial effusion. No enlarged  axillary or mediastinal lymph nodes are present. No acute findings are seen in the bones or surrounding soft tissues. Remote insult to the left chest wall posteriorly with multiple rib  Deformities. Please see report from CT abdomen pelvis adjacent for intra-abdominal  Findings. .    2 discrete subcentimeter nodules in the right lower lobe were not  present on prior examination. These are indeterminate short-term  follow-up recommended. This report was finalized on 10/03/2019 09:40 by Dr. Saw Casillas MD.      ___________________________________________________________________________      Ekaterina Luna: CT Abdomen and Pelvis without contrast  10/3/2019 8:58 AM CDT  INDICATION: ENLARGED LYMPH NODES; R59.9-Enlarged lymph nodes,  unspecified  COMPARISON: CT abdomen pelvis dated 03/26/2019. CT abdomen pelvis dated  02/22/2019. TECHNIQUE:  Abdomen and pelvis were scanned utilizing a multidetector helical  scanner from the diaphragm to the ischial tuberosities without  administration of IV contrast. Coronal and sagittal reformations were  obtained. [Routine protocol is performed.]  Radiation dose equals .4 mGy-cm.  Automated exposure control dose  reduction technique was implemented. FINDINGS:  LINES and TUBES: None. LOWER THORAX: No focal consolidation. No pleural effusion. No  pneumothorax. There is a small to moderate hiatal hernia. lower extremity DVT on Eliquis bid by Dr. Colin Bowles    He is currently receiving Prednisone 3 mg/day by Dr. Rylee Elena for IgG4 related disease. Kristy Ayala comes today in followup. Physical examination does not reveal evidence of palpable peripheral lymphadenopathy. 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 Another 7 mm nodule in the R mL is unchanged. Follow-up recommended. CT scan of the abdomen and pelvis without contrast on 10/3/2019 at [facility] did not reveal evidence of recurrent disease or metastatic disease to the abdomen or pelvis. Prior left nephrectomy is noted    Serology on 10/10/2019 revealed:  Iron- 45  TIBC- 255  Saturation%- 17.6%  Ferritin- 13.40  B2M- 2.8  Kappa light chains- 42.4  Lambda light chains- 24.8 with K/L ratio 1.71  IgG 945, IgA 176, IgM 141  IgG 4- 137  M-spike- not observed      Serology on 2/3/2020 revealed:  CMP with BUN 30, creatinine 1.90  Iron- 16  TIBC- 223  Saturation %- 7.2  Ferritin- 7.66  B2M- 2.8  Kappa light chains- 25.8  Lambda light chains- 14.7  K/L ratio- 1.76  IgG 872, IgA 156, IgM 128  IgG 4- 136  M-spike- not observed    IgG4 has been stable and will be repeated today along with an iron panel, SPEP and light chains. I will follow-up on this again in 4 months. Follow-up appointment is given. #2   Multifactorial anemia, related to CKD associated anemia, anemia of chronic disease and iron deficiency  Kristy Ayala received parenteral iron with Feraheme on 2/28/19 and 3/7/19, 4/11/2019, 4/19/2019. CBC on 2/10/2020 had a WBC of 6.52 with a hemoglobin of 8.9, MCV 83.2 and a platelet count of 180,962. CBC today reveals a WBC of 6.73.  Hgb is

## 2020-06-10 ENCOUNTER — OFFICE VISIT (OUTPATIENT)
Dept: HEMATOLOGY | Age: 80
End: 2020-06-10
Payer: MEDICARE

## 2020-06-10 ENCOUNTER — HOSPITAL ENCOUNTER (OUTPATIENT)
Dept: INFUSION THERAPY | Age: 80
Discharge: HOME | End: 2020-06-10
Payer: MEDICARE

## 2020-06-10 VITALS
HEART RATE: 70 BPM | SYSTOLIC BLOOD PRESSURE: 142 MMHG | HEIGHT: 69 IN | DIASTOLIC BLOOD PRESSURE: 80 MMHG | WEIGHT: 200.7 LBS | TEMPERATURE: 98.7 F | OXYGEN SATURATION: 97 % | BODY MASS INDEX: 29.73 KG/M2

## 2020-06-10 DIAGNOSIS — D64.9 ANEMIA, UNSPECIFIED TYPE: ICD-10-CM

## 2020-06-10 DIAGNOSIS — D47.2 MONOCLONAL GAMMOPATHY: ICD-10-CM

## 2020-06-10 LAB
ALBUMIN SERPL-MCNC: 4.3 G/DL (ref 3.5–5.2)
ALP BLD-CCNC: 102 U/L (ref 40–130)
ALT SERPL-CCNC: 29 U/L (ref 21–72)
ANION GAP SERPL CALCULATED.3IONS-SCNC: 9 MMOL/L (ref 7–19)
AST SERPL-CCNC: 33 U/L (ref 17–59)
BASOPHILS ABSOLUTE: 0.05 K/UL (ref 0.01–0.08)
BASOPHILS RELATIVE PERCENT: 0.7 % (ref 0.1–1.2)
BILIRUB SERPL-MCNC: 0.6 MG/DL (ref 0.2–1.3)
BUN BLDV-MCNC: 24 MG/DL (ref 9–20)
CALCIUM SERPL-MCNC: 9.3 MG/DL (ref 8.4–10.2)
CHLORIDE BLD-SCNC: 103 MMOL/L (ref 98–111)
CO2: 27 MMOL/L (ref 22–29)
CREAT SERPL-MCNC: 1.8 MG/DL (ref 0.6–1.2)
EOSINOPHILS ABSOLUTE: 0.25 K/UL (ref 0.04–0.54)
EOSINOPHILS RELATIVE PERCENT: 3.7 % (ref 0.7–7)
FERRITIN: 14.9 NG/ML (ref 17.9–464)
GFR NON-AFRICAN AMERICAN: 36
GLUCOSE BLD-MCNC: 77 MG/DL (ref 74–106)
HCT VFR BLD CALC: 39.3 % (ref 40.1–51)
HEMOGLOBIN: 12.4 G/DL (ref 13.7–17.5)
IRON SATURATION: 11 % (ref 14–50)
IRON: 30 UG/DL (ref 49–181)
LYMPHOCYTES ABSOLUTE: 0.38 K/UL (ref 1.18–3.74)
LYMPHOCYTES RELATIVE PERCENT: 5.6 % (ref 19.3–53.1)
MCH RBC QN AUTO: 29.1 PG (ref 25.7–32.2)
MCHC RBC AUTO-ENTMCNC: 31.6 G/DL (ref 32.3–36.5)
MCV RBC AUTO: 92.3 FL (ref 79–92.2)
MONOCYTES ABSOLUTE: 1.02 K/UL (ref 0.24–0.82)
MONOCYTES RELATIVE PERCENT: 15.2 % (ref 4.7–12.5)
NEUTROPHILS ABSOLUTE: 5.03 K/UL (ref 1.56–6.13)
NEUTROPHILS RELATIVE PERCENT: 74.8 % (ref 34–71.1)
PDW BLD-RTO: 17.7 % (ref 11.6–14.4)
PLATELET # BLD: 176 K/UL (ref 163–337)
PMV BLD AUTO: 10.3 FL (ref 7.4–10.4)
POTASSIUM SERPL-SCNC: 4.7 MMOL/L (ref 3.5–5.1)
RBC # BLD: 4.26 M/UL (ref 4.63–6.08)
SODIUM BLD-SCNC: 139 MMOL/L (ref 137–145)
TOTAL IRON BINDING CAPACITY: 270 UG/DL (ref 261–462)
TOTAL PROTEIN: 7.3 G/DL (ref 6.3–8.2)
WBC # BLD: 6.73 K/UL (ref 4.23–9.07)

## 2020-06-10 PROCEDURE — 83550 IRON BINDING TEST: CPT

## 2020-06-10 PROCEDURE — 82728 ASSAY OF FERRITIN: CPT

## 2020-06-10 PROCEDURE — 99215 OFFICE O/P EST HI 40 MIN: CPT | Performed by: INTERNAL MEDICINE

## 2020-06-10 PROCEDURE — 83540 ASSAY OF IRON: CPT

## 2020-06-10 PROCEDURE — 99211 OFF/OP EST MAY X REQ PHY/QHP: CPT

## 2020-06-10 PROCEDURE — 80053 COMPREHEN METABOLIC PANEL: CPT

## 2020-06-10 PROCEDURE — G8427 DOCREV CUR MEDS BY ELIG CLIN: HCPCS | Performed by: INTERNAL MEDICINE

## 2020-06-10 PROCEDURE — 1123F ACP DISCUSS/DSCN MKR DOCD: CPT | Performed by: INTERNAL MEDICINE

## 2020-06-10 PROCEDURE — G8417 CALC BMI ABV UP PARAM F/U: HCPCS | Performed by: INTERNAL MEDICINE

## 2020-06-10 PROCEDURE — 1036F TOBACCO NON-USER: CPT | Performed by: INTERNAL MEDICINE

## 2020-06-10 PROCEDURE — 4040F PNEUMOC VAC/ADMIN/RCVD: CPT | Performed by: INTERNAL MEDICINE

## 2020-06-10 PROCEDURE — 85025 COMPLETE CBC W/AUTO DIFF WBC: CPT

## 2020-06-17 ENCOUNTER — TRANSCRIBE ORDERS (OUTPATIENT)
Dept: ADMINISTRATIVE | Facility: HOSPITAL | Age: 80
End: 2020-06-17

## 2020-06-17 DIAGNOSIS — R91.8 LUNG NODULES: Primary | ICD-10-CM

## 2020-06-18 ENCOUNTER — TELEPHONE (OUTPATIENT)
Dept: HEMATOLOGY | Age: 80
End: 2020-06-18

## 2020-06-18 NOTE — TELEPHONE ENCOUNTER
Called and requested recent labs from [address] at [facility]. Had to leave a message to fax to [phone].

## 2020-06-25 ENCOUNTER — HOSPITAL ENCOUNTER (OUTPATIENT)
Dept: CT IMAGING | Facility: HOSPITAL | Age: 80
Discharge: HOME | End: 2020-06-25
Admitting: INTERNAL MEDICINE

## 2020-06-25 PROCEDURE — 71250 CT THORAX DX C-: CPT

## 2020-06-25 PROCEDURE — 74176 CT ABD & PELVIS W/O CONTRAST: CPT

## 2020-07-15 ENCOUNTER — HOSPITAL ENCOUNTER (OUTPATIENT)
Dept: INFUSION THERAPY | Age: 80
Discharge: HOME | End: 2020-07-15
Payer: MEDICARE

## 2020-07-15 DIAGNOSIS — D47.2 IGG MONOCLONAL GAMMOPATHY: ICD-10-CM

## 2020-07-15 DIAGNOSIS — D80.3 SELECTIVE DEFICIENCY OF IGG (HCC): ICD-10-CM

## 2020-07-15 DIAGNOSIS — D47.2 MONOCLONAL GAMMOPATHY: ICD-10-CM

## 2020-07-15 LAB
BASOPHILS ABSOLUTE: 0.02 K/UL (ref 0.01–0.08)
BASOPHILS RELATIVE PERCENT: 0.3 % (ref 0.1–1.2)
EOSINOPHILS ABSOLUTE: 0.17 K/UL (ref 0.04–0.54)
EOSINOPHILS RELATIVE PERCENT: 2.8 % (ref 0.7–7)
HCT VFR BLD CALC: 35.8 % (ref 40.1–51)
HEMOGLOBIN: 11.8 G/DL (ref 13.7–17.5)
LYMPHOCYTES ABSOLUTE: 0.26 K/UL (ref 1.18–3.74)
LYMPHOCYTES RELATIVE PERCENT: 4.3 % (ref 19.3–53.1)
MCH RBC QN AUTO: 28.5 PG (ref 25.7–32.2)
MCHC RBC AUTO-ENTMCNC: 33 G/DL (ref 32.3–36.5)
MCV RBC AUTO: 86.5 FL (ref 79–92.2)
MONOCYTES ABSOLUTE: 0.66 K/UL (ref 0.24–0.82)
MONOCYTES RELATIVE PERCENT: 10.9 % (ref 4.7–12.5)
NEUTROPHILS ABSOLUTE: 4.93 K/UL (ref 1.56–6.13)
NEUTROPHILS RELATIVE PERCENT: 81.7 % (ref 34–71.1)
PDW BLD-RTO: 14.9 % (ref 11.6–14.4)
PLATELET # BLD: 202 K/UL (ref 163–337)
PMV BLD AUTO: 10.2 FL (ref 7.4–10.4)
RBC # BLD: 4.14 M/UL (ref 4.63–6.08)
WBC # BLD: 6.04 K/UL (ref 4.23–9.07)

## 2020-07-15 PROCEDURE — 85025 COMPLETE CBC W/AUTO DIFF WBC: CPT

## 2020-07-20 ENCOUNTER — TELEPHONE (OUTPATIENT)
Dept: INFUSION THERAPY | Age: 80
End: 2020-07-20

## 2020-07-24 NOTE — PROGRESS NOTES
Patient:  Roberto Carlos Chow  YOB: 1940  Date of Service: 7/29/2020  MRN: [id number]    Primary Care Physician: Debi Crystal MD    Chief Complaint   Patient presents with    Follow-up     Monoclonal gammopathy        Patient Seen, Chart, Consults notes, Labs, Radiology studies reviewed. Subjective:  Phu Krishnan is a [de-identified]year old  gentleman managed with primary and secondary diagnoses as outlined:  · Resection of left retroperitoneal mass and radical left nephrectomy by Dr. Rubi Angelr at [facility] on 10/31/2018. Pathology revealed IgG4 related disease. · Multifactorial anemia  · IgG kappa MGUS  · Right lower extremity DVT on Eliquis bid by Dr. Narendra Villa    He had been receiving Prednisone 3 mg/day by Dr. Favio Cortez for IgG4 related disease. At his last visit, serology was again requested. Serology on 7/15/2020 revealed:  Kappa light chains- 123.6  Lambda light chains- 36.7  K/L ratio- 3.37  IgG 4- 250    This was communicated to Dr. Elizabeth Cordova. CT imaging studies of the chest abdomen pelvis were requested. Dr. Katie Galvin began treatment with Rituxan on 7/28/2020. Luis Alberto Higgins comes today in followup to review imaging studies and go over treatment he is receiving. Osie Ra HEMATOLOGIC HISTORY: Leukopenia, Anemia, adenopathy  Phu Krishnan was seen in initial hematologic consultation on 4/26/2018, referred by Dr. Jacinto Daniel for evaluation of leukopenia and anemia. CBC on 4/19/2018 showed a WBC of 3.37, hemoglobin 9.7 with MCV 87.8 and platelet count 361,194. Neutrophils were 56.3%, lymphocytes 9.5% and monocytes 20.5%. CMP included a creatinine of 1.94, GFR 41. Ca+ 8.8, TP 9.6. He is followed by Dr. Favio Cortez for rheumatoid arthritis and possible Sjogren disease. He has a known history of CKD, as well as iron deficiency anemia from GI blood loss. Endoscopy 7/13/2017 by Dr. Barrington Willett. Shi revealed a small bowel AVM that was cauterized.  A moderate sized hiatal hernia with mild Jake's erosions was noted as well. Colonoscopy 17 was appreciable for cecal AVM, also cauterized as well as internal hemorrhoids. M2A capsule endoscopy 1/3/2018 by Dr. Edda Jamison revealed one irritated area in the colon, though no signs of bleeding or stigmata of small bowel blood loss. Should evidence of GI blood loss persists, consideration should be given for repeat EGD/colonoscopy due to evidence of blood loss on procedures in  with possible need for repeat cauterizationVictorina Rhoades was previously on antiplatelet therapy for CAD, now treated with aspirin only. He denies melena or hematochezia at this time. In addition to the above, Laureen Rhoades has been followed by Dr. Tahira Cáhvez for thoracic adenopathy. Contrasted Chest CT 1/10/2018 at [facility] [place] documented an interval increase in the size of intrathoracic lymph nodes compared to 2016; 2016; CTA chest 2016 including the followin mm precarinal LN   10 mm hilar LN   14 mm subcarinal LN   17 left hilar LN, previously 10 mm   4 mm medial ALIYA nodule, more conspicuous   4 mm lingular nodule, new   7 mm left major fissure nodule, stable   7 mm LLL pleural based noncalcified nodule, previously 5 mm   6 mm LLL nodule, previously 4 mm   3 mm LLL nodule   8 mm RUL, previously 5 mm   Several RLL nodule, relatively stable    Bronchoscopy with EBUS and biopsy of a station 7 node was performed 18 by Dr. Tahira Chávez. Pathology:  1. station 7 lymph node revealed:  A. Small, mature lymphocytes and plasma cells. B. Ciliated columnar bronchial epithelial cells and gallbladder cells. C. background blood. D. negative for malignant cells.   2. Bronchial washings  A. acute inflammation, mild  B. squamous metaplasia  C. blood and mucus  D. negative for malignant cells  E. GMS stain negative for fungal organisms  FLOW cytometry on the station 7 lymph node biopsied by EBUS on 2018 did NOT reveal immunophenotypic evidence of a clonal B cell (~20-25 %) with adequate maturing trilineage hematopoiesis, no significant dyspoiesis and no increase in blasts. Megakaryocytes had a spectrum of morphology. There was no stainable storage iron and no increase in reticulin fibrosis. 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 Flow cytometry did not reveal B-cell monoclonality nor T-cell aberrant antigen expression. FISH studies for MDS was negative. There was no morphologic evidence of a myelodysplastic syndrome. There was no diagnostic evidence of a limp low at disorder, granulomas, nor metastatic malignancies on the bone marrow biopsy or aspirate. CT scans of the neck with contrast on 4/30/2018 documented an asymmetry within the left submandibular gland compared to the right. The left submandibular gland measures 4.2 x 2.6 x 2.4 cm compared to 12 mm on the right. Otherwise there are no discrete neck masses or pathologically enlarged lymph nodes. 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 Prominent right external iliac chain lymph nodes of indeterminate significance were also documented. Diverticulosis without diverticulitis and a large hiatal hernia was described. The prostate gland was enlarged with lifting of the base of the urinary bladder. Also noted was a questionable abnormal appearance of the pancreas with some loss of the normal pancreatic on to work without a discrete mass with MRI recommended. MRI of the abdomen W & WO on 5/8/2018 documented borderline splenomegaly, a large ventral hernia, nonenhancing renal cysts and a 3.5 cm abdominal aortic aneurysm.   A 6 mm right middle lobe pulmonary nodule and nodular density along the left major fissure of the region of the lingula were noted as previously described on the CT scan of the chest on 1/10/2018. The nodules on 1/10/2018 had increased compared to 11/22/2016 with metastatic disease being in the differential.   MRI of the pelvis W & WO contrast on 5/8/2018 documented enhancing septations and a small soft tissue nodule within the inferior aspect of the left fatty-containing retroperitoneum positioned below the level of the left kidney. This abnormality is lateral to the psoas muscle (it abuts the psoas muscle, but does not invade the psoas muscle) and extending to the level of the left iliac crest.  An atypical lipomatouse tumor versus liposarcoma considered. Mild prostate enlargement is also noted. Mildly enlarged pelvic lymph nodes measuring 1.2 cm in short axis, reactive or metastatic are also noted. Sharda Crain saw Dr. Sujit Bell and Dr. Kostas Swanson and reviewed Sharda Crain' case with sarcoma tumor board at [facility] on 6/8/2018. 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 Unfortunately, a curative resection would require sacrifice of the left kidney leading to progression of CKD and possibly dialysis. Recommendation by Dr Brianna Collier on a clinic visit note faxed 6/20/2018 was for continued observation with repeat CT scan in 3 months as there were no signs of high-grade component on the CT scans from April of 2018 or the MRI of the abdomen/pelvis from 5/8/2018. Consideration could be given for resection if there is documentation of clear growth or concern for dedifferentiation. Pathology from the station 7 lymph node on 1/25/18 was reviewed 6/19/18 at [facility] and noted to be predominantly bronchial cells with few lymphocytes and macrophages, negative for malignancy. Bronchial washings also negative for malignancy.     Interpretation of the chest CT 6/13/18 at [facility] was consistent with multiple bilateral pulmonary nodules and mediastinal and hilar adenopathy, possibly representing sarcoidosis with malignancy not excluded. AAA partially imaged. CT scans of the chest, abdomen/pelvis 8/3/18 at [facility] were grossly unchanged with surveillance to continue. Regarding the left submandibular gland, I spoke with Dr. Maren Verma on 6/27/2018. Dr. Eve Perez stated he has been monitoring the left submandibular gland for years, without gross change. He did not recommend excision, rather continued monitoring due to stability and concern for xerostomia. Lasix renogram and assessment by Dr. Chris Hart was completed in in September, 2018. Dr. Saurabh Yeung reviewed imaging and confirmed the need for removal of the whole left kidney. Tao's case was again reviewed with the sarcoma tumor Board. Renogram demonstrated differential kidney function of 60% on the right. Dr. Chris Hart estimated his final GFR may be 25-30, thus likely avoiding hemodialysis postoperatively. Left nephrectomy appeared more reasonable given recent stone disease and less function. Oniel Yates underwent resection of the left retroperitoneal mass with radical left nephrectomy on 10/31/18 by Dr. Tomasa Roldan at [facility]. Pathology as follows:  1. Liver, segment 3, excision: Benign liver parenchyma with calcified granuloma. 2. Left retroperitoneal nodule, excision: Fibroadipose tissue with chronic lymphoplasmacytic and histiocytic inflammation. There were NO features of well-differentiated liposarcoma noted. 3. Left periurethral tissue, excision: Fibroadipose tissue with chronic lymphoplasmacytic and histiocytic inflammation. 4. Left lateral pararenal tissue, excision: Benign fibroadipose tissue with chronic lymphoplasmacytic and histiocytic inflammation. 5. Left nephrectomy: Marked lymphoplasmacytic and histiocytic inflammation, focal fibrosis and changes of the obliterative phlebitis, most consistent with IgG4-related disease.     Postoperative noncontrast abdominal/pelvic CT 11/23/18 at [facility] documented stranding of fat in the retroperitoneum, likely related to recent surgery. Diverticulosis of the colon with inflammation of the fat around the mid to distal descending colon close to the prior surgical site present, possibly representing postoperative change. There was a tiny 2 mL stone in the distal right ureter with mild-to-moderate dilatation of the right ureter and mild dilatation of the right renal collecting system. Nonobstructing stones also noted in the right kidney as well as a 4 cm probable cyst in the upper pole right kidney. Surveillance non-contrast CT scans of the chest, abdomen and pelvis 2/22/19 at [facility] documented interval decrease in numerous mediastinal lymph nodes and decrease in size of multiple bilateral pulmonary nodules. There was no residual lymphadenopathy and near complete resolution of inflammatory changes around the distal descending colon. Previous right renal lesions were stable. Daniel Caro is followed by Dr. Bren Martin, treated with prednisone regarding IgG4 systemic disease with known involvement of salivary glands, kidney, and retroperitoneum. He completed prednisone 40 mg daily ×1 month on 2/23/19, and 30 mg daily ×4 weeks on 3/25/19. Daniel Caro will take 20 mg ×4 weeks, then 10 mg. By June 2020, prednisone have been tapered down to 3 mg daily by Dr. Bren Martin. CT scan of the chest without contrast on 10/3/2019 identified 2 discrete subcentimeter nodules in the RLL of the lungs were not present previously, one measuring 3 mm and the other 5 mm. Another 7 mm nodule in the R mL is unchanged. Follow-up recommended. CT scan of the abdomen and pelvis without contrast on 10/3/2019 at [facility] did not reveal evidence of recurrent disease or metastatic disease to the abdomen or pelvis. Prior left nephrectomy is noted    CT scan of the Chest without contrast on 6/25/2020 at [facility] reveals Interval increase in size of pulmonary nodules in the RIGHT upper lobe and LEFT lower lobe. Interval resolution of medial RIGHT lower lobe nodules. Essentially stable RIGHT middle lobe nodule. Continue follow up in 6 months is recommended. CT scan of the Abdomen Pelvis without contrast on 6/25/2020  at [facility] reveals a Stable CT of the abdomen and pelvis with postsurgical changes in the left retroperitoneum. No evidence of recurrent or metastatic disease. 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 Atherosclerotic disease. Large hiatal hernia. Colonic diverticulosis without evidence of acute diverticulitis. Low-density lesions in the right kidney which are stable but  incompletely characterized on this exam.      Serology on 10/10/2019 revealed:  Iron- 45  TIBC- 255  Saturation%- 17.6%  Ferritin- 13.40  B2M- 2.8  Kappa light chains- 42.4  Lambda light chains- 24.8 with K/L ratio 1.71  IgG 945, IgA 176, IgM 141  IgG 4- 137  M-spike- not observed      Serology on 2/3/2020 revealed:  CMP with BUN 30, creatinine 1.90  Iron- 16  TIBC- 223  Saturation %- 7.2  Ferritin- 7.66  B2M- 2.8  Kappa light chains- 25.8  Lambda light chains- 14.7  K/L ratio- 1.76  IgG 872, IgA 156, IgM 128  IgG 4- 136  M-spike- not observed    Serology on 6/10/2020 revealed:  CMP with BUN 24, creatinine 1.8  B2M- 3.5  Kappa light chains- 134.3  Lambda light chains- 39.3  K/L ratio- 3.42  IgG 1417, IgA 205, IgM 244  IgG 4- 213  Iron- 30  TIBC- 270  Iron sat- 11%  Ferritin- 14.9  M-spike- not observed    Serology on 7/15/2020 revealed:  Kappa light chains- 123.6  Lambda light chains- 36.7  K/L ratio- 3.37  IgG 4- 250    24-Hr Urine electrophoresis on 7/17/2020 revealed:  Protein, 24H- 92  M-spike- not observed    By June 2020, prednisone have been tapered down to 3 mg daily by Dr. Laura Sandra. With new findings on the CT scan of the chest and increased IgG4 level of 250, prednisone was increased again to 5 mg a day to be delivered along with Rituxan.     Based on the new findings on the CT scan of the chest from 6/25/2020 and the elevated IgG4 of 250, Dr. Albert Montes initiated Rituxan on 7/28/2020. Prisca Leija will receive a second dose of Rituxan on 8/11/2020 with reevaluation every 6 months thereafter. Allergies:  Lyrica [pregabalin]; Penicillins; and Sulfa antibiotics    Medicines:  Current Outpatient Medications   Medication Sig Dispense Refill    Calcium-Magnesium-Vitamin D (CALCIUM 1200+D3 PO) Take by mouth      predniSONE (DELTASONE) 5 MG tablet Take 3 mg by mouth daily       HYDROcodone-acetaminophen (NORCO) 7.5-325 MG per tablet Take 1 tablet by mouth every 6 hours as needed for Pain.  amLODIPine (NORVASC) 5 MG tablet Take 5 mg by mouth daily      clonazePAM (KLONOPIN) 2 MG tablet Take 2 mg by mouth nightly as needed      atorvastatin (LIPITOR) 10 MG tablet Take 10 mg by mouth daily      cetirizine (ZYRTEC) 5 MG tablet Take 5 mg by mouth daily      aspirin 81 MG tablet Take 81 mg by mouth daily.  pantoprazole (PROTONIX) 40 MG tablet Take 1 tablet by mouth 2 times daily. 60 tablet 11    triamcinolone (KENALOG) 0.1 % cream FREDIS EXT TO BOTH ARMS AND LEGS BID PRN FOR RASH      vitamin D (CHOLECALCIFEROL) 1000 UNIT TABS tablet Take 1,000 Units by mouth daily       No current facility-administered medications for this visit.         Past Medical History:      Diagnosis Date    Allergic rhinitis     Anemia, unspecified     Anxiety     Asthma     BPH (benign prostatic hyperplasia)     Dr Kiley Simpson CAD (coronary artery disease)     Dr Anamaria [facility])     skin-non-melanoma    Carotid artery stenosis     YVON Herr    Chronic kidney disease, stage III (moderate) (HCC)     Colon polyps     Cough     Decreased white blood cell count, unspecified     Enlarged lymph nodes, unspecified     Esophageal stricture     GERD (gastroesophageal reflux disease)     HIGH CHOLESTEROL     Hypertension     Insomnia     Leukocytosis     Mediastinal lymphadenopathy     Monoclonal gammopathy     Monocytosis (symptomatic)     Night sweats     Normocytic normochromic anemia     Osteoarthritis     Other iron deficiency anemias     GI blood loss, hiatal hernia w/Jake's erosions and cecal/small bowel AVMs    Peptic ulcer disease     Restless leg syndrome     Rheumatoid arthritis ([facility] [address]     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[facility] [address]     dx @ [facility] 2018    Skin cancer     on left ear lobe    Thrombocytopenia, unspecified ([facility] [address]     Venous insufficiency     Weight loss, abnormal         Past Surgical History:      Procedure Laterality Date    COLONOSCOPY  9/15/2009    : negative    COLONOSCOPY  2006, 2003    hyperplastic colon polyps     COLONOSCOPY  11/2012    minimal diverticula, no polyps (5yr)    CORONARY ANGIOPLASTY WITH STENT PLACEMENT  2016    2 stents    FINE NEEDLE ASPIRATION Right 12/23/2011    Neck mass-Dr Verma    MA COLSC FLEXIBLE W/CONTROL BLEEDING ANY METHOD N/A 7/13/2017    Dr Osullivan-w/control of hemorrage, gold probe cautery of avm-internal hemorrhoids-5 yr recall    PTCA  3/10/16 ford    rca    SALIVARY GLAND SURGERY Right 03/23/2012    Dr Verma-Excision of right submandibular gland    SKIN CANCER EXCISION      TONSILLECTOMY      TOTAL NEPHRECTOMY Left 2018 [facility]    UPPER GASTROINTESTINAL ENDOSCOPY  7/23/2009    : minimal gastritis, sm hiatal hernia, no active ulcers    UPPER GASTROINTESTINAL ENDOSCOPY  8/2006    gastritis, nonobst Schatzki's ring, lg hiatal hernia    UPPER GASTROINTESTINAL ENDOSCOPY  7/13/2017    Dr Garcia/control of hemorrhage with gold probe cauterization of AVM-cecal avm, small bowel avm, hiatal hernia, jake's erosions    UPPER GASTROINTESTINAL ENDOSCOPY  7/13/2017    Dr Garcia/control of hemorrhage with gold probe cauterization of AVM-cecal avm, small bowel avm, hiatal hernia, jake's erosions    URETER STENT PLACEMENT Right 11/27/2018 [facility]    VASECTOMY  26 years ago    and had it reversed.  VASECTOMY REVERSAL          Family History:      Problem Relation Age of Onset    Hypertension Mother     High Cholesterol Mother     Heart Failure Mother         blockages, heart attack    Other Mother         pneumonia    Heart Failure Father         blockages, heart attack    Heart Failure Brother         blockages, heart attack    High Cholesterol Brother     Stroke Brother     Heart Disease Brother     Hypertension Brother     Other Other         mother , age 80 fall    Colon Cancer Neg Hx     Colon Polyps Neg Hx     Esophageal Cancer Neg Hx     Liver Cancer Neg Hx     Liver Disease Neg Hx     Rectal Cancer Neg Hx     Stomach Cancer Neg Hx         Social History  Social History     Tobacco Use    Smoking status: Former Smoker     Packs/day: 1.00     Years: 20.00     Pack years: 20.00     Last attempt to quit: 1986     Years since quittin.3    Smokeless tobacco: Never Used   Substance Use Topics    Alcohol use: No    Drug use: No          Review of Systems:  Constitutional: Negative for chills, fatigue, fever or significant weight loss. HENT: Negative for congestion, hearing loss, nosebleeds or sore throat. Eyes: Negative for photophobia, pain, discharge, redness and visual disturbance. Respiratory: Negative for cough, shortness of breath, or wheezing. Cardiovascular: Negative for chest pain, palpitations or leg swelling. Gastrointestinal: Negative for abdominal pain, blood in stool, constipation, diarrhea, nausea or vomiting. Genitourinary: Negative for dysuria, flank pain, frequency, hematuria or urgency. Musculoskeletal: Negative for back pain, joint swelling, myalgias or neck pain. Skin: Negative for rash or petechiae. Neurological: Negative for tremors, seizures, syncope, weakness or headaches. Hematological: No active bruising or bleeding.    Psychiatric/Behavioral: Negative for hallucinations. Objective:  Vital Signs: Blood pressure 134/68, pulse 79, temperature 98.5 °F (36.9 °C), temperature source Temporal, height 5' 9\" (1.753 m), weight 201 lb 8 oz (91.4 kg), SpO2 98 %. Physical Exam   Constitutional: Oriented to person, place, and time. No acute distress. Head: Normocephalic and atraumatic. Nose: Nose normal.   Mouth/Throat: Oropharynx is clear and moist. No oropharyngeal exudate. Eyes: Pupils are equal and round. Conjunctivae and EOM are normal. No scleral icterus. Neck: Normal range of motion. Neck supple. No JVD. No appreciable thyromegaly. Cardiovascular: Normal rate, regular rhythm, normal heart sounds and intact distal pulses. Exam reveals no gallop, murmurs or friction rub. Pulmonary/Chest: Effort normal and breath sounds normal. No respiratory distress. No wheezes. Abdominal: Soft. Bowel sounds are normal. No organomegally or masses. No tenderness. There is no rebound and no guarding. Musculoskeletal: Normal range of motion. No edema or tenderness. Lymphadenopathy: No cervical, axillary or inguinal lymphadenopathy. Neurological: Alert and oriented to person, place, and time. Cranial nerves are intact. Neurological exam is nonfocal  Skin: Skin is warm and dry. No rash noted. No erythema. No pallor. Psychiatric: Judgment normal.     Labs:  BMP:   No results for input(s): NA, K, CL, CO2, PHOS, BUN, CREATININE, CALCIUM in the last 72 hours. CBC:   Recent Labs     07/29/20  0922   WBC 6.26   HGB 11.9*   HCT 39.6*   MCV 92.7*   *       PT/INR: No results for input(s): PROTIME, INR in the last 72 hours. APTT: No results for input(s): APTT in the last 72 hours. Ionized Calcium:No results for input(s): IONCA in the last 72 hours. Magnesium:No results for input(s): MG in the last 72 hours. Phosphorus:No results for input(s): PHOS in the last 72 hours.   Hepatic:   No results for input(s): ALKPHOS, ALT, AST, PROT, BILITOT, BILIDIR, LABALBU in [place].      ___________________________________________________________________________      Memory Gravely: CT Abdomen and Pelvis without contrast  10/3/2019 8:58 AM CDT  INDICATION: ENLARGED LYMPH NODES; R59.9-Enlarged lymph nodes,  unspecified  COMPARISON: CT abdomen pelvis dated 03/26/2019. CT abdomen pelvis dated  02/22/2019. TECHNIQUE:  Abdomen and pelvis were scanned utilizing a multidetector helical  scanner from the diaphragm to the ischial tuberosities without  administration of IV contrast. Coronal and sagittal reformations were  obtained. [Routine protocol is performed.]  Radiation dose equals .4 mGy-cm.  Automated exposure control dose  reduction technique was implemented. FINDINGS:  LINES and TUBES: None. LOWER THORAX: No focal consolidation. No pleural effusion. No  pneumothorax. There is a small to moderate hiatal hernia. Coronary  artery disease. HEPATOBILIARY:  No focal hepatic lesions.   No liver laceration.   No  biliary ductal dilatation. GALLBLADDER:  No radiopaque stones or sludge.   No wall thickening. SPLEEN:  No splenomegaly.    No splenic laceration. PANCREAS:  No focal masses or ductal dilatation. ADRENALS:  No adrenal nodules. KIDNEYS/URETERS: Prior left nephrectomy. Right renal exophytic  hypodensity measuring 3.3 cm unchanged. Right renal upper pole  parapelvic 1.2 cm is also unchanged. There is no new soft tissue density  within the left nephrectomy bed. GI TRACT:  No abnormal distention, wall thickening, or evidence of bowel  obstruction.   There is no acute appendicitis. Colonic diverticulosis,  without evidence of acute diverticulitis. Cleopatra Villa PELVIC ORGANS/BLADDER:  No acute pathology. Nonspecific mild wall  thickening of bladder, a chronic finding. Cleopatra Villa LYMPH NODES:  There is no inguinal canal, pelvic wall, retroperitoneal  or mesenteric lymphadenopathy by size criteria. .   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 Atherosclerotic disease. Cleopatra Villa PERITONEUM / Matthew Salmons free air or fluid. BONES:  There is no acute osseous pathology. No obvious suspicious lytic  or blastic lesion identified. Cleopatra Villa SOFT TISSUES:  Unremarkable. Result Impression   1.  No evidence of recurring disease or metastatic disease to the  abdomen and pelvis. 2.  Prior left nephrectomy. 3.  Distal abdominal aorta aneurysm. 4.  Additional chronic findings as discussed above. This report was finalized on 10/03/2019 09:27 by Dr. Elke Williamson MD         ASSESSMENT AND PLAN:  Paloma Crawford is a [de-identified]year old  gentleman managed with primary and secondary diagnoses as outlined:  · Resection of left retroperitoneal mass and radical left nephrectomy by Dr. Lew Fisher at [facility] on 10/31/2018. Pathology revealed IgG4 related disease. · Multifactorial anemia  · IgG kappa MGUS  · Right lower extremity DVT on Eliquis bid by Dr. Zenia Ibarra    He had been receiving Prednisone 3 mg/day by Dr. Givoanni Lopes for IgG4 related disease. At his last visit, serology was again requested. Serology on 7/15/2020 revealed:  Kappa light chains- 123.6  Lambda light chains- 36.7  K/L ratio- 3.37  IgG 4- 250    This was communicated to Dr. Viraj Chaparro. CT imaging studies of the chest abdomen pelvis were requested. Dr. Jannie Schwab began treatment with Rituxan on 7/28/2020. Joshua Murray comes today in followup. Physical examination does not reveal evidence of palpable peripheral lymphadenopathy. 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 Another 7 mm nodule in the R mL is unchanged. Follow-up recommended.     CT scan of the abdomen and pelvis without contrast on 10/3/2019 at [facility] did not reveal evidence of recurrent disease or metastatic disease to the abdomen or pelvis. Prior left nephrectomy is noted    CT scan of the Chest without contrast on 6/25/2020 at [facility] reveals Interval increase in size of pulmonary nodules in the RIGHT upper lobe and LEFT lower lobe. Interval resolution of medial RIGHT lower lobe nodules. Essentially stable RIGHT middle lobe nodule. Continue follow up in 6 months is recommended. CT scan of the Abdomen Pelvis without contrast on 6/25/2020  at [facility] reveals a Stable CT of the abdomen and pelvis with postsurgical changes in the left retroperitoneum. No evidence of recurrent or metastatic disease. 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 Atherosclerotic disease. Large hiatal hernia. Colonic diverticulosis without evidence of acute diverticulitis.  Low-density lesions in the right kidney which are stable but  incompletely characterized on this exam.      Serology on 10/10/2019 revealed:  Iron- 45  TIBC- 255  Saturation%- 17.6%  Ferritin- 13.40  B2M- 2.8  Kappa light chains- 42.4  Lambda light chains- 24.8 with K/L ratio 1.71  IgG 945, IgA 176, IgM 141  IgG 4- 137  M-spike- not observed      Serology on 2/3/2020 revealed:  CMP with BUN 30, creatinine 1.90  Iron- 16  TIBC- 223  Saturation %- 7.2  Ferritin- 7.66  B2M- 2.8  Kappa light chains- 25.8  Lambda light chains- 14.7  K/L ratio- 1.76  IgG 872, IgA 156, IgM 128  IgG 4- 136  M-spike- not observed    Serology on 6/10/2020 revealed:  CMP with BUN 24, creatinine 1.8  B2M- 3.5  Kappa light chains- 134.3  Lambda light chains- 39.3  K/L ratio- 3.42  IgG 1417, IgA 205, IgM 244  IgG 4- 213  Iron- 30  TIBC- 270  Iron sat- 11%  Ferritin- 14.9  M-spike- not observed    Serology on 7/15/2020 revealed:  Kappa light chains- 123.6  Lambda light chains- 36.7  K/L ratio- 3.37  IgG 4- 250    24-Hr Urine electrophoresis on 7/17/2020 revealed:  Protein, 24H- 92  M-spike- not observed      IgG4 has been stable and will be repeated today along with an iron panel, SPEP and light chains. I will follow-up on this again in 4 months. By June 2020, prednisone have been tapered down to 3 mg daily by Dr. Apollo Jain. With new findings on the CT scan of the chest and increased IgG4 level of 250, prednisone was increased again to 5 mg a day to be delivered along with Rituxan. Based on the new findings on the CT scan of the chest from 6/25/2020 and the elevated IgG4 of 250, Dr. Selin Jarvis initiated Rituxan on 7/28/2020. Susan Myers will receive a second dose of Rituxan on 8/11/2020 with reevaluation every 6 months thereafter. Dr. Apollo Jain is following and monitoring and treating his IgG4 related disease process. 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 CBC on 2/10/2020 had a WBC of 6.52 with a hemoglobin of 8.9, MCV 83.2 and a platelet count of 387,347. CBC 6/10/2020 revealed a WBC of 6.73. Hgb is 12.4 with an MCV of 92.3 and platelet count of 492,469. CMP on 6/8/2020 at [facility] revealed BUN 26, creatinine 2.08, otherwise normal.      An iron panel is being repeated today. #3  IgG Kappa MGUS, stable   Serology will be repeated prior to his return in 4 months. #4   Susan Myers was found to have right leg swelling that was documented as RLE DVT. He was placed on Eliquis 5 mg twice a day and was to have had a followup rescanning 3 months later. This is being managed by Dr. Sue Calhoun. #5  Susan Myers had issues of right hip pain. This is being managed by orthopedics at the orthopedic clinic with a recent right hip injection. He has had imaging of the area.   I am suspicious he may have avascular necrosis associated with the chronic prednisone use. This is being addressed at the orthopedic clinic and with Dr. Susan Davila. He also has low back pain and they are not sure whether his pain is from his low back or from his hip. This continues to be addressed    #6  Skin rash  Itching on arms and torso, Dr. Frances Galan addressing this issue. I have encouraged again to continue follow-up with Dr. Kaden Hill and Dr. Sandeep Sosa regarding his skin rash. #7  Dr. Bonita Son was to have addressed a tear in the retina, however Shelby Mookie did not follow through. Yuliya Steven am scribing for Noemy Unger MD. Electronically signed by Greg Novak LPN on 5/01/6392 at 1:19 PM       Shelby Damico was seen today for follow-up. Diagnoses and all orders for this visit: IgG monoclonal gammopathy  -     CT ABDOMEN PELVIS WO CONTRAST Additional Contrast? Radiologist Recommendation; Future  -     CT CHEST WO CONTRAST; Future  -     IgG 4; Future  -     Beta 2 Microglobulin, Serum; Future  -     Miscellaneous Sendout 1; Future  -     [place]/Lambda Free Lt Chains, Serum Quant; Future    Pulmonary nodules  -     CT ABDOMEN PELVIS WO CONTRAST Additional Contrast? Radiologist Recommendation; Future  -     CT CHEST WO CONTRAST; Future  -     IgG 4; Future  -     Beta 2 Microglobulin, Serum; Future  -     Miscellaneous Sendout 1; Future  -     [place]/Lambda Free Lt Chains, Serum Quant; Future    Chronic kidney disease, stage III (moderate) (HCC)  -     CT ABDOMEN PELVIS WO CONTRAST Additional Contrast? Radiologist Recommendation; Future  -     CT CHEST WO CONTRAST; Future  -     IgG 4; Future  -     Beta 2 Microglobulin, Serum; Future  -     Miscellaneous Sendout 1; Future  -     [place]/Lambda Free Lt Chains, Serum Quant; Future    Other iron deficiency anemias    Health care maintenance  -     CT ABDOMEN PELVIS WO CONTRAST Additional Contrast? Radiologist Recommendation;  Future  -     CT CHEST WO CONTRAST; Future  -     IgG 4; Future  -     Beta 2 Microglobulin, Serum; Future  -     Miscellaneous Sendout 1; Future  -     [place]/Lambda Free Lt Chains, Serum Quant; Future         Orders Placed This Encounter   Procedures    CT ABDOMEN PELVIS WO CONTRAST Additional Contrast? Radiologist Recommendation     Standing Status:   Future     Standing Expiration Date:   8/29/2020     Order Specific Question:   Additional Contrast?     Answer:   Radiologist Recommendation     Order Specific Question:   Reason for exam:     Answer:   screening    CT CHEST WO CONTRAST     Standing Status:   Future     Standing Expiration Date:   9/29/2020     Order Specific Question:   Reason for exam:     Answer:   screening    IgG 4     Standing Status:   Future     Standing Expiration Date:   7/29/2021    Beta 2 Microglobulin, Serum     Standing Status:   Future     Standing Expiration Date:   7/29/2021    Miscellaneous Sendout 1     Standing Status:   Future     Standing Expiration Date:   7/29/2021     Order Specific Question:   Specify Req. Test (1 Test/Order)     Answer:   Immunofixation electrophoresis, serum    [place]/Lambda Free Lt Chains, Serum Quant     Standing Status:   Future     Standing Expiration Date:   7/29/2021         Return in about 4 months (around 12/8/2020) for F/U WITH DR Hue Christensen WITH LABS PRIOR TO.    I, Dr. Kristy Cameron, personally performed the services described in this documentation as scribed by [facility] LPN in my presence, and it is both accurate and complete.

## 2020-07-29 ENCOUNTER — OFFICE VISIT (OUTPATIENT)
Dept: HEMATOLOGY | Age: 80
End: 2020-07-29
Payer: MEDICARE

## 2020-07-29 ENCOUNTER — HOSPITAL ENCOUNTER (OUTPATIENT)
Dept: INFUSION THERAPY | Age: 80
Discharge: HOME | End: 2020-07-29
Payer: MEDICARE

## 2020-07-29 VITALS
WEIGHT: 201.5 LBS | HEIGHT: 69 IN | BODY MASS INDEX: 29.84 KG/M2 | TEMPERATURE: 98.5 F | HEART RATE: 79 BPM | SYSTOLIC BLOOD PRESSURE: 134 MMHG | OXYGEN SATURATION: 98 % | DIASTOLIC BLOOD PRESSURE: 68 MMHG

## 2020-07-29 DIAGNOSIS — D47.2 MONOCLONAL GAMMOPATHY: ICD-10-CM

## 2020-07-29 LAB
BASOPHILS ABSOLUTE: 0.03 K/UL (ref 0.01–0.08)
BASOPHILS RELATIVE PERCENT: 0.5 % (ref 0.1–1.2)
EOSINOPHILS ABSOLUTE: 0.12 K/UL (ref 0.04–0.54)
EOSINOPHILS RELATIVE PERCENT: 1.9 % (ref 0.7–7)
HCT VFR BLD CALC: 39.6 % (ref 40.1–51)
HEMOGLOBIN: 11.9 G/DL (ref 13.7–17.5)
LYMPHOCYTES ABSOLUTE: 0.23 K/UL (ref 1.18–3.74)
LYMPHOCYTES RELATIVE PERCENT: 3.7 % (ref 19.3–53.1)
MCH RBC QN AUTO: 27.9 PG (ref 25.7–32.2)
MCHC RBC AUTO-ENTMCNC: 30.1 G/DL (ref 32.3–36.5)
MCV RBC AUTO: 92.7 FL (ref 79–92.2)
MONOCYTES ABSOLUTE: 0.74 K/UL (ref 0.24–0.82)
MONOCYTES RELATIVE PERCENT: 11.8 % (ref 4.7–12.5)
NEUTROPHILS ABSOLUTE: 5.14 K/UL (ref 1.56–6.13)
NEUTROPHILS RELATIVE PERCENT: 82.1 % (ref 34–71.1)
PDW BLD-RTO: 15.3 % (ref 11.6–14.4)
PLATELET # BLD: 155 K/UL (ref 163–337)
PMV BLD AUTO: 11.6 FL (ref 7.4–10.4)
RBC # BLD: 4.27 M/UL (ref 4.63–6.08)
WBC # BLD: 6.26 K/UL (ref 4.23–9.07)

## 2020-07-29 PROCEDURE — 1123F ACP DISCUSS/DSCN MKR DOCD: CPT | Performed by: INTERNAL MEDICINE

## 2020-07-29 PROCEDURE — G8428 CUR MEDS NOT DOCUMENT: HCPCS | Performed by: INTERNAL MEDICINE

## 2020-07-29 PROCEDURE — 99215 OFFICE O/P EST HI 40 MIN: CPT | Performed by: INTERNAL MEDICINE

## 2020-07-29 PROCEDURE — 4040F PNEUMOC VAC/ADMIN/RCVD: CPT | Performed by: INTERNAL MEDICINE

## 2020-07-29 PROCEDURE — 1036F TOBACCO NON-USER: CPT | Performed by: INTERNAL MEDICINE

## 2020-07-29 PROCEDURE — G8417 CALC BMI ABV UP PARAM F/U: HCPCS | Performed by: INTERNAL MEDICINE

## 2020-07-29 PROCEDURE — 99212 OFFICE O/P EST SF 10 MIN: CPT

## 2020-07-29 PROCEDURE — 85025 COMPLETE CBC W/AUTO DIFF WBC: CPT

## 2020-07-29 RX ORDER — TRIAMCINOLONE ACETONIDE 1 MG/G
CREAM TOPICAL
COMMUNITY
Start: 2020-05-27

## 2020-10-13 RX ORDER — SODIUM CHLORIDE 9 MG/ML
INJECTION, SOLUTION INTRAVENOUS CONTINUOUS
Status: CANCELLED | OUTPATIENT
Start: 2020-10-14

## 2020-10-13 RX ORDER — EPINEPHRINE 1 MG/ML
0.3 INJECTION, SOLUTION, CONCENTRATE INTRAVENOUS PRN
Status: CANCELLED | OUTPATIENT
Start: 2020-10-14

## 2020-10-13 RX ORDER — DIPHENHYDRAMINE HYDROCHLORIDE 50 MG/ML
50 INJECTION INTRAMUSCULAR; INTRAVENOUS ONCE
Status: CANCELLED | OUTPATIENT
Start: 2020-10-14

## 2020-10-13 RX ORDER — HEPARIN SODIUM (PORCINE) LOCK FLUSH IV SOLN 100 UNIT/ML 100 UNIT/ML
500 SOLUTION INTRAVENOUS PRN
Status: CANCELLED | OUTPATIENT
Start: 2020-10-14

## 2020-10-13 RX ORDER — SODIUM CHLORIDE 0.9 % (FLUSH) 0.9 %
10 SYRINGE (ML) INJECTION PRN
Status: CANCELLED | OUTPATIENT
Start: 2020-10-14

## 2020-10-13 RX ORDER — SODIUM CHLORIDE 0.9 % (FLUSH) 0.9 %
5 SYRINGE (ML) INJECTION PRN
Status: CANCELLED | OUTPATIENT
Start: 2020-10-14

## 2020-10-13 RX ORDER — METHYLPREDNISOLONE SODIUM SUCCINATE 125 MG/2ML
125 INJECTION, POWDER, LYOPHILIZED, FOR SOLUTION INTRAMUSCULAR; INTRAVENOUS ONCE
Status: CANCELLED | OUTPATIENT
Start: 2020-10-14

## 2020-10-16 ENCOUNTER — HOSPITAL ENCOUNTER (OUTPATIENT)
Dept: INFUSION THERAPY | Age: 80
Setting detail: INFUSION SERIES
Discharge: HOME | End: 2020-10-16
Payer: MEDICARE

## 2020-10-16 VITALS
TEMPERATURE: 97.4 F | OXYGEN SATURATION: 96 % | HEART RATE: 74 BPM | RESPIRATION RATE: 18 BRPM | SYSTOLIC BLOOD PRESSURE: 126 MMHG | DIASTOLIC BLOOD PRESSURE: 70 MMHG

## 2020-10-16 DIAGNOSIS — D50.8 OTHER IRON DEFICIENCY ANEMIAS: Primary | ICD-10-CM

## 2020-10-16 DIAGNOSIS — D50.0 IRON DEFICIENCY ANEMIA DUE TO CHRONIC BLOOD LOSS: ICD-10-CM

## 2020-10-16 DIAGNOSIS — D50.9 IRON DEFICIENCY ANEMIA, UNSPECIFIED IRON DEFICIENCY ANEMIA TYPE: ICD-10-CM

## 2020-10-16 PROCEDURE — 2580000003 HC RX 258: Performed by: NURSE PRACTITIONER

## 2020-10-16 PROCEDURE — 6360000002 HC RX W HCPCS: Performed by: NURSE PRACTITIONER

## 2020-10-16 PROCEDURE — 96365 THER/PROPH/DIAG IV INF INIT: CPT

## 2020-10-16 RX ORDER — EPINEPHRINE 1 MG/ML
0.3 INJECTION, SOLUTION, CONCENTRATE INTRAVENOUS PRN
Status: CANCELLED | OUTPATIENT
Start: 2020-10-23

## 2020-10-16 RX ORDER — SODIUM CHLORIDE 9 MG/ML
INJECTION, SOLUTION INTRAVENOUS CONTINUOUS
Status: CANCELLED | OUTPATIENT
Start: 2020-10-23

## 2020-10-16 RX ORDER — SODIUM CHLORIDE 9 MG/ML
INJECTION, SOLUTION INTRAVENOUS CONTINUOUS
Status: ACTIVE | OUTPATIENT
Start: 2020-10-16 | End: 2020-10-16

## 2020-10-16 RX ORDER — SODIUM CHLORIDE 0.9 % (FLUSH) 0.9 %
5 SYRINGE (ML) INJECTION PRN
Status: CANCELLED | OUTPATIENT
Start: 2020-10-23

## 2020-10-16 RX ORDER — SODIUM CHLORIDE 0.9 % (FLUSH) 0.9 %
10 SYRINGE (ML) INJECTION PRN
Status: CANCELLED | OUTPATIENT
Start: 2020-10-23

## 2020-10-16 RX ORDER — METHYLPREDNISOLONE SODIUM SUCCINATE 125 MG/2ML
125 INJECTION, POWDER, LYOPHILIZED, FOR SOLUTION INTRAMUSCULAR; INTRAVENOUS ONCE
Status: CANCELLED | OUTPATIENT
Start: 2020-10-23

## 2020-10-16 RX ORDER — DIPHENHYDRAMINE HYDROCHLORIDE 50 MG/ML
50 INJECTION INTRAMUSCULAR; INTRAVENOUS ONCE
Status: CANCELLED | OUTPATIENT
Start: 2020-10-23

## 2020-10-16 RX ORDER — HEPARIN SODIUM (PORCINE) LOCK FLUSH IV SOLN 100 UNIT/ML 100 UNIT/ML
500 SOLUTION INTRAVENOUS PRN
Status: CANCELLED | OUTPATIENT
Start: 2020-10-23

## 2020-10-16 RX ADMIN — SODIUM CHLORIDE: 9 INJECTION, SOLUTION INTRAVENOUS at 09:31

## 2020-10-16 RX ADMIN — FERUMOXYTOL 510 MG: 510 INJECTION INTRAVENOUS at 09:42

## 2020-10-23 ENCOUNTER — HOSPITAL ENCOUNTER (OUTPATIENT)
Dept: INFUSION THERAPY | Age: 80
Setting detail: INFUSION SERIES
Discharge: HOME | End: 2020-10-23
Payer: MEDICARE

## 2020-10-23 VITALS
RESPIRATION RATE: 18 BRPM | TEMPERATURE: 96.9 F | DIASTOLIC BLOOD PRESSURE: 64 MMHG | SYSTOLIC BLOOD PRESSURE: 108 MMHG | HEART RATE: 77 BPM | OXYGEN SATURATION: 95 %

## 2020-10-23 DIAGNOSIS — D50.9 IRON DEFICIENCY ANEMIA, UNSPECIFIED IRON DEFICIENCY ANEMIA TYPE: ICD-10-CM

## 2020-10-23 DIAGNOSIS — D50.8 OTHER IRON DEFICIENCY ANEMIAS: Primary | ICD-10-CM

## 2020-10-23 DIAGNOSIS — D50.0 IRON DEFICIENCY ANEMIA DUE TO CHRONIC BLOOD LOSS: ICD-10-CM

## 2020-10-23 PROCEDURE — 6360000002 HC RX W HCPCS: Performed by: NURSE PRACTITIONER

## 2020-10-23 PROCEDURE — 2580000003 HC RX 258: Performed by: NURSE PRACTITIONER

## 2020-10-23 PROCEDURE — 96365 THER/PROPH/DIAG IV INF INIT: CPT

## 2020-10-23 RX ORDER — SODIUM CHLORIDE 9 MG/ML
INJECTION, SOLUTION INTRAVENOUS CONTINUOUS
Status: CANCELLED | OUTPATIENT
Start: 2020-10-23

## 2020-10-23 RX ORDER — DIPHENHYDRAMINE HYDROCHLORIDE 50 MG/ML
50 INJECTION INTRAMUSCULAR; INTRAVENOUS ONCE
Status: CANCELLED | OUTPATIENT
Start: 2020-10-23

## 2020-10-23 RX ORDER — SODIUM CHLORIDE 9 MG/ML
INJECTION, SOLUTION INTRAVENOUS CONTINUOUS
Status: ACTIVE | OUTPATIENT
Start: 2020-10-23 | End: 2020-10-23

## 2020-10-23 RX ORDER — SODIUM CHLORIDE 0.9 % (FLUSH) 0.9 %
5 SYRINGE (ML) INJECTION PRN
Status: CANCELLED | OUTPATIENT
Start: 2020-10-23

## 2020-10-23 RX ORDER — SODIUM CHLORIDE 0.9 % (FLUSH) 0.9 %
10 SYRINGE (ML) INJECTION PRN
Status: CANCELLED | OUTPATIENT
Start: 2020-10-23

## 2020-10-23 RX ORDER — EPINEPHRINE 1 MG/ML
0.3 INJECTION, SOLUTION, CONCENTRATE INTRAVENOUS PRN
Status: CANCELLED | OUTPATIENT
Start: 2020-10-23

## 2020-10-23 RX ORDER — METHYLPREDNISOLONE SODIUM SUCCINATE 125 MG/2ML
125 INJECTION, POWDER, LYOPHILIZED, FOR SOLUTION INTRAMUSCULAR; INTRAVENOUS ONCE
Status: CANCELLED | OUTPATIENT
Start: 2020-10-23

## 2020-10-23 RX ORDER — HEPARIN SODIUM (PORCINE) LOCK FLUSH IV SOLN 100 UNIT/ML 100 UNIT/ML
500 SOLUTION INTRAVENOUS PRN
Status: CANCELLED | OUTPATIENT
Start: 2020-10-23

## 2020-10-23 RX ADMIN — FERUMOXYTOL 510 MG: 510 INJECTION INTRAVENOUS at 09:25

## 2020-10-23 RX ADMIN — SODIUM CHLORIDE: 9 INJECTION, SOLUTION INTRAVENOUS at 09:20

## 2020-12-16 ENCOUNTER — HOSPITAL ENCOUNTER (OUTPATIENT)
Dept: INFUSION THERAPY | Age: 80
Discharge: HOME | End: 2020-12-16
Payer: MEDICARE

## 2020-12-16 ENCOUNTER — HOSPITAL ENCOUNTER (OUTPATIENT)
Dept: CT IMAGING | Age: 80
Discharge: HOME | End: 2020-12-16
Payer: MEDICARE

## 2020-12-16 ENCOUNTER — HOSPITAL ENCOUNTER (OUTPATIENT)
Dept: VASCULAR LAB | Age: 80
Discharge: HOME | End: 2020-12-16
Payer: MEDICARE

## 2020-12-16 ENCOUNTER — OFFICE VISIT (OUTPATIENT)
Dept: VASCULAR SURGERY | Age: 80
End: 2020-12-16
Payer: MEDICARE

## 2020-12-16 VITALS
BODY MASS INDEX: 27.85 KG/M2 | HEIGHT: 69 IN | DIASTOLIC BLOOD PRESSURE: 68 MMHG | WEIGHT: 188 LBS | TEMPERATURE: 98 F | HEART RATE: 59 BPM | SYSTOLIC BLOOD PRESSURE: 117 MMHG

## 2020-12-16 DIAGNOSIS — R91.8 PULMONARY NODULES: ICD-10-CM

## 2020-12-16 DIAGNOSIS — Z00.00 HEALTH CARE MAINTENANCE: ICD-10-CM

## 2020-12-16 DIAGNOSIS — N18.30 CHRONIC KIDNEY DISEASE, STAGE III (MODERATE) (HCC): ICD-10-CM

## 2020-12-16 DIAGNOSIS — D64.9 ANEMIA, UNSPECIFIED TYPE: ICD-10-CM

## 2020-12-16 DIAGNOSIS — D47.2 IGG MONOCLONAL GAMMOPATHY: ICD-10-CM

## 2020-12-16 DIAGNOSIS — D47.2 MONOCLONAL GAMMOPATHY: ICD-10-CM

## 2020-12-16 PROCEDURE — G8484 FLU IMMUNIZE NO ADMIN: HCPCS | Performed by: PHYSICIAN ASSISTANT

## 2020-12-16 PROCEDURE — 99213 OFFICE O/P EST LOW 20 MIN: CPT | Performed by: PHYSICIAN ASSISTANT

## 2020-12-16 PROCEDURE — 74176 CT ABD & PELVIS W/O CONTRAST: CPT

## 2020-12-16 PROCEDURE — G8427 DOCREV CUR MEDS BY ELIG CLIN: HCPCS | Performed by: PHYSICIAN ASSISTANT

## 2020-12-16 PROCEDURE — 1123F ACP DISCUSS/DSCN MKR DOCD: CPT | Performed by: PHYSICIAN ASSISTANT

## 2020-12-16 PROCEDURE — 71250 CT THORAX DX C-: CPT

## 2020-12-16 PROCEDURE — 4040F PNEUMOC VAC/ADMIN/RCVD: CPT | Performed by: PHYSICIAN ASSISTANT

## 2020-12-16 PROCEDURE — G8417 CALC BMI ABV UP PARAM F/U: HCPCS | Performed by: PHYSICIAN ASSISTANT

## 2020-12-16 PROCEDURE — 1036F TOBACCO NON-USER: CPT | Performed by: PHYSICIAN ASSISTANT

## 2020-12-16 PROCEDURE — 93880 EXTRACRANIAL BILAT STUDY: CPT

## 2020-12-16 NOTE — PROGRESS NOTES
Patient Care Team:  Ketan Sanchez MD as PCP - General (Internal Medicine)  Ketan Sanchez MD as PCP - [facility]  Raphael Aguilar MD (Cardiology)  YVON Brooks as Nurse Practitioner (Family Nurse Practitioner)  Trupti Perales MD as Consulting Physician (Cardiology)      Subjective    Mr. Becky Tatum is a [de-identified] yo male who has a history that includes HTN, hyerlipidemia, AAA, past tobacco abuse, IGG4 deficiency and carotid artery stenosis. His current treatment includes ASA EC and a statin daily. He denies a history of CVA. He reports no TIA's, episodes of amaurosis fugax and episodes of lateralizing weakness/numbnes/tingling. He denies any significant back and abdominal pain. He  denies any claudication, IRP or new or non healing wounds.      Petar Cuadra is a [de-identified] y.o. 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[facility])      dx @ [facility] 2018      Normocytic normochromic anemia     Other iron deficiency anemias     Enlarged lymph nodes, unspecified     Rheumatoid arthritis ([address]     Decreased white blood cell count, unspecified     Chronic kidney disease, stage III (moderate)     Weight loss, abnormal     Thrombocytopenia, unspecified ([address]     Monocytosis (symptomatic)     Esophageal dysphagia 07/13/2017    Iron deficiency anemia due to chronic blood loss     Melena 06/07/2017    Iron deficiency anemia 06/07/2017    Constipation 06/07/2017    Altered bowel function 06/07/2017     Updating Deprecated Diagnoses      Chronic GERD 06/07/2017    Dysphagia 06/07/2017     Updating Deprecated Diagnoses      AAA (abdominal aortic aneurysm) without rupture ([address] 11/07/2016    History of esophageal stricture 05/24/2016    S/P coronary artery stent placement 05/24/2016  Anticoagulated 05/24/2016    Pill dysphagia 05/24/2016    Carotid artery stenosis 10/24/2012    History of colon polyps 08/13/2012    Hemorrhoids 08/13/2012    Allergic rhinitis     Asthma     HIGH CHOLESTEROL     Hypertension     Restless leg syndrome      Current Outpatient Medications   Medication Sig Dispense Refill    Ergocalciferol (VITAMIN D2 PO) Take by mouth      Calcium-Magnesium-Vitamin D (CALCIUM 1200+D3 PO) Take by mouth      predniSONE (DELTASONE) 5 MG tablet Take 3 mg by mouth daily       HYDROcodone-acetaminophen (NORCO) 7.5-325 MG per tablet Take 1 tablet by mouth every 6 hours as needed for Pain.  vitamin D (CHOLECALCIFEROL) 1000 UNIT TABS tablet Take 1,000 Units by mouth daily      amLODIPine (NORVASC) 5 MG tablet Take 5 mg by mouth daily      clonazePAM (KLONOPIN) 2 MG tablet Take 2 mg by mouth nightly as needed      atorvastatin (LIPITOR) 10 MG tablet Take 10 mg by mouth daily      cetirizine (ZYRTEC) 5 MG tablet Take 5 mg by mouth daily      aspirin 81 MG tablet Take 81 mg by mouth daily.  pantoprazole (PROTONIX) 40 MG tablet Take 1 tablet by mouth 2 times daily. 60 tablet 11    triamcinolone (KENALOG) 0.1 % cream FREDIS EXT TO BOTH ARMS AND LEGS BID PRN FOR RASH       No current facility-administered medications for this visit. Current Outpatient Medications on File Prior to Visit   Medication Sig Dispense Refill    Ergocalciferol (VITAMIN D2 PO) Take by mouth      Calcium-Magnesium-Vitamin D (CALCIUM 1200+D3 PO) Take by mouth      predniSONE (DELTASONE) 5 MG tablet Take 3 mg by mouth daily       HYDROcodone-acetaminophen (NORCO) 7.5-325 MG per tablet Take 1 tablet by mouth every 6 hours as needed for Pain.       vitamin D (CHOLECALCIFEROL) 1000 UNIT TABS tablet Take 1,000 Units by mouth daily      amLODIPine (NORVASC) 5 MG tablet Take 5 mg by mouth daily      clonazePAM (KLONOPIN) 2 MG tablet Take 2 mg by mouth nightly as needed  atorvastatin (LIPITOR) 10 MG tablet Take 10 mg by mouth daily      cetirizine (ZYRTEC) 5 MG tablet Take 5 mg by mouth daily      aspirin 81 MG tablet Take 81 mg by mouth daily.  pantoprazole (PROTONIX) 40 MG tablet Take 1 tablet by mouth 2 times daily. 60 tablet 11    triamcinolone (KENALOG) 0.1 % cream FREDIS EXT TO BOTH ARMS AND LEGS BID PRN FOR RASH       No current facility-administered medications on file prior to visit.       Allergies: Lyrica [pregabalin], Penicillins, and Sulfa antibiotics  Past Medical History:   Diagnosis Date    Allergic rhinitis     Anemia, unspecified     Anxiety     Asthma     BPH (benign prostatic hyperplasia)     Dr Marcio Llamas CAD (coronary artery disease)     Dr Jim Castillo [facility])     skin-non-melanoma    Carotid artery stenosis     [facility], APRN    Chronic kidney disease, stage III (moderate)     Colon polyps     Cough     Decreased white blood cell count, unspecified     Enlarged lymph nodes, unspecified     Esophageal stricture     GERD (gastroesophageal reflux disease)     HIGH CHOLESTEROL     Hypertension     Insomnia     Leukocytosis     Mediastinal lymphadenopathy     Monoclonal gammopathy     Monocytosis (symptomatic)     Night sweats     Normocytic normochromic anemia     Osteoarthritis     Other iron deficiency anemias     GI blood loss, hiatal hernia w/Jake's erosions and cecal/small bowel AVMs    Peptic ulcer disease     Restless leg syndrome     Rheumatoid arthritis (HCC)     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[facility]     dx @ [facility] 2018    Skin cancer     on left ear lobe    Thrombocytopenia, unspecified ([facility]     Venous insufficiency     Weight loss, abnormal      Past Surgical History:   Procedure Laterality Date    COLONOSCOPY  9/15/2009    : negative    COLONOSCOPY  2006, 2003    hyperplastic colon polyps     COLONOSCOPY  11/2012    minimal diverticula, no polyps (5yr)  CORONARY ANGIOPLASTY WITH STENT PLACEMENT  2016    2 stents    FINE NEEDLE ASPIRATION Right 2011    Neck mass-Dr Verma    HI COLSC FLEXIBLE W/CONTROL BLEEDING ANY METHOD N/A 2017    Dr Osullivan-w/control of hemorrage, gold probe cautery of avm-internal hemorrhoids-5 yr recall    PTCA  3/10/16 ford    rca    SALIVARY GLAND SURGERY Right 2012    Dr Verma-Excision of right submandibular gland    SKIN CANCER EXCISION      TONSILLECTOMY      TOTAL NEPHRECTOMY Left 2018 [place]    UPPER GASTROINTESTINAL ENDOSCOPY  2009    : minimal gastritis, sm hiatal hernia, no active ulcers    UPPER GASTROINTESTINAL ENDOSCOPY  2006    gastritis, nonobst Schatzki's ring, lg hiatal hernia    UPPER GASTROINTESTINAL ENDOSCOPY  2017    Dr [place]aw/control of hemorrhage with gold probe cauterization of AVM-cecal avm, small bowel avm, hiatal hernia, margarito's erosions    UPPER GASTROINTESTINAL ENDOSCOPY  2017    Dr Garcia/control of hemorrhage with gold probe cauterization of AVM-cecal avm, small bowel avm, hiatal hernia, margarito's erosions    URETER STENT PLACEMENT Right 2018    [facility]    VASECTOMY  26 years ago    and had it reversed.     VASECTOMY REVERSAL       Family History   Problem Relation Age of Onset    Hypertension Mother     High Cholesterol Mother     Heart Failure Mother         blockages, heart attack    Other Mother         pneumonia    Heart Failure Father         blockages, heart attack    Heart Failure Brother         blockages, heart attack    High Cholesterol Brother     Stroke Brother     Heart Disease Brother     Hypertension Brother     Other Other         mother , age 80 fall    Colon Cancer Neg Hx     Colon Polyps Neg Hx     Esophageal Cancer Neg Hx     Liver Cancer Neg Hx     Liver Disease Neg Hx     Rectal Cancer Neg Hx     Stomach Cancer Neg Hx      Social History     Tobacco Use    Smoking status: Former Smoker Packs/day: 1.00     Years: 20.00     Pack years: 20.00     Types: Cigarettes     Quit date: 1986     Years since quittin.6    Smokeless tobacco: Never Used   Substance Use Topics    Alcohol use: No       Review of Systems    Constitutional  no significant activity change, appetite change, or unexpected weight change. No fever or chills. No diaphoresis or significant fatigue. HENT  no significant rhinorrhea or epistaxis. No tinnitus or significant hearing loss. Eyes  no sudden vision change or amaurosis. Respiratory  no significant shortness of breath, wheezing, or stridor. No apnea, cough, or chest tightness associated with shortness of breath. Cardiovascular  no chest pain, syncope, or significant dizziness. No palpitations or significant leg swelling. No claudication. Gastrointestinal  no abdominal swelling or pain. No blood in stool. No severe constipation, diarrhea, nausea, or vomiting. Genitourinary  No difficulty urinating, dysuria, frequency, or urgency. No flank pain or hematuria. Musculoskeletal  no back pain, gait disturbance, or myalgia. Skin  no color change, rash, pallor, or new wound. Neurologic  no dizziness, facial asymmetry, or light headedness. No seizures. No speech difficulty or lateralizing weakness. Hematologic  no easy bruising or excessive bleeding. Psychiatric  no severe anxiety or nervousness. No confusion. All other review of systems are negative. Physical Exam    Vitals:    20 0941 20 0945   BP: 120/70 117/68   Site: Left Upper Arm Right Upper Arm   Position: Sitting Sitting   Cuff Size: Medium Adult Medium Adult   Pulse: 56 59   Temp: 98 °F (36.7 °C)    TempSrc: Temporal    Weight: 188 lb (85.3 kg)    Height: 5' 9\" (1.753 m)        Constitutional  well developed, well nourished. No diaphoresis or acute distress. diameter. The CT density suggest a cyst. Another smaller low density   nodule seen located medially in the upper pole measuring 0.7 cm. The   CT density suggest a cyst. A 2 mm calculus is seen in the lower pole   of the right kidney. No hydronephrosis. The right ureter appear   normal. The urinary bladder is poorly distended. No intrinsic   abnormality. The prostate is moderately enlarged. There are fat-containing inguinal hernias bilaterally, left larger   than the right. The intra-abdominal stomach is decompressed. The duodenum and small   bowel appear normal. No finding to suggest appendicitis. Moderate gas   and stool are scattered in the colon. There is diverticulosis of the   distal colon. No evidence of a diverticulitis. Atheromatous changes of the abdominal aorta and iliac arteries are   seen. 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 . A level this is suboptimally   evaluated due to lack of contrast enhancement. There is no evidence of abdominal or pelvic lymphadenopathy. No   evidence of retroperitoneal mass (as suggested in the history). No   evidence of mesenteric adenopathy. The images reviewed in bone window show no acute bony abnormality or a   bone lesion. There is a mild dextroscoliosis of the lumbar spine.       Impression   No evidence of retroperitoneal mass or lymphadenopathy. A large sliding-type heart of hernia. Low-density masses/nodules in the right kidney which are suboptimally   evaluated due to lack of contrast enhancement. Left nephrectomy without regional complications. The diverticulosis of the distal colon. No evidence for   diverticulitis. Enlarged prostate. The fusiform aneurysmal dilatation of distal abdominal aorta. 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 Further follow-up may be obtained. The fat-containing small inguinal hernias bilaterally. Signed by Dr Jonathon Page on 12/16/2020 8:37 AM       Carotid Doppler results: 12/16/2020    Right CCA/ICA <50% stenotic  Left CCA/ICA <50% stenotic  Right verterbral artery flow is antegrade  Left verterbral artery flow is antegrade  Individual velocities reviewed: Yes. Results were reviewed with the patient. Disease process is stable      Assessment      1. AAA (abdominal aortic aneurysm) without rupture ([address]    2. Bilateral carotid artery stenosis          Plan      Start/Continue ASA EC 81 mg daily  Strongly encourage start/continue statin therapy  Recommend no smoking  Patient instructed to call or proceed to the emergency room with any symptoms of lateralizing weakness, loss of vision in one eye, or episodes slurred speech.     Follow up in  12  Month(s) with a repeat CDU and CT A/P (patient states that Dr. Steven Calabrese usually orders twice yearly) to follow AAA and Carotid Artery Stenosis

## 2020-12-18 NOTE — PROGRESS NOTES
Patient:  Mervat Weber  YOB: 1940  Date of Service: 12/23/2020  MRN: [id number]    Primary Care Physician: Cynthia Lynn MD    Chief Complaint   Patient presents with    Follow-up     IgG monoclonal gammopathy       Patient Seen, Chart, Consults notes, Labs, Radiology studies reviewed. Subjective:  Shruti Sims is a [de-identified]year old  gentleman managed with primary and secondary diagnoses as outlined:  · Resection of left retroperitoneal mass and radical left nephrectomy by Dr. Kraft Pages Dr. Marge Canales at [facility] on 10/31/2018. Pathology revealed IgG4 related disease. · Multifactorial anemia  · IgG kappa MGUS  · Right lower extremity DVT on Eliquis bid by Dr. Brewer Simple    Mr. Jose Centeno states that he knows when he is getting a flare because of left submandibular lymph nodes/salivary glands get swollen and enlarged and uncomfortable. Prednisone was increased 3 weeks ago by Dr. Juarez Denise for IgG4 related disease progression with an IgG level of 285. Jaky Mayes comes today in followup to review imaging studies and go over treatment he is receiving. HEMATOLOGIC HISTORY: Leukopenia, Anemia, adenopathy  Shruti Sims was seen in initial hematologic consultation on 4/26/2018, referred by Dr. Bradly Ramirez for evaluation of leukopenia and anemia. CBC on 4/19/2018 showed a WBC of 3.37, hemoglobin 9.7 with MCV 87.8 and platelet count 970,217. Neutrophils were 56.3%, lymphocytes 9.5% and monocytes 20.5%. CMP included a creatinine of 1.94, GFR 41. Ca+ 8.8, TP 9.6. He is followed by Dr. Juarez Denise for rheumatoid arthritis and possible Sjogren disease. He has a known history of CKD, as well as iron deficiency anemia from GI blood loss. Endoscopy 7/13/2017 by Dr. Dash Wright. Shi revealed a small bowel AVM that was cauterized. A moderate sized hiatal hernia with mild Jake's erosions was noted as well. Colonoscopy 7/13/17 was appreciable for cecal AVM, also cauterized as well as internal hemorrhoids.     M2A capsule endoscopy 1/3/2018 by Dr. Refugio Moses revealed one irritated area in the colon, though no signs of bleeding or stigmata of small bowel blood loss. Should evidence of GI blood loss persists, consideration should be given for repeat EGD/colonoscopy due to evidence of blood loss on procedures in  with possible need for repeat cauterization. Gill Cheema was previously on antiplatelet therapy for CAD, now treated with aspirin only. He denies melena or hematochezia at this time. In addition to the above, Gill Cheema has been followed by Dr. Arlen Ireland for thoracic adenopathy. Contrasted Chest CT 1/10/2018 at [facility] documented an interval increase in the size of intrathoracic lymph nodes compared to 2016; 2016; CTA chest 2016 including the followin mm precarinal LN   10 mm hilar LN   14 mm subcarinal LN   17 left hilar LN, previously 10 mm   4 mm medial ALIYA nodule, more conspicuous   4 mm lingular nodule, new   7 mm left major fissure nodule, stable   7 mm LLL pleural based noncalcified nodule, previously 5 mm   6 mm LLL nodule, previously 4 mm   3 mm LLL nodule   8 mm RUL, previously 5 mm   Several RLL nodule, relatively stable    Bronchoscopy with EBUS and biopsy of a station 7 node was performed 18 by Dr. Arlen Ireland. Pathology:  1. station 7 lymph node revealed:  A. Small, mature lymphocytes and plasma cells. B. Ciliated columnar bronchial epithelial cells and gallbladder cells. C. background blood. D. negative for malignant cells. 2. Bronchial washings  A. acute inflammation, mild  B. squamous metaplasia  C. blood and mucus  D. negative for malignant cells  E. GMS stain negative for fungal organisms  FLOW cytometry on the station 7 lymph node biopsied by EBUS on 2018 did NOT reveal immunophenotypic evidence of a clonal B cell population     IN RETROSPECT,  Gill Cheema has a history of FNA of a right neck mass performed 2011 by Dr. Stephy Mendes.  Pathology revealed no malignant cells, morphology. There was no stainable storage iron and no increase in reticulin fibrosis. 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 Flow cytometry did not reveal B-cell monoclonality nor T-cell aberrant antigen expression. FISH studies for MDS was negative. There was no morphologic evidence of a myelodysplastic syndrome. There was no diagnostic evidence of a limp low at disorder, granulomas, nor metastatic malignancies on the bone marrow biopsy or aspirate. CT scans of the neck with contrast on 4/30/2018 documented an asymmetry within the left submandibular gland compared to the right. The left submandibular gland measures 4.2 x 2.6 x 2.4 cm compared to 12 mm on the right. Otherwise there are no discrete neck masses or pathologically enlarged lymph nodes. 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 Prominent right external iliac chain lymph nodes of indeterminate significance were also documented. Diverticulosis without diverticulitis and a large hiatal hernia was described. The prostate gland was enlarged with lifting of the base of the urinary bladder. Also noted was a questionable abnormal appearance of the pancreas with some loss of the normal pancreatic on to work without a discrete mass with MRI recommended. MRI of the abdomen W & WO on 5/8/2018 documented borderline splenomegaly, a large ventral hernia, nonenhancing renal cysts and a 3.5 cm abdominal aortic aneurysm. A 6 mm right middle lobe pulmonary nodule and nodular density along the left major fissure of the region of the lingula were noted as previously described on the CT scan of the chest on 1/10/2018.  The nodules on 1/10/2018 had increased compared to 11/22/2016 with metastatic disease being in the differential.   MRI of the pelvis W & WO contrast on 5/8/2018 documented enhancing septations and a small soft tissue nodule within the inferior aspect of the left fatty-containing retroperitoneum positioned below the level of the left kidney. This abnormality is lateral to the psoas muscle (it abuts the psoas muscle, but does not invade the psoas muscle) and extending to the level of the left iliac crest.  An atypical lipomatouse tumor versus liposarcoma considered. Mild prostate enlargement is also noted. Mildly enlarged pelvic lymph nodes measuring 1.2 cm in short axis, reactive or metastatic are also noted. Akash Agrawal saw Dr. Booker Del Toro and Dr. Deann Mccoy and reviewed Akash Agrawal' case with sarcoma tumor board at [facility] on 6/8/2018. 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 Unfortunately, a curative resection would require sacrifice of the left kidney leading to progression of CKD and possibly dialysis. Recommendation by Dr Nita Thibodeaux on a clinic visit note faxed 6/20/2018 was for continued observation with repeat CT scan in 3 months as there were no signs of high-grade component on the CT scans from April of 2018 or the MRI of the abdomen/pelvis from 5/8/2018. Consideration could be given for resection if there is documentation of clear growth or concern for dedifferentiation. Pathology from the station 7 lymph node on 1/25/18 was reviewed 6/19/18 at [facility] and noted to be predominantly bronchial cells with few lymphocytes and macrophages, negative for malignancy. Bronchial washings also negative for malignancy. Interpretation of the chest CT 6/13/18 at [facility] was consistent with multiple bilateral pulmonary nodules and mediastinal and hilar adenopathy, possibly representing sarcoidosis with malignancy not excluded. AAA partially imaged.     CT scans of the chest, abdomen/pelvis 8/3/18 at [facility] were grossly unchanged with surveillance to continue. Regarding the left submandibular gland, I spoke with Dr. Romayne Nao Resser on 6/27/2018. Dr. Leydi Kingston stated he has been monitoring the left submandibular gland for years, without gross change. He did not recommend excision, rather continued monitoring due to stability and concern for xerostomia. Lasix renogram and assessment by Dr. Kami Lance was completed in in September, 2018. Dr. Luis Burk reviewed imaging and confirmed the need for removal of the whole left kidney. Tao's case was again reviewed with the sarcoma tumor Board. Renogram demonstrated differential kidney function of 60% on the right. Dr. Kami Lance estimated his final GFR may be [phone], thus likely avoiding hemodialysis postoperatively. Left nephrectomy appeared more reasonable given recent stone disease and less function. Fabiana Larson underwent resection of the left retroperitoneal mass with radical left nephrectomy on 10/31/18 by Dr. Nata Samano at [facility]. Pathology as follows:  1. Liver, segment 3, excision: Benign liver parenchyma with calcified granuloma. 2. Left retroperitoneal nodule, excision: Fibroadipose tissue with chronic lymphoplasmacytic and histiocytic inflammation. There were NO features of well-differentiated liposarcoma noted. 3. Left periurethral tissue, excision: Fibroadipose tissue with chronic lymphoplasmacytic and histiocytic inflammation. 4. Left lateral pararenal tissue, excision: Benign fibroadipose tissue with chronic lymphoplasmacytic and histiocytic inflammation. 5. Left nephrectomy: Marked lymphoplasmacytic and histiocytic inflammation, focal fibrosis and changes of the obliterative phlebitis, most consistent with IgG4-related disease. Postoperative noncontrast abdominal/pelvic CT 11/23/18 at [facility] documented stranding of fat in the retroperitoneum, likely related to recent surgery.  Diverticulosis of the colon with inflammation of the fat around the mid to distal descending colon close to the prior surgical site present, possibly representing postoperative change. There was a tiny 2 mL stone in the distal right ureter with mild-to-moderate dilatation of the right ureter and mild dilatation of the right renal collecting system. Nonobstructing stones also noted in the right kidney as well as a 4 cm probable cyst in the upper pole right kidney. Surveillance non-contrast CT scans of the chest, abdomen and pelvis 2/22/19 at [facility] documented interval decrease in numerous mediastinal lymph nodes and decrease in size of multiple bilateral pulmonary nodules. There was no residual lymphadenopathy and near complete resolution of inflammatory changes around the distal descending colon. Previous right renal lesions were stable. Akash Agrawal is followed by Dr. Pepper Barillas, treated with prednisone regarding IgG4 systemic disease with known involvement of salivary glands, kidney, and retroperitoneum. He completed prednisone 40 mg daily ×1 month on 2/23/19, and 30 mg daily ×4 weeks on 3/25/19. Akash Agrawal will take 20 mg ×4 weeks, then 10 mg. By June 2020, prednisone have been tapered down to 3 mg daily by Dr. Pepper Barillas. CT scan of the chest without contrast on 10/3/2019 identified 2 discrete subcentimeter nodules in the RLL of the lungs were not present previously, one measuring 3 mm and the other 5 mm. Another 7 mm nodule in the R mL is unchanged. Follow-up recommended. CT scan of the abdomen and pelvis without contrast on 10/3/2019 at [facility] did not reveal evidence of recurrent disease or metastatic disease to the abdomen or pelvis. Prior left nephrectomy is noted    CT scan of the Chest without contrast on 6/25/2020 at [facility] reveals Interval increase in size of pulmonary nodules in the RIGHT upper lobe and LEFT lower lobe. Interval resolution of medial RIGHT lower lobe nodules. Essentially stable RIGHT middle lobe nodule. Continue follow up in 6 months is recommended. CT scan of the Abdomen Pelvis without contrast on 6/25/2020  at [facility] reveals a Stable CT of the abdomen and pelvis with postsurgical changes in the left retroperitoneum. No evidence of recurrent or metastatic disease. 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 Atherosclerotic disease. Large hiatal hernia. Colonic diverticulosis without evidence of acute diverticulitis. Low-density lesions in the right kidney which are stable but  incompletely characterized on this exam.      Serology on 10/10/2019 revealed:  Iron- 45  TIBC- 255  Saturation%- 17.6%  Ferritin- 13.40  B2M- 2.8  Kappa light chains- 42.4  Lambda light chains- 24.8 with K/L ratio 1.71  IgG 945, IgA 176, IgM 141  IgG 4- 137  M-spike- not observed      Serology on 2/3/2020 revealed:  CMP with BUN 30, creatinine 1.90  Iron- 16  TIBC- 223  Saturation %- 7.2  Ferritin- 7.66  B2M- 2.8  Kappa light chains- 25.8  Lambda light chains- 14.7  K/L ratio- 1.76  IgG 872, IgA 156, IgM 128  IgG 4- 136  M-spike- not observed    Serology on 6/10/2020 revealed:  CMP with BUN 24, creatinine 1.8  B2M- 3.5  Kappa light chains- 134.3  Lambda light chains- 39.3  K/L ratio- 3.42  IgG 1417, IgA 205, IgM 244  IgG 4- 213  Iron- 30  TIBC- 270  Iron sat- 11%  Ferritin- 14.9  M-spike- not observed    Serology on 7/15/2020 revealed:  Kappa light chains- 123.6  Lambda light chains- 36.7  K/L ratio- 3.37  IgG 4- 250    24-Hr Urine electrophoresis on 7/17/2020 revealed:  Protein, 24H- 92  M-spike- not observed    By June 2020, prednisone have been tapered down to 3 mg daily by Dr. Abhi Bartholomew. With new findings on the CT scan of the chest and increased IgG4 level of 250, prednisone was increased again to 5 mg a day to be delivered along with Rituxan. Based on the new findings on the CT scan of the chest from 6/25/2020 and the elevated IgG4 of 250, Dr. Jayesh Garland initiated Rituxan on 7/28/2020.   Raffy Ramirez will receive a second dose of Rituxan on 8/11/2020 with reevaluation every 6 months thereafter. Allergies:  Lyrica [pregabalin], Penicillins, and Sulfa antibiotics    Medicines:  Current Outpatient Medications   Medication Sig Dispense Refill    Ergocalciferol (VITAMIN D2 PO) Take by mouth      triamcinolone (KENALOG) 0.1 % cream FREDIS EXT TO BOTH ARMS AND LEGS BID PRN FOR RASH      Calcium-Magnesium-Vitamin D (CALCIUM 1200+D3 PO) Take by mouth      predniSONE (DELTASONE) 5 MG tablet Take 3 mg by mouth daily       HYDROcodone-acetaminophen (NORCO) 7.5-325 MG per tablet Take 1 tablet by mouth every 6 hours as needed for Pain.  vitamin D (CHOLECALCIFEROL) 1000 UNIT TABS tablet Take 1,000 Units by mouth daily      amLODIPine (NORVASC) 5 MG tablet Take 5 mg by mouth daily      clonazePAM (KLONOPIN) 2 MG tablet Take 2 mg by mouth nightly as needed      atorvastatin (LIPITOR) 10 MG tablet Take 10 mg by mouth daily      cetirizine (ZYRTEC) 5 MG tablet Take 5 mg by mouth daily      aspirin 81 MG tablet Take 81 mg by mouth daily.  pantoprazole (PROTONIX) 40 MG tablet Take 1 tablet by mouth 2 times daily. 60 tablet 11     No current facility-administered medications for this visit.         Past Medical History:      Diagnosis Date    Allergic rhinitis     Anemia, unspecified     Anxiety     Asthma     BPH (benign prostatic hyperplasia)     Dr Puente Amish CAD (coronary artery disease)     Dr Lema Favor [facility])     skin-non-melanoma    Carotid artery stenosis     Napolean Friday, APRN    Chronic kidney disease, stage III (moderate)     Colon polyps     Cough     Decreased white blood cell count, unspecified     Enlarged lymph nodes, unspecified     Esophageal stricture     GERD (gastroesophageal reflux disease)     HIGH CHOLESTEROL     Hypertension     Insomnia     Leukocytosis     Mediastinal lymphadenopathy     Monoclonal gammopathy     Monocytosis (symptomatic)     Night sweats     Normocytic normochromic anemia     Osteoarthritis     Other iron deficiency anemias     GI blood loss, hiatal hernia w/Jake's erosions and cecal/small bowel AVMs    Peptic ulcer disease     Restless leg syndrome     Rheumatoid arthritis (HCC)     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 @ [facility] 2018    Skin cancer     on left ear lobe    Thrombocytopenia, unspecified ([address]     Venous insufficiency     Weight loss, abnormal         Past Surgical History:      Procedure Laterality Date    COLONOSCOPY  9/15/2009    : negative    COLONOSCOPY  2006, 2003    hyperplastic colon polyps     COLONOSCOPY  11/2012    minimal diverticula, no polyps (5yr)    CORONARY ANGIOPLASTY WITH STENT PLACEMENT  2016    2 stents    FINE NEEDLE ASPIRATION Right 12/23/2011    Neck mass-Dr Verma    NM COLSC FLEXIBLE W/CONTROL BLEEDING ANY METHOD N/A 7/13/2017    Dr Osullivan-w/control of hemorrage, gold probe cautery of avm-internal hemorrhoids-5 yr recall    PTCA  3/10/16 ford    rca    SALIVARY GLAND SURGERY Right 03/23/2012    Dr Verma-Excision of right submandibular gland    SKIN CANCER EXCISION      TONSILLECTOMY      TOTAL NEPHRECTOMY Left 2018 [place]    UPPER GASTROINTESTINAL ENDOSCOPY  7/23/2009    : minimal gastritis, sm hiatal hernia, no active ulcers    UPPER GASTROINTESTINAL ENDOSCOPY  8/2006    gastritis, nonobst Schatzki's ring, lg hiatal hernia    UPPER GASTROINTESTINAL ENDOSCOPY  7/13/2017    Dr Osullivan-w/control of hemorrhage with gold probe cauterization of AVM-cecal avm, small bowel avm, hiatal hernia, jake's erosions    UPPER GASTROINTESTINAL ENDOSCOPY  7/13/2017    Dr Garcia/control of hemorrhage with gold probe cauterization of AVM-cecal avm, small bowel avm, hiatal hernia, jake's erosions    URETER STENT PLACEMENT Right 11/27/2018    [facility]    VASECTOMY  26 years ago    and had it reversed.     VASECTOMY REVERSAL          Family History: Problem Relation Age of Onset    Hypertension Mother     High Cholesterol Mother     Heart Failure Mother         blockages, heart attack    Other Mother         pneumonia    Heart Failure Father         blockages, heart attack    Heart Failure Brother         blockages, heart attack    High Cholesterol Brother     Stroke Brother     Heart Disease Brother     Hypertension Brother     Other Other         mother , age 80 fall    Colon Cancer Neg Hx     Colon Polyps Neg Hx     Esophageal Cancer Neg Hx     Liver Cancer Neg Hx     Liver Disease Neg Hx     Rectal Cancer Neg Hx     Stomach Cancer Neg Hx         Social History  Social History     Tobacco Use    Smoking status: Former Smoker     Packs/day: 1.00     Years: 20.00     Pack years: 20.00     Types: Cigarettes     Quit date: 1986     Years since quittin.7    Smokeless tobacco: Never Used   Substance Use Topics    Alcohol use: No    Drug use: No          Review of Systems:  Constitutional: Negative for chills, fatigue, fever or significant weight loss. HENT: Negative for congestion, hearing loss, nosebleeds or sore throat. Eyes: Negative for photophobia, pain, discharge, redness and visual disturbance. Respiratory: Negative for cough, shortness of breath, or wheezing. Cardiovascular: Negative for chest pain, palpitations or leg swelling. Gastrointestinal: Negative for abdominal pain, blood in stool, constipation, diarrhea, nausea or vomiting. Genitourinary: Negative for dysuria, flank pain, frequency, hematuria or urgency. Musculoskeletal: Negative for back pain, joint swelling, myalgias or neck pain. Skin: Negative for rash or petechiae. Neurological: Negative for tremors, seizures, syncope, weakness or headaches. Hematological: No active bruising or bleeding. Psychiatric/Behavioral: Negative for hallucinations.            Objective:  Vital Signs: Blood pressure (!) 142/70, pulse 63, temperature 97.3 °F (36.3 °C), temperature source Temporal, height 5' 9\" (1.753 m), weight 188 lb 14.4 oz (85.7 kg), SpO2 96 %. Physical Exam   Constitutional: Oriented to person, place, and time. No acute distress. Head: Normocephalic and atraumatic. Nose: Nose normal.   Mouth/Throat: Oropharynx is clear and moist. No oropharyngeal exudate. Eyes: Pupils are equal and round. Conjunctivae and EOM are normal. No scleral icterus. Neck: Normal range of motion. Neck supple. No JVD. No appreciable thyromegaly. Cardiovascular: Normal rate, regular rhythm, normal heart sounds and intact distal pulses. Exam reveals no gallop, murmurs or friction rub. Pulmonary/Chest: Effort normal and breath sounds normal. No respiratory distress. No wheezes. Abdominal: Soft. Bowel sounds are normal. No organomegally or masses. No tenderness. There is no rebound and no guarding. Musculoskeletal: Normal range of motion. No edema or tenderness. Lymphadenopathy: No cervical, axillary or inguinal lymphadenopathy. Neurological: Alert and oriented to person, place, and time. Cranial nerves are intact. Neurological exam is nonfocal  Skin: Skin is warm and dry. No rash noted. No erythema. No pallor. Psychiatric: Judgment normal.     Labs:  BMP:   No results for input(s): NA, K, CL, CO2, PHOS, BUN, CREATININE, CALCIUM in the last 72 hours. CBC:   Recent Labs     12/21/20  0907   WBC 5.47   HGB 12.9*   HCT 39.7*   MCV 94.1*   *       PT/INR: No results for input(s): PROTIME, INR in the last 72 hours. APTT: No results for input(s): APTT in the last 72 hours. Ionized Calcium:No results for input(s): IONCA in the last 72 hours. Magnesium:No results for input(s): MG in the last 72 hours. Phosphorus:No results for input(s): PHOS in the last 72 hours. Hepatic:   No results for input(s): ALKPHOS, ALT, AST, PROT, BILITOT, BILIDIR, LABALBU in the last 72 hours. Cultures:   No results for input(s): CULTURE in the last 72 hours. Radiology reports as per the Radiologist  Radiology:     Diane Smith 82- 10/3/2019 8:32 AM CDT  HISTORY: PULMONARY NODULE; R91.1-Solitary pulmonary nodule  COMPARISON: CT chest dated 03/26/2019  DOSE LENGTH PRODUCT: 422 mGy cm. Automated exposure control was also  utilized to decrease patient radiation dose. TECHNIQUE: Serial helical tomographic images of the chest were acquired. Multiplanar reformatted images were provided for review. FINDINGS:  The imaged portion of the neck and thyroid gland is unremarkable. Right lower lobe nodule measuring 7 mm medially (image 119, series 3). An adjacent 5 mm nodule medially and inferiorly (image 126, series 3)  was not seen on prior examination. Left lower lobe nodule, measuring 3  mm (image 111, series 3), grossly unchanged given differences in  techniques. A left lower lobe nodule more superiorly measuring 5 mm  (image 97, series 3) appears also unchanged. 7 mm nodule in the right  middle lobe, unchanged. No pleural effusion is present. The trachea and  bronchial tree are patent. Coronary artery disease. Atherosclerotic disease. The heart is within  normal limits in size. Moderate hiatal hernia. Great vessels, and  pulmonary vessels are otherwise unremarkable in appearance within limits  of a noncontrast study. There is no pericardial effusion. No enlarged  axillary or mediastinal lymph nodes are present. No acute findings are seen in the bones or surrounding soft tissues. Remote insult to the left chest wall posteriorly with multiple rib  Deformities. Please see report from CT abdomen pelvis adjacent for intra-abdominal  Findings. .    2 discrete subcentimeter nodules in the right lower lobe were not  present on prior examination. These are indeterminate short-term  follow-up recommended.     This report was finalized on 10/03/2019 09:40 by Dr. Barbara Baptiste MD.      ___________________________________________________________________________ EXAM: CT Abdomen and Pelvis without contrast  10/3/2019 8:58 AM CDT  INDICATION: ENLARGED LYMPH NODES; R59.9-Enlarged lymph nodes,  unspecified  COMPARISON: CT abdomen pelvis dated 03/26/2019. CT abdomen pelvis dated  02/22/2019. TECHNIQUE:  Abdomen and pelvis were scanned utilizing a multidetector helical  scanner from the diaphragm to the ischial tuberosities without  administration of IV contrast. Coronal and sagittal reformations were  obtained. [Routine protocol is performed.]  Radiation dose equals .4 mGy-cm.  Automated exposure control dose  reduction technique was implemented. FINDINGS:  LINES and TUBES: None. LOWER THORAX: No focal consolidation. No pleural effusion. No  pneumothorax. There is a small to moderate hiatal hernia. Coronary  artery disease. HEPATOBILIARY:  No focal hepatic lesions.   No liver laceration.   No  biliary ductal dilatation. GALLBLADDER:  No radiopaque stones or sludge.   No wall thickening. SPLEEN:  No splenomegaly.    No splenic laceration. PANCREAS:  No focal masses or ductal dilatation. ADRENALS:  No adrenal nodules. KIDNEYS/URETERS: Prior left nephrectomy. Right renal exophytic  hypodensity measuring 3.3 cm unchanged. Right renal upper pole  parapelvic 1.2 cm is also unchanged. There is no new soft tissue density  within the left nephrectomy bed. GI TRACT:  No abnormal distention, wall thickening, or evidence of bowel  obstruction.   There is no acute appendicitis. Colonic diverticulosis,  without evidence of acute diverticulitis. Rashad Quinteros PELVIC ORGANS/BLADDER:  No acute pathology. Nonspecific mild wall  thickening of bladder, a chronic finding. Rashad Quinteros LYMPH NODES:  There is no inguinal canal, pelvic wall, retroperitoneal  or mesenteric lymphadenopathy by size criteria. .   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 Atherosclerotic disease. Rashad Quinteros PERITONEUM / Gee Slot free air or fluid.    BONES:  There is no acute osseous without contrast on 6/25/2020 at [facility] reveals Interval increase in size of pulmonary nodules in the RIGHT upper lobe and LEFT lower lobe. Interval resolution of medial RIGHT lower lobe nodules. Essentially stable RIGHT middle lobe nodule. Continue follow up in 6 months is recommended. CT scan of the Abdomen Pelvis without contrast on 6/25/2020  at [facility] reveals a Stable CT of the abdomen and pelvis with postsurgical changes in the left retroperitoneum. No evidence of recurrent or metastatic disease. 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 Atherosclerotic disease. Large hiatal hernia. Colonic diverticulosis without evidence of acute diverticulitis.  Low-density lesions in the right kidney which are stable but  incompletely characterized on this exam.    Serology on 10/10/2019 revealed:  Iron- 45  TIBC- 255  Saturation%- 17.6%  Ferritin- 13.40  B2M- 2.8  Kappa light chains- 42.4  Lambda light chains- 24.8 with K/L ratio 1.71  IgG 945, IgA 176, IgM 141  IgG 4- 137  M-spike- not observed      Serology on 2/3/2020 revealed:  CMP with BUN 30, creatinine 1.90  Iron- 16  TIBC- 223  Saturation %- 7.2  Ferritin- 7.66  B2M- 2.8  Kappa light chains- 25.8  Lambda light chains- 14.7  K/L ratio- 1.76  IgG 872, IgA 156, IgM 128  IgG 4- 136  M-spike- not observed    Serology on 6/10/2020 revealed:  CMP with BUN 24, creatinine 1.8  B2M- 3.5  Kappa light chains- 134.3  Lambda light chains- 39.3  K/L ratio- 3.42  IgG 1417, IgA 205, IgM 244  IgG 4- 213  Iron- 30  TIBC- 270  Iron sat- 11%  Ferritin- 14.9  M-spike- not observed    Serology on 7/15/2020 revealed:  Kappa light chains- 123.6  Lambda light chains- 36.7  K/L ratio- 3.37  IgG 4- 250    Dr. Cristhian Acosta began treatment with Rituxan on 7/28/2020.    24-Hr Urine electrophoresis on 7/17/2020 revealed:  Protein, 24H- 92  M-spike- not observed    Serology on 12/16/2020 revealed:  CMP with creatinine 1.91  B2M- 3.3  Kappa light was found to have right leg swelling that was documented as RLE DVT. He was placed on Eliquis 5 mg twice a day and was to have had a followup rescanning 3 months later. This is being managed by Dr. Sudhir Rios. #5  Omar Mckoy had issues of right hip pain. This is being managed by orthopedics at the orthopedic clinic with a recent right hip injection. He has had imaging of the area. I am suspicious he may have avascular necrosis associated with the chronic prednisone use. This is being addressed at the orthopedic clinic and with Dr. Abiodun oVss. He also has low back pain and they are not sure whether his pain is from his low back or from his hip. This continues to be addressed    #6  Skin rash  Itching on arms and torso, Dr. Ptarick Frey addressing this issue. I have encouraged again to continue follow-up with Dr. Brennan White and Dr. Aliza Babin regarding his skin rash. #7  Dr. Zandra Butcher was to have addressed a tear in the retina, however Omar Mckoy did not follow through. #8  GI cancer screening  Colonoscopy performed on 7/13/2017 by Dr. Andrea Zacarias and revealed avm's, hiatal hernia and camerons erosions. CSC recommended in 5 years given history of colon polyps. Capsule endoscopy recommended to r/o other avm's within the small bowel that may need to be treated. M2A pill cam results read on 1/18/2018 revealed no signs or stigmata of small bowel blood loss. If evidence of GI blood loss continues, EGD recommended. Gerardo Painting am scribing for Anne-Marie Varela MD. Electronically signed by Nayeli Palomares LPN on 10/38/2233 at 2:31 PM       Omar Mckoy was seen today for follow-up. Diagnoses and all orders for this visit: IgG monoclonal gammopathy  -     Electrophoresis Protein, Serum with Reflex to Immunofixation; Future  -     Beta 2 Microglobulin, Serum; Future  -     [place]/Lambda Free Lt Chains, Serum Quant; Future  -     Immunoglobulins, Quantitative;  Future  -     IgG 4; Future    Pulmonary nodules

## 2020-12-21 ENCOUNTER — HOSPITAL ENCOUNTER (OUTPATIENT)
Dept: INFUSION THERAPY | Age: 80
Discharge: HOME | End: 2020-12-21
Payer: MEDICARE

## 2020-12-21 ENCOUNTER — OFFICE VISIT (OUTPATIENT)
Dept: HEMATOLOGY | Age: 80
End: 2020-12-21
Payer: MEDICARE

## 2020-12-21 VITALS
OXYGEN SATURATION: 96 % | BODY MASS INDEX: 27.98 KG/M2 | SYSTOLIC BLOOD PRESSURE: 142 MMHG | WEIGHT: 188.9 LBS | DIASTOLIC BLOOD PRESSURE: 70 MMHG | HEART RATE: 63 BPM | HEIGHT: 69 IN | TEMPERATURE: 97.3 F

## 2020-12-21 DIAGNOSIS — D47.2 MONOCLONAL GAMMOPATHY: ICD-10-CM

## 2020-12-21 LAB
BASOPHILS ABSOLUTE: 0.02 K/UL (ref 0.01–0.08)
BASOPHILS RELATIVE PERCENT: 0.4 % (ref 0.1–1.2)
EOSINOPHILS ABSOLUTE: 0.22 K/UL (ref 0.04–0.54)
EOSINOPHILS RELATIVE PERCENT: 4 % (ref 0.7–7)
HCT VFR BLD CALC: 39.7 % (ref 40.1–51)
HEMOGLOBIN: 12.9 G/DL (ref 13.7–17.5)
LYMPHOCYTES ABSOLUTE: 0.32 K/UL (ref 1.18–3.74)
LYMPHOCYTES RELATIVE PERCENT: 5.9 % (ref 19.3–53.1)
MCH RBC QN AUTO: 30.6 PG (ref 25.7–32.2)
MCHC RBC AUTO-ENTMCNC: 32.5 G/DL (ref 32.3–36.5)
MCV RBC AUTO: 94.1 FL (ref 79–92.2)
MONOCYTES ABSOLUTE: 0.99 K/UL (ref 0.24–0.82)
MONOCYTES RELATIVE PERCENT: 18.1 % (ref 4.7–12.5)
NEUTROPHILS ABSOLUTE: 3.92 K/UL (ref 1.56–6.13)
NEUTROPHILS RELATIVE PERCENT: 71.6 % (ref 34–71.1)
PDW BLD-RTO: 20 % (ref 11.6–14.4)
PLATELET # BLD: 149 K/UL (ref 163–337)
PMV BLD AUTO: 11.3 FL (ref 7.4–10.4)
RBC # BLD: 4.22 M/UL (ref 4.63–6.08)
WBC # BLD: 5.47 K/UL (ref 4.23–9.07)

## 2020-12-21 PROCEDURE — G8417 CALC BMI ABV UP PARAM F/U: HCPCS | Performed by: INTERNAL MEDICINE

## 2020-12-21 PROCEDURE — G8484 FLU IMMUNIZE NO ADMIN: HCPCS | Performed by: INTERNAL MEDICINE

## 2020-12-21 PROCEDURE — 4040F PNEUMOC VAC/ADMIN/RCVD: CPT | Performed by: INTERNAL MEDICINE

## 2020-12-21 PROCEDURE — 85025 COMPLETE CBC W/AUTO DIFF WBC: CPT

## 2020-12-21 PROCEDURE — 99214 OFFICE O/P EST MOD 30 MIN: CPT | Performed by: INTERNAL MEDICINE

## 2020-12-21 PROCEDURE — G8427 DOCREV CUR MEDS BY ELIG CLIN: HCPCS | Performed by: INTERNAL MEDICINE

## 2020-12-21 PROCEDURE — 1036F TOBACCO NON-USER: CPT | Performed by: INTERNAL MEDICINE

## 2020-12-21 PROCEDURE — 99211 OFF/OP EST MAY X REQ PHY/QHP: CPT

## 2020-12-21 PROCEDURE — 1123F ACP DISCUSS/DSCN MKR DOCD: CPT | Performed by: INTERNAL MEDICINE

## 2021-02-20 ENCOUNTER — IMMUNIZATION (OUTPATIENT)
Age: 81
End: 2021-02-20
Payer: MEDICARE

## 2021-02-20 PROCEDURE — 91300 COVID-19, PFIZER VACCINE 30MCG/0.3ML DOSE: CPT | Performed by: FAMILY MEDICINE

## 2021-02-20 PROCEDURE — 0001A COVID-19, PFIZER VACCINE 30MCG/0.3ML DOSE: CPT | Performed by: FAMILY MEDICINE

## 2021-03-13 ENCOUNTER — IMMUNIZATION (OUTPATIENT)
Age: 81
End: 2021-03-13
Payer: MEDICARE

## 2021-03-13 PROCEDURE — 91300 COVID-19, PFIZER VACCINE 30MCG/0.3ML DOSE: CPT | Performed by: FAMILY MEDICINE

## 2021-03-13 PROCEDURE — 0002A COVID-19, PFIZER VACCINE 30MCG/0.3ML DOSE: CPT | Performed by: FAMILY MEDICINE

## 2021-04-12 ENCOUNTER — HOSPITAL ENCOUNTER (OUTPATIENT)
Dept: INFUSION THERAPY | Age: 81
Discharge: HOME | End: 2021-04-12
Payer: MEDICARE

## 2021-04-12 DIAGNOSIS — D64.9 ANEMIA, UNSPECIFIED TYPE: ICD-10-CM

## 2021-04-12 DIAGNOSIS — D47.2 MONOCLONAL GAMMOPATHY: ICD-10-CM

## 2021-04-15 NOTE — PROGRESS NOTES
loss.    Endoscopy 2017 by Dr. Matt Montana. Shi revealed a small bowel AVM that was cauterized. A moderate sized hiatal hernia with mild Jake's erosions was noted as well. Colonoscopy 17 was appreciable for cecal AVM, also cauterized as well as internal hemorrhoids. M2A capsule endoscopy 1/3/2018 by Dr. Swati Murphy revealed one irritated area in the colon, though no signs of bleeding or stigmata of small bowel blood loss. Should evidence of GI blood loss persists, consideration should be given for repeat EGD/colonoscopy due to evidence of blood loss on procedures in  with possible need for repeat cauterization. Evangelina Zhao was previously on antiplatelet therapy for CAD, now treated with aspirin only. He denies melena or hematochezia at this time. In addition to the above, Evangelina Zhao has been followed by Dr. Milana Page for thoracic adenopathy. Contrasted Chest CT 1/10/2018 at [facility] documented an interval increase in the size of intrathoracic lymph nodes compared to 2016; 2016; CTA chest 2016 including the followin mm precarinal LN   10 mm hilar LN   14 mm subcarinal LN   17 left hilar LN, previously 10 mm   4 mm medial ALIYA nodule, more conspicuous   4 mm lingular nodule, new   7 mm left major fissure nodule, stable   7 mm LLL pleural based noncalcified nodule, previously 5 mm   6 mm LLL nodule, previously 4 mm   3 mm LLL nodule   8 mm RUL, previously 5 mm   Several RLL nodule, relatively stable    Bronchoscopy with EBUS and biopsy of a station 7 node was performed 18 by Dr. Milana Page. Pathology:  1. station 7 lymph node revealed:  A. Small, mature lymphocytes and plasma cells. B. Ciliated columnar bronchial epithelial cells and gallbladder cells. C. background blood. D. negative for malignant cells.   2. Bronchial washings  A. acute inflammation, mild  B. squamous metaplasia  C. blood and mucus  D. negative for malignant cells  E. GMS stain negative for fungal organisms  FLOW cytometry on the station 7 lymph node biopsied by EBUS on 1/25/2018 did NOT reveal immunophenotypic evidence of a clonal B cell population     IN RETROSPECT,  Patricia Hinds has a history of FNA of a right neck mass performed 12/23/2011 by Dr. Antonina Gar. Pathology revealed no malignant cells, with fragments of benign lymphoid tissue and a few benign salivary gland acinar fragments. On 3/23/2012 excision of a right submandibular gland was performed by Dr. 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 There was no evidence of epithelial malignancy. FLOW cytometry revealed no evidence of B-cell or T-cell lymphoma. On exam, left submandibular lymphadenopathy is appreciated and perhaps shotty axillary lymphadenopathy. CBC 4/26/2018 showed a normal WBC of 4.52, though with persistent monocytosis at 22.8%. Hemoglobin is 10.8 with MCV 91.0 and platelet count 602,411. Patricia Hinds' main complaint had been of hoarseness and progressive cough, which occurs daily. He dates symptoms back to August, 2017 after having an allergic reaction to Lyrica for post-herpetic neuralgia. In addition to hoarseness and cough, Patricia Hinds also reports weight loss of 24 pounds over the previous 4-6 weeks prior to presentation at this office, night sweats and chills. He was also referred to [address] [place] [address] by Dr. Boom Nickerson regarding persistent pulmonary symptoms. Given the abnormal CBC finding, adenopathy and symptomatology work up was also requested, and he was referred to this office.     Serology 4/26/2018  Serum Fe - 32  TIBC - 220  Fe Sat - 15%  Ferritin - 45  B12 - 744  Folate > 20  Retic - 1.6 %  LDH -175  B2 M - 7 (0.6-2.4)  QI - [id number] (700-1600), IgM 170 ()  SPEP - no M spike, TP - 10.2 (6-8.5), globulin - 7.5 (2.2-3.9)  Free serum light chains - kappa 930.3 (3.3-19.4), lambda 228 (5.7-26.3), K/L ratio 4.08 (0.26-1.65)    Bone marrow aspiration and biopsy on 4/30/2018 was normocellular (~2025 %) with adequate maturing trilineage hematopoiesis, no significant dyspoiesis and no increase in blasts. Megakaryocytes had a spectrum of morphology. There was no stainable storage iron and no increase in reticulin fibrosis. 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 Flow cytometry did not reveal B-cell monoclonality nor T-cell aberrant antigen expression. FISH studies for MDS was negative. There was no morphologic evidence of a myelodysplastic syndrome. There was no diagnostic evidence of a limp low at disorder, granulomas, nor metastatic malignancies on the bone marrow biopsy or aspirate. CT scans of the neck with contrast on 4/30/2018 documented an asymmetry within the left submandibular gland compared to the right. The left submandibular gland measures 4.2 x 2.6 x 2.4 cm compared to 12 mm on the right. Otherwise there are no discrete neck masses or pathologically enlarged lymph nodes. 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 Prominent right external iliac chain lymph nodes of indeterminate significance were also documented. Diverticulosis without diverticulitis and a large hiatal hernia was described. The prostate gland was enlarged with lifting of the base of the urinary bladder. Also noted was a questionable abnormal appearance of the pancreas with some loss of the normal pancreatic on to work without a discrete mass with MRI recommended.     MRI of the abdomen W & WO on 5/8/2018 documented borderline splenomegaly, a large ventral hernia, nonenhancing malignancy. Interpretation of the chest CT 6/13/18 at [facility] was consistent with multiple bilateral pulmonary nodules and mediastinal and hilar adenopathy, possibly representing sarcoidosis with malignancy not excluded. AAA partially imaged. CT scans of the chest, abdomen/pelvis 8/3/18 at [facility] were grossly unchanged with surveillance to continue. Regarding the left submandibular gland, I spoke with Dr. Giles Verma on 6/27/2018. Dr. Ema Glover stated he has been monitoring the left submandibular gland for years, without gross change. He did not recommend excision, rather continued monitoring due to stability and concern for xerostomia. Lasix renogram and assessment by Dr. Corrine Evangelista was completed in in September, 2018. Dr. Dusty Collado reviewed imaging and confirmed the need for removal of the whole left kidney. Tao's case was again reviewed with the sarcoma tumor Board. Renogram demonstrated differential kidney function of 60% on the right. Dr. Corrine Evangelista estimated his final GFR may be [phone], thus likely avoiding hemodialysis postoperatively. Left nephrectomy appeared more reasonable given recent stone disease and less function. Yara Gardner underwent resection of the left retroperitoneal mass with radical left nephrectomy on 10/31/18 by Dr. Mensah Mor Dr. Stacy Fox at [facility]. Pathology as follows:  1. Liver, segment 3, excision: Benign liver parenchyma with calcified granuloma. 2. Left retroperitoneal nodule, excision: Fibroadipose tissue with chronic lymphoplasmacytic and histiocytic inflammation. There were NO features of well-differentiated liposarcoma noted. 3. Left periurethral tissue, excision: Fibroadipose tissue with chronic lymphoplasmacytic and histiocytic inflammation. 4. Left lateral pararenal tissue, excision: Benign fibroadipose tissue with chronic lymphoplasmacytic and histiocytic inflammation.    5. Left nephrectomy: Marked lymphoplasmacytic and histiocytic inflammation, focal fibrosis and changes of the obliterative phlebitis, most consistent with IgG4-related disease. Postoperative noncontrast abdominal/pelvic CT 11/23/18 at [facility] documented stranding of fat in the retroperitoneum, likely related to recent surgery. Diverticulosis of the colon with inflammation of the fat around the mid to distal descending colon close to the prior surgical site present, possibly representing postoperative change. There was a tiny 2 mL stone in the distal right ureter with mild-to-moderate dilatation of the right ureter and mild dilatation of the right renal collecting system. Nonobstructing stones also noted in the right kidney as well as a 4 cm probable cyst in the upper pole right kidney. Surveillance non-contrast CT scans of the chest, abdomen and pelvis 2/22/19 at [facility] documented interval decrease in numerous mediastinal lymph nodes and decrease in size of multiple bilateral pulmonary nodules. There was no residual lymphadenopathy and near complete resolution of inflammatory changes around the distal descending colon. Previous right renal lesions were stable. Brenda Granados is followed by Dr. 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 He completed prednisone 40 mg daily ×1 month on 2/23/19, and 30 mg daily ×4 weeks on 3/25/19. Brenda Granados will take 20 mg ×4 weeks, then 10 mg. By June 2020, prednisone have been tapered down to 3 mg daily by Dr. Gregg Arechiga. CT scan of the chest without contrast on 10/3/2019 identified 2 discrete subcentimeter nodules in the RLL of the lungs were not present previously, one measuring 3 mm and the other 5 mm. Another 7 mm nodule in the R mL is unchanged. Follow-up recommended. CT scan of the abdomen and pelvis without contrast on 10/3/2019 at [facility] did not reveal evidence of recurrent disease or metastatic disease to the abdomen or pelvis.   Prior left nephrectomy is noted    CT scan of the Chest without contrast on 6/25/2020 at [facility] reveals Interval increase in size of pulmonary nodules in the RIGHT upper lobe and LEFT lower lobe. Interval resolution of medial RIGHT lower lobe nodules. Essentially stable RIGHT middle lobe nodule. Continue follow up in 6 months is recommended. CT scan of the Abdomen Pelvis without contrast on 6/25/2020  at [facility] reveals a Stable CT of the abdomen and pelvis with postsurgical changes in the left retroperitoneum. No evidence of recurrent or metastatic disease. 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 Atherosclerotic disease. Large hiatal hernia. Colonic diverticulosis without evidence of acute diverticulitis. Low-density lesions in the right kidney which are stable but  incompletely characterized on this exam.      Serology on 10/10/2019 revealed:  Iron- 45  TIBC- 255  Saturation%- 17.6%  Ferritin- 13.40  B2M- 2.8  Kappa light chains- 42.4  Lambda light chains- 24.8 with K/L ratio 1.71  IgG 945, IgA 176, IgM 141  IgG 4- 137  M-spike- not observed      Serology on 2/3/2020 revealed:  CMP with BUN 30, creatinine 1.90  Iron- 16  TIBC- 223  Saturation %- 7.2  Ferritin- 7.66  B2M- 2.8  Kappa light chains- 25.8  Lambda light chains- 14.7  K/L ratio- 1.76  IgG 872, IgA 156, IgM 128  IgG 4- 136  M-spike- not observed    Serology on 6/10/2020 revealed:  CMP with BUN 24, creatinine 1.8  B2M- 3.5  Kappa light chains- 134.3  Lambda light chains- 39.3  K/L ratio- 3.42  IgG 1417, IgA 205, IgM 244  IgG 4- 213  Iron- 30  TIBC- 270  Iron sat- 11%  Ferritin- 14.9  M-spike- not observed    Serology on 7/15/2020 revealed:  Kappa light chains- 123.6  Lambda light chains- 36.7  K/L ratio- 3.37  IgG 4- 250    24-Hr Urine electrophoresis on 7/17/2020 revealed:  Protein, 24H- 92  M-spike- not observed    By June 2020, prednisone have been tapered down to 3 mg daily by Dr. Carlos Carter.   With new findings on the CT scan of the chest and increased IgG4 level of 250, prednisone was increased again to 5 mg a day to be delivered along with Rituxan. Based on the new findings on the CT scan of the chest from 6/25/2020 and the elevated IgG4 of 250, Dr. Cherri Madrigal initiated Rituxan on 7/28/2020. [place] will receive a second dose of Rituxan on 8/11/2020 with reevaluation every 6 months thereafter. CT chest without contrast on 12/16/2020 was compared to 6/25/2020 and documented:  · No definite intrathoracic metastasis. · Bilateral interlobular septal thickening with patchy consolidation in the LEFT lower lobe. Imaging appearance favors mild pulmonary edema and atelectasis. Correlate with clinical exam and laboratory analysis  · 7 mm RML lung nodule, stable  · 4 mm adjacent to above RML lung nodule, stable   · 5 mm LLL lung nodule, stable  · 5 mm LLL subpleural lung nodule, stable   · Decreased size of right apical lung nodule which now appears more linear and scar like  · No new or enlarging nodule  · No enlarged axillary, supraclavicular, mediastinal or hilar lymph nodes  · No acute osseous finding    CT abdomen and pelvis without contrast on 12/16/2020 documented:  · No evidence of retroperitoneal mass or lymphadenopathy  · Large sliding-type heart of hernia  · 3.9 cm low-density mass located laterally in the upper pole of left kidney, CT density suggest cyst  · 0.7 cm smaller low density nodule seen located medially in upper pole of left kidney, CT density suggest a cyst  · 2 mm calculus in lower pole of right kidney   · Left nephrectomy without regional complications. · Diverticulosis of the distal colon. No evidence for diverticulitis. · Enlarged prostate. · Fusiform aneurysmal dilatation of distal abdominal aorta. 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 Further follow-up may be obtained.   · Fat-containing small inguinal hernias bilaterally    Dr. 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 He  received Rituxan weekly x 4 Dr. Mathew Syed in January/Feb 2021. SerologyDrVictorina Adams Imtiaz Chatman4/8/2021  IgG4 - 182      TREATMENT SUMMARY:  · Resection of left retroperitoneal mass and radical left nephrectomy by Dr. Goldy Easton at [facility] on 10/31/2018. Pathology revealed IgG4 related disease. · Prednisonemanagement per Dr. Mathew Syed  · Rituxanmanagement per Dr. Mathew Syed              Allergies:  Lyrica [pregabalin], Penicillins, and Sulfa antibiotics    Medicines:  Current Outpatient Medications   Medication Sig Dispense Refill    Ergocalciferol (VITAMIN D2 PO) Take by mouth      Calcium-Magnesium-Vitamin D (CALCIUM 1200+D3 PO) Take by mouth      predniSONE (DELTASONE) 5 MG tablet Take 3 mg by mouth daily 1mg      HYDROcodone-acetaminophen (NORCO) 7.5-325 MG per tablet Take 1 tablet by mouth every 6 hours as needed for Pain.  vitamin D (CHOLECALCIFEROL) 1000 UNIT TABS tablet Take 1,000 Units by mouth daily      amLODIPine (NORVASC) 5 MG tablet Take 5 mg by mouth daily      clonazePAM (KLONOPIN) 2 MG tablet Take 2 mg by mouth nightly as needed      atorvastatin (LIPITOR) 10 MG tablet Take 10 mg by mouth daily      cetirizine (ZYRTEC) 5 MG tablet Take 5 mg by mouth daily      aspirin 81 MG tablet Take 81 mg by mouth daily.  pantoprazole (PROTONIX) 40 MG tablet Take 1 tablet by mouth 2 times daily. 60 tablet 11    triamcinolone (KENALOG) 0.1 % cream FREDIS EXT TO BOTH ARMS AND LEGS BID PRN FOR RASH       No current facility-administered medications for this visit.         Past Medical History:      Diagnosis Date    Allergic rhinitis     Anemia, unspecified     Anxiety     Asthma     BPH (benign prostatic hyperplasia)     Dr Angie Segovia CAD (coronary artery disease)     Dr Karla Hall [facility])     skin-non-melanoma    Carotid artery Genitourinary: Negative for dysuria, flank pain, frequency, hematuria or urgency. Musculoskeletal: Negative for back pain, joint swelling, myalgias or neck pain. Skin: Negative for rash or petechiae. Neurological: Negative for tremors, seizures, syncope, weakness or headaches. Hematological: No active bruising or bleeding. Psychiatric/Behavioral: Negative for hallucinations. Objective:  Vital Signs: Blood pressure (!) 142/84, pulse 90, temperature 97.3 °F (36.3 °C), temperature source Temporal, height 5' 9\" (1.753 m), weight 194 lb 4.8 oz (88.1 kg), SpO2 96 %. Physical Exam   Constitutional: Oriented to person, place, and time. No acute distress. Head: Normocephalic and atraumatic. Nose: Nose normal.   Mouth/Throat: Oropharynx is clear and moist. No oropharyngeal exudate. Eyes: Pupils are equal and round. Conjunctivae and EOM are normal. No scleral icterus. Neck: Normal range of motion. Neck supple. No JVD. No appreciable thyromegaly. Cardiovascular: Normal rate, regular rhythm, normal heart sounds and intact distal pulses. Exam reveals no gallop, murmurs or friction rub. Pulmonary/Chest: Effort normal and breath sounds normal. No respiratory distress. No wheezes. Abdominal: Soft. Bowel sounds are normal. No organomegally or masses. No tenderness. There is no rebound and no guarding. Musculoskeletal: Normal range of motion. No edema or tenderness. Lymphadenopathy: No cervical, axillary or inguinal lymphadenopathy. Neurological: Alert and oriented to person, place, and time. Cranial nerves are intact. Neurological exam is nonfocal  Skin: Skin is warm and dry. No rash noted. No erythema. No pallor. Psychiatric: Judgment normal.     Labs:  BMP:   No results for input(s): NA, K, CL, CO2, PHOS, BUN, CREATININE, CALCIUM in the last 72 hours.   CBC:   Recent Labs     04/19/21  0819   WBC 7.50   HGB 13.1*   HCT 39.2*   MCV 93.6*   *       PT/INR: No results for input(s): PROTIME, INR in the last 72 hours. APTT: No results for input(s): APTT in the last 72 hours. Ionized Calcium:No results for input(s): IONCA in the last 72 hours. Magnesium:No results for input(s): MG in the last 72 hours. Phosphorus:No results for input(s): PHOS in the last 72 hours. Hepatic:   No results for input(s): ALKPHOS, ALT, AST, PROT, BILITOT, BILIDIR, LABALBU in the last 72 hours. Cultures:   No results for input(s): CULTURE in the last 72 hours. Radiology reports as per the Radiologist  Radiology:     Diane Smith 82- 10/3/2019 8:32 AM CDT  HISTORY: PULMONARY NODULE; R91.1-Solitary pulmonary nodule  COMPARISON: CT chest dated 03/26/2019  DOSE LENGTH PRODUCT: 422 mGy cm. Automated exposure control was also  utilized to decrease patient radiation dose. TECHNIQUE: Serial helical tomographic images of the chest were acquired. Multiplanar reformatted images were provided for review. FINDINGS:  The imaged portion of the neck and thyroid gland is unremarkable. Right lower lobe nodule measuring 7 mm medially (image 119, series 3). An adjacent 5 mm nodule medially and inferiorly (image 126, series 3)  was not seen on prior examination. Left lower lobe nodule, measuring 3  mm (image 111, series 3), grossly unchanged given differences in  techniques. A left lower lobe nodule more superiorly measuring 5 mm  (image 97, series 3) appears also unchanged. 7 mm nodule in the right  middle lobe, unchanged. No pleural effusion is present. The trachea and  bronchial tree are patent. Coronary artery disease. Atherosclerotic disease. The heart is within  normal limits in size. Moderate hiatal hernia. Great vessels, and  pulmonary vessels are otherwise unremarkable in appearance within limits  of a noncontrast study. There is no pericardial effusion. No enlarged  axillary or mediastinal lymph nodes are present. No acute findings are seen in the bones or surrounding soft tissues.   Remote insult to the left chest wall posteriorly with multiple rib  Deformities. Please see report from CT abdomen pelvis adjacent for intra-abdominal  Findings. .    2 discrete subcentimeter nodules in the right lower lobe were not  present on prior examination. These are indeterminate short-term  follow-up recommended. This report was finalized on 10/03/2019 09:40 by Dr. Maureen Marcos MD.      ___________________________________________________________________________      Brooksie Brine: CT Abdomen and Pelvis without contrast  10/3/2019 8:58 AM CDT  INDICATION: ENLARGED LYMPH NODES; R59.9-Enlarged lymph nodes,  unspecified  COMPARISON: CT abdomen pelvis dated 03/26/2019. CT abdomen pelvis dated  02/22/2019. TECHNIQUE:  Abdomen and pelvis were scanned utilizing a multidetector helical  scanner from the diaphragm to the ischial tuberosities without  administration of IV contrast. Coronal and sagittal reformations were  obtained. [Routine protocol is performed.]  Radiation dose equals .4 mGy-cm.  Automated exposure control dose  reduction technique was implemented. FINDINGS:  LINES and TUBES: None. LOWER THORAX: No focal consolidation. No pleural effusion. No  pneumothorax. There is a small to moderate hiatal hernia. Coronary  artery disease. HEPATOBILIARY:  No focal hepatic lesions.   No liver laceration.   No  biliary ductal dilatation. GALLBLADDER:  No radiopaque stones or sludge.   No wall thickening. SPLEEN:  No splenomegaly.    No splenic laceration. PANCREAS:  No focal masses or ductal dilatation. ADRENALS:  No adrenal nodules. KIDNEYS/URETERS: Prior left nephrectomy. Right renal exophytic  hypodensity measuring 3.3 cm unchanged. Right renal upper pole  parapelvic 1.2 cm is also unchanged. There is no new soft tissue density  within the left nephrectomy bed. GI TRACT:  No abnormal distention, wall thickening, or evidence of bowel  obstruction.   There is no acute appendicitis.  Colonic diverticulosis,  without evidence of acute diverticulitis. [employer] [facility] PELVIC ORGANS/BLADDER:  No acute pathology. Nonspecific mild wall  thickening of bladder, a chronic finding. [employer] [facility] LYMPH NODES:  There is no inguinal canal, pelvic wall, retroperitoneal  or mesenteric lymphadenopathy by size criteria. .   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 Atherosclerotic disease. [employer] [facility] PERITONEUM / Camm[place] free air or fluid. BONES:  There is no acute osseous pathology. No obvious suspicious lytic  or blastic lesion identified. [employer] [facility] SOFT TISSUES:  Unremarkable. Result Impression   1.  No evidence of recurring disease or metastatic disease to the  abdomen and pelvis. 2.  Prior left nephrectomy. 3.  Distal abdominal aorta aneurysm. 4.  Additional chronic findings as discussed above. This report was finalized on 10/03/2019 09:27 by Dr. Fernando Schneider MD         ASSESSMENT AND PLAN:  Marcio Ricketts is a 80year old  gentleman managed with primary and secondary diagnoses as outlined:  · Resection of left retroperitoneal mass and radical left nephrectomy by Dr. Samantha Fontenot at [facility] on 10/31/2018. Pathology revealed IgG4 related disease. · Multifactorial anemia  · IgG kappa MGUS  · Right lower extremity DVT on Eliquis bid by Dr. Luis Alberto Vega    Mr. Miles Bond received weekly Rituxan x 4 in 2021    Mr. Miles Bond was seen by Dr. Xavier Olson on 2021. His prednisone has been reduced down to 1 mg a day a couple of days prior to that visit. At that time the record states that he was doing okay but Benito Sebastian states now that he is having excessive joint pains pleuritic-like chest discomfort, rib pain and feeling kind of rough. His ex-wife  2 months ago, and their son  last month. He has had a difficult time recently. Benito Sebastian comes today in followup for evaluation and to go over treatment he is receiving.     Physical examination today, 2021 does not reveal evidence of palpable peripheral lymphadenopathy. Lungs have scattered rales bilaterally in upper and lower lung fields. He does complain on discomfort on deep inspiration, he may have a pleural friction rub. The heart is regular the abdomen is soft and benign  He does have a cushingoid appearance of the face due to chronic prednisone/steroid use on a slow taper presently    CT scan of the chest without contrast on 10/3/2019 identified 2 discrete subcentimeter nodules in the RLL of the lungs were not present previously, one measuring 3 mm and the other 5 mm. Another 7 mm nodule in the R mL is unchanged. Follow-up recommended. CT scan of the abdomen and pelvis without contrast on 10/3/2019 at [facility] did not reveal evidence of recurrent disease or metastatic disease to the abdomen or pelvis. Prior left nephrectomy is noted    CT scan of the Chest without contrast on 6/25/2020 at [facility] reveals Interval increase in size of pulmonary nodules in the RIGHT upper lobe and LEFT lower lobe. Interval resolution of medial RIGHT lower lobe nodules. Essentially stable RIGHT middle lobe nodule. Continue follow up in 6 months is recommended. CT scan of the Abdomen Pelvis without contrast on 6/25/2020  at [facility] reveals a Stable CT of the abdomen and pelvis with postsurgical changes in the left retroperitoneum. No evidence of recurrent or metastatic disease. 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 Atherosclerotic disease. Large hiatal hernia. Colonic diverticulosis without evidence of acute diverticulitis.  Low-density lesions in the right kidney which are stable but  incompletely characterized on this exam.    Serology on 10/10/2019 revealed:  Iron- 45  TIBC- 255  Saturation%- 17.6%  Ferritin- 13.40  B2M- 2.8  Kappa light chains- 42.4  Lambda light chains- 24.8 with K/L ratio 1.71  IgG 945, IgA 176, IgM 141  IgG 4- 137  M-spike- not observed      Serology on 2/3/2020 revealed:  CMP with BUN 30, creatinine 1.90  Iron- 16  TIBC- 223  Saturation %- 7.2  Ferritin- 7.66  B2M- 2.8  Kappa light chains- 25.8  Lambda light chains- 14.7  K/L ratio- 1.76  IgG 872, IgA 156, IgM 128  IgG 4- 136  M-spike- not observed    Serology on 6/10/2020 revealed:  CMP with BUN 24, creatinine 1.8  B2M- 3.5  Kappa light chains- 134.3  Lambda light chains- 39.3  K/L ratio- 3.42  IgG 1417, IgA 205, IgM 244  IgG 4- 213  Iron- 30  TIBC- 270  Iron sat- 11%  Ferritin- 14.9  M-spike- not observed    Serology on 7/15/2020 revealed:  Kappa light chains- 123.6  Lambda light chains- 36.7  K/L ratio- 3.37  IgG 4- 250    Dr. Roberta Diggs began treatment with Rituxan on 7/28/2020.    24-Hr Urine electrophoresis on 7/17/2020 revealed:  Protein, 24H- 92  M-spike- not observed    Serology on 12/16/2020 revealed:  CMP with creatinine 1.91  B2M- 3.3  Kappa light chains- 129.5  Lambda light chains- 45.0  K/L ratio- 2.88  IgG 1580, IgA 216, IgM 176  M-spike- not observed  IgG4- 264    Serology 4- revealed:  B2M-4.0  KLC-109.9  LLC- 37.6  K/L ratio - 2.92  IgG 1228  IgA- 187  IgM-147  Mspike-not observed  TIBC 279  Iron level   50  Iron saturation    18  Ferritin     76    Summary of IgG4 levels as follows:  10/10/2019  IgG 4- 137  2/3/2020      IgG 4- 136  6/10/2020    IgG 4- 213  7/15/2020    IgG 4- 250  12/16/2020  IgG4- 264  4/8/2021      IgG4-182      IgG4 has been stable and will be repeated prior to his return in 4 months along with an SPEP and light chains. I will follow-up on this again in 4 months. By June 2020, prednisone have been tapered down to 3 mg daily by Dr. Roberta Diggs. With new findings on the CT scan of the chest and increased IgG4 level of 250, prednisone was increased again to 5 mg a day to be delivered along with Rituxan. Based on the new findings on the CT scan of the chest from 6/25/2020 and the elevated IgG4 of 250, Dr. Claudine Bardales initiated Rituxan on 7/28/2020.   Baljit Thompsono will receive a second dose of Rituxan on 8/11/2020 with reevaluation every 6 months thereafter. CT chest without contrast on 12/16/2020 was compared to 6/25/2020 and documented:  · No definite intrathoracic metastasis. · Bilateral interlobular septal thickening with patchy consolidation in the LEFT lower lobe. Imaging appearance favors mild pulmonary edema and atelectasis. Correlate with clinical exam and laboratory analysis  · 7 mm RML lung nodule, stable  · 4 mm adjacent to above RML lung nodule, stable   · 5 mm LLL lung nodule, stable  · 5 mm LLL subpleural lung nodule, stable   · Decreased size of right apical lung nodule which now appears more linear and scar like  · No new or enlarging nodule  · No enlarged axillary, supraclavicular, mediastinal or hilar lymph nodes  · No acute osseous finding    CT abdomen and pelvis without contrast on 12/16/2020 documented:  · No evidence of retroperitoneal mass or lymphadenopathy  · Large sliding-type heart of hernia  · 3.9 cm low-density mass located laterally in the upper pole of left kidney, CT density suggest cyst  · 0.7 cm smaller low density nodule seen located medially in upper pole of left kidney, CT density suggest a cyst  · 2 mm calculus in lower pole of right kidney   · Left nephrectomy without regional complications. · Diverticulosis of the distal colon. No evidence for diverticulitis. · Enlarged prostate. · Fusiform aneurysmal dilatation of distal abdominal aorta. 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 Further follow-up may be obtained. · Fat-containing small inguinal hernias bilaterally    Dr. 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 He received Rituxan weekly x 4 Dr. Kary Irwin in January/Feb 2021.     SerologyDr. 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 CBC on 2/10/2020 had a WBC of 6.52 with a hemoglobin of 8.9, MCV 83.2 and a platelet count of 148,634. CBC 6/10/2020 revealed a WBC of 6.73. Hgb is 12.4 with an MCV of 92.3 and platelet count of 764,546. CBC on 7/29/2020 revealed WBC of 6.26. Hgb 11.9 with an MCV of 92.7 and platelet count of 476,824. CBC 12/21/2020 revealed a WBC of 5.47. Hgb is 12.9 with an MCV of 94.1 and platelet count of 371,256. CMP on 6/8/2020 at [facility] revealed BUN 26, creatinine 2.08, otherwise normal.    CBC today 4/19/2021 reveals a WBC of 7.50 with a hemoglobin of 13.1 and platelet count of 937,420. Normal differential and MCV of 93.6    In October 2020, Frantz Barron received iron infusions x 2 due to anemia associated with iron deficiency. MCV is stable and hemoglobin is 13.1. Conservative monitoring will continue regarding iron deficiency. #3  IgG Kappa MGUS, stable   Serology will be repeated prior to his return in 4 months. Serology on 12/16/2020 revealed:  CMP with creatinine 1.91  B2M- 3.3  Kappa light chains- 129.5  Lambda light chains- 45.0  K/L ratio- 2.88  IgG 1580, IgA 216, IgM 176  M-spike- not observed  IgG4- 264    SerologyDr. Rosalita Sink Chatman4/8/2021  IgG4 - 182    Serology 4- revealed:  B2M-4.0  KLC-109.9  LLC- 37.6  K/L ratio - 2.92  IgG 1228  IgA- 187  IgM-147  Mspike-not observed  TIBC 279  Iron level   50  Iron saturation    18  Ferritin     76    Serology will be repeated prior to his return, see orders below    #4   Frantz Barron was found to have right leg swelling that was documented as RLE DVT. He was placed on Eliquis 5 mg twice a day and was to have had a followup rescanning 3 months later. This is being managed by Dr. Kojo Vang. #5  Frantz Barron had issues of right hip pain.   This is being managed by orthopedics at the orthopedic clinic with a recent right hip injection. He has had imaging of the area. I am suspicious he may have avascular necrosis associated with the chronic prednisone use. This is being addressed at the orthopedic clinic and with Dr. Kenneth Stewart. He also has low back pain and they are not sure whether his pain is from his low back or from his hip. This continues to be addressed    #6  Skin rash  Itching on arms and torso, Dr. Philbert Dubin addressing this issue. I have encouraged again to continue follow-up with Dr. Yuly Holcomb and Dr. Marielos Dubon regarding his skin rash. #7  Dr. Kelsi Villaseñor was to have addressed a tear in the retina, however Jung Hurtado did not follow through. #8  GI cancer screening  Colonoscopy performed on 7/13/2017 by Dr. May Philip and revealed avm's, hiatal hernia and camerons erosions. CSC recommended in 5 years given history of colon polyps. Capsule endoscopy recommended to r/o other avm's within the small bowel that may need to be treated. M2A pill cam results read on 1/18/2018 revealed no signs or stigmata of small bowel blood loss. If evidence of GI blood loss continues, EGD recommended. #9  New symptoms of arthritis fatigue dyspnea on exertion and pulmonary findings of rales crackles and possible pleural friction rub on examination today, 4/19/2021    I will defer the management of arthritis to Dr. Kenneth Stewart, he is on prednisone 1 mg a day at this time. I will go ahead and get a chest x-ray to expedite things but defer management of symptoms of shortness of breath to Dr. Katie Parmar, his PCP. We can get in touch with Dr. Carrillo Glass office once the chest x-ray is complete. Floresita Ahn am scribing for Marguerite Vidales MD. Electronically signed by Mark Vázquez RN on 4/19/2021 at 9:17 AM        Jung Hurtado was seen today for follow-up. Diagnoses and all orders for this visit:    Dyspnea, unspecified type  -     XR CHEST STANDARD (2 VW);  Future    IgG monoclonal gammopathy  -     Beta 2 Microglobulin, Serum; Future  -     Electrophoresis Protein, Serum without Reflex to Immunofixation; Future  -     Laird/Lambda Free Lt Chains, Serum Quant; Future  -     Immunoglobulins, Quantitative; Future  -     Miscellaneous Sendout 1; Future  -     Cancel: Iron and TIBC; Future    Anemia, unspecified type  -     Beta 2 Microglobulin, Serum; Future  -     Electrophoresis Protein, Serum without Reflex to Immunofixation; Future  -     Laird/Lambda Free Lt Chains, Serum Quant; Future  -     Immunoglobulins, Quantitative; Future  -     Miscellaneous Sendout 1; Future  -     Cancel: Iron and TIBC; Future         Orders Placed This Encounter   Procedures    XR CHEST STANDARD (2 VW)     Standing Status:   Future     Standing Expiration Date:   4/19/2022     Order Specific Question:   Reason for exam:     Answer:   SOB    Beta 2 Microglobulin, Serum     Standing Status:   Future     Standing Expiration Date:   4/19/2022    Electrophoresis Protein, Serum without Reflex to Immunofixation     Standing Status:   Future     Standing Expiration Date:   4/19/2022    Laird/Lambda Free Lt Chains, Serum Quant     Standing Status:   Future     Standing Expiration Date:   4/19/2022    Immunoglobulins, Quantitative     Standing Status:   Future     Standing Expiration Date:   4/19/2022    Miscellaneous Sendout 1     Standing Status:   Future     Standing Expiration Date:   4/19/2022     Order Specific Question:   Specify Req. Test (1 Test/Order)     Answer:   Immunofixation electrophoresis, serum         No follow-ups on file. I, Dr. Jackson, personally performed the services described in this documentation as [facility] by [facility] in my presence, and it is both accurate and complete.

## 2021-04-19 ENCOUNTER — OFFICE VISIT (OUTPATIENT)
Dept: HEMATOLOGY | Age: 81
End: 2021-04-19
Payer: MEDICARE

## 2021-04-19 ENCOUNTER — HOSPITAL ENCOUNTER (OUTPATIENT)
Dept: INFUSION THERAPY | Age: 81
Discharge: HOME | End: 2021-04-19
Payer: MEDICARE

## 2021-04-19 ENCOUNTER — HOSPITAL ENCOUNTER (OUTPATIENT)
Dept: GENERAL RADIOLOGY | Age: 81
Discharge: HOME | End: 2021-04-19
Payer: MEDICARE

## 2021-04-19 VITALS
BODY MASS INDEX: 28.78 KG/M2 | OXYGEN SATURATION: 96 % | DIASTOLIC BLOOD PRESSURE: 84 MMHG | TEMPERATURE: 97.3 F | HEIGHT: 69 IN | HEART RATE: 90 BPM | SYSTOLIC BLOOD PRESSURE: 142 MMHG | WEIGHT: 194.3 LBS

## 2021-04-19 DIAGNOSIS — R06.00 DYSPNEA, UNSPECIFIED TYPE: ICD-10-CM

## 2021-04-19 DIAGNOSIS — D47.2 MONOCLONAL GAMMOPATHY: ICD-10-CM

## 2021-04-19 DIAGNOSIS — R06.00 DYSPNEA, UNSPECIFIED TYPE: Primary | ICD-10-CM

## 2021-04-19 DIAGNOSIS — D64.9 ANEMIA, UNSPECIFIED TYPE: ICD-10-CM

## 2021-04-19 DIAGNOSIS — D47.2 IGG MONOCLONAL GAMMOPATHY: ICD-10-CM

## 2021-04-19 LAB
BASOPHILS ABSOLUTE: 0.02 K/UL (ref 0.01–0.08)
BASOPHILS RELATIVE PERCENT: 0.3 % (ref 0.1–1.2)
EOSINOPHILS ABSOLUTE: 0.26 K/UL (ref 0.04–0.54)
EOSINOPHILS RELATIVE PERCENT: 3.5 % (ref 0.7–7)
HCT VFR BLD CALC: 39.2 % (ref 40.1–51)
HEMOGLOBIN: 13.1 G/DL (ref 13.7–17.5)
LYMPHOCYTES ABSOLUTE: 0.22 K/UL (ref 1.18–3.74)
LYMPHOCYTES RELATIVE PERCENT: 2.9 % (ref 19.3–53.1)
MCH RBC QN AUTO: 31.3 PG (ref 25.7–32.2)
MCHC RBC AUTO-ENTMCNC: 33.4 G/DL (ref 32.3–36.5)
MCV RBC AUTO: 93.6 FL (ref 79–92.2)
MONOCYTES ABSOLUTE: 0.86 K/UL (ref 0.24–0.82)
MONOCYTES RELATIVE PERCENT: 11.5 % (ref 4.7–12.5)
NEUTROPHILS ABSOLUTE: 6.14 K/UL (ref 1.56–6.13)
NEUTROPHILS RELATIVE PERCENT: 81.8 % (ref 34–71.1)
PDW BLD-RTO: 13.3 % (ref 11.6–14.4)
PLATELET # BLD: 156 K/UL (ref 163–337)
PMV BLD AUTO: 11.1 FL (ref 7.4–10.4)
RBC # BLD: 4.19 M/UL (ref 4.63–6.08)
WBC # BLD: 7.5 K/UL (ref 4.23–9.07)

## 2021-04-19 PROCEDURE — 1036F TOBACCO NON-USER: CPT | Performed by: INTERNAL MEDICINE

## 2021-04-19 PROCEDURE — 85025 COMPLETE CBC W/AUTO DIFF WBC: CPT

## 2021-04-19 PROCEDURE — 1123F ACP DISCUSS/DSCN MKR DOCD: CPT | Performed by: INTERNAL MEDICINE

## 2021-04-19 PROCEDURE — 99215 OFFICE O/P EST HI 40 MIN: CPT | Performed by: INTERNAL MEDICINE

## 2021-04-19 PROCEDURE — G8417 CALC BMI ABV UP PARAM F/U: HCPCS | Performed by: INTERNAL MEDICINE

## 2021-04-19 PROCEDURE — G8427 DOCREV CUR MEDS BY ELIG CLIN: HCPCS | Performed by: INTERNAL MEDICINE

## 2021-04-19 PROCEDURE — 71046 X-RAY EXAM CHEST 2 VIEWS: CPT

## 2021-04-19 PROCEDURE — 4040F PNEUMOC VAC/ADMIN/RCVD: CPT | Performed by: INTERNAL MEDICINE

## 2021-04-19 PROCEDURE — 99212 OFFICE O/P EST SF 10 MIN: CPT

## 2021-06-04 ENCOUNTER — OFFICE VISIT (OUTPATIENT)
Dept: CARDIOLOGY | Facility: CLINIC | Age: 81
End: 2021-06-04

## 2021-06-04 VITALS
HEART RATE: 83 BPM | BODY MASS INDEX: 28.44 KG/M2 | SYSTOLIC BLOOD PRESSURE: 130 MMHG | DIASTOLIC BLOOD PRESSURE: 80 MMHG | HEIGHT: 69 IN | WEIGHT: 192 LBS | OXYGEN SATURATION: 95 %

## 2021-06-04 DIAGNOSIS — I71.40 AAA (ABDOMINAL AORTIC ANEURYSM) WITHOUT RUPTURE (HCC): ICD-10-CM

## 2021-06-04 DIAGNOSIS — I25.10 CORONARY ARTERY DISEASE INVOLVING NATIVE CORONARY ARTERY OF NATIVE HEART WITHOUT ANGINA PECTORIS: Primary | ICD-10-CM

## 2021-06-04 DIAGNOSIS — D89.89 IGG4 RELATED DISEASE (HCC): ICD-10-CM

## 2021-06-04 DIAGNOSIS — E78.2 MIXED HYPERLIPIDEMIA: ICD-10-CM

## 2021-06-04 PROCEDURE — 93000 ELECTROCARDIOGRAM COMPLETE: CPT | Performed by: INTERNAL MEDICINE

## 2021-06-04 PROCEDURE — 99213 OFFICE O/P EST LOW 20 MIN: CPT | Performed by: INTERNAL MEDICINE

## 2021-06-04 RX ORDER — CLONAZEPAM 2 MG/1
TABLET ORAL AS NEEDED
COMMUNITY
Start: 2021-04-30

## 2021-06-04 RX ORDER — ERGOCALCIFEROL 1.25 MG/1
50000 CAPSULE ORAL
COMMUNITY
Start: 2021-05-29

## 2021-06-04 NOTE — PROGRESS NOTES
Referring Provider: Adi Jack MD    Reason for Follow-up Visit: CAD    Subjective .   Chief Complaint:   Chief Complaint   Patient presents with   • Follow-up     yearly   • Coronary Artery Disease     pt states he has been having some sob with vigerous activity.  walking is ok but getting out of the shower he gets out of air.  no chest pain.   • Hyperlipidemia     last lab done 6/2020 LDL was 88 on Lipitor 10 mg       History of present illness:  Zeb Adams is a 81 y.o. yo male with history of CAD, s/p TAYLOR in 2016 in for routine follow up. He denies CP or SHABAZZ with normal activity.        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)    • Impotence of organic origin    • Kidney disease     stage 3   • LPRD (laryngopharyngeal reflux disease)    • Lung collapse    • Mediastinal adenopathy    • Poor urinary stream    • Presence of stent in coronary artery in patient with coronary artery disease    • RLS (restless legs syndrome)    • Sialoadenitis    • Skin cancer     SCALP   • SOB (shortness of breath) on exertion    • Stroke (CMS/HCC)     sometime in his life he has had a mini stroke found on a ct scan ewcently   ,   Past Surgical History:   Procedure Laterality Date   • BONE MARROW ASPIRATE W/ LUMBAR PUNCTURE     • BRONCHOSCOPY Bilateral 1/25/2018    Procedure: BRONCHOSCOPY WITH ENDOBRONCHIAL ULTRASOUND;  Surgeon: Cecilio Hurtado MD;  Location: [facility] OR;  Service:    • CARDIAC SURGERY     • CORONARY ANGIOPLASTY WITH STENT PLACEMENT  2016   • CORONARY ANGIOPLASTY  WITH STENT PLACEMENT     • KIDNEY SURGERY     • NEPHRECTOMY     • NEPHRECTOMY RADICAL Left    • SUBMANDIBULAR GLAND EXCISION     • TONSILLECTOMY     • TURBINOPLASTY     • VASECTOMY       and a reveral   ,   Family History   Problem Relation Age of Onset   • Diabetes Father    • Heart failure Father    • Diabetes Brother    • Heart failure Brother    • Heart attack Brother    • Coronary artery disease Other    • Heart attack Other    • Heart disease Mother    • Hyperlipidemia Mother    • No Known Problems Maternal Grandmother    • Heart disease Maternal Grandfather    • No Known Problems Paternal Grandmother    • No Known Problems Paternal Grandfather    ,   Social History     Tobacco Use   • Smoking status: Former Smoker     Start date:      Quit date:      Years since quittin.4   • Smokeless tobacco: Never Used   • Tobacco comment: 30 yrs ago   Vaping Use   • Vaping Use: Never used   Substance Use Topics   • Alcohol use: Never   • Drug use: Never   ,     Medications  Current Outpatient Medications   Medication Sig Dispense Refill   • amLODIPine (NORVASC) 5 MG tablet Take 5 mg by mouth Daily.     • aspirin 81 MG tablet Take 81 mg by mouth daily.  LAST DOSE      • atorvastatin (LIPITOR) 10 MG tablet Take 10 mg by mouth Daily.     • calcium carbonate (OS-JEAN PAUL) 600 MG tablet Take 2,000 mg by mouth Daily.     • cetirizine (ZyrTEC) 10 MG tablet Take 10 mg by mouth Daily As Needed for Allergies.     • clonazePAM (KlonoPIN) 2 MG tablet As Needed.     • HYDROcodone-acetaminophen (NORCO) 7.5-325 MG per tablet Take 1 tablet by mouth Every 8 (Eight) Hours As Needed for Severe Pain . 9 tablet 0   • pantoprazole (PROTONIX) 40 MG EC tablet Take 1 tablet by mouth 2 times daily.     • predniSONE (DELTASONE) 1 MG tablet Take 1 mg by mouth Daily.     • vitamin D (ERGOCALCIFEROL) 1.25 MG (15485 UT) capsule capsule Take 50,000 Units by mouth Every 7 (Seven) Days.       No current facility-administered  "medications for this visit.       Allergies:  Lyrica [pregabalin], Penicillins, and Sulfa antibiotics    Review of Systems  Review of Systems   HENT: Negative for nosebleeds.    Cardiovascular: Positive for chest pain (with cough). Negative for claudication, dyspnea on exertion, leg swelling, near-syncope, orthopnea, palpitations, paroxysmal nocturnal dyspnea and syncope.   Respiratory: Negative for hemoptysis and shortness of breath.    Gastrointestinal: Negative for melena.   Genitourinary: Negative for hematuria.       Objective     Physical Exam:  /80   Pulse 83   Ht 175.3 cm (69\")   Wt 87.1 kg (192 lb)   SpO2 95%   BMI 28.35 kg/m²   Pulmonary:      Effort: Pulmonary effort is normal.      Breath sounds: Normal breath sounds.   Cardiovascular:      Normal rate. Regular rhythm.      Murmurs: There is no murmur.   Edema:     Peripheral edema absent.         Results Review:    ECG 12 Lead    Date/Time: 6/4/2021 11:16 AM  Performed by: Zeb Mark MD  Authorized by: Zeb Mark MD   Comparison: compared with previous ECG   Similar to previous ECG  Rhythm: sinus rhythm  Rate: normal  Conduction: conduction normal  ST Segments: ST segments normal  T Waves: T waves normal  QRS axis: normal    Clinical impression: normal ECG            Lab on 06/08/2020   Component Date Value Ref Range Status   • Total Cholesterol 06/08/2020 152  0 - 200 mg/dL Final   • Triglycerides 06/08/2020 131  0 - 150 mg/dL Final   • HDL Cholesterol 06/08/2020 38* 40 - 60 mg/dL Final   • LDL Cholesterol  06/08/2020 88  0 - 100 mg/dL Final   • VLDL Cholesterol 06/08/2020 26.2  5 - 40 mg/dL Final   • LDL/HDL Ratio 06/08/2020 2.31   Final   • Glucose 06/08/2020 82  65 - 99 mg/dL Final   • BUN 06/08/2020 26* 8 - 23 mg/dL Final   • Creatinine 06/08/2020 2.08* 0.76 - 1.27 mg/dL Final   • Sodium 06/08/2020 137  136 - 145 mmol/L Final   • Potassium 06/08/2020 4.2  3.5 - 5.2 mmol/L Final   • Chloride 06/08/2020 101  98 - 107 mmol/L Final "   • CO2 06/08/2020 23.6  22.0 - 29.0 mmol/L Final   • Calcium 06/08/2020 9.3  8.6 - 10.5 mg/dL Final   • Total Protein 06/08/2020 7.2  6.0 - 8.5 g/dL Final   • Albumin 06/08/2020 4.30  3.50 - 5.20 g/dL Final   • ALT (SGPT) 06/08/2020 19  1 - 41 U/L Final   • AST (SGOT) 06/08/2020 18  1 - 40 U/L Final   • Alkaline Phosphatase 06/08/2020 74  39 - 117 U/L Final   • Total Bilirubin 06/08/2020 0.5  0.2 - 1.2 mg/dL Final   • eGFR Non African Amer 06/08/2020 31* >60 mL/min/1.73 Final   • Globulin 06/08/2020 2.9  gm/dL Final   • A/G Ratio 06/08/2020 1.5  g/dL Final   • BUN/Creatinine Ratio 06/08/2020 12.5  7.0 - 25.0 Final   • Anion Gap 06/08/2020 12.4  5.0 - 15.0 mmol/L Final       Assessment/Plan   Problem List Items Addressed This Visit        Cardiac and Vasculature    Coronary artery disease involving native coronary artery of native heart without angina pectoris - Primary    Current Assessment & Plan     The patient denies chest pain, shortness of breath, dyspnea on exertion, orthopnea, PND, edema. There is no evidence of ongoing ischemia           Mixed hyperlipidemia    Current Assessment & Plan     Patient's statin therapy is followed by PCP.           AAA (abdominal aortic aneurysm) without rupture (CMS/[facility])    Current Assessment & Plan     AAA measuring 3.6 cm            Multi-system (Lupus, Sarcoid...)    IgG4 related disease (CMS/HCC)          Medical Complexity  must have 1 out of 3     Moderate Complexity Level 3           1 of the following medical problems:          []One chronic illness with mild exacerbation         [x]Two or more stable chronic illness          []One new problem  []One acute illness with systemic symptoms    Complexity of Data  Reviewed (1 out of the 3 following categories)      Category 1 tests, documents, historian (must have 3 points)       []Review of prior external records  [x]Review of results of unique tests  []Ordering unique tests  []Assessment requires an independent  historian    Category 2 Interpretation of tests   []Independent interpretation of test read by another doc    Category 3 Discuss Management/tests  []Discussion with external physician    Risk of complications and/or morbidity          [x]Prescription Drug Management

## 2021-07-27 LAB
ALBUMIN SERPL-MCNC: 4 G/DL (ref 3.5–5.2)
ALP BLD-CCNC: 121 U/L (ref 40–130)
ALT SERPL-CCNC: 30 U/L (ref 5–41)
ANION GAP SERPL CALCULATED.3IONS-SCNC: 10 MMOL/L (ref 7–19)
AST SERPL-CCNC: 30 U/L (ref 5–40)
BASOPHILS ABSOLUTE: 0.1 K/UL (ref 0–0.2)
BASOPHILS RELATIVE PERCENT: 1.3 % (ref 0–1)
BILIRUB SERPL-MCNC: 0.7 MG/DL (ref 0.2–1.2)
BILIRUBIN URINE: NEGATIVE
BLOOD, URINE: NEGATIVE
BUN BLDV-MCNC: 19 MG/DL (ref 8–23)
CALCIUM SERPL-MCNC: 10.5 MG/DL (ref 8.8–10.2)
CHLORIDE BLD-SCNC: 98 MMOL/L (ref 98–111)
CLARITY: CLEAR
CO2: 27 MMOL/L (ref 22–29)
COLOR: YELLOW
CREAT SERPL-MCNC: 1.9 MG/DL (ref 0.5–1.2)
CREATININE URINE: 77.9 MG/DL (ref 4.2–622)
EOSINOPHILS ABSOLUTE: 0.2 K/UL (ref 0–0.6)
EOSINOPHILS RELATIVE PERCENT: 3.9 % (ref 0–5)
GFR AFRICAN AMERICAN: 41
GFR NON-AFRICAN AMERICAN: 34
GLUCOSE BLD-MCNC: 82 MG/DL (ref 74–109)
GLUCOSE URINE: NEGATIVE MG/DL
HCT VFR BLD CALC: 40.3 % (ref 42–52)
HEMOGLOBIN: 12.9 G/DL (ref 14–18)
IMMATURE GRANULOCYTES #: 0 K/UL
KETONES, URINE: NEGATIVE MG/DL
LEUKOCYTE ESTERASE, URINE: NEGATIVE
LYMPHOCYTES ABSOLUTE: 0.3 K/UL (ref 1.1–4.5)
LYMPHOCYTES RELATIVE PERCENT: 6 % (ref 20–40)
MAGNESIUM: 2 MG/DL (ref 1.6–2.4)
MCH RBC QN AUTO: 29.8 PG (ref 27–31)
MCHC RBC AUTO-ENTMCNC: 32 G/DL (ref 33–37)
MCV RBC AUTO: 93.1 FL (ref 80–94)
MONOCYTES ABSOLUTE: 0.8 K/UL (ref 0–0.9)
MONOCYTES RELATIVE PERCENT: 16.8 % (ref 0–10)
NEUTROPHILS ABSOLUTE: 3.3 K/UL (ref 1.5–7.5)
NEUTROPHILS RELATIVE PERCENT: 71.8 % (ref 50–65)
NITRITE, URINE: NEGATIVE
PARATHYROID HORMONE INTACT: 60.5 PG/ML (ref 15–65)
PDW BLD-RTO: 14.3 % (ref 11.5–14.5)
PH UA: 7 (ref 5–8)
PHOSPHORUS: 2.5 MG/DL (ref 2.5–4.5)
PLATELET # BLD: 178 K/UL (ref 130–400)
PMV BLD AUTO: 11.2 FL (ref 9.4–12.4)
POTASSIUM SERPL-SCNC: 4.4 MMOL/L (ref 3.5–5)
PROTEIN PROTEIN: 6 MG/DL (ref 15–45)
PROTEIN UA: NEGATIVE MG/DL
RBC # BLD: 4.33 M/UL (ref 4.7–6.1)
SODIUM BLD-SCNC: 135 MMOL/L (ref 136–145)
SPECIFIC GRAVITY UA: 1.01 (ref 1–1.03)
TOTAL PROTEIN: 7.9 G/DL (ref 6.6–8.7)
URIC ACID, SERUM: 6.9 MG/DL (ref 3.4–7)
UROBILINOGEN, URINE: 0.2 E.U./DL
VITAMIN D 25-HYDROXY: 68.6 NG/ML
WBC # BLD: 4.6 K/UL (ref 4.8–10.8)

## 2021-09-17 DIAGNOSIS — D47.2 IGG MONOCLONAL GAMMOPATHY: Primary | ICD-10-CM

## 2021-09-17 DIAGNOSIS — D64.9 ANEMIA, UNSPECIFIED TYPE: ICD-10-CM

## 2021-09-20 ENCOUNTER — HOSPITAL ENCOUNTER (OUTPATIENT)
Dept: INFUSION THERAPY | Age: 81
Discharge: HOME | End: 2021-09-20
Payer: MEDICARE

## 2021-09-20 DIAGNOSIS — D47.2 IGG MONOCLONAL GAMMOPATHY: ICD-10-CM

## 2021-09-20 DIAGNOSIS — D64.9 ANEMIA, UNSPECIFIED TYPE: ICD-10-CM

## 2021-09-20 LAB
ALBUMIN SERPL-MCNC: 4 G/DL (ref 3.5–5.2)
ALP BLD-CCNC: 125 U/L (ref 40–130)
ALT SERPL-CCNC: 27 U/L (ref 21–72)
ANION GAP SERPL CALCULATED.3IONS-SCNC: 10 MMOL/L (ref 7–19)
AST SERPL-CCNC: 27 U/L (ref 17–59)
BASOPHILS ABSOLUTE: 0.02 K/UL (ref 0.01–0.08)
BASOPHILS RELATIVE PERCENT: 0.4 % (ref 0.1–1.2)
BILIRUB SERPL-MCNC: 1.1 MG/DL (ref 0.2–1.3)
BUN BLDV-MCNC: 24 MG/DL (ref 9–20)
CALCIUM SERPL-MCNC: 10.7 MG/DL (ref 8.4–10.2)
CHLORIDE BLD-SCNC: 103 MMOL/L (ref 98–111)
CO2: 27 MMOL/L (ref 22–29)
CREAT SERPL-MCNC: 1.8 MG/DL (ref 0.6–1.2)
EOSINOPHILS ABSOLUTE: 0.24 K/UL (ref 0.04–0.54)
EOSINOPHILS RELATIVE PERCENT: 4.3 % (ref 0.7–7)
GFR NON-AFRICAN AMERICAN: 36
GLOBULIN: 3.8 G/DL
GLUCOSE BLD-MCNC: 90 MG/DL (ref 74–106)
HCT VFR BLD CALC: 37.5 % (ref 40.1–51)
HEMOGLOBIN: 12.4 G/DL (ref 13.7–17.5)
LYMPHOCYTES ABSOLUTE: 0.31 K/UL (ref 1.18–3.74)
LYMPHOCYTES RELATIVE PERCENT: 5.5 % (ref 19.3–53.1)
MCH RBC QN AUTO: 30 PG (ref 25.7–32.2)
MCHC RBC AUTO-ENTMCNC: 33.1 G/DL (ref 32.3–36.5)
MCV RBC AUTO: 90.8 FL (ref 79–92.2)
MONOCYTES ABSOLUTE: 0.68 K/UL (ref 0.24–0.82)
MONOCYTES RELATIVE PERCENT: 12.1 % (ref 4.7–12.5)
NEUTROPHILS ABSOLUTE: 4.37 K/UL (ref 1.56–6.13)
NEUTROPHILS RELATIVE PERCENT: 77.7 % (ref 34–71.1)
PDW BLD-RTO: 14.7 % (ref 11.6–14.4)
PLATELET # BLD: 203 K/UL (ref 163–337)
PMV BLD AUTO: 10.7 FL (ref 7.4–10.4)
POTASSIUM SERPL-SCNC: 4.6 MMOL/L (ref 3.5–5.1)
RBC # BLD: 4.13 M/UL (ref 4.63–6.08)
SODIUM BLD-SCNC: 140 MMOL/L (ref 137–145)
TOTAL PROTEIN: 7.8 G/DL (ref 6.3–8.2)
WBC # BLD: 5.62 K/UL (ref 4.23–9.07)

## 2021-09-20 PROCEDURE — 80053 COMPREHEN METABOLIC PANEL: CPT

## 2021-09-20 PROCEDURE — 85025 COMPLETE CBC W/AUTO DIFF WBC: CPT

## 2021-09-20 PROCEDURE — 36415 COLL VENOUS BLD VENIPUNCTURE: CPT

## 2021-09-28 NOTE — PROGRESS NOTES
Patient:  Jason Adan  YOB: 1940  Date of Service: 2021  MRN: [id number]    Primary Care Physician: Shweta Dixon MD    Chief Complaint   Patient presents with    Follow-up     IgG monoclonal gammopathy       Patient Seen, Chart, Consults notes, Labs, Radiology studies reviewed. Subjective:  Sofy Lares is a 80year old  gentleman managed with primary and secondary diagnoses as outlined:  · Resection of left retroperitoneal mass and radical left nephrectomy by Dr. Saldivar Child Dr. Arden Cameron at [facility] on 10/31/2018. Pathology revealed IgG4 related disease. · Multifactorial anemia  · IgG kappa MGUS  · Right lower extremity DVT on Eliquis bid by Dr. Padilla Sox    His ex-wife  in 2021, and their son  in 2021. He is still grieving. Mr. Thao Newman is on prednisone 1 mg daily and treatment of joint pains pleuritic-like chest discomfort, rib pain and feeling kind of rough, primarily fatigue. Mr. Thao Newman receives weekly Rituxan x 4 periodically for exacerbations of his IgG4 related disease directed by Dr. Aure Armando. Mr. Thao Newman received 4 weekly doses of Rituxan between 2021 and 2021    Janet Cui comes today in followup for evaluation and to go over treatment he is receiving. HEMATOLOGIC HISTORY:  IgG4 related disease 10/31/2018 with associated leukopenia, Anemia, and adenopathy  Sofy Lares was seen in initial hematologic consultation on 2018, referred by Dr. Etelvina Nelson for evaluation of leukopenia and anemia. CBC on 2018 showed a WBC of 3.37, hemoglobin 9.7 with MCV 87.8 and platelet count 387,340. Neutrophils were 56.3%, lymphocytes 9.5% and monocytes 20.5%. CMP included a creatinine of 1.94, GFR 41. Ca+ 8.8, TP 9.6. He is followed by Dr. Nathalie Magana for rheumatoid arthritis and possible Sjogren disease. He has a known history of CKD, as well as iron deficiency anemia from GI blood loss.     Endoscopy 2017 by Dr. [place]. biopsied by [facility] on 1/25/2018 did NOT reveal immunophenotypic evidence of a clonal B cell population     IN RETROSPECT,  Lexus White has a history of FNA of a right neck mass performed 12/23/2011 by Dr. Terrance Neff. Pathology revealed no malignant cells, with fragments of benign lymphoid tissue and a few benign salivary gland acinar fragments. On 3/23/2012 excision of a right submandibular gland was performed by Dr. Terrance Neff that demonstrated severe, chronic follicular sialadenitis with focal areas of glandular fibrosis and heterogeneous lymphoid predominant inflammatory cell population with significant number of plasma cells and eosinophils. There was no evidence of epithelial malignancy. FLOW cytometry revealed no evidence of B-cell or T-cell lymphoma. On exam, left submandibular lymphadenopathy is appreciated and perhaps shotty axillary lymphadenopathy. CBC 4/26/2018 showed a normal WBC of 4.52, though with persistent monocytosis at 22.8%. Hemoglobin is 10.8 with MCV 91.0 and platelet count 780,288. Lexus White' main complaint had been of hoarseness and progressive cough, which occurs daily. He dates symptoms back to August, 2017 after having an allergic reaction to Lyrica for post-herpetic neuralgia. In addition to hoarseness and cough, Lexus White also reports weight loss of 24 pounds over the previous 4-6 weeks prior to presentation at this office, night sweats and chills. He was also referred to [place] by Dr. Gusman regarding persistent pulmonary symptoms. Given the abnormal CBC finding, adenopathy and symptomatology work up was also requested, and he was referred to this office.     Serology 4/26/2018  Serum Fe - 32  TIBC - 220  Fe Sat - 15%  Ferritin - 45  B12 - 744  Folate > 20  Retic - 1.6 %  LDH -175  B2 M - 7 (0.6-2.4)  QI - MTX1954 (700-1600), IgM 170 ()  SPEP - no M spike, TP - 10.2 (6-8.5), globulin - 7.5 (2.2-3.9)  Free serum light chains - kappa 930.3 (3.3-19.4), lambda 228 (5.7-26.3), K/L ratio 4.08 (0.26-1.65)    Bone marrow aspiration and biopsy on 4/30/2018 was normocellular (~2025 %) with adequate maturing trilineage hematopoiesis, no significant dyspoiesis and no increase in blasts. Megakaryocytes had a spectrum of morphology. There was no stainable storage iron and no increase in reticulin fibrosis. 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 Flow cytometry did not reveal B-cell monoclonality nor T-cell aberrant antigen expression. FISH studies for MDS was negative. There was no morphologic evidence of a myelodysplastic syndrome. There was no diagnostic evidence of a limp low at disorder, granulomas, nor metastatic malignancies on the bone marrow biopsy or aspirate. CT scans of the neck with contrast on 4/30/2018 documented an asymmetry within the left submandibular gland compared to the right. The left submandibular gland measures 4.2 x 2.6 x 2.4 cm compared to 12 mm on the right. Otherwise there are no discrete neck masses or pathologically enlarged lymph nodes. 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 Prominent right external iliac chain lymph nodes of indeterminate significance were also documented. Diverticulosis without diverticulitis and a large hiatal hernia was described. The prostate gland was enlarged with lifting of the base of the urinary bladder. Also noted was a questionable abnormal appearance of the pancreas with some loss of the normal pancreatic on to work without a discrete mass with MRI recommended.     MRI of the abdomen W & WO on 5/8/2018 documented borderline splenomegaly, a large ventral hernia, nonenhancing renal cysts and a 3.5 cm abdominal aortic aneurysm. A 6 mm right middle lobe pulmonary nodule and nodular density along the left major fissure of the region of the lingula were noted as previously described on the CT scan of the chest on 1/10/2018. The nodules on 1/10/2018 had increased compared to 11/22/2016 with metastatic disease being in the differential.   MRI of the pelvis W & WO contrast on 5/8/2018 documented enhancing septations and a small soft tissue nodule within the inferior aspect of the left fatty-containing retroperitoneum positioned below the level of the left kidney. This abnormality is lateral to the psoas muscle (it abuts the psoas muscle, but does not invade the psoas muscle) and extending to the level of the left iliac crest.  An atypical lipomatouse tumor versus liposarcoma considered. Mild prostate enlargement is also noted. Mildly enlarged pelvic lymph nodes measuring 1.2 cm in short axis, reactive or metastatic are also noted. Christian Veliz saw Dr. Luis Wood and Dr. Kasia Fall and reviewed Christian Veliz' case with sarcoma tumor board at [facility] on 6/8/2018. 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 Unfortunately, a curative resection would require sacrifice of the left kidney leading to progression of CKD and possibly dialysis. Recommendation by Dr Valentino Friedlander on a clinic visit note faxed 6/20/2018 was for continued observation with repeat CT scan in 3 months as there were no signs of high-grade component on the CT scans from April of 2018 or the MRI of the abdomen/pelvis from 5/8/2018. Consideration could be given for resection if there is documentation of clear growth or concern for dedifferentiation. Pathology from the station 7 lymph node on 1/25/18 was reviewed 6/19/18 at [facility] and noted to be predominantly bronchial cells with few lymphocytes and macrophages, negative for malignancy. Bronchial washings also negative for malignancy.     Interpretation of the chest CT 6/13/18 at [facility] was consistent with multiple bilateral pulmonary nodules and mediastinal and hilar adenopathy, possibly representing sarcoidosis with malignancy not excluded. AAA partially imaged. CT scans of the chest, abdomen/pelvis 8/3/18 at [facility] were grossly unchanged with surveillance to continue. Regarding the left submandibular gland, I spoke with Dr. Julia Verma on 6/27/2018. Dr. Marcio Butcher stated he has been monitoring the left submandibular gland for years, without gross change. He did not recommend excision, rather continued monitoring due to stability and concern for xerostomia. Lasix renogram and assessment by Dr. eTz Maldonado was completed in in September, 2018. Dr. Susanne Andrews reviewed imaging and confirmed the need for removal of the whole left kidney. Tao's case was again reviewed with the sarcoma tumor Board. Renogram demonstrated differential kidney function of 60% on the right. Dr. Tez Maldonado estimated his final GFR may be [phone], thus likely avoiding hemodialysis postoperatively. Left nephrectomy appeared more reasonable given recent stone disease and less function. Janet Cui underwent resection of the left retroperitoneal mass with radical left nephrectomy on 10/31/18 by Dr. Saldivar Child Dr. Arden Cameron at [facility]. Pathology as follows:  1. Liver, segment 3, excision: Benign liver parenchyma with calcified granuloma. 2. Left retroperitoneal nodule, excision: Fibroadipose tissue with chronic lymphoplasmacytic and histiocytic inflammation. There were NO features of well-differentiated liposarcoma noted. 3. Left periurethral tissue, excision: Fibroadipose tissue with chronic lymphoplasmacytic and histiocytic inflammation. 4. Left lateral pararenal tissue, excision: Benign fibroadipose tissue with chronic lymphoplasmacytic and histiocytic inflammation.    5. Left nephrectomy: Marked lymphoplasmacytic and histiocytic inflammation, focal fibrosis and changes of the obliterative phlebitis, most consistent with IgG4-related disease. Postoperative noncontrast abdominal/pelvic CT 11/23/18 at [facility] documented stranding of fat in the retroperitoneum, likely related to recent surgery. Diverticulosis of the colon with inflammation of the fat around the mid to distal descending colon close to the prior surgical site present, possibly representing postoperative change. There was a tiny 2 mL stone in the distal right ureter with mild-to-moderate dilatation of the right ureter and mild dilatation of the right renal collecting system. Nonobstructing stones also noted in the right kidney as well as a 4 cm probable cyst in the upper pole right kidney. Surveillance non-contrast CT scans of the chest, abdomen and pelvis 2/22/19 at [facility] documented interval decrease in numerous mediastinal lymph nodes and decrease in size of multiple bilateral pulmonary nodules. There was no residual lymphadenopathy and near complete resolution of inflammatory changes around the distal descending colon. Previous right renal lesions were stable. Felix Yoder is followed by Dr. Jenniffer Gonzales, treated with prednisone regarding IgG4 systemic disease with known involvement of salivary glands, kidney, and retroperitoneum. He completed prednisone 40 mg daily ×1 month on 2/23/19, and 30 mg daily ×4 weeks on 3/25/19. Felix Yoder will take 20 mg ×4 weeks, then 10 mg. By June 2020, prednisone have been tapered down to 3 mg daily by Dr. Jenniffer Gonzales. CT scan of the chest without contrast on 10/3/2019 identified 2 discrete subcentimeter nodules in the RLL of the lungs were not present previously, one measuring 3 mm and the other 5 mm. Another 7 mm nodule in the R mL is unchanged. Follow-up recommended. CT scan of the abdomen and pelvis without contrast on 10/3/2019 at [facility] did not reveal evidence of recurrent disease or metastatic disease to the abdomen or pelvis.   Prior left nephrectomy is noted    CT scan of the Chest without contrast on 6/25/2020 at [facility] reveals Interval increase in size of pulmonary nodules in the RIGHT upper lobe and LEFT lower lobe. Interval resolution of medial RIGHT lower lobe nodules. Essentially stable RIGHT middle lobe nodule. Continue follow up in 6 months is recommended. CT scan of the Abdomen Pelvis without contrast on 6/25/2020  at [facility] reveals a Stable CT of the abdomen and pelvis with postsurgical changes in the left retroperitoneum. No evidence of recurrent or metastatic disease. 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 Atherosclerotic disease. Large hiatal hernia. Colonic diverticulosis without evidence of acute diverticulitis. Low-density lesions in the right kidney which are stable but  incompletely characterized on this exam.      Serology on 10/10/2019 revealed:  Iron- 45  TIBC- 255  Saturation%- 17.6%  Ferritin- 13.40  B2M- 2.8  Kappa light chains- 42.4  Lambda light chains- 24.8 with K/L ratio 1.71  IgG 945, IgA 176, IgM 141  IgG 4- 137  M-spike- not observed      Serology on 2/3/2020 revealed:  CMP with BUN 30, creatinine 1.90  Iron- 16  TIBC- 223  Saturation %- 7.2  Ferritin- 7.66  B2M- 2.8  Kappa light chains- 25.8  Lambda light chains- 14.7  K/L ratio- 1.76  IgG 872, IgA 156, IgM 128  IgG 4- 136  M-spike- not observed    Serology on 6/10/2020 revealed:  CMP with BUN 24, creatinine 1.8  B2M- 3.5  Kappa light chains- 134.3  Lambda light chains- 39.3  K/L ratio- 3.42  IgG 1417, IgA 205, IgM 244  IgG 4- 213  Iron- 30  TIBC- 270  Iron sat- 11%  Ferritin- 14.9  M-spike- not observed    Serology on 7/15/2020 revealed:  Kappa light chains- 123.6  Lambda light chains- 36.7  K/L ratio- 3.37  IgG 4- 250    24-Hr Urine electrophoresis on 7/17/2020 revealed:  Protein, 24H- 92  M-spike- not observed    By June 2020, prednisone have been tapered down to 3 mg daily by Dr. Elin Kapoor.   With new findings on the CT scan of the chest and increased IgG4 level of 250, prednisone was increased again to 5 mg a day to be delivered along with Rituxan. Based on the new findings on the CT scan of the chest from 6/25/2020 and the elevated IgG4 of 250, Dr. Karine Gerardo initiated Rituxan on 7/28/2020. Yancey Severs will receive a second dose of Rituxan on 8/11/2020 with reevaluation every 6 months thereafter. CT chest without contrast on 12/16/2020 was compared to 6/25/2020 and documented:  · No definite intrathoracic metastasis. · Bilateral interlobular septal thickening with patchy consolidation in the LEFT lower lobe. Imaging appearance favors mild pulmonary edema and atelectasis. Correlate with clinical exam and laboratory analysis  · 7 mm RML lung nodule, stable  · 4 mm adjacent to above RML lung nodule, stable   · 5 mm LLL lung nodule, stable  · 5 mm LLL subpleural lung nodule, stable   · Decreased size of right apical lung nodule which now appears more linear and scar like  · No new or enlarging nodule  · No enlarged axillary, supraclavicular, mediastinal or hilar lymph nodes  · No acute osseous finding    CT abdomen and pelvis without contrast on 12/16/2020 documented:  · No evidence of retroperitoneal mass or lymphadenopathy  · Large sliding-type heart of hernia  · 3.9 cm low-density mass located laterally in the upper pole of left kidney, CT density suggest cyst  · 0.7 cm smaller low density nodule seen located medially in upper pole of left kidney, CT density suggest a cyst  · 2 mm calculus in lower pole of right kidney   · Left nephrectomy without regional complications. · Diverticulosis of the distal colon. No evidence for diverticulitis. · Enlarged prostate. · Fusiform aneurysmal dilatation of distal abdominal aorta. 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 Further follow-up may be obtained.   · Fat-containing small inguinal hernias bilaterally    Dr. Melodie Glover is following and monitoring and treating his IgG4 related disease process. He  received Rituxan weekly x 4 Dr. Brandi Knox in January/Feb 2021. SerologyDr. Walter Echavarria Chatman4/8/2021  IgG4 - 182      TREATMENT SUMMARY:  · Resection of left retroperitoneal mass and radical left nephrectomy by Dr. Vinay Del Toro at [facility] on 10/31/2018. Pathology revealed IgG4 related disease. · Prednisonemanagement per Dr. Brandi Knox  · Rituxanmanagement per Dr. Brandi Knox              Allergies:  Lyrica [pregabalin], Penicillins, and Sulfa antibiotics    Medicines:  Current Outpatient Medications   Medication Sig Dispense Refill    Ergocalciferol (VITAMIN D2 PO) Take by mouth      triamcinolone (KENALOG) 0.1 % cream FREDIS EXT TO BOTH ARMS AND LEGS BID PRN FOR RASH (Patient not taking: Reported on 9/29/2021)      Calcium-Magnesium-Vitamin D (CALCIUM 1200+D3 PO) Take by mouth (Patient not taking: Reported on 9/29/2021)      predniSONE (DELTASONE) 5 MG tablet Take 3 mg by mouth daily 1mg      HYDROcodone-acetaminophen (NORCO) 7.5-325 MG per tablet Take 1 tablet by mouth every 6 hours as needed for Pain.  vitamin D (CHOLECALCIFEROL) 1000 UNIT TABS tablet Take 1,000 Units by mouth daily      amLODIPine (NORVASC) 5 MG tablet Take 5 mg by mouth daily      clonazePAM (KLONOPIN) 2 MG tablet Take 2 mg by mouth nightly as needed      atorvastatin (LIPITOR) 10 MG tablet Take 10 mg by mouth daily      cetirizine (ZYRTEC) 5 MG tablet Take 5 mg by mouth daily      aspirin 81 MG tablet Take 81 mg by mouth daily.  pantoprazole (PROTONIX) 40 MG tablet Take 1 tablet by mouth 2 times daily. 60 tablet 11     No current facility-administered medications for this visit.        Past Medical History:      Diagnosis Date    Allergic rhinitis     Anemia, unspecified     Anxiety     Asthma     BPH (benign prostatic hyperplasia)     Dr Bill Munson CAD (coronary artery disease)     Dr [facility]) skin-non-melanoma    Carotid artery stenosis     YVON Galarza    Chronic kidney disease, stage III (moderate) (HCC)     Colon polyps     Cough     Decreased white blood cell count, unspecified     Enlarged lymph nodes, unspecified     Esophageal stricture     GERD (gastroesophageal reflux disease)     HIGH CHOLESTEROL     Hypertension     Insomnia     Leukocytosis     Mediastinal lymphadenopathy     Monoclonal gammopathy     Monocytosis (symptomatic)     Night sweats     Normocytic normochromic anemia     Osteoarthritis     Other iron deficiency anemias     GI blood loss, hiatal hernia w/Jake's erosions and cecal/small bowel AVMs    Peptic ulcer disease     Restless leg syndrome     Rheumatoid arthritis (HCC)     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 @ [facility] 2018    Skin cancer     on left ear lobe    Thrombocytopenia, unspecified (HCC)     Venous insufficiency     Weight loss, abnormal         Past Surgical History:      Procedure Laterality Date    COLONOSCOPY  9/15/2009    : negative    COLONOSCOPY  2006, 2003    hyperplastic colon polyps     COLONOSCOPY  11/2012    minimal diverticula, no polyps (5yr)    CORONARY ANGIOPLASTY WITH STENT PLACEMENT  2016    2 stents    FINE NEEDLE ASPIRATION Right 12/23/2011    Neck mass-Dr Verma    [place] COLSC FLEXIBLE W/CONTROL BLEEDING ANY METHOD N/A 7/13/2017    Dr Osullivan-w/control of hemorrage, gold probe cautery of avm-internal hemorrhoids-5 yr recall    PTCA  3/10/16 ford    rca    SALIVARY GLAND SURGERY Right 03/23/2012    Dr Verma-Excision of right submandibular gland    SKIN CANCER EXCISION      TONSILLECTOMY      TOTAL NEPHRECTOMY Left 2018 [place]    UPPER GASTROINTESTINAL ENDOSCOPY  7/23/2009    : minimal gastritis, sm hiatal hernia, no active ulcers    UPPER GASTROINTESTINAL ENDOSCOPY  8/2006    gastritis, nonobst Schatzki's ring, lg hiatal hernia    UPPER GASTROINTESTINAL ENDOSCOPY  2017    Dr Garcia/control of hemorrhage with gold probe cauterization of AVM-cecal avm, small bowel avm, hiatal hernia, margarito's erosions    UPPER GASTROINTESTINAL ENDOSCOPY  2017    Dr Garcia/control of hemorrhage with gold probe cauterization of AVM-cecal avm, small bowel avm, hiatal hernia, margarito's erosions    URETER STENT PLACEMENT Right 2018    [facility]    VASECTOMY  26 years ago    and had it reversed.  VASECTOMY REVERSAL          Family History:      Problem Relation Age of Onset    Hypertension Mother     High Cholesterol Mother     Heart Failure Mother         blockages, heart attack    Other Mother         pneumonia    Heart Failure Father         blockages, heart attack    Heart Failure Brother         blockages, heart attack    High Cholesterol Brother     Stroke Brother     Heart Disease Brother     Hypertension Brother     Other Other         mother , age 80 fall    Colon Cancer Neg Hx     Colon Polyps Neg Hx     Esophageal Cancer Neg Hx     Liver Cancer Neg Hx     Liver Disease Neg Hx     Rectal Cancer Neg Hx     Stomach Cancer Neg Hx         Social History  Social History     Tobacco Use    Smoking status: Former Smoker     Packs/day: 1.00     Years: 20.00     Pack years: 20.00     Types: Cigarettes     Quit date: 1986     Years since quittin.4    Smokeless tobacco: Never Used   Vaping Use    Vaping Use: Never used   Substance Use Topics    Alcohol use: No    Drug use: No            Objective:  Vital Signs: Blood pressure 118/68, pulse 72, height 5' 9\" (1.753 m), weight 185 lb 14.4 oz (84.3 kg), SpO2 93 %. Labs:  BMP:   No results for input(s): NA, K, CL, CO2, PHOS, BUN, CREATININE, CALCIUM in the last 72 hours. CBC:   Recent Labs     21  1015   WBC 6.59   HGB 12.3*   HCT 37.3*   MCV 92.1   *       PT/INR: No results for input(s): PROTIME, INR in the last 72 hours.   APTT: No results for input(s): APTT in the last 72 hours. Ionized Calcium:No results for input(s): IONCA in the last 72 hours. Magnesium:No results for input(s): MG in the last 72 hours. Phosphorus:No results for input(s): PHOS in the last 72 hours. Hepatic:   No results for input(s): ALKPHOS, ALT, AST, PROT, BILITOT, BILIDIR, LABALBU in the last 72 hours. Cultures:   No results for input(s): CULTURE in the last 72 hours. Radiology reports as per the Radiologist  Radiology:     Diane Smith 82- 10/3/2019 8:32 AM CDT  HISTORY: PULMONARY NODULE; R91.1-Solitary pulmonary nodule  COMPARISON: CT chest dated 03/26/2019  DOSE LENGTH PRODUCT: 422 mGy cm. Automated exposure control was also  utilized to decrease patient radiation dose. TECHNIQUE: Serial helical tomographic images of the chest were acquired. Multiplanar reformatted images were provided for review. FINDINGS:  The imaged portion of the neck and thyroid gland is unremarkable. Right lower lobe nodule measuring 7 mm medially (image 119, series 3). An adjacent 5 mm nodule medially and inferiorly (image 126, series 3)  was not seen on prior examination. Left lower lobe nodule, measuring 3  mm (image 111, series 3), grossly unchanged given differences in  techniques. A left lower lobe nodule more superiorly measuring 5 mm  (image 97, series 3) appears also unchanged. 7 mm nodule in the right  middle lobe, unchanged. No pleural effusion is present. The trachea and  bronchial tree are patent. Coronary artery disease. Atherosclerotic disease. The heart is within  normal limits in size. Moderate hiatal hernia. Great vessels, and  pulmonary vessels are otherwise unremarkable in appearance within limits  of a noncontrast study. There is no pericardial effusion. No enlarged  axillary or mediastinal lymph nodes are present. No acute findings are seen in the bones or surrounding soft tissues.   Remote insult to the left chest wall posteriorly with multiple rib  Deformities. Please see report from CT abdomen pelvis adjacent for intra-abdominal  Findings. .    2 discrete subcentimeter nodules in the right lower lobe were not  present on prior examination. These are indeterminate short-term  follow-up recommended. This report was finalized on 10/03/2019 09:40 by Dr. Otto Christianson MD.      ___________________________________________________________________________      [employer]: CT Abdomen and Pelvis without contrast  10/3/2019 8:58 AM CDT  INDICATION: ENLARGED LYMPH NODES; R59.9-Enlarged lymph nodes,  unspecified  COMPARISON: CT abdomen pelvis dated 03/26/2019. CT abdomen pelvis dated  02/22/2019. TECHNIQUE:  Abdomen and pelvis were scanned utilizing a multidetector helical  scanner from the diaphragm to the ischial tuberosities without  administration of IV contrast. Coronal and sagittal reformations were  obtained. [Routine protocol is performed.]  Radiation dose equals .4 mGy-cm.  Automated exposure control dose  reduction technique was implemented. FINDINGS:  LINES and TUBES: None. LOWER THORAX: No focal consolidation. No pleural effusion. No  pneumothorax. There is a small to moderate hiatal hernia. Coronary  artery disease. HEPATOBILIARY:  No focal hepatic lesions.   No liver laceration.   No  biliary ductal dilatation. GALLBLADDER:  No radiopaque stones or sludge.   No wall thickening. SPLEEN:  No splenomegaly.    No splenic laceration. PANCREAS:  No focal masses or ductal dilatation. ADRENALS:  No adrenal nodules. KIDNEYS/URETERS: Prior left nephrectomy. Right renal exophytic  hypodensity measuring 3.3 cm unchanged. Right renal upper pole  parapelvic 1.2 cm is also unchanged. There is no new soft tissue density  within the left nephrectomy bed. GI TRACT:  No abnormal distention, wall thickening, or evidence of bowel  obstruction.   There is no acute appendicitis. Colonic diverticulosis,  without evidence of acute diverticulitis. [place]    PELVIC ORGANS/BLADDER:  No acute pathology. Nonspecific mild wall  thickening of bladder, a chronic finding. Caren Sharper LYMPH NODES:  There is no inguinal canal, pelvic wall, retroperitoneal  or mesenteric lymphadenopathy by size criteria. .   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 Atherosclerotic disease. Caren Sharper PERITONEUM / Lucy Karlos free air or fluid. BONES:  There is no acute osseous pathology. No obvious suspicious lytic  or blastic lesion identified. Caren Sharper SOFT TISSUES:  Unremarkable. Result Impression   1.  No evidence of recurring disease or metastatic disease to the  abdomen and pelvis. 2.  Prior left nephrectomy. 3.  Distal abdominal aorta aneurysm. 4.  Additional chronic findings as discussed above. This report was finalized on 10/03/2019 09:27 by Dr. Neida Hair MD         ASSESSMENT AND PLAN:  Keenan Chatman is a 80year old  gentleman managed with primary and secondary diagnoses as outlined:  · Resection of left retroperitoneal mass and radical left nephrectomy by Dr. Samantha Aldana at [facility] on 10/31/2018. Pathology revealed IgG4 related disease. · Multifactorial anemia  · IgG kappa MGUS  · Right lower extremity DVT on Eliquis bid by Dr. Sabrina Krishnan    His ex-wife  in 2021, and their son  in 2021. He is still grieving. Mr. José Manuel Magana is on prednisone 1 mg daily and treatment of joint pains pleuritic-like chest discomfort, rib pain and feeling kind of rough, primarily fatigue. Mr. José Manuel Magana receives weekly Rituxan x 4 periodically for exacerbations of his IgG4 related disease directed by Dr. Andry Lance. Mr. José Manuel Magana received 4 weekly doses of Rituxan between 2021 and 2021    Jamshid Cha comes today in followup for evaluation and to go over treatment he is receiving. Physical examination today, 2021 does not reveal evidence of palpable peripheral lymphadenopathy.   Lungs have scattered rales bilaterally in upper and lower lung fields. He does complain on discomfort on deep inspiration, he may have a pleural friction rub.   The heart is regular the abdomen is soft and benign  He does have a cushingoid appearance of the face due to chronic prednisone/steroid use on a slow taper presently    Serology on 10/10/2019 revealed:  Iron- 45  TIBC- 255  Saturation%- 17.6%  Ferritin- 13.40  B2M- 2.8  Kappa light chains- 42.4  Lambda light chains- 24.8 with K/L ratio 1.71  IgG 945, IgA 176, IgM 141  IgG 4- 137  M-spike- not observed      Serology on 2/3/2020 revealed:  CMP with BUN 30, creatinine 1.90  Iron- 16  TIBC- 223  Saturation %- 7.2  Ferritin- 7.66  B2M- 2.8  Kappa light chains- 25.8  Lambda light chains- 14.7  K/L ratio- 1.76  IgG 872, IgA 156, IgM 128  IgG 4- 136  M-spike- not observed    Serology on 6/10/2020 revealed:  CMP with BUN 24, creatinine 1.8  B2M- 3.5  Kappa light chains- 134.3  Lambda light chains- 39.3  K/L ratio- 3.42  IgG 1417, IgA 205, IgM 244  IgG 4- 213  Iron- 30  TIBC- 270  Iron sat- 11%  Ferritin- 14.9  M-spike- not observed    Serology on 7/15/2020 revealed:  Kappa light chains- 123.6  Lambda light chains- 36.7  K/L ratio- 3.37  IgG 4- 250    Dr. Michell Linda began treatment with Rituxan on 7/28/2020.    24-Hr Urine electrophoresis on 7/17/2020 revealed:  Protein, 24H- 92  M-spike- not observed    Serology on 12/16/2020 revealed:  CMP with creatinine 1.91  B2M- 3.3  Kappa light chains- 129.5  Lambda light chains- 45.0  K/L ratio- 2.88  IgG 1580, IgA 216, IgM 176  M-spike- not observed  IgG4- 264    Serology 4- revealed:  B2M-4.0  KLC-109.9  LLC- 37.6  K/L ratio - 2.92  IgG 1228  IgA- 187  IgM-147  Mspike-not observed  TIBC 279  Iron level   50  Iron saturation    18  Ferritin     76    Serology 9/20/2021 revealed:  B2M-3.9  KLC-213.6  LLC- 55.2  K/L ratio - 3.87  IgG 2056  IgA- 238  IgM-230  Mspike-not observed      Summary of IgG4 levels as follows:  10/10/2019  IgG 4- 137  2/3/2020      IgG 4- 136  6/10/2020    IgG 4- 213  7/15/2020    IgG 4- 250  12/16/2020  IgG4- 264  4/8/2021      IgG4-182  7/14/2021    IgG4-250  Next level by Dr. Kendal Harp anticipated for November 2021    Dr. Rio Gentile is following and monitoring and treating his IgG4 related disease process. Gill Cheema received 4 weekly doses of Rituxan from Dr. Rio Gentile starting on 8/27/2021, the final dose on 9/17/2021  Dr. Rio Gentile will redraw IgG4 in November    There is no evidence on physical examination or review of systems of new or active plasma cell dyscrasia or evidence of new symptoms or disease related to his IgG for related diseases. 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 CBC on 2/10/2020 had a WBC of 6.52 with a hemoglobin of 8.9, MCV 83.2 and a platelet count of 541,024. CBC 6/10/2020 revealed a WBC of 6.73. Hgb is 12.4 with an MCV of 92.3 and platelet count of 300,496. CBC on 7/29/2020 revealed WBC of 6.26. Hgb 11.9 with an MCV of 92.7 and platelet count of 772,821. CBC 12/21/2020 revealed a WBC of 5.47. Hgb is 12.9 with an MCV of 94.1 and platelet count of 012,651. CMP on 6/8/2020 at [facility] revealed BUN 26, creatinine 2.08, otherwise normal.    CBC 4/19/2021 revealed a WBC of 7.50 with a hemoglobin of 13.1 and platelet count of 522,440. Normal differential and MCV of 93.6    CBC today 9/29/2021 reveals a WBC of 6.59  Hgb is 12.3 with an MCV of  92.1 and platelet count of 031,463 . #3  IgG Kappa MGUS, stable   Serology will be repeated prior to his return in 4 months.     Serology on 12/16/2020 revealed:  CMP with creatinine 1.91  B2M- 3.3  Kappa light chains- 129.5  Lambda light chains- 45.0  K/L ratio- 2.88  IgG 1580, IgA 216, IgM 176  M-spike- not observed  IgG4- 264    SerologyDr. Adrianna Metcalf Phillips4/8/2021  IgG4 - 182    Serology defer the management of arthritis to Dr. Nicholas Ulloa, he is on prednisone 1 mg a day at this time. XR CHEST (2 VW) 4/19/2021 :  · COPD and chronic interstitial change. There is no acute cardiopulmonary process. #10  Immunizations:  Immunization History   Administered Date(s) Administered    COVID-19, Pfizer, PF, 30mcg/0.3mL 02/20/2021, 03/13/2021    Influenza Whole 10/24/2015             Maria Isabel Rudd was seen today for follow-up. Diagnoses and all orders for this visit: IgG monoclonal gammopathy    Anemia, unspecified type    Stage 3 chronic kidney disease, unspecified whether stage 3a or 3b CKD ([facility] [address]    Health care maintenance         No orders of the defined types were placed in this encounter. Return in about 6 months (around 3/29/2022) for Follow Up with Tomy Reza.

## 2021-09-29 ENCOUNTER — OFFICE VISIT (OUTPATIENT)
Dept: HEMATOLOGY | Age: 81
End: 2021-09-29
Payer: MEDICARE

## 2021-09-29 ENCOUNTER — HOSPITAL ENCOUNTER (OUTPATIENT)
Dept: INFUSION THERAPY | Age: 81
Discharge: HOME | End: 2021-09-29
Payer: MEDICARE

## 2021-09-29 VITALS
OXYGEN SATURATION: 93 % | WEIGHT: 185.9 LBS | HEART RATE: 72 BPM | DIASTOLIC BLOOD PRESSURE: 68 MMHG | BODY MASS INDEX: 27.53 KG/M2 | HEIGHT: 69 IN | SYSTOLIC BLOOD PRESSURE: 118 MMHG

## 2021-09-29 DIAGNOSIS — N18.30 STAGE 3 CHRONIC KIDNEY DISEASE, UNSPECIFIED WHETHER STAGE 3A OR 3B CKD (HCC): ICD-10-CM

## 2021-09-29 DIAGNOSIS — D64.9 ANEMIA, UNSPECIFIED TYPE: ICD-10-CM

## 2021-09-29 DIAGNOSIS — Z00.00 HEALTH CARE MAINTENANCE: ICD-10-CM

## 2021-09-29 DIAGNOSIS — D47.2 IGG MONOCLONAL GAMMOPATHY: Primary | ICD-10-CM

## 2021-09-29 LAB
BASOPHILS ABSOLUTE: 0.03 K/UL (ref 0.01–0.08)
BASOPHILS RELATIVE PERCENT: 0.5 % (ref 0.1–1.2)
EOSINOPHILS ABSOLUTE: 0.42 K/UL (ref 0.04–0.54)
EOSINOPHILS RELATIVE PERCENT: 6.4 % (ref 0.7–7)
HCT VFR BLD CALC: 37.3 % (ref 40.1–51)
HEMOGLOBIN: 12.3 G/DL (ref 13.7–17.5)
LYMPHOCYTES ABSOLUTE: 0.23 K/UL (ref 1.18–3.74)
LYMPHOCYTES RELATIVE PERCENT: 3.5 % (ref 19.3–53.1)
MCH RBC QN AUTO: 30.4 PG (ref 25.7–32.2)
MCHC RBC AUTO-ENTMCNC: 33 G/DL (ref 32.3–36.5)
MCV RBC AUTO: 92.1 FL (ref 79–92.2)
MONOCYTES ABSOLUTE: 1.04 K/UL (ref 0.24–0.82)
MONOCYTES RELATIVE PERCENT: 15.8 % (ref 4.7–12.5)
NEUTROPHILS ABSOLUTE: 4.87 K/UL (ref 1.56–6.13)
NEUTROPHILS RELATIVE PERCENT: 73.8 % (ref 34–71.1)
PDW BLD-RTO: 14.9 % (ref 11.6–14.4)
PLATELET # BLD: 145 K/UL (ref 163–337)
PMV BLD AUTO: 11.7 FL (ref 7.4–10.4)
RBC # BLD: 4.05 M/UL (ref 4.63–6.08)
WBC # BLD: 6.59 K/UL (ref 4.23–9.07)

## 2021-09-29 PROCEDURE — G8417 CALC BMI ABV UP PARAM F/U: HCPCS | Performed by: INTERNAL MEDICINE

## 2021-09-29 PROCEDURE — 1036F TOBACCO NON-USER: CPT | Performed by: INTERNAL MEDICINE

## 2021-09-29 PROCEDURE — 85025 COMPLETE CBC W/AUTO DIFF WBC: CPT

## 2021-09-29 PROCEDURE — G8427 DOCREV CUR MEDS BY ELIG CLIN: HCPCS | Performed by: INTERNAL MEDICINE

## 2021-09-29 PROCEDURE — 4040F PNEUMOC VAC/ADMIN/RCVD: CPT | Performed by: INTERNAL MEDICINE

## 2021-09-29 PROCEDURE — 99213 OFFICE O/P EST LOW 20 MIN: CPT | Performed by: INTERNAL MEDICINE

## 2021-09-29 PROCEDURE — 1123F ACP DISCUSS/DSCN MKR DOCD: CPT | Performed by: INTERNAL MEDICINE

## 2021-09-29 PROCEDURE — 36415 COLL VENOUS BLD VENIPUNCTURE: CPT

## 2021-09-29 PROCEDURE — 99211 OFF/OP EST MAY X REQ PHY/QHP: CPT

## 2021-11-22 ENCOUNTER — TRANSCRIBE ORDERS (OUTPATIENT)
Dept: ADMINISTRATIVE | Facility: HOSPITAL | Age: 81
End: 2021-11-22

## 2021-11-22 DIAGNOSIS — D89.89 IGG4 RELATED DISEASE (HCC): Primary | ICD-10-CM

## 2021-12-02 ENCOUNTER — HOSPITAL ENCOUNTER (OUTPATIENT)
Dept: CT IMAGING | Facility: HOSPITAL | Age: 81
Discharge: HOME | End: 2021-12-02
Admitting: INTERNAL MEDICINE

## 2021-12-02 PROCEDURE — 74176 CT ABD & PELVIS W/O CONTRAST: CPT

## 2021-12-20 ENCOUNTER — HOSPITAL ENCOUNTER (OUTPATIENT)
Dept: VASCULAR LAB | Age: 81
Discharge: HOME | End: 2021-12-20
Payer: MEDICARE

## 2021-12-20 ENCOUNTER — OFFICE VISIT (OUTPATIENT)
Dept: VASCULAR SURGERY | Age: 81
End: 2021-12-20
Payer: MEDICARE

## 2021-12-20 VITALS
HEART RATE: 63 BPM | RESPIRATION RATE: 18 BRPM | SYSTOLIC BLOOD PRESSURE: 128 MMHG | TEMPERATURE: 97.6 F | OXYGEN SATURATION: 96 % | DIASTOLIC BLOOD PRESSURE: 72 MMHG

## 2021-12-20 DIAGNOSIS — I65.23 BILATERAL CAROTID ARTERY STENOSIS: ICD-10-CM

## 2021-12-20 DIAGNOSIS — I65.23 BILATERAL CAROTID ARTERY STENOSIS: Primary | ICD-10-CM

## 2021-12-20 PROCEDURE — G8484 FLU IMMUNIZE NO ADMIN: HCPCS | Performed by: NURSE PRACTITIONER

## 2021-12-20 PROCEDURE — 4040F PNEUMOC VAC/ADMIN/RCVD: CPT | Performed by: NURSE PRACTITIONER

## 2021-12-20 PROCEDURE — G8427 DOCREV CUR MEDS BY ELIG CLIN: HCPCS | Performed by: NURSE PRACTITIONER

## 2021-12-20 PROCEDURE — 99213 OFFICE O/P EST LOW 20 MIN: CPT | Performed by: NURSE PRACTITIONER

## 2021-12-20 PROCEDURE — G8417 CALC BMI ABV UP PARAM F/U: HCPCS | Performed by: NURSE PRACTITIONER

## 2021-12-20 PROCEDURE — 1036F TOBACCO NON-USER: CPT | Performed by: NURSE PRACTITIONER

## 2021-12-20 PROCEDURE — 93880 EXTRACRANIAL BILAT STUDY: CPT

## 2021-12-20 PROCEDURE — 1123F ACP DISCUSS/DSCN MKR DOCD: CPT | Performed by: NURSE PRACTITIONER

## 2021-12-20 NOTE — PROGRESS NOTES
Bryson Lee (:  1940) is a 80 y.o. male,Established patient, here for evaluation of the following chief complaint(s):  Follow-up (carotid)            SUBJECTIVE/OBJECTIVE:  He presents for follow up of carotid artery stenosis. He has a known history of carotid artery stenosis for 1 - 5 years. His current treatment includes ASA EC daily. He denies a history of CVA. He reports has not had TIA's, episodes of lateralizing weakness and episodes of amaurosis fugax.     Bryson Lee is a 80 y.o. 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[facility])     Normocytic normochromic anemia     Other iron deficiency anemias     Enlarged lymph nodes, unspecified     Rheumatoid arthritis (HCC)     Decreased white blood cell count, unspecified     Chronic kidney disease, stage III (moderate) ([facility])     Weight loss, abnormal     Thrombocytopenia, unspecified ([facility])     Monocytosis (symptomatic)     Esophageal dysphagia 2017    Iron deficiency anemia due to chronic blood loss     Melena 2017    Iron deficiency anemia 2017    Constipation 2017    Altered bowel function 2017    Chronic GERD 2017    Dysphagia 2017    AAA (abdominal aortic aneurysm) without rupture ([facility] [address] 2016    History of esophageal stricture 2016    S/P coronary artery stent placement 2016    Anticoagulated 2016    Pill dysphagia 2016    Carotid artery stenosis 10/24/2012    History of colon polyps 2012    Hemorrhoids 2012    Allergic rhinitis     Asthma     HIGH CHOLESTEROL     Hypertension     Restless leg syndrome      Current Outpatient Medications   Medication Sig Dispense Refill    triamcinolone (KENALOG) 0.1 % cream FREDIS EXT TO BOTH ARMS AND LEGS BID PRN FOR RASH  Peptic ulcer disease     Restless leg syndrome     [facility]oid arthritis ([facility] [address]     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 [facility] 2018    Skin cancer     on left ear lobe    Thrombocytopenia, unspecified (HCC)     Venous insufficiency     Weight loss, abnormal      Past Surgical History:   Procedure Laterality Date    COLONOSCOPY  9/15/2009    : negative    COLONOSCOPY  2006, 2003    hyperplastic colon polyps     COLONOSCOPY  11/2012    minimal diverticula, no polyps (5yr)    CORONARY ANGIOPLASTY WITH STENT PLACEMENT  2016    2 stents    FINE NEEDLE ASPIRATION Right 12/23/2011    Neck mass-Dr Verma    [place] COLSC FLEXIBLE W/CONTROL BLEEDING ANY METHOD N/A 7/13/2017    Dr Osullivan-w/control of hemorrage, gold probe cautery of avm-internal hemorrhoids-5 yr recall    PTCA  3/10/16 ford    rca    SALIVARY GLAND SURGERY Right 03/23/2012    Dr Verma-Excision of right submandibular gland    SKIN CANCER EXCISION      TONSILLECTOMY      TOTAL NEPHRECTOMY Left 2018 [place]    UPPER GASTROINTESTINAL ENDOSCOPY  7/23/2009    : minimal gastritis, sm hiatal hernia, no active ulcers    UPPER GASTROINTESTINAL ENDOSCOPY  8/2006    gastritis, nonobst Schatzki's ring, lg hiatal hernia    UPPER GASTROINTESTINAL ENDOSCOPY  7/13/2017    Dr Garcia/control of hemorrhage with gold probe cauterization of AVM-cecal avm, small bowel avm, hiatal hernia, margarito's erosions    UPPER GASTROINTESTINAL ENDOSCOPY  7/13/2017    Dr Garcia/control of hemorrhage with gold probe cauterization of AVM-cecal avm, small bowel avm, hiatal hernia, margarito's erosions    URETER STENT PLACEMENT Right 11/27/2018    [facility]    VASECTOMY  26 years ago    and had it reversed.     VASECTOMY REVERSAL       Family History   Problem Relation Age of Onset    Hypertension Mother     High Cholesterol Mother     Heart Failure Mother         blockages, heart attack    Other Mother pneumonia    Heart Failure Father         blockages, heart attack    Heart Failure Brother         blockages, heart attack    High Cholesterol Brother     Stroke Brother     Heart Disease Brother     Hypertension Brother     Other Other         mother , age 80 fall    Colon Cancer Neg Hx     Colon Polyps Neg Hx     Esophageal Cancer Neg Hx     Liver Cancer Neg Hx     Liver Disease Neg Hx     Rectal Cancer Neg Hx     Stomach Cancer Neg Hx      Social History     Tobacco Use    Smoking status: Former Smoker     Packs/day: 1.00     Years: 20.00     Pack years: 20.00     Types: Cigarettes     Quit date: 1986     Years since quittin.7    Smokeless tobacco: Never Used   Substance Use Topics    Alcohol use: No         Eyes - no sudden vision change or amaurosis. Respiratory - no significant shortness of breath,  Cardiovascular - no chest pain or syncope. No  significant leg swelling. no claudication. Musculoskeletal - no gait disturbance  Skin - no new wound. Neurologic -  No speech difficulty or lateralizing weakness. All other review of systems are negative. Physical Exam    /72 Comment: right  Pulse 63   Temp 97.6 °F (36.4 °C)   Resp 18   SpO2 96%       Neck- carotid pulses 2+ to palpation with no bruit  Cardiovascular - Regular rate and rhythm. Pulmonary - effort appears normal.  No respiratory distress. Lungs - Breath sounds normal. No wheezes or rales. Extremities - without edema   Neurologic - alert and oriented X 3. Physiologic. Face symmetric. Skin - warm, dry, and intact. no wound  Psychiatric - mood, affect, and behavior appear normal.  Judgment and thought processes appear normal.    Risk factors for atherosclerosis of all vascular beds have been reviewed with the patient including:  Family history, tobacco abuse in all forms, elevated cholesterol, hyperlipidemia, and diabetes.       Doppler results:    Right CCA/ICA <50% stenotic  Left CCA/ICA <50% stenotic  Right verterbral artery flow is antegrade  Left verterbral artery flow is antegrade  Individual velocities reviewed: Yes. Results were reviewed with the patient. Disease process is stable and chronic        Reviewed previous studies including: carotid u/s  Individual images were reviewed. I agree with the findings  Results were discussed with the patient. ASSESSMENT/PLAN:  1. Bilateral carotid artery stenosis        Discussed management of carotid u/s which includes:  continue asa to reduce risk of arterial thrombosis and to decrease rate of plaque buildup  Strongly encourage start/continue statin therapy -   Recommend no smoking - discussed the effect tobacco has on illness;   Proceed with 12 months with cvls         Patient instructed to call or proceed to the emergency room with any symptoms of lateralizing weakness, loss of vision in one eye, or episodes slurred speech. An electronic signature was used to authenticate this note.     --YVON Sorensen

## 2022-03-29 NOTE — PROGRESS NOTES
Patient:  Marielos Raines  YOB: 1940  Date of Service: 3/30/2022  MRN: [id number]    Primary Care Physician: Bella Robles MD    Chief Complaint   Patient presents with    Follow-up     IgG monoclonal gammopathy       Patient Seen, Chart, Consults notes, Labs, Radiology studies reviewed. Subjective:  Juan Nielson is a 80year old  gentleman managed with primary and secondary diagnoses as outlined:  · Resection of left retroperitoneal mass and radical left nephrectomy by Dr. Mancuso Daughters Dr. Ayla Vega at [facility] on 10/31/2018. Pathology revealed IgG4 related disease. · Multifactorial anemia  · IgG kappa MGUS  · Right lower extremity DVT on Eliquis bid by Dr. Boucher Kidney    His ex-wife  in 2021, and their son  in 2021. He is still grieving. Mr. Paco Florence is on prednisone 1 mg daily and treatment of joint pains pleuritic-like chest discomfort, rib pain and feeling kind of rough, primarily fatigue. Mr. aPco Florence receives weekly Rituxan x 4 periodically for exacerbations of his IgG4 related disease directed by Dr. Annie Olson. Mr. Paco Florence received 4 weekly doses of Rituxan between 2021 and 2021    Tyron Heller returns today, broken hearted that his 22-year-old wife of 24 years left him and cleaned out all the furniture in the house with a moving company while he was at [place] on  ~5 weeks ago. Since that time, he has had no appetite, he has lost 15 pounds and is crying during the entire clinic visit today. HEMATOLOGIC HISTORY:  IgG4 related disease 10/31/2018 with associated leukopenia, Anemia, and adenopathy  Juan Nielson was seen in initial hematologic consultation on 2018, referred by Dr. Lluvia Winston for evaluation of leukopenia and anemia. CBC on 2018 showed a WBC of 3.37, hemoglobin 9.7 with MCV 87.8 and platelet count 199,605. Neutrophils were 56.3%, lymphocytes 9.5% and monocytes 20.5%. CMP included a creatinine of 1.94, GFR 41.  Ca+ 8.8, TP 9.6. He is followed by Dr. Ajay Tavarez for rheumatoid arthritis and possible Sjogren disease. He has a known history of CKD, as well as iron deficiency anemia from GI blood loss. Endoscopy 2017 by Dr. Wilfred Osullivan revealed a small bowel AVM that was cauterized. A moderate sized hiatal hernia with mild Jake's erosions was noted as well. Colonoscopy 17 was appreciable for cecal AVM, also cauterized as well as internal hemorrhoids. M2A capsule endoscopy 1/3/2018 by Dr. May Philip revealed one irritated area in the colon, though no signs of bleeding or stigmata of small bowel blood loss. Should evidence of GI blood loss persists, consideration should be given for repeat EGD/colonoscopy due to evidence of blood loss on procedures in  with possible need for repeat cauterization. Jung Hurtado was previously on antiplatelet therapy for CAD, now treated with aspirin only. He denies melena or hematochezia at this time. In addition to the above, Jung Hurtado has been followed by Dr. Casper Hodge for thoracic adenopathy. Contrasted Chest CT 1/10/2018 at [facility] documented an interval increase in the size of intrathoracic lymph nodes compared to 2016; 2016; CTA chest 2016 including the followin mm precarinal LN   10 mm hilar LN   14 mm subcarinal LN   17 left hilar LN, previously 10 mm   4 mm medial ALIYA nodule, more conspicuous   4 mm lingular nodule, new   7 mm left major fissure nodule, stable   7 mm LLL pleural based noncalcified nodule, previously 5 mm   6 mm LLL nodule, previously 4 mm   3 mm LLL nodule   8 mm RUL, previously 5 mm   Several RLL nodule, relatively stable    Bronchoscopy with EBUS and biopsy of a station 7 node was performed 18 by Dr. Casper Hodge. Pathology:  1. station 7 lymph node revealed:  A. Small, mature lymphocytes and plasma cells. B. Ciliated columnar bronchial epithelial cells and gallbladder cells. C. background blood.   D. negative for malignant cells.  2. Bronchial washings  A. acute inflammation, mild  B. squamous metaplasia  C. blood and mucus  D. negative for malignant cells  E. GMS stain negative for fungal organisms  FLOW cytometry on the station 7 lymph node biopsied by EBUS on 1/25/2018 did NOT reveal immunophenotypic evidence of a clonal B cell population     IN RETROSPECT,  Gardenia Hauser has a history of FNA of a right neck mass performed 12/23/2011 by Dr. Ricardo Daigle. Pathology revealed no malignant cells, with fragments of benign lymphoid tissue and a few benign salivary gland acinar fragments. On 3/23/2012 excision of a right submandibular gland was performed by Dr. Ricardo Daigle that demonstrated severe, chronic follicular sialadenitis with focal areas of glandular fibrosis and heterogeneous lymphoid predominant inflammatory cell population with significant number of plasma cells and eosinophils. There was no evidence of epithelial malignancy. FLOW cytometry revealed no evidence of B-cell or T-cell lymphoma. On exam, left submandibular lymphadenopathy is appreciated and perhaps shotty axillary lymphadenopathy. CBC 4/26/2018 showed a normal WBC of 4.52, though with persistent monocytosis at 22.8%. Hemoglobin is 10.8 with MCV 91.0 and platelet count 586,619. Gardenia Hauser' main complaint had been of hoarseness and progressive cough, which occurs daily. He dates symptoms back to August, 2017 after having an allergic reaction to Lyrica for post-herpetic neuralgia. In addition to hoarseness and cough, Gardenia Hauser also reports weight loss of 24 pounds over the previous 4-6 weeks prior to presentation at this office, night sweats and chills. He was also referred to [facility] by Dr. Callie Connors regarding persistent pulmonary symptoms. Given the abnormal CBC finding, adenopathy and symptomatology work up was also requested, and he was referred to this office.     Serology 4/26/2018  Serum Fe - 32  TIBC - 220  Fe Sat - 15%  Ferritin - 45  B12 - 744  Folate > 20  Retic - 1.6 %  LDH -175  B2 M - 7 (0.6-2.4)  QI - IgG-5051 (700-1600), IgM 170 ()  SPEP - no M spike, TP - 10.2 (6-8.5), globulin - 7.5 (2.2-3.9)  Free serum light chains - kappa 930.3 (3.3-19.4), lambda 228 (5.7-26.3), K/L ratio 4.08 (0.26-1.65)    Bone marrow aspiration and biopsy on 4/30/2018 was normocellular (~20-25 %) with adequate maturing trilineage hematopoiesis, no significant dyspoiesis and no increase in blasts. Megakaryocytes had a spectrum of morphology. There was no stainable storage iron and no increase in reticulin fibrosis. 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 Flow cytometry did not reveal B-cell monoclonality nor T-cell aberrant antigen expression. FISH studies for MDS was negative. There was no morphologic evidence of a myelodysplastic syndrome. There was no diagnostic evidence of a limp low at disorder, granulomas, nor metastatic malignancies on the bone marrow biopsy or aspirate. CT scans of the neck with contrast on 4/30/2018 documented an asymmetry within the left submandibular gland compared to the right. The left submandibular gland measures 4.2 x 2.6 x 2.4 cm compared to 12 mm on the right. Otherwise there are no discrete neck masses or pathologically enlarged lymph nodes. 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 Prominent right external iliac chain lymph nodes of indeterminate significance were also documented. Diverticulosis without diverticulitis and a large hiatal hernia was described. The prostate gland was enlarged with lifting of the base of the urinary bladder.   Also noted was a questionable abnormal appearance of the pancreas with some loss of the normal pancreatic on to work without a discrete mass with MRI recommended. MRI of the abdomen W & WO on 5/8/2018 documented borderline splenomegaly, a large ventral hernia, nonenhancing renal cysts and a 3.5 cm abdominal aortic aneurysm. A 6 mm right middle lobe pulmonary nodule and nodular density along the left major fissure of the region of the lingula were noted as previously described on the CT scan of the chest on 1/10/2018. The nodules on 1/10/2018 had increased compared to 11/22/2016 with metastatic disease being in the differential.   MRI of the pelvis W & WO contrast on 5/8/2018 documented enhancing septations and a small soft tissue nodule within the inferior aspect of the left fatty-containing retroperitoneum positioned below the level of the left kidney. This abnormality is lateral to the psoas muscle (it abuts the psoas muscle, but does not invade the psoas muscle) and extending to the level of the left iliac crest.  An atypical lipomatouse tumor versus liposarcoma considered. Mild prostate enlargement is also noted. Mildly enlarged pelvic lymph nodes measuring 1.2 cm in short axis, reactive or metastatic are also noted. Frantz Crofty saw Dr. John Valle and Dr. Juan Rod and reviewed Frantz Barron' case with sarcoma tumor board at [facility] on 6/8/2018. 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 Unfortunately, a curative resection would require sacrifice of the left kidney leading to progression of CKD and possibly dialysis. Recommendation by Dr Maurizio August on a clinic visit note faxed 6/20/2018 was for continued observation with repeat CT scan in 3 months as there were no signs of high-grade component on the CT scans from April of 2018 or the MRI of the abdomen/pelvis from 5/8/2018. Consideration could be given for resection if there is documentation of clear growth or concern for dedifferentiation.     Pathology from the station 7 lymph node on 1/25/18 was reviewed 6/19/18 at [facility] and noted to be predominantly bronchial cells with few lymphocytes and macrophages, negative for malignancy. Bronchial washings also negative for malignancy. Interpretation of the chest CT 6/13/18 at [facility] was consistent with multiple bilateral pulmonary nodules and mediastinal and hilar adenopathy, possibly representing sarcoidosis with malignancy not excluded. AAA partially imaged. CT scans of the chest, abdomen/pelvis 8/3/18 at [facility] were grossly unchanged with surveillance to continue. Regarding the left submandibular gland, I spoke with Dr. Sofía Verma on 6/27/2018. Dr. Ricardo Daigle stated he has been monitoring the left submandibular gland for years, without gross change. He did not recommend excision, rather continued monitoring due to stability and concern for xerostomia. Lasix renogram and assessment by Dr. Parag Hutson was completed in in September, 2018. Dr. Ulysses Herndon reviewed imaging and confirmed the need for removal of the whole left kidney. Tao's case was again reviewed with the sarcoma tumor Board. Renogram demonstrated differential kidney function of 60% on the right. Dr. Parag Hutson estimated his final GFR may be 25-30, thus likely avoiding hemodialysis postoperatively. Left nephrectomy appeared more reasonable given recent stone disease and less function. Gardenia Hauser underwent resection of the left retroperitoneal mass with radical left nephrectomy on 10/31/18 by Dr. Brian Allison at [facility]. Pathology as follows:  1. Liver, segment 3, excision: Benign liver parenchyma with calcified granuloma. 2. Left retroperitoneal nodule, excision: Fibroadipose tissue with chronic lymphoplasmacytic and histiocytic inflammation. There were NO features of well-differentiated liposarcoma noted. 3. Left periurethral tissue, excision: Fibroadipose tissue with chronic lymphoplasmacytic and histiocytic inflammation.    4. Left lateral pararenal tissue, excision: Benign without contrast on 10/3/2019 at [facility] did not reveal evidence of recurrent disease or metastatic disease to the abdomen or pelvis. Prior left nephrectomy is noted    CT scan of the Chest without contrast on 6/25/2020 at [facility] reveals Interval increase in size of pulmonary nodules in the RIGHT upper lobe and LEFT lower lobe. Interval resolution of medial RIGHT lower lobe nodules. Essentially stable RIGHT middle lobe nodule. Continue follow up in 6 months is recommended. CT scan of the Abdomen Pelvis without contrast on 6/25/2020  at [facility] reveals a Stable CT of the abdomen and pelvis with postsurgical changes in the left retroperitoneum. No evidence of recurrent or metastatic disease. 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 Atherosclerotic disease. Large hiatal hernia. Colonic diverticulosis without evidence of acute diverticulitis.  Low-density lesions in the right kidney which are stable but  incompletely characterized on this exam.      Serology on 10/10/2019 revealed:  Iron- 45  TIBC- 255  Saturation%- 17.6%  Ferritin- 13.40  B2M- 2.8  Kappa light chains- 42.4  Lambda light chains- 24.8 with K/L ratio 1.71  IgG 945, IgA 176, IgM 141  IgG 4- 137  M-spike- not observed      Serology on 2/3/2020 revealed:  CMP with BUN 30, creatinine 1.90  Iron- 16  TIBC- 223  Saturation %- 7.2  Ferritin- 7.66  B2M- 2.8  Kappa light chains- 25.8  Lambda light chains- 14.7  K/L ratio- 1.76  IgG 872, IgA 156, IgM 128  IgG 4- 136  M-spike- not observed    Serology on 6/10/2020 revealed:  CMP with BUN 24, creatinine 1.8  B2M- 3.5  Kappa light chains- 134.3  Lambda light chains- 39.3  K/L ratio- 3.42  IgG 1417, IgA 205, IgM 244  IgG 4- 213  Iron- 30  TIBC- 270  Iron sat- 11%  Ferritin- 14.9  M-spike- not observed    Serology on 7/15/2020 revealed:  Kappa light chains- 123.6  Lambda light chains- 36.7  K/L ratio- 3.37  IgG 4- 250    24-Hr Urine electrophoresis on 7/17/2020 revealed:  Protein, 24H- 92  M-spike- not observed    By June 2020, prednisone have been tapered down to 3 mg daily by Dr. Susana Gomez. With new findings on the CT scan of the chest and increased IgG4 level of 250, prednisone was increased again to 5 mg a day to be delivered along with Rituxan. Based on the new findings on the CT scan of the chest from 6/25/2020 and the elevated IgG4 of 250, Dr. Pretty Sow initiated Rituxan on 7/28/2020. Javad Raman will receive a second dose of Rituxan on 8/11/2020 with reevaluation every 6 months thereafter. CT chest without contrast on 12/16/2020 was compared to 6/25/2020 and documented:  · No definite intrathoracic metastasis. · Bilateral interlobular septal thickening with patchy consolidation in the LEFT lower lobe. Imaging appearance favors mild pulmonary edema and atelectasis. Correlate with clinical exam and laboratory analysis  · 7 mm RML lung nodule, stable  · 4 mm adjacent to above RML lung nodule, stable   · 5 mm LLL lung nodule, stable  · 5 mm LLL subpleural lung nodule, stable   · Decreased size of right apical lung nodule which now appears more linear and scar like  · No new or enlarging nodule  · No enlarged axillary, supraclavicular, mediastinal or hilar lymph nodes  · No acute osseous finding    CT abdomen and pelvis without contrast on 12/16/2020 documented:  · No evidence of retroperitoneal mass or lymphadenopathy  · Large sliding-type heart of hernia  · 3.9 cm low-density mass located laterally in the upper pole of left kidney, CT density suggest cyst  · 0.7 cm smaller low density nodule seen located medially in upper pole of left kidney, CT density suggest a cyst  · 2 mm calculus in lower pole of right kidney   · Left nephrectomy without regional complications. · Diverticulosis of the distal colon. No evidence for diverticulitis. · Enlarged prostate. · Fusiform aneurysmal dilatation of distal abdominal aorta.  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 Further follow-up may be obtained. · Fat-containing small inguinal hernias bilaterally    Dr. Bettye Reina is following and monitoring and treating his IgG4 related disease process. He  received Rituxan weekly x 4 Dr. Zahra Perry in January/Feb 2021. Serology-Dr. Loera-4/8/2021  IgG4 - 182      TREATMENT SUMMARY:  · Resection of left retroperitoneal mass and radical left nephrectomy by Dr. Je Bui at [facility] on 10/31/2018. Pathology revealed IgG4 related disease. · Prednisone-management per Dr. Zahra Perry  · Rituxan-management per Dr. Zahra Perry              Allergies:  Lyrica [pregabalin], Penicillins, and Sulfa antibiotics    Medicines:  Current Outpatient Medications   Medication Sig Dispense Refill    Calcium-Magnesium-Vitamin D (CALCIUM 1200+D3 PO) Take by mouth       predniSONE (DELTASONE) 5 MG tablet Take 3 mg by mouth daily 1mg      HYDROcodone-acetaminophen (NORCO) 7.5-325 MG per tablet Take 1 tablet by mouth every 6 hours as needed for Pain.  vitamin D (CHOLECALCIFEROL) 1000 UNIT TABS tablet Take 1,000 Units by mouth daily      amLODIPine (NORVASC) 5 MG tablet Take 5 mg by mouth daily      atorvastatin (LIPITOR) 10 MG tablet Take 10 mg by mouth daily      cetirizine (ZYRTEC) 5 MG tablet Take 5 mg by mouth daily      aspirin 81 MG tablet Take 81 mg by mouth daily.  pantoprazole (PROTONIX) 40 MG tablet Take 1 tablet by mouth 2 times daily.  60 tablet 11    Ergocalciferol (VITAMIN D2 PO) Take by mouth (Patient not taking: Reported on 12/20/2021)      triamcinolone (KENALOG) 0.1 % cream FREDIS EXT TO BOTH ARMS AND LEGS BID PRN FOR RASH (Patient not taking: Reported on 3/30/2022)      clonazePAM (KLONOPIN) 2 MG tablet Take 2 mg by mouth nightly as needed (Patient not taking: Reported on 12/20/2021)       No current facility-administered medications for this visit.        Past Medical History:      Diagnosis Date    Allergic rhinitis     Anemia, unspecified     Anxiety     Asthma     BPH (benign prostatic hyperplasia)     Dr Ana Woodson CAD (coronary artery disease)     Dr Paresh Mariee [facility])     skin-non-melanoma    Carotid artery stenosis     YVON Yadav    Chronic kidney disease, stage III (moderate) (HCC)     Colon polyps     Cough     Decreased white blood cell count, unspecified     Enlarged lymph nodes, unspecified     Esophageal stricture     GERD (gastroesophageal reflux disease)     HIGH CHOLESTEROL     Hypertension     Insomnia     Leukocytosis     Mediastinal lymphadenopathy     Monoclonal gammopathy     Monocytosis (symptomatic)     Night sweats     Normocytic normochromic anemia     Osteoarthritis     Other iron deficiency anemias     GI blood loss, hiatal hernia w/Jake's erosions and cecal/small bowel AVMs    Peptic ulcer disease     Restless leg syndrome     Rheumatoid arthritis ([address]     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[address]     dx @ [facility] 2018    Skin cancer     on left ear lobe    Thrombocytopenia, unspecified ([address]     Venous insufficiency     Weight loss, abnormal         Past Surgical History:      Procedure Laterality Date    COLONOSCOPY  9/15/2009    : negative    COLONOSCOPY  2006, 2003    hyperplastic colon polyps     COLONOSCOPY  11/2012    minimal diverticula, no polyps (5yr)    CORONARY ANGIOPLASTY WITH STENT PLACEMENT  2016    2 stents    FINE NEEDLE ASPIRATION Right 12/23/2011    Neck mass-Dr Verma    RI COLSC FLEXIBLE W/CONTROL BLEEDING ANY METHOD N/A 7/13/2017    Dr Osullivan-w/control of hemorrage, gold probe cautery of avm-internal hemorrhoids-5 yr recall    PTCA  3/10/16 ford    rca    SALIVARY GLAND SURGERY Right 03/23/2012    Dr Verma-Excision of right submandibular gland    SKIN CANCER EXCISION  TONSILLECTOMY      TOTAL NEPHRECTOMY Left 2018 [place]    UPPER GASTROINTESTINAL ENDOSCOPY  2009    : minimal gastritis, sm hiatal hernia, no active ulcers    UPPER GASTROINTESTINAL ENDOSCOPY  2006    gastritis, nonobst Schatzki's ring, lg hiatal hernia    UPPER GASTROINTESTINAL ENDOSCOPY  2017    Dr Garcia/control of hemorrhage with gold probe cauterization of AVM-cecal avm, small bowel avm, hiatal hernia, margarito's erosions    UPPER GASTROINTESTINAL ENDOSCOPY  2017    Dr Garcia/control of hemorrhage with gold probe cauterization of AVM-cecal avm, small bowel avm, hiatal hernia, margarito's erosions    URETER STENT PLACEMENT Right 2018    [facility]    VASECTOMY  26 years ago    and had it reversed.  VASECTOMY REVERSAL          Family History:      Problem Relation Age of Onset    Hypertension Mother     High Cholesterol Mother     Heart Failure Mother         blockages, heart attack    Other Mother         pneumonia    Heart Failure Father         blockages, heart attack    Heart Failure Brother         blockages, heart attack    High Cholesterol Brother     Stroke Brother     Heart Disease Brother     Hypertension Brother     Other Other         mother , age 80 fall    Colon Cancer Neg Hx     Colon Polyps Neg Hx     Esophageal Cancer Neg Hx     Liver Cancer Neg Hx     Liver Disease Neg Hx     Rectal Cancer Neg Hx     Stomach Cancer Neg Hx         Social History  Social History     Tobacco Use    Smoking status: Former Smoker     Packs/day: 1.00     Years: 20.00     Pack years: 20.00     Types: Cigarettes     Quit date: 1986     Years since quittin.9    Smokeless tobacco: Never Used   Vaping Use    Vaping Use: Never used   Substance Use Topics    Alcohol use: No    Drug use: No            Objective:  Vital Signs: Blood pressure 130/70, pulse 61, height 5' 9\" (1.753 m), weight 170 lb (77.1 kg), SpO2 96 %.       Labs:  BMP: Recent Labs     03/30/22  1012   *   K 4.7   CL 98   CO2 23   BUN 20   CREATININE 1.6*   CALCIUM 10.6*     CBC:   Recent Labs     03/30/22  0851   WBC 4.64   HGB 10.9*   HCT 33.7*   MCV 84.9          PT/INR: No results for input(s): PROTIME, INR in the last 72 hours. APTT: No results for input(s): APTT in the last 72 hours. Ionized Calcium:No results for input(s): IONCA in the last 72 hours. Magnesium:No results for input(s): MG in the last 72 hours. Phosphorus:No results for input(s): PHOS in the last 72 hours. Hepatic:   Recent Labs     03/30/22  1012   ALKPHOS 130   ALT 15*   AST 22   PROT 7.1   BILITOT 0.8   LABALBU 3.9       Cultures:   No results for input(s): CULTURE in the last 72 hours. Radiology reports as per the Radiologist  Radiology:     Christa Posadas- 10/3/2019 8:32 AM CDT  HISTORY: PULMONARY NODULE; R91.1-Solitary pulmonary nodule  COMPARISON: CT chest dated 03/26/2019  DOSE LENGTH PRODUCT: 422 mGy cm. Automated exposure control was also  utilized to decrease patient radiation dose. TECHNIQUE: Serial helical tomographic images of the chest were acquired. Multiplanar reformatted images were provided for review. FINDINGS:  The imaged portion of the neck and thyroid gland is unremarkable. Right lower lobe nodule measuring 7 mm medially (image 119, series 3). An adjacent 5 mm nodule medially and inferiorly (image 126, series 3)  was not seen on prior examination. Left lower lobe nodule, measuring 3  mm (image 111, series 3), grossly unchanged given differences in  techniques. A left lower lobe nodule more superiorly measuring 5 mm  (image 97, series 3) appears also unchanged. 7 mm nodule in the right  middle lobe, unchanged. No pleural effusion is present. The trachea and  bronchial tree are patent. Coronary artery disease. Atherosclerotic disease. The heart is within  normal limits in size. Moderate hiatal hernia.  Great vessels, and  pulmonary vessels are otherwise unremarkable in appearance within limits  of a noncontrast study. There is no pericardial effusion. No enlarged  axillary or mediastinal lymph nodes are present. No acute findings are seen in the bones or surrounding soft tissues. Remote insult to the left chest wall posteriorly with multiple rib  Deformities. Please see report from CT abdomen pelvis adjacent for intra-abdominal  Findings. .    2 discrete subcentimeter nodules in the right lower lobe were not  present on prior examination. These are indeterminate short-term  follow-up recommended. This report was finalized on 10/03/2019 09:40 by Dr. Gale Downs MD.      ___________________________________________________________________________      Rosaura Clos: CT Abdomen and Pelvis without contrast  10/3/2019 8:58 AM CDT  INDICATION: ENLARGED LYMPH NODES; R59.9-Enlarged lymph nodes,  unspecified  COMPARISON: CT abdomen pelvis dated 03/26/2019. CT abdomen pelvis dated  02/22/2019. TECHNIQUE:  Abdomen and pelvis were scanned utilizing a multidetector helical  scanner from the diaphragm to the ischial tuberosities without  administration of IV contrast. Coronal and sagittal reformations were  obtained. [Routine protocol is performed.]  Radiation dose equals .4 mGy-cm.  Automated exposure control dose  reduction technique was implemented. FINDINGS:  LINES and TUBES: None. LOWER THORAX: No focal consolidation. No pleural effusion. No  pneumothorax. There is a small to moderate hiatal hernia. Coronary  artery disease. HEPATOBILIARY:  No focal hepatic lesions.   No liver laceration.   No  biliary ductal dilatation. GALLBLADDER:  No radiopaque stones or sludge.   No wall thickening. SPLEEN:  No splenomegaly.    No splenic laceration. PANCREAS:  No focal masses or ductal dilatation. ADRENALS:  No adrenal nodules. KIDNEYS/URETERS: Prior left nephrectomy. Right renal exophytic  hypodensity measuring 3.3 cm unchanged.  Right renal upper pole  parapelvic 1.2 cm is also unchanged. There is no new soft tissue density  within the left nephrectomy bed. GI TRACT:  No abnormal distention, wall thickening, or evidence of bowel  obstruction.   There is no acute appendicitis. Colonic diverticulosis,  without evidence of acute diverticulitis. Felisha Neighbours PELVIC ORGANS/BLADDER:  No acute pathology. Nonspecific mild wall  thickening of bladder, a chronic finding. [place] LYMPH NODES:  There is no inguinal canal, pelvic wall, retroperitoneal  or mesenteric lymphadenopathy by size criteria. .   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 Atherosclerotic disease. [place] PERITONEUM / [place] free air or fluid. BONES:  There is no acute osseous pathology. No obvious suspicious lytic  or blastic lesion identified. Felisha Neighbours SOFT TISSUES:  Unremarkable. Result Impression   1.  No evidence of recurring disease or metastatic disease to the  abdomen and pelvis. 2.  Prior left nephrectomy. 3.  Distal abdominal aorta aneurysm. 4.  Additional chronic findings as discussed above. This report was finalized on 10/03/2019 09:27 by Dr. Demarcus Botello MD         ASSESSMENT AND PLAN:  Whit Dunn is a 80year old  gentleman managed with primary and secondary diagnoses as outlined:  · Resection of left retroperitoneal mass and radical left nephrectomy by Dr. Zoltan López at [facility] on 10/31/2018. Pathology revealed IgG4 related disease. · Multifactorial anemia  · IgG kappa MGUS  · Right lower extremity DVT on Eliquis bid by Dr. Sean Woods    His ex-wife  in 2021, and their son  in 2021. He is still grieving. Mr. Sara Blackwood is on prednisone 1 mg daily and treatment of joint pains pleuritic-like chest discomfort, rib pain and feeling kind of rough, primarily fatigue. Mr. Sara Blackwood receives weekly Rituxan x 4 periodically for exacerbations of his IgG4 related disease directed by Dr. Larry Bland.      Mr. Sara Blackwood received 4 weekly doses of Rituxan between 8/24/2021 and 9/17/2021    Radha Fernando returns today, broken hearted that his 59-year-old wife of 24 years left him and cleaned out all the furniture in the house with a moving company while he was at [employer] on Sunday ~5 weeks ago. Since that time, he has had no appetite, he has lost 15 pounds and is crying during the entire clinic visit today. CBC today 3/30/2022  reveals a WBC of  Hgb is with an MCV of  and platelet count of . Physical examination today, 3/30/2022 does not reveal evidence of palpable peripheral lymphadenopathy. Lungs have scattered rales bilaterally in upper and lower lung fields. He does complain on discomfort on deep inspiration, he may have a pleural friction rub.   The heart is regular the abdomen is soft and benign  He does have a cushingoid appearance of the face due to chronic prednisone/steroid use on a slow taper presently    Serology on 10/10/2019 revealed:  Iron- 45  TIBC- 255  Saturation%- 17.6%  Ferritin- 13.40  B2M- 2.8  Kappa light chains- 42.4  Lambda light chains- 24.8 with K/L ratio 1.71  IgG 945, IgA 176, IgM 141  IgG 4- 137  M-spike- not observed      Serology on 2/3/2020 revealed:  CMP with BUN 30, creatinine 1.90  Iron- 16  TIBC- 223  Saturation %- 7.2  Ferritin- 7.66  B2M- 2.8  Kappa light chains- 25.8  Lambda light chains- 14.7  K/L ratio- 1.76  IgG 872, IgA 156, IgM 128  IgG 4- 136  M-spike- not observed    Serology on 6/10/2020 revealed:  CMP with BUN 24, creatinine 1.8  B2M- 3.5  Kappa light chains- 134.3  Lambda light chains- 39.3  K/L ratio- 3.42  IgG 1417, IgA 205, IgM 244  IgG 4- 213  Iron- 30  TIBC- 270  Iron sat- 11%  Ferritin- 14.9  M-spike- not observed    Serology on 7/15/2020 revealed:  Kappa light chains- 123.6  Lambda light chains- 36.7  K/L ratio- 3.37  IgG 4- 250    Dr. Yves Croft began treatment with Rituxan on 7/28/2020.    24-Hr Urine electrophoresis on 7/17/2020 revealed:  Protein, 24H- 92  M-spike- not observed    Serology on 12/16/2020 revealed:  CMP with creatinine 1.91  B2M- 3.3  Kappa light chains- 129.5  Lambda light chains- 45.0  K/L ratio- 2.88  IgG 1580, IgA 216, IgM 176  M-spike- not observed  IgG4- 264    Serology 4- revealed:  B2M-4.0  KLC-109.9  LLC- 37.6  K/L ratio - 2.92  IgG 1228  IgA- 187  IgM-147  Mspike-not observed  TIBC 279  Iron level   50  Iron saturation    18  Ferritin     76    Serology 9/20/2021 revealed:  B2M-3.9  KLC-213.6  LLC- 55.2  K/L ratio - 3.87  IgG 2056  IgA- 238  IgM-230  Mspike-not observed      Summary of IgG4 levels as follows:  10/10/2019  IgG 4- 137  2/3/2020      IgG 4- 136  6/10/2020    IgG 4- 213  7/15/2020    IgG 4- 250  12/16/2020  IgG4- 264  4/8/2021      IgG4-182  7/14/2021    IgG4-250  Next level by Dr. Daryl Lemon anticipated for November 2021    Dr. Yvonna Lesches is following and monitoring and treating his IgG4 related disease process. Zulema Shirley received 4 weekly doses of Rituxan from Dr. Yvonna Lesches starting on 8/27/2021, the final dose on 9/17/2021  Dr. Yvonna Lesches will redraw IgG4 in November [facility]- 12/2/2021   · Stable CT abdomen pelvis exam compared to 6/5/2020. · Left nephrectomy changes. · Similar hypodense right renal lesions favoring cysts. · Punctate nonobstructing inferior right intrarenal stone. · Stable tubular appearance of the pancreatic tail and proximal pancreatic body. 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 · 3.6 cm infrarenal abdominal aortic aneurysm with partial chronic dissection based on distribution of intimal calcification. · Moderate fecal stasis. No bowel obstruction. · Trace left pleural effusion with persistent pleural thickening with left basilar atelectasis. Old left-sided rib fractures. · Moderate to large hiatal hernia.      Zulema Shirley returns today, broken hearted that his 44-year-old wife of 24 years left him and cleaned out all the furniture in the house with a moving company while he was at [employer] on Sunday ~5 weeks ago. Since that time, he has had no appetite, he has lost 15 pounds and is crying during the entire clinic visit today. There is no evidence on physical examination or review of systems of new or active plasma cell dyscrasia or evidence of new symptoms or disease related to his IgG for related diseases. Tyron Heller has been to see his PCP, Dr. Sander Roberto who is providing him antianxiolytics during this difficult process. Tyron Heller is certainly out of character, typically a happy-go-jose eduardo laughing jovial individual.    Full panel serology and CT scans are being requested today due to the weight loss and routine monitoring  I will see him back in follow-up in 3 months rather than 6 months to keep track on things. 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 CBC on 2/10/2020 had a WBC of 6.52 with a hemoglobin of 8.9, MCV 83.2 and a platelet count of 314,516. CBC 6/10/2020 revealed a WBC of 6.73. Hgb is 12.4 with an MCV of 92.3 and platelet count of 720,952. CBC on 7/29/2020 revealed WBC of 6.26. Hgb 11.9 with an MCV of 92.7 and platelet count of 606,905. CBC 12/21/2020 revealed a WBC of 5.47. Hgb is 12.9 with an MCV of 94.1 and platelet count of 050,004. CMP on 6/8/2020 at [facility] revealed BUN 26, creatinine 2.08, otherwise normal.    CBC 4/19/2021 revealed a WBC of 7.50 with a hemoglobin of 13.1 and platelet count of 555,740. Normal differential and MCV of 93.6    CBC 9/29/2021 revealed a WBC of 6.59  Hgb is 12.3 with an MCV of  92.1 and platelet count of 722,955 . CBC today 3/30/2022 reveals a WBC of 4.64 Hgb is 10.9 with an MCV of 84.9 and platelet count of 812,860. #3  IgG Kappa MGUS, stable   Serology will be repeated prior to his return in 4 months.     Serology on 12/16/2020 revealed:  CMP with creatinine 1.91  B2M- 3.3  Kappa light chains- 129.5  Lambda light chains- 45.0  K/L ratio- 2.88  IgG 1580, IgA 216, IgM 176  M-spike- not observed  IgG4- 264    Serology-Dr. Antonietta Valle Compa-4/8/2021  IgG4 - 182    Serology 4- revealed:  B2M-4.0  KLC-109.9  LLC- 37.6  K/L ratio - 2.92  IgG 1228  IgA- 187  IgM-147  Mspike-not observed  TIBC 279  Iron level   50  Iron saturation    18  Ferritin     76    Serology 9/20/2021 revealed:  B2M-3.9  KLC-213.6  LLC- 55.2  K/L ratio - 3.87  IgG 2056  IgA- 238  IgM-230  Mspike-not observed    No intervention warranted at this juncture. Serology will be repeated prior to his return, see orders below    #4   Mary Lou Murray was found to have right leg swelling that was documented as RLE DVT. He was placed on Eliquis 5 mg twice a day and was to have had a followup rescanning 3 months later. This is being managed by Dr. Moreno Woody. #5  Mary Lou Murray had issues of right hip pain. This is being managed by orthopedics at the orthopedic clinic with a recent right hip injection. He has had imaging of the area. I am suspicious he may have avascular necrosis associated with the chronic prednisone use. This is being addressed at the orthopedic clinic and with Dr. Rome Mcqueen. He also has low back pain and they are not sure whether his pain is from his low back or from his hip. This continues to be addressed    #6  Skin rash  Itching on arms and torso, Dr. Zakia Amado addressing this issue. I have encouraged again to continue follow-up with Dr. Bobby Thomason and Dr. Yvon Díaz regarding his skin rash. #7  Dr. Matt Smith was to have addressed a tear in the retina, however Mary Lou Murray did not follow through. #8  GI cancer screening  Colonoscopy performed on 7/13/2017 by Dr. Chris Vo and revealed avm's, hiatal hernia and camerons erosions. CSC recommended in 5 years given history of colon polyps.  Capsule endoscopy recommended to r/o other avm's within the small bowel that may need to be treated. M2A pill cam results read on 1/18/2018 revealed no signs or stigmata of small bowel blood loss. If evidence of GI blood loss continues, EGD recommended. #9  New symptoms of arthritis fatigue dyspnea on exertion    I will defer the management of arthritis to Dr. Artie Grove, he is on prednisone 1 mg a day at this time. XR CHEST (2 VW) 4/19/2021 :  · COPD and chronic interstitial change. There is no acute cardiopulmonary process. #10  Immunizations:  Immunization History   Administered Date(s) Administered    COVID-19, Pfizer Purple top, DILUTE for use, 12+ yrs, 30mcg/0.3mL dose 02/20/2021, 03/13/2021    Influenza Whole 10/24/2015             Ramesh Morton was seen today for follow-up. Diagnoses and all orders for this visit: IgG monoclonal gammopathy  -     Beta 2 Microglobulin, Serum; Future  -     Kappa/Lambda Quantitative Free Light Chains, Serum; Future  -     Immunoglobulins, Quantitative; Future  -     Comprehensive Metabolic Panel; Future  -     Cancel: Comprehensive Metabolic Panel; Future  -     Cancel: Beta 2 Microglobulin, Serum; Future  -     Cancel: Immunoglobulins, Quantitative; Future  -     Cancel: Kappa/Lambda Quantitative Free Light Chains, Serum; Future  -     CT ABDOMEN PELVIS WO CONTRAST Additional Contrast? None; Future  -     IgG 4; Future  -     CT CHEST WO CONTRAST; Future  -     Beta 2 Microglobulin, Serum; Future  -     Immunoglobulins, Quantitative; Future  -     Kappa/Lambda Quantitative Free Light Chains, Serum; Future  -     Comprehensive Metabolic Panel; Future    Anemia, unspecified type  -     Ferritin; Future  -     Iron and TIBC; Future  -     Vitamin B12; Future  -     Folate;  Future    Stage 3 chronic kidney disease, unspecified whether stage 3a or 3b CKD ([facility] [address]  -     CT ABDOMEN PELVIS WO CONTRAST Additional Contrast? None; Future    Health care maintenance         Orders Placed This Encounter   Procedures    CT ABDOMEN PELVIS WO CONTRAST Additional Contrast? None     Soft Tissues/and bone windows     Standing Status:   Future     Standing Expiration Date:   3/30/2023     Scheduling Instructions:      At [facility]     Order Specific Question:   Additional Contrast?     Answer:   None     Order Specific Question:   Reason for exam:     Answer:   hx of renal mass    CT CHEST WO CONTRAST     Standing Status:   Future     Standing Expiration Date:   3/30/2023     [facility]     Order Specific Question:   Reason for exam:     Answer:   Hx of renal mass    Beta 2 Microglobulin, Serum     Standing Status:   Future     Number of Occurrences:   1     Standing Expiration Date:   3/29/2023    Kappa/Lambda Quantitative Free Light Chains, Serum     Standing Status:   Future     Number of Occurrences:   1     Standing Expiration Date:   3/29/2023    Immunoglobulins, Quantitative     Standing Status:   Future     Number of Occurrences:   1     Standing Expiration Date:   3/29/2023    Comprehensive Metabolic Panel     Standing Status:   Future     Number of Occurrences:   1     Standing Expiration Date:   3/29/2023    Ferritin     Standing Status:   Future     Number of Occurrences:   1     Standing Expiration Date:   3/29/2023    Iron and TIBC     Standing Status:   Future     Number of Occurrences:   1     Standing Expiration Date:   3/29/2023     Order Specific Question:   Is Patient Fasting? Answer:   no     Order Specific Question:   No of Hours?      Answer:   0    Vitamin B12     Standing Status:   Future     Number of Occurrences:   1     Standing Expiration Date:   3/30/2023    Folate     Standing Status:   Future     Number of Occurrences:   1     Standing Expiration Date:   3/30/2023    IgG 4     Standing Status:   Future     Number of Occurrences:   1     Standing Expiration Date:   3/30/2023    Beta 2 Microglobulin, Serum     Standing Status:   Future     Number of Occurrences:   1     Standing Expiration Date:   3/30/2023    Immunoglobulins, Quantitative     Standing Status:   Future     Number of Occurrences:   1     Standing Expiration Date:   3/30/2023    Kappa/Lambda Quantitative Free Light Chains, Serum     Standing Status:   Future     Number of Occurrences:   1     Standing Expiration Date:   3/30/2023    Comprehensive Metabolic Panel     Standing Status:   Future     Number of Occurrences:   1     Standing Expiration Date:   3/30/2023         Return in about 3 months (around 6/30/2022) for Follow Up with Selina April labs prior.

## 2022-03-30 ENCOUNTER — TRANSCRIBE ORDERS (OUTPATIENT)
Dept: ADMINISTRATIVE | Facility: HOSPITAL | Age: 82
End: 2022-03-30

## 2022-03-30 ENCOUNTER — OFFICE VISIT (OUTPATIENT)
Dept: HEMATOLOGY | Age: 82
End: 2022-03-30
Payer: MEDICARE

## 2022-03-30 ENCOUNTER — HOSPITAL ENCOUNTER (OUTPATIENT)
Dept: INFUSION THERAPY | Age: 82
Discharge: HOME | End: 2022-03-30
Payer: MEDICARE

## 2022-03-30 VITALS
OXYGEN SATURATION: 96 % | SYSTOLIC BLOOD PRESSURE: 130 MMHG | BODY MASS INDEX: 25.18 KG/M2 | HEIGHT: 69 IN | DIASTOLIC BLOOD PRESSURE: 70 MMHG | HEART RATE: 61 BPM | WEIGHT: 170 LBS

## 2022-03-30 DIAGNOSIS — D47.2 IGG MONOCLONAL GAMMOPATHY: Primary | ICD-10-CM

## 2022-03-30 DIAGNOSIS — Z00.00 HEALTH CARE MAINTENANCE: ICD-10-CM

## 2022-03-30 DIAGNOSIS — D64.9 ANEMIA, UNSPECIFIED TYPE: ICD-10-CM

## 2022-03-30 DIAGNOSIS — D47.2 IGG MONOCLONAL GAMMOPATHY: ICD-10-CM

## 2022-03-30 DIAGNOSIS — N18.30 STAGE 3 CHRONIC KIDNEY DISEASE, UNSPECIFIED WHETHER STAGE 3A OR 3B CKD (HCC): ICD-10-CM

## 2022-03-30 DIAGNOSIS — N18.30 STAGE 3 CHRONIC KIDNEY DISEASE, UNSPECIFIED WHETHER STAGE 3A OR 3B CKD: ICD-10-CM

## 2022-03-30 LAB
ALBUMIN SERPL-MCNC: 3.9 G/DL (ref 3.5–5.2)
ALP BLD-CCNC: 130 U/L (ref 40–130)
ALT SERPL-CCNC: 15 U/L (ref 21–72)
ANION GAP SERPL CALCULATED.3IONS-SCNC: 10 MMOL/L (ref 7–19)
AST SERPL-CCNC: 22 U/L (ref 17–59)
BASOPHILS ABSOLUTE: 0.03 K/UL (ref 0.01–0.08)
BASOPHILS RELATIVE PERCENT: 0.6 % (ref 0.1–1.2)
BILIRUB SERPL-MCNC: 0.8 MG/DL (ref 0.2–1.3)
BUN BLDV-MCNC: 20 MG/DL (ref 9–20)
CALCIUM SERPL-MCNC: 10.6 MG/DL (ref 8.4–10.2)
CHLORIDE BLD-SCNC: 98 MMOL/L (ref 98–111)
CO2: 23 MMOL/L (ref 22–29)
CREAT SERPL-MCNC: 1.6 MG/DL (ref 0.6–1.2)
EOSINOPHILS ABSOLUTE: 0.11 K/UL (ref 0.04–0.54)
EOSINOPHILS RELATIVE PERCENT: 2.4 % (ref 0.7–7)
FOLATE: 14 NG/ML (ref 4.5–32.2)
GFR NON-AFRICAN AMERICAN: 42
GLOBULIN: 3.2 G/DL
GLUCOSE BLD-MCNC: 85 MG/DL (ref 74–106)
HCT VFR BLD CALC: 33.7 % (ref 40.1–51)
HEMOGLOBIN: 10.9 G/DL (ref 13.7–17.5)
LYMPHOCYTES ABSOLUTE: 0.14 K/UL (ref 1.18–3.74)
LYMPHOCYTES RELATIVE PERCENT: 3 % (ref 19.3–53.1)
MCH RBC QN AUTO: 27.5 PG (ref 25.7–32.2)
MCHC RBC AUTO-ENTMCNC: 32.3 G/DL (ref 32.3–36.5)
MCV RBC AUTO: 84.9 FL (ref 79–92.2)
MONOCYTES ABSOLUTE: 0.97 K/UL (ref 0.24–0.82)
MONOCYTES RELATIVE PERCENT: 20.9 % (ref 4.7–12.5)
NEUTROPHILS ABSOLUTE: 3.39 K/UL (ref 1.56–6.13)
NEUTROPHILS RELATIVE PERCENT: 73.1 % (ref 34–71.1)
PDW BLD-RTO: 15 % (ref 11.6–14.4)
PLATELET # BLD: 216 K/UL (ref 163–337)
PMV BLD AUTO: 10.5 FL (ref 7.4–10.4)
POTASSIUM SERPL-SCNC: 4.7 MMOL/L (ref 3.5–5.1)
RBC # BLD: 3.97 M/UL (ref 4.63–6.08)
SODIUM BLD-SCNC: 131 MMOL/L (ref 137–145)
TOTAL PROTEIN: 7.1 G/DL (ref 6.3–8.2)
VITAMIN B-12: 398 PG/ML (ref 211–946)
WBC # BLD: 4.64 K/UL (ref 4.23–9.07)

## 2022-03-30 PROCEDURE — 1123F ACP DISCUSS/DSCN MKR DOCD: CPT | Performed by: INTERNAL MEDICINE

## 2022-03-30 PROCEDURE — G8427 DOCREV CUR MEDS BY ELIG CLIN: HCPCS | Performed by: INTERNAL MEDICINE

## 2022-03-30 PROCEDURE — G8417 CALC BMI ABV UP PARAM F/U: HCPCS | Performed by: INTERNAL MEDICINE

## 2022-03-30 PROCEDURE — 85025 COMPLETE CBC W/AUTO DIFF WBC: CPT

## 2022-03-30 PROCEDURE — 36415 COLL VENOUS BLD VENIPUNCTURE: CPT | Performed by: INTERNAL MEDICINE

## 2022-03-30 PROCEDURE — 36415 COLL VENOUS BLD VENIPUNCTURE: CPT

## 2022-03-30 PROCEDURE — 1036F TOBACCO NON-USER: CPT | Performed by: INTERNAL MEDICINE

## 2022-03-30 PROCEDURE — 4040F PNEUMOC VAC/ADMIN/RCVD: CPT | Performed by: INTERNAL MEDICINE

## 2022-03-30 PROCEDURE — 80053 COMPREHEN METABOLIC PANEL: CPT

## 2022-03-30 PROCEDURE — 99212 OFFICE O/P EST SF 10 MIN: CPT

## 2022-03-30 PROCEDURE — 99214 OFFICE O/P EST MOD 30 MIN: CPT | Performed by: INTERNAL MEDICINE

## 2022-03-30 PROCEDURE — G8484 FLU IMMUNIZE NO ADMIN: HCPCS | Performed by: INTERNAL MEDICINE

## 2022-03-31 LAB
IGA: 172 MG/DL (ref 68–408)
IGG 4: 280 MG/DL (ref 1–123)
IGG: 1433 MG/DL (ref 768–1632)
IGM: 220 MG/DL (ref 35–263)

## 2022-04-02 LAB — BETA-2 MICROGLOBULIN: 3.9 MG/L

## 2022-04-04 LAB
FREE KAPPA LIGHT CHAINS: 10.67 MG/DL (ref 0.37–1.94)
FREE KAPPA/LAMBDA RATIO: 2.85 (ref 0.26–1.65)
FREE LAMBDA LIGHT CHAINS: 3.74 MG/DL (ref 0.57–2.63)

## 2022-04-14 ENCOUNTER — HOSPITAL ENCOUNTER (OUTPATIENT)
Dept: CT IMAGING | Facility: HOSPITAL | Age: 82
Discharge: HOME | End: 2022-04-14
Admitting: INTERNAL MEDICINE

## 2022-04-14 ENCOUNTER — HOSPITAL ENCOUNTER (OUTPATIENT)
Dept: CT IMAGING | Facility: HOSPITAL | Age: 82
End: 2022-04-14

## 2022-04-14 DIAGNOSIS — N18.30 STAGE 3 CHRONIC KIDNEY DISEASE, UNSPECIFIED WHETHER STAGE 3A OR 3B CKD: ICD-10-CM

## 2022-04-14 DIAGNOSIS — D47.2 IGG MONOCLONAL GAMMOPATHY: ICD-10-CM

## 2022-04-14 PROCEDURE — 74176 CT ABD & PELVIS W/O CONTRAST: CPT

## 2022-04-14 PROCEDURE — 71250 CT THORAX DX C-: CPT

## 2022-05-24 ENCOUNTER — HOSPITAL ENCOUNTER (OUTPATIENT)
Dept: GENERAL RADIOLOGY | Facility: HOSPITAL | Age: 82
Discharge: HOME | End: 2022-05-24
Admitting: NURSE PRACTITIONER

## 2022-05-24 ENCOUNTER — TRANSCRIBE ORDERS (OUTPATIENT)
Dept: ADMINISTRATIVE | Facility: HOSPITAL | Age: 82
End: 2022-05-24

## 2022-05-24 DIAGNOSIS — M54.16 LUMBAR RADICULOPATHY: Primary | ICD-10-CM

## 2022-05-24 DIAGNOSIS — M54.16 LUMBAR RADICULOPATHY: ICD-10-CM

## 2022-05-24 PROCEDURE — 72110 X-RAY EXAM L-2 SPINE 4/>VWS: CPT

## 2022-05-25 ENCOUNTER — TELEPHONE (OUTPATIENT)
Dept: CARDIOLOGY | Facility: CLINIC | Age: 82
End: 2022-05-25

## 2022-06-06 DIAGNOSIS — D64.9 ANEMIA, UNSPECIFIED TYPE: Primary | ICD-10-CM

## 2022-06-06 DIAGNOSIS — D47.2 IGG MONOCLONAL GAMMOPATHY: ICD-10-CM

## 2022-06-06 NOTE — TELEPHONE ENCOUNTER
----- Message from KENDRICK Valverde sent at 6/6/2022  1:44 PM CDT -----  Reviewing labs.  Pt noted with elevated renal indices (BUN 25 / creat 1.43), elevated Ca (11.3), and elevated glucose 363.  He appears dehydrated according to labs and needs better glucose control.  CRP / ESR are also elevated.  Please call pt and ask him to dramatically increase water intake, avoid NSAIDs, and follow DM diet.  Will order repeat CMP x 1 week.  Stress compliance with Doxycycline also.     Per conversation with pt and then Dr. Yossi Abdi RE: need for Rituxan infusions, signer called Dr. Mirella Berry' office and spoke to nurse Promise Golden who stated pt is currently scheduled to see Dr. Mirella Berry on 7/28 and receive Rituxan on 7/28 and 8/12 in their office. She said pt is currently on a 6 month sched to rec the Rituxan. Signer thanked Promise Golden for [employer].

## 2022-06-06 NOTE — PROGRESS NOTES
Patient:  Richard Maldonado  YOB: 1940  Date of Service: 2022  MRN: [id number]    Primary Care Physician: Nicolas Monroe MD    Chief Complaint   Patient presents with    Follow-up     IgG monoclonal gammopathy       Patient Seen, Chart, Consults notes, Labs, Radiology studies reviewed. Subjective:  Hyacinth Man is a 80year old  gentleman managed with primary and secondary diagnoses as outlined:  · Resection of left retroperitoneal mass and radical left nephrectomy by Dr. Ambrocio Canseco at [facility] on 10/31/2018. Pathology revealed IgG4 related disease. · Multifactorial anemia  · IgG kappa MGUS  · Right lower extremity DVT on Eliquis bid by Dr. Genaro Le    His ex-wife  in 2021, and their son  in 2021. He is still grieving. Mr. Titi Clark is on prednisone 1 mg daily and treatment of joint pains pleuritic-like chest discomfort, rib pain and feeling kind of rough, primarily fatigue. Mr. Titi Clark receives weekly Rituxan x 4 periodically for exacerbations of his IgG4 related disease directed by Dr. Owen Thomson. Mr. Titi Clark received 4 weekly doses of Rituxan between 2021 and 2021    Roxana Brewer continues broken hearted that his 59-year-old wife of 24 years left him and cleaned out all the furniture in the house with a moving company while he was at [employer] in 2022. Since his last visit, wife had left him to get a restraining order and spread rumors around the [employer] that he was going to be harmful. This is all been taken to the authorities and addressed accordingly. Roxana Brewer has lost almost 30 pounds over the course of the last year. He comes for continued monitoring. HEMATOLOGIC HISTORY:  IgG4 related disease 10/31/2018 with associated leukopenia, Anemia, and adenopathy  Hyacinth Man was seen in initial hematologic consultation on 2018, referred by Dr. Marcelina Monreal for evaluation of leukopenia and anemia.     CBC on 2018 showed a WBC of 3.37, hemoglobin 9.7 with MCV 87.8 and platelet count 416,862. Neutrophils were 56.3%, lymphocytes 9.5% and monocytes 20.5%. CMP included a creatinine of 1.94, GFR 41. Ca+ 8.8, TP 9.6. He is followed by Dr. Peace Garcia for rheumatoid arthritis and possible Sjogren disease. He has a known history of CKD, as well as iron deficiency anemia from GI blood loss. Endoscopy 2017 by Dr. Kierra Mcdermott. Shi revealed a small bowel AVM that was cauterized. A moderate sized hiatal hernia with mild Jake's erosions was noted as well. Colonoscopy 17 was appreciable for cecal AVM, also cauterized as well as internal hemorrhoids. M2A capsule endoscopy 1/3/2018 by Dr. Marvin Giordano revealed one irritated area in the colon, though no signs of bleeding or stigmata of small bowel blood loss. Should evidence of GI blood loss persists, consideration should be given for repeat EGD/colonoscopy due to evidence of blood loss on procedures in  with possible need for repeat cauterization. Simon Burciaga was previously on antiplatelet therapy for CAD, now treated with aspirin only. He denies melena or hematochezia at this time. In addition to the above, Simon Burciaga has been followed by Dr. Carolina Cedillo for thoracic adenopathy. Contrasted Chest CT 1/10/2018 at [facility] documented an interval increase in the size of intrathoracic lymph nodes compared to 2016; 2016; CTA chest 2016 including the followin mm precarinal LN   10 mm hilar LN   14 mm subcarinal LN   17 left hilar LN, previously 10 mm   4 mm medial ALIYA nodule, more conspicuous   4 mm lingular nodule, new   7 mm left major fissure nodule, stable   7 mm LLL pleural based noncalcified nodule, previously 5 mm   6 mm LLL nodule, previously 4 mm   3 mm LLL nodule   8 mm RUL, previously 5 mm   Several RLL nodule, relatively stable    Bronchoscopy with EBUS and biopsy of a station 7 node was performed 18 by Dr. Carolina Cedillo.   Pathology:  1. station 7 lymph node revealed:  A. Small, mature lymphocytes and plasma cells. B. Ciliated columnar bronchial epithelial cells and gallbladder cells. C. background blood. D. negative for malignant cells. 2. Bronchial washings  A. acute inflammation, mild  B. squamous metaplasia  C. blood and mucus  D. negative for malignant cells  E. GMS stain negative for fungal organisms  FLOW cytometry on the station 7 lymph node biopsied by EBUS on 1/25/2018 did NOT reveal immunophenotypic evidence of a clonal B cell population     IN RETROSPECT,  Lien Lee has a history of FNA of a right neck mass performed 12/23/2011 by Dr. Kirsten Cole. Pathology revealed no malignant cells, with fragments of benign lymphoid tissue and a few benign salivary gland acinar fragments. On 3/23/2012 excision of a right submandibular gland was performed by Dr. Kirsten Cole that demonstrated severe, chronic follicular sialadenitis with focal areas of glandular fibrosis and heterogeneous lymphoid predominant inflammatory cell population with significant number of plasma cells and eosinophils. There was no evidence of epithelial malignancy. FLOW cytometry revealed no evidence of B-cell or T-cell lymphoma. On exam, left submandibular lymphadenopathy is appreciated and perhaps shotty axillary lymphadenopathy. CBC 4/26/2018 showed a normal WBC of 4.52, though with persistent monocytosis at 22.8%. Hemoglobin is 10.8 with MCV 91.0 and platelet count 067,203. Lien Lee' main complaint had been of hoarseness and progressive cough, which occurs daily. He dates symptoms back to August, 2017 after having an allergic reaction to Lyrica for post-herpetic neuralgia. In addition to hoarseness and cough, Lien Lee also reports weight loss of 24 pounds over the previous 4-6 weeks prior to presentation at this office, night sweats and chills. He was also referred to [facility] by Dr. Castro  regarding persistent pulmonary symptoms.      Given the abnormal CBC finding, adenopathy and symptomatology work up was also requested, and he was referred to this office. Serology 4/26/2018  Serum Fe - 32  TIBC - 220  Fe Sat - 15%  Ferritin - 45  B12 - 744  Folate > 20  Retic - 1.6 %  LDH -175  B2 M - 7 (0.6-2.4)  QI - IgG-5051 (700-1600), IgM 170 ()  SPEP - no M spike, TP - 10.2 (6-8.5), globulin - 7.5 (2.2-3.9)  Free serum light chains - kappa 930.3 (3.3-19.4), lambda 228 (5.7-26.3), K/L ratio 4.08 (0.26-1.65)    Bone marrow aspiration and biopsy on 4/30/2018 was normocellular (~20-25 %) with adequate maturing trilineage hematopoiesis, no significant dyspoiesis and no increase in blasts. Megakaryocytes had a spectrum of morphology. There was no stainable storage iron and no increase in reticulin fibrosis. 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 Flow cytometry did not reveal B-cell monoclonality nor T-cell aberrant antigen expression. FISH studies for MDS was negative. There was no morphologic evidence of a myelodysplastic syndrome. There was no diagnostic evidence of a limp low at disorder, granulomas, nor metastatic malignancies on the bone marrow biopsy or aspirate. CT scans of the neck with contrast on 4/30/2018 documented an asymmetry within the left submandibular gland compared to the right. The left submandibular gland measures 4.2 x 2.6 x 2.4 cm compared to 12 mm on the right. Otherwise there are no discrete neck masses or pathologically enlarged lymph nodes. 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 Prominent right external iliac chain lymph nodes of indeterminate significance were also documented.   Diverticulosis without diverticulitis and a large hiatal hernia was described. The prostate gland was enlarged with lifting of the base of the urinary bladder. Also noted was a questionable abnormal appearance of the pancreas with some loss of the normal pancreatic on to work without a discrete mass with MRI recommended. MRI of the abdomen W & WO on 5/8/2018 documented borderline splenomegaly, a large ventral hernia, nonenhancing renal cysts and a 3.5 cm abdominal aortic aneurysm. A 6 mm right middle lobe pulmonary nodule and nodular density along the left major fissure of the region of the lingula were noted as previously described on the CT scan of the chest on 1/10/2018. The nodules on 1/10/2018 had increased compared to 11/22/2016 with metastatic disease being in the differential.   MRI of the pelvis W & WO contrast on 5/8/2018 documented enhancing septations and a small soft tissue nodule within the inferior aspect of the left fatty-containing retroperitoneum positioned below the level of the left kidney. This abnormality is lateral to the psoas muscle (it abuts the psoas muscle, but does not invade the psoas muscle) and extending to the level of the left iliac crest.  An atypical lipomatouse tumor versus liposarcoma considered. Mild prostate enlargement is also noted. Mildly enlarged pelvic lymph nodes measuring 1.2 cm in short axis, reactive or metastatic are also noted. Vannessa Rowland saw Dr. Lawyer Edward and Dr. Corinne Riser and reviewed Vannessa Kashif' case with sarcoma tumor board at [facility] on 6/8/2018. 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 Unfortunately, a curative resection would require sacrifice of the left kidney leading to progression of CKD and possibly dialysis.    Recommendation by Dr Eduardo Smalls on a clinic visit note faxed 6/20/2018 was for continued observation with repeat CT scan in 3 months as there were no signs of high-grade component on the CT scans from April of 2018 or the MRI of the abdomen/pelvis from 5/8/2018. Consideration could be given for resection if there is documentation of clear growth or concern for dedifferentiation. Pathology from the station 7 lymph node on 1/25/18 was reviewed 6/19/18 at [facility] and noted to be predominantly bronchial cells with few lymphocytes and macrophages, negative for malignancy. Bronchial washings also negative for malignancy. Interpretation of the chest CT 6/13/18 at [facility] was consistent with multiple bilateral pulmonary nodules and mediastinal and hilar adenopathy, possibly representing sarcoidosis with malignancy not excluded. AAA partially imaged. CT scans of the chest, abdomen/pelvis 8/3/18 at [facility] were grossly unchanged with surveillance to continue. Regarding the left submandibular gland, I spoke with Dr. Arvind Verma on 6/27/2018. Dr. Jackeline Weaver stated he has been monitoring the left submandibular gland for years, without gross change. He did not recommend excision, rather continued monitoring due to stability and concern for xerostomia. Lasix renogram and assessment by Dr. Jasbir Kaur was completed in in September, 2018. Dr. Radha De Leon reviewed imaging and confirmed the need for removal of the whole left kidney. Tao's case was again reviewed with the sarcoma tumor Board. Renogram demonstrated differential kidney function of 60% on the right. Dr. Jasbir Kaur estimated his final GFR may be 25-30, thus likely avoiding hemodialysis postoperatively. Left nephrectomy appeared more reasonable given recent stone disease and less function. Anahy Muse underwent resection of the left retroperitoneal mass with radical left nephrectomy on 10/31/18 by Dr. Cy Barrios at [facility]. Pathology as follows:  1. Liver, segment 3, excision: Benign liver parenchyma with calcified granuloma. 2. Left retroperitoneal nodule, excision: Fibroadipose tissue with chronic lymphoplasmacytic and histiocytic inflammation.  There were NO features of well-differentiated liposarcoma noted. 3. Left periurethral tissue, excision: Fibroadipose tissue with chronic lymphoplasmacytic and histiocytic inflammation. 4. Left lateral pararenal tissue, excision: Benign fibroadipose tissue with chronic lymphoplasmacytic and histiocytic inflammation. 5. Left nephrectomy: Marked lymphoplasmacytic and histiocytic inflammation, focal fibrosis and changes of the obliterative phlebitis, most consistent with IgG4-related disease. Postoperative noncontrast abdominal/pelvic CT 11/23/18 at [facility] documented stranding of fat in the retroperitoneum, likely related to recent surgery. Diverticulosis of the colon with inflammation of the fat around the mid to distal descending colon close to the prior surgical site present, possibly representing postoperative change. There was a tiny 2 mL stone in the distal right ureter with mild-to-moderate dilatation of the right ureter and mild dilatation of the right renal collecting system. Nonobstructing stones also noted in the right kidney as well as a 4 cm probable cyst in the upper pole right kidney. Surveillance non-contrast CT scans of the chest, abdomen and pelvis 2/22/19 at [facility] documented interval decrease in numerous mediastinal lymph nodes and decrease in size of multiple bilateral pulmonary nodules. There was no residual lymphadenopathy and near complete resolution of inflammatory changes around the distal descending colon. Previous right renal lesions were stable. Vannessa Rowland is followed by Dr. Merline Cinnamon, treated with prednisone regarding IgG4 systemic disease with known involvement of salivary glands, kidney, and retroperitoneum. He completed prednisone 40 mg daily ×1 month on 2/23/19, and 30 mg daily ×4 weeks on 3/25/19. Vannessa Rowland will take 20 mg ×4 weeks, then 10 mg. By June 2020, prednisone have been tapered down to 3 mg daily by Dr. Merline Cinnamon.     CT scan of the chest without contrast on 10/3/2019 identified 2 discrete subcentimeter nodules in the 14.9  M-spike- not observed    Serology on 7/15/2020 revealed:  Kappa light chains- 123.6  Lambda light chains- 36.7  K/L ratio- 3.37  IgG 4- 250    24-Hr Urine electrophoresis on 7/17/2020 revealed:  Protein, 24H- 92  M-spike- not observed    By June 2020, prednisone have been tapered down to 3 mg daily by Dr. Dunia Medeiros. With new findings on the CT scan of the chest and increased IgG4 level of 250, prednisone was increased again to 5 mg a day to be delivered along with Rituxan. Based on the new findings on the CT scan of the chest from 6/25/2020 and the elevated IgG4 of 250, Dr. Homero Galan initiated Rituxan on 7/28/2020. Vic Shea will receive a second dose of Rituxan on 8/11/2020 with reevaluation every 6 months thereafter. CT chest without contrast on 12/16/2020 was compared to 6/25/2020 and documented:  · No definite intrathoracic metastasis. · Bilateral interlobular septal thickening with patchy consolidation in the LEFT lower lobe. Imaging appearance favors mild pulmonary edema and atelectasis.  Correlate with clinical exam and laboratory analysis  · 7 mm RML lung nodule, stable  · 4 mm adjacent to above RML lung nodule, stable   · 5 mm LLL lung nodule, stable  · 5 mm LLL subpleural lung nodule, stable   · Decreased size of right apical lung nodule which now appears more linear and scar like  · No new or enlarging nodule  · No enlarged axillary, supraclavicular, mediastinal or hilar lymph nodes  · No acute osseous finding    CT abdomen and pelvis without contrast on 12/16/2020 documented:  · No evidence of retroperitoneal mass or lymphadenopathy  · Large sliding-type heart of hernia  · 3.9 cm low-density mass located laterally in the upper pole of left kidney, CT density suggest cyst  · 0.7 cm smaller low density nodule seen located medially in upper pole of left kidney, CT density suggest a cyst  · 2 mm calculus in lower pole of right kidney   · Left nephrectomy without regional complications. · Diverticulosis of the distal colon. No evidence for diverticulitis. · Enlarged prostate. · Fusiform aneurysmal dilatation of distal abdominal aorta. 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 Further follow-up may be obtained. · Fat-containing small inguinal hernias bilaterally    Dr. Shawn Whatley is following and monitoring and treating his IgG4 related disease process. He  received Rituxan weekly x 4 Dr. Aure Armando in January/Feb 2021. Serology-Dr. Kenneth Chatman-4/8/2021  IgG4 - 182      TREATMENT SUMMARY:  · Resection of left retroperitoneal mass and radical left nephrectomy by Dr. Saldivar Child Dr. Arden Cameron at [facility] on 10/31/2018. Pathology revealed IgG4 related disease. · Prednisone-management per Dr. Aure Armando  · Rituxan-management per Dr. Aure Armando              Allergies:  Lyrica [pregabalin], Penicillins, and Sulfa antibiotics    Medicines:  Current Outpatient Medications   Medication Sig Dispense Refill    Calcium-Magnesium-Vitamin D (CALCIUM 1200+D3 PO) Take by mouth       predniSONE (DELTASONE) 5 MG tablet Take 3 mg by mouth daily 1mg      HYDROcodone-acetaminophen (NORCO) 7.5-325 MG per tablet Take 1 tablet by mouth every 6 hours as needed for Pain.  vitamin D (CHOLECALCIFEROL) 1000 UNIT TABS tablet Take 1,000 Units by mouth daily      amLODIPine (NORVASC) 5 MG tablet Take 5 mg by mouth daily      atorvastatin (LIPITOR) 10 MG tablet Take 10 mg by mouth daily      cetirizine (ZYRTEC) 5 MG tablet Take 5 mg by mouth daily      aspirin 81 MG tablet Take 81 mg by mouth daily.  pantoprazole (PROTONIX) 40 MG tablet Take 1 tablet by mouth 2 times daily.  60 tablet 11    Ergocalciferol (VITAMIN D2 PO) Take by mouth (Patient not taking: Reported on 12/20/2021)      triamcinolone (KENALOG) 0.1 % cream FREDIS EXT TO BOTH ARMS AND LEGS BID PRN FOR RASH (Patient not taking: Reported on 3/30/2022)      clonazePAM (KLONOPIN) 2 MG tablet Take 2 mg by mouth nightly as needed (Patient not taking: Reported on 12/20/2021)       No current facility-administered medications for this visit.        Past Medical History:      Diagnosis Date    Allergic rhinitis     Anemia, unspecified     Anxiety     Asthma     BPH (benign prostatic hyperplasia)     Dr Orlando Fuentes CAD (coronary artery disease)     Dr Edmond Rubio [facility])     skin-non-melanoma    Carotid artery stenosis     YVON Aguilar    Chronic kidney disease, stage III (moderate) (HCC)     Colon polyps     Cough     Decreased white blood cell count, unspecified     Enlarged lymph nodes, unspecified     Esophageal stricture     GERD (gastroesophageal reflux disease)     HIGH CHOLESTEROL     Hypertension     Insomnia     Leukocytosis     Mediastinal lymphadenopathy     Monoclonal gammopathy     Monocytosis (symptomatic)     Night sweats     Normocytic normochromic anemia     Osteoarthritis     Other iron deficiency anemias     GI blood loss, hiatal hernia w/Jake's erosions and cecal/small bowel AVMs    Peptic ulcer disease     Restless leg syndrome     Rheumatoid arthritis ([address]     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[address]     dx @ [facility] 2018    Skin cancer     on left ear lobe    Thrombocytopenia, unspecified ([address]     Venous insufficiency     Weight loss, abnormal         Past Surgical History:      Procedure Laterality Date    COLONOSCOPY  9/15/2009    : negative    COLONOSCOPY  2006, 2003    hyperplastic colon polyps     COLONOSCOPY  11/2012    minimal diverticula, no polyps (5yr)    CORONARY ANGIOPLASTY WITH STENT PLACEMENT  2016    2 stents    FINE NEEDLE ASPIRATION Right 12/23/2011    Neck mass-Dr Verma    SC COLSC FLEXIBLE W/CONTROL BLEEDING ANY METHOD N/A 7/13/2017    Dr Osullivan-w/control of hemorrage, gold probe cautery of avm-internal hemorrhoids-5 yr recall    PTCA  3/10/16 ford    rca    SALIVARY GLAND SURGERY Right 2012    Dr Verma-Excision of right submandibular gland    SKIN CANCER EXCISION      TONSILLECTOMY      TOTAL NEPHRECTOMY Left 2018 [place]    UPPER GASTROINTESTINAL ENDOSCOPY  2009    : minimal gastritis, sm hiatal hernia, no active ulcers    UPPER GASTROINTESTINAL ENDOSCOPY  2006    gastritis, nonobst Schatzki's ring, lg hiatal hernia    UPPER GASTROINTESTINAL ENDOSCOPY  2017    Dr Osullivan-w/control of hemorrhage with gold probe cauterization of AVM-cecal avm, small bowel avm, hiatal hernia, margarito's erosions    UPPER GASTROINTESTINAL ENDOSCOPY  2017    Dr Garcia/control of hemorrhage with gold probe cauterization of AVM-cecal avm, small bowel avm, hiatal hernia, margarito's erosions    URETER STENT PLACEMENT Right 2018    [facility]    VASECTOMY  26 years ago    and had it reversed.     VASECTOMY REVERSAL          Family History:      Problem Relation Age of Onset    Hypertension Mother     High Cholesterol Mother     Heart Failure Mother         blockages, heart attack    Other Mother         pneumonia    Heart Failure Father         blockages, heart attack    Heart Failure Brother         blockages, heart attack    High Cholesterol Brother     Stroke Brother     Heart Disease Brother     Hypertension Brother     Other Other         mother , age 80 fall    Colon Cancer Neg Hx     Colon Polyps Neg Hx     Esophageal Cancer Neg Hx     Liver Cancer Neg Hx     Liver Disease Neg Hx     Rectal Cancer Neg Hx     Stomach Cancer Neg Hx         Social History  Social History     Tobacco Use    Smoking status: Former Smoker     Packs/day: 1.00     Years: 20.00     Pack years: 20.00     Types: Cigarettes     Quit date: 1986     Years since quittin.2    Smokeless tobacco: Never Used   Vaping Use    Vaping Use: Never used   Substance Use Topics    Alcohol use: No    Drug use: No            Objective:  Vital Signs: Blood pressure 138/66, pulse 65, height 5' 9\" (1.753 m), weight 167 lb (75.8 kg), SpO2 96 %. Labs:  BMP:   No results for input(s): NA, K, CL, CO2, PHOS, BUN, CREATININE, CALCIUM in the last 72 hours. CBC:   Recent Labs     22  0843   WBC 5.86   HGB 10.5*   HCT 34.3*   MCV 90.7   *       PT/INR: No results for input(s): PROTIME, INR in the last 72 hours. APTT: No results for input(s): APTT in the last 72 hours. Ionized Calcium:No results for input(s): IONCA in the last 72 hours. Magnesium:No results for input(s): MG in the last 72 hours. Phosphorus:No results for input(s): PHOS in the last 72 hours. Hepatic:   No results for input(s): ALKPHOS, ALT, AST, PROT, BILITOT, BILIDIR, LABALBU in the last 72 hours. Cultures:   No results for input(s): CULTURE in the last 72 hours. ASSESSMENT AND PLAN:    Sofy Lares is a 80year old  gentleman managed with primary and secondary diagnoses as outlined:  · Resection of left retroperitoneal mass and radical left nephrectomy by Dr. Saldivar Child Dr. Arden Cameron at [facility] on 10/31/2018. Pathology revealed IgG4 related disease. · Multifactorial anemia  · IgG kappa MGUS  · Right lower extremity DVT on Eliquis bid by Dr. Valerie Donovan    His ex-wife  in 2021, and their son  in 2021. He is still grieving. Mr. Thao Newman is on prednisone 1 mg daily and treatment of joint pains pleuritic-like chest discomfort, rib pain and feeling kind of rough, primarily fatigue. Mr. Thao Newman receives weekly Rituxan x 4 periodically for exacerbations of his IgG4 related disease directed by Dr. Aure Armando.      Mr. Thao Newman received 4 weekly doses of Rituxan between 2021 and 2021    Janet Cui continues broken hearted that his 59-year-old wife of 24 years left him and cleaned out all the furniture in the house with a moving company while he was at [employer] in revealed:  CMP with creatinine 1.91  B2M- 3.3  Kappa light chains- 129.5  Lambda light chains- 45.0  K/L ratio- 2.88  IgG 1580, IgA 216, IgM 176  M-spike- not observed  IgG4- 264    Serology 4- revealed:  B2M-4.0  KLC-109.9  LLC- 37.6  K/L ratio - 2.92  IgG 1228  IgA- 187  IgM-147  Mspike-not observed  TIBC 279  Iron level   50  Iron saturation    18  Ferritin     76    Serology 9/20/2021 revealed:  B2M-3.9  KLC-213.6  LLC- 55.2  K/L ratio - 3.87  IgG 2056  IgA- 238  IgM-230  Mspike-not observed    Serology 3/30/2022 revealed:  B2M-3.9  KLC-10.67  LLC- 3.74  K/L ratio - 2.85  IgG 1433  IgA- 172  IgM-220    Folate-14.0    Serology 6/15/2022 revealed:  B2M-4.4  KLC-20.03  LLC- 4.95  K/L ratio - 4.05  IgG 1743  IgA- 195  IgM-245      Summary of IgG4 levels as follows:  10/10/2019  IgG 4- 137  2/3/2020      IgG 4- 136  6/10/2020    IgG 4- 213  7/15/2020    IgG 4- 250  12/16/2020  IgG4- 264  4/8/2021      IgG4- 182  7/14/2021    IgG4- 250  3/30/2022    IgG4- 280  6/14/2022    IgG4- 364    Dr. 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 Guevara Hopson received 4 weekly doses of Rituxan from Dr. Deniz Knott starting on 8/27/2021, the final dose on 9/17/2021    Dr. Deniz Knott increased his prednisone to 3 mg daily and is following this closely. 0. Dr. Deniz Knott will redraw IgG4 in 3-month's. CT ABDOMEN PELVIS WO [facility]AST [facility]- 12/2/2021   · Stable CT abdomen pelvis exam compared to 6/5/2020. · Left nephrectomy changes. · Similar hypodense right renal lesions favoring cysts. · Punctate nonobstructing inferior right intrarenal stone. · Stable tubular appearance of the pancreatic tail and proximal pancreatic body. 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 · 3.6 cm infrarenal abdominal aortic aneurysm with partial chronic dissection based on distribution of intimal calcification. · Moderate fecal stasis. No bowel obstruction.    · Trace left pleural effusion with persistent pleural thickening with left basilar atelectasis. Old left-sided rib fractures. · Moderate to large hiatal hernia. There is no evidence on physical examination or review of systems of new or active plasma cell dyscrasia or evidence of new symptoms or disease related to his IgG for related diseases. [facility] has been to see his PCP, Dr. Sang Elaine who is providing him antianxiolytics during this difficult process. [facility] is certainly out of character, typically a happy-go-jose eduardo laughing jovial individual.    CT of the chest without contrast at [facility] on 4/14/2022: COMPARISON: 6/25/2020   · 6 mm RIGHT middle lobe pulmonary nodule, unchanged  · Previous described RIGHT upper lobe pulmonary nodule has resolved. · The previously described LEFT lower lobe pulmonary nodule is obscured by new LEFT lower lobe rounded atelectasis  · New mild LEFT pleural thickening and trace effusion  · Heavy coronary artery atherosclerotic calcification    CT of the abdomen and pelvis without contrast at [facility] on 4/14/2022:  · 3 mm nonobstructing RIGHT lower pole renal calculus  · 3.8 cm RIGHT renal cyst  · 4.9 cm Prostate   · Moderate to large hiatal hernia  · 3.6 cm Infrarenal abdominal aortic aneurysm   · 13 mm aortocaval retroperitoneal lymph node  · 9 mm lymph node posterior to the IVC   · 10 mm enlarging RIGHT common iliac chain lymph nodes     There is no evidence of new progression radiographically or clinically otherwise. I will see [facility] back in follow-up in 4 months. 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 CBC on 2/10/2020 had a WBC of 6.52 with a hemoglobin of 8.9, MCV 83.2 and a platelet count of 879,461. CBC 6/10/2020 revealed a WBC of 6.73. Hgb is 12.4 with an MCV of 92.3 and platelet count of 721,371.     CBC on 7/29/2020 revealed WBC of 6.26. Hgb 11.9 with an MCV of 92.7 and platelet count of 181,959. CBC 12/21/2020 revealed a WBC of 5.47. Hgb is 12.9 with an MCV of 94.1 and platelet count of 644,004. CMP on 6/8/2020 at \[facility]\"" revealed BUN 26, creatinine 2.08, otherwise normal.    CBC 4/19/2021 revealed a WBC of 7.50 with a hemoglobin of 13.1 and platelet count of 222,662. Normal differential and MCV of 93.6    CBC 9/29/2021 revealed a WBC of 6.59  Hgb is 12.3 with an MCV of  92.1 and platelet count of 551,795 . CBC 3/30/2022 revealed a WBC of 4.64 Hgb is 10.9 with an MCV of 84.9 and platelet count of 796,260. CBC today 6/22/2022 reveals a WBC of 5.86 with a normal differential, hemoglobin 10.5 and a platelet count of 343,142. #3  IgG Kappa MGUS, stable   Serology will be repeated prior to his return in 4 months. Serology on 12/16/2020 revealed:  CMP with creatinine 1.91  B2M- 3.3  Kappa light chains- 129.5  Lambda light chains- 45.0  K/L ratio- 2.88  IgG 1580, IgA 216, IgM 176  M-spike- not observed  IgG4- 264    Serology-Dr. Jannetta Claude Phillips-4/8/2021  IgG4 - 182    Serology 4- revealed:  B2M-4.0  KLC-109.9  LLC- 37.6  K/L ratio - 2.92  IgG 1228  IgA- 187  IgM-147  Mspike-not observed  TIBC 279  Iron level   50  Iron saturation    18  Ferritin     76    Serology 9/20/2021 revealed:  B2M-3.9  KLC-213.6  LLC- 55.2  K/L ratio - 3.87  IgG 2056  IgA- 238  IgM-230  Mspike-not observed    Serology 3/30/2022 revealed:  B2M-3.9  KLC-10.67  LLC- 3.74  K/L ratio - 2.85  IgG 1433  IgA- 172  IgM-220    Folate-14.0    No intervention warranted at this juncture. Serology will be repeated prior to his return, see orders below    #4   Guevara Hopson was found to have right leg swelling that was documented as RLE DVT. He was placed on Eliquis 5 mg twice a day and was to have had a followup rescanning 3 months later. This is being managed by Dr. Rosalio Rodriguez. #5  Guevara Hopson had issues of right hip pain.   This is being managed by orthopedics at the orthopedic clinic with a recent right hip injection. He has had imaging of the area. I am suspicious he may have avascular necrosis associated with the chronic prednisone use. This is being addressed at the orthopedic clinic and with Dr. Kendal Harp. He also has low back pain and they are not sure whether his pain is from his low back or from his hip. This continues to be addressed    #6  Skin rash  Itching on arms and torso, Dr. Mccartney Many addressing this issue. I have encouraged again to continue follow-up with Dr. Brad Guido and Dr. Yari Verduzco regarding his skin rash. #7  Dr. Taco Willett was to have addressed a tear in the retina, however Gill Cheema did not follow through. #8  GI cancer screening  Colonoscopy performed on 7/13/2017 by Dr. Refugio Moses and revealed avm's, hiatal hernia and camerons erosions. CSC recommended in 5 years given history of colon polyps. Capsule endoscopy recommended to r/o other avm's within the small bowel that may need to be treated. M2A pill cam results read on 1/18/2018 revealed no signs or stigmata of small bowel blood loss. If evidence of GI blood loss continues, EGD recommended. #9  New symptoms of arthritis fatigue dyspnea on exertion    I will defer the management of arthritis to Dr. Kendal Harp, he is on prednisone 1 mg a day at this time. XR CHEST (2 VW) 4/19/2021 :  · COPD and chronic interstitial change. There is no acute cardiopulmonary process. #10  Immunizations:  Immunization History   Administered Date(s) Administered    COVID-19, Pfizer Purple top, DILUTE for use, 12+ yrs, 30mcg/0.3mL dose 02/20/2021, 03/13/2021    Influenza Whole 10/24/2015             Gill Cheema was seen today for follow-up. Diagnoses and all orders for this visit: IgG monoclonal gammopathy  -     Cancel: IgG 4; Future         No orders of the defined types were placed in this encounter.         Return in about 4 months (around 10/22/2022) for Follow Up with [facility] labs prior.

## 2022-06-09 ENCOUNTER — OFFICE VISIT (OUTPATIENT)
Dept: CARDIOLOGY | Facility: CLINIC | Age: 82
End: 2022-06-09

## 2022-06-09 VITALS
OXYGEN SATURATION: 98 % | HEIGHT: 69 IN | WEIGHT: 168 LBS | SYSTOLIC BLOOD PRESSURE: 130 MMHG | BODY MASS INDEX: 24.88 KG/M2 | DIASTOLIC BLOOD PRESSURE: 70 MMHG | HEART RATE: 62 BPM

## 2022-06-09 DIAGNOSIS — I10 PRIMARY HYPERTENSION: ICD-10-CM

## 2022-06-09 DIAGNOSIS — I25.10 CORONARY ARTERY DISEASE INVOLVING NATIVE CORONARY ARTERY OF NATIVE HEART WITHOUT ANGINA PECTORIS: Primary | ICD-10-CM

## 2022-06-09 DIAGNOSIS — Z95.5 S/P CORONARY ARTERY STENT PLACEMENT: ICD-10-CM

## 2022-06-09 DIAGNOSIS — E78.2 MIXED HYPERLIPIDEMIA: ICD-10-CM

## 2022-06-09 PROCEDURE — 99214 OFFICE O/P EST MOD 30 MIN: CPT | Performed by: NURSE PRACTITIONER

## 2022-06-09 PROCEDURE — 93000 ELECTROCARDIOGRAM COMPLETE: CPT | Performed by: NURSE PRACTITIONER

## 2022-06-09 RX ORDER — LOSARTAN POTASSIUM 25 MG/1
25 TABLET ORAL DAILY
COMMUNITY

## 2022-06-09 RX ORDER — VALACYCLOVIR HYDROCHLORIDE 500 MG/1
TABLET, FILM COATED ORAL
COMMUNITY
Start: 2022-06-07

## 2022-06-09 NOTE — PROGRESS NOTES
"    Subjective:     Encounter Date:06/09/2022      Patient ID: Zeb Adams is a 82 y.o. male with CAD s/p TAYLOR to the RCA, HLD.    Chief Complaint:\"no complaints\"  Coronary Artery Disease  Presents for follow-up visit. Pertinent negatives include no chest pain, dizziness, leg swelling, palpitations, shortness of breath or weight gain. Risk factors include hyperlipidemia. The symptoms have been stable.   Hyperlipidemia  This is a chronic problem. The current episode started more than 1 year ago. The problem is uncontrolled. Recent lipid tests were reviewed and are high. Pertinent negatives include no chest pain or shortness of breath.     Patient presents today for a routine follow up. Patient is followed for CAD s/p TAYLOR to the ostium and mid RCA in 2016. He had a low risk stress echo in 2018. He reports overall he has been well. He notes occasional swelling in a \"bad ankle\". He had previously complained of dyspnea with vigorous exertion which has since subsided. He denies chest pain, palpitations, and dyspnea.     The following portions of the patient's history were reviewed and updated as appropriate: allergies, current medications, past family history, past medical history, past social history, past surgical history and problem list.    Allergies   Allergen Reactions   • Lyrica [Pregabalin] Anaphylaxis   • Penicillins Rash   • Sulfa Antibiotics Rash       Current Outpatient Medications:   •  amLODIPine (NORVASC) 5 MG tablet, Take 5 mg by mouth Daily., Disp: , Rfl:   •  aspirin 81 MG tablet, Take 81 mg by mouth daily.  LAST DOSE FRIDAY JAN19TH, Disp: , Rfl:   •  atorvastatin (LIPITOR) 10 MG tablet, Take 10 mg by mouth Daily., Disp: , Rfl:   •  cetirizine (ZyrTEC) 10 MG tablet, Take 10 mg by mouth Daily As Needed for Allergies., Disp: , Rfl:   •  clonazePAM (KlonoPIN) 2 MG tablet, As Needed., Disp: , Rfl:   •  HYDROcodone-acetaminophen (NORCO) 7.5-325 MG per tablet, Take 1 tablet by mouth Every 8 (Eight) Hours As " Needed for Severe Pain ., Disp: 9 tablet, Rfl: 0  •  losartan (COZAAR) 25 MG tablet, Take 25 mg by mouth Daily., Disp: , Rfl:   •  pantoprazole (PROTONIX) 40 MG EC tablet, 2 (Two) Times a Day., Disp: , Rfl:   •  predniSONE (DELTASONE) 1 MG tablet, Take 3 mg by mouth Daily., Disp: , Rfl:   •  valACYclovir (VALTREX) 500 MG tablet, TAKE 1 TABLET BY MOUTH DAILY AS NEEDED FOR COLD SORES, Disp: , Rfl:   •  vitamin D (ERGOCALCIFEROL) 1.25 MG (29978 UT) capsule capsule, Take 50,000 Units by mouth Every 7 (Seven) Days., Disp: , Rfl:   Past Medical History:   Diagnosis Date   • Allergic rhinitis    • Anxiety    • Arthritis    • Asthma    • Benign neoplasm of submandibular gland    • BPH with urinary obstruction    • Chest pain    • Chronic laryngitis    • Chronic rhinitis    • Epistaxis    • Esophageal stricture    • GERD (gastroesophageal reflux disease)    • Hiatal hernia    • Histoplasmosis    • Hypercholesterolemia    • Hypertension    • IgG4 deficiency (HCC)    • IgG4 related disease (HCC)    • Impotence of organic origin    • Kidney disease     stage 3   • LPRD (laryngopharyngeal reflux disease)    • Lung collapse    • Mediastinal adenopathy    • Poor urinary stream    • Presence of stent in coronary artery in patient with coronary artery disease    • RLS (restless legs syndrome)    • Sialoadenitis    • Skin cancer     SCALP   • SOB (shortness of breath) on exertion    • Stroke (HCC)     sometime in his life he has had a mini stroke found on a ct scan ewcently       Social History     Socioeconomic History   • Marital status:    Tobacco Use   • Smoking status: Former Smoker     Start date:      Quit date:      Years since quittin.4   • Smokeless tobacco: Never Used   • Tobacco comment: 30 yrs ago   Vaping Use   • Vaping Use: Never used   Substance and Sexual Activity   • Alcohol use: Never   • Drug use: Never   • Sexual activity: Defer       Review of Systems   Constitutional: Negative for  malaise/fatigue, weight gain and weight loss.   Cardiovascular: Negative for chest pain, dyspnea on exertion, irregular heartbeat, leg swelling, near-syncope, orthopnea, palpitations, paroxysmal nocturnal dyspnea and syncope.   Respiratory: Negative for cough, shortness of breath, sleep disturbances due to breathing, sputum production and wheezing.    Skin: Negative for dry skin, flushing, itching and rash.   Gastrointestinal: Negative for hematemesis and hematochezia.   Neurological: Negative for dizziness, light-headedness, loss of balance and weakness.   All other systems reviewed and are negative.         Objective:     Vitals reviewed.   Constitutional:       General: Not in acute distress.     Appearance: Healthy appearance. Well-developed. Not diaphoretic.   Eyes:      General: No scleral icterus.     Conjunctiva/sclera: Conjunctivae normal.      Pupils: Pupils are equal, round, and reactive to light.   HENT:      Head: Normocephalic.    Mouth/Throat:      Pharynx: No oropharyngeal exudate.   Neck:      Vascular: No JVR.   Pulmonary:      Effort: Pulmonary effort is normal. No respiratory distress.      Breath sounds: Normal breath sounds. No wheezing. No rhonchi. No rales.   Chest:      Chest wall: Not tender to palpatation.   Cardiovascular:      Normal rate. Regular rhythm.   Pulses:     Intact distal pulses.   Edema:     Peripheral edema absent.   Abdominal:      General: Bowel sounds are normal. There is no distension.      Palpations: Abdomen is soft.      Tenderness: There is no abdominal tenderness.   Musculoskeletal: Normal range of motion.      Cervical back: Normal range of motion and neck supple. Skin:     General: Skin is warm and dry.      Coloration: Skin is not pale.      Findings: No erythema or rash.   Neurological:      Mental Status: Alert, oriented to person, place, and time and oriented to person, place and time.      Deep Tendon Reflexes: Reflexes are normal and symmetric.  "  Psychiatric:         Behavior: Behavior normal.             ECG 12 Lead    Date/Time: 6/9/2022 3:34 PM  Performed by: Sharon Barros APRN  Authorized by: Sharon Barros APRN   Comparison: compared with previous ECG from 6/4/2021  Similar to previous ECG  Rhythm: sinus rhythm  Rate: normal  BPM: 62  Conduction: conduction normal  Q waves: II, III, V1 and aVF    ST Segments: ST segments normal  T Waves: T waves normal  QRS axis: normal  Other findings: T wave abnormality    Clinical impression: abnormal EKG          /70   Pulse 62   Ht 175.3 cm (69\")   Wt 76.2 kg (168 lb)   SpO2 98%   BMI 24.81 kg/m²     Lab Review:   I have reviewed previous office notes, recent labs and recent cardiac testing.     Lipid panel 8/2021:      Stress echo 10/2018:  Interpretation Summary    · Normal stress echo with no significant clinical, ECG or echocardiographic evidence for myocardial ischemia.      Cath 3/2016:  CONCLUSION:  1.  Successful stent placement to the ostium and mid right coronary artery with  drug-eluting stents.   2.  Nonobstructive disease in the left coronary system.   3.  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   4.  Small infrarenal abdominal aortic aneurysm.          Assessment:          Diagnosis Plan   1. Coronary artery disease involving native coronary artery of native heart without angina pectoris     2. S/P coronary artery stent placement     3. Primary hypertension     4. Mixed hyperlipidemia  Lipid Panel          Plan:       1. CAD- stable. No clinical signs of ongoing ischemia. low risk stress echo in 2018. Continue ASA, statin, ARB and CCB.   2. S/p TAYLOR- to ostial and mid RCA in 2016.   3. HTN- controlled. Followed by PCP.   4. HLD- last LDL in 8/2021 uncontrolled at 120. Will obtain a lipid panel and discuss medication titration with results.       Follow up in 1 year or sooner if symptoms worsen.     I spent 30 minutes caring for Zeb on this date of service. This " time includes time spent by me in the following activities:preparing for the visit, reviewing tests, obtaining and/or reviewing a separately obtained history, performing a medically appropriate examination and/or evaluation , counseling and educating the patient/family/caregiver, ordering medications, tests, or procedures, documenting information in the medical record, independently interpreting results and communicating that information with the patient/family/caregiver and care coordination

## 2022-06-15 ENCOUNTER — HOSPITAL ENCOUNTER (OUTPATIENT)
Dept: INFUSION THERAPY | Age: 82
Discharge: HOME | End: 2022-06-15

## 2022-06-15 DIAGNOSIS — D47.2 IGG MONOCLONAL GAMMOPATHY: ICD-10-CM

## 2022-06-15 DIAGNOSIS — D64.9 ANEMIA, UNSPECIFIED TYPE: ICD-10-CM

## 2022-06-16 LAB
BETA-2 MICROGLOBULIN: 4.4 MG/L
IGA: 195 MG/DL (ref 68–408)
IGG: 1743 MG/DL (ref 768–1632)
IGM: 245 MG/DL (ref 35–263)

## 2022-06-17 LAB
FREE KAPPA LIGHT CHAINS: 20.03 MG/DL (ref 0.37–1.94)
FREE KAPPA/LAMBDA RATIO: 4.05 (ref 0.26–1.65)
FREE LAMBDA LIGHT CHAINS: 4.95 MG/DL (ref 0.57–2.63)

## 2022-06-21 DIAGNOSIS — D64.9 ANEMIA, UNSPECIFIED TYPE: Primary | ICD-10-CM

## 2022-06-22 ENCOUNTER — HOSPITAL ENCOUNTER (OUTPATIENT)
Dept: INFUSION THERAPY | Age: 82
Discharge: HOME | End: 2022-06-22
Payer: MEDICARE

## 2022-06-22 ENCOUNTER — OFFICE VISIT (OUTPATIENT)
Dept: HEMATOLOGY | Age: 82
End: 2022-06-22
Payer: MEDICARE

## 2022-06-22 VITALS
BODY MASS INDEX: 24.73 KG/M2 | HEART RATE: 65 BPM | WEIGHT: 167 LBS | OXYGEN SATURATION: 96 % | DIASTOLIC BLOOD PRESSURE: 66 MMHG | SYSTOLIC BLOOD PRESSURE: 138 MMHG | HEIGHT: 69 IN

## 2022-06-22 DIAGNOSIS — D47.2 IGG MONOCLONAL GAMMOPATHY: Primary | ICD-10-CM

## 2022-06-22 DIAGNOSIS — D64.9 ANEMIA, UNSPECIFIED TYPE: ICD-10-CM

## 2022-06-22 LAB
BASOPHILS ABSOLUTE: 0.04 K/UL (ref 0.01–0.08)
BASOPHILS RELATIVE PERCENT: 0.7 % (ref 0.1–1.2)
EOSINOPHILS ABSOLUTE: 0.13 K/UL (ref 0.04–0.54)
EOSINOPHILS RELATIVE PERCENT: 2.2 % (ref 0.7–7)
HCT VFR BLD CALC: 34.3 % (ref 40.1–51)
HEMOGLOBIN: 10.5 G/DL (ref 13.7–17.5)
LYMPHOCYTES ABSOLUTE: 0.23 K/UL (ref 1.18–3.74)
LYMPHOCYTES RELATIVE PERCENT: 3.9 % (ref 19.3–53.1)
MCH RBC QN AUTO: 27.8 PG (ref 25.7–32.2)
MCHC RBC AUTO-ENTMCNC: 30.6 G/DL (ref 32.3–36.5)
MCV RBC AUTO: 90.7 FL (ref 79–92.2)
MONOCYTES ABSOLUTE: 0.88 K/UL (ref 0.24–0.82)
MONOCYTES RELATIVE PERCENT: 15 % (ref 4.7–12.5)
NEUTROPHILS ABSOLUTE: 4.57 K/UL (ref 1.56–6.13)
NEUTROPHILS RELATIVE PERCENT: 78 % (ref 34–71.1)
PDW BLD-RTO: 15.1 % (ref 11.6–14.4)
PLATELET # BLD: 152 K/UL (ref 163–337)
PMV BLD AUTO: 11.7 FL (ref 7.4–10.4)
RBC # BLD: 3.78 M/UL (ref 4.63–6.08)
WBC # BLD: 5.86 K/UL (ref 4.23–9.07)

## 2022-06-22 PROCEDURE — 1123F ACP DISCUSS/DSCN MKR DOCD: CPT | Performed by: INTERNAL MEDICINE

## 2022-06-22 PROCEDURE — G8427 DOCREV CUR MEDS BY ELIG CLIN: HCPCS | Performed by: INTERNAL MEDICINE

## 2022-06-22 PROCEDURE — 85025 COMPLETE CBC W/AUTO DIFF WBC: CPT

## 2022-06-22 PROCEDURE — 99214 OFFICE O/P EST MOD 30 MIN: CPT | Performed by: INTERNAL MEDICINE

## 2022-06-22 PROCEDURE — G8420 CALC BMI NORM PARAMETERS: HCPCS | Performed by: INTERNAL MEDICINE

## 2022-06-22 PROCEDURE — 1036F TOBACCO NON-USER: CPT | Performed by: INTERNAL MEDICINE

## 2022-06-22 PROCEDURE — 99211 OFF/OP EST MAY X REQ PHY/QHP: CPT

## 2022-08-17 ENCOUNTER — TELEPHONE (OUTPATIENT)
Dept: GASTROENTEROLOGY | Age: 82
End: 2022-08-17

## 2022-08-17 NOTE — TELEPHONE ENCOUNTER
Denise Jakes requests that clinic return his call. The best time to reach him is Anytime. Denise Pearce is ready to schedule his 5 year colon screening. Recall Date: 7/13/22  l  Thank you.

## 2022-10-03 ENCOUNTER — TELEPHONE (OUTPATIENT)
Dept: GASTROENTEROLOGY | Age: 82
End: 2022-10-03

## 2022-10-03 NOTE — TELEPHONE ENCOUNTER
Elizabeth Martinez requests that clinic return his call. The best time to reach him is Anytime. Elizabeth Martinez is needing clarification as to if he will be having to reschedule his Colon screening on 10/13/22? Elizabeth Martinez stated that he thinks his procedure has been rescheduled to 10/20/22 with an egd added on. Please call patient to clarify. He is requesting to have both done at the same time. The office visit is scheduled on 10/20/22. Thank you.

## 2022-10-03 NOTE — TELEPHONE ENCOUNTER
CLN ON 10/13/22 HAS BEEN CANCELED. PT WILL SCHED THE EGD/CLN TOGETHER WHEN HE IS SEEN ON 10/20/22 FOR OV TO DISCUSS EGD. PT IS AWARE.

## 2022-10-11 DIAGNOSIS — D47.2 IGG MONOCLONAL GAMMOPATHY: Primary | ICD-10-CM

## 2022-10-19 ENCOUNTER — HOSPITAL ENCOUNTER (OUTPATIENT)
Dept: INFUSION THERAPY | Age: 82
Discharge: HOME | End: 2022-10-19
Payer: MEDICARE

## 2022-10-19 DIAGNOSIS — D47.2 IGG MONOCLONAL GAMMOPATHY: ICD-10-CM

## 2022-10-19 LAB
ALBUMIN SERPL-MCNC: 3.7 G/DL (ref 3.5–5.2)
ALP BLD-CCNC: 103 U/L (ref 40–130)
ALT SERPL-CCNC: 18 U/L (ref 21–72)
ANION GAP SERPL CALCULATED.3IONS-SCNC: 8 MMOL/L (ref 7–19)
AST SERPL-CCNC: 21 U/L (ref 17–59)
BILIRUB SERPL-MCNC: 0.8 MG/DL (ref 0.2–1.3)
BUN BLDV-MCNC: 21 MG/DL (ref 9–20)
C-REACTIVE PROTEIN: <0.3 MG/DL (ref 0–0.5)
CALCIUM SERPL-MCNC: 10.7 MG/DL (ref 8.4–10.2)
CHLORIDE BLD-SCNC: 107 MMOL/L (ref 98–111)
CO2: 25 MMOL/L (ref 22–29)
CREAT SERPL-MCNC: 1.8 MG/DL (ref 0.6–1.2)
GFR SERPL CREATININE-BSD FRML MDRD: 37 ML/MIN/{1.73_M2}
GLOBULIN: 3.8 G/DL
GLUCOSE BLD-MCNC: 90 MG/DL (ref 74–106)
HCT VFR BLD CALC: 29.9 % (ref 40.1–51)
HEMOGLOBIN: 9 G/DL (ref 13.7–17.5)
LYMPHOCYTES ABSOLUTE: 0.17 K/UL (ref 1.18–3.74)
LYMPHOCYTES RELATIVE PERCENT: 5.6 % (ref 19.3–53.1)
MCH RBC QN AUTO: 25.1 PG (ref 25.7–32.2)
MCHC RBC AUTO-ENTMCNC: 30.1 G/DL (ref 32.3–36.5)
MCV RBC AUTO: 83.3 FL (ref 79–92.2)
MONOCYTES ABSOLUTE: 0.69 K/UL (ref 0.24–0.82)
MONOCYTES RELATIVE PERCENT: 22.7 % (ref 4.7–12.5)
NEUTROPHILS ABSOLUTE: 2 K/UL (ref 1.56–6.13)
NEUTROPHILS RELATIVE PERCENT: 65.8 % (ref 34–71.1)
PDW BLD-RTO: 14.8 % (ref 11.6–14.4)
PLATELET # BLD: 210 K/UL (ref 163–337)
PMV BLD AUTO: 9.6 FL (ref 7.4–10.4)
POTASSIUM SERPL-SCNC: 4.5 MMOL/L (ref 3.5–5.1)
RBC # BLD: 3.59 M/UL (ref 4.63–6.08)
SODIUM BLD-SCNC: 140 MMOL/L (ref 137–145)
TOTAL PROTEIN: 7.5 G/DL (ref 6.3–8.2)
WBC # BLD: 3.04 K/UL (ref 4.23–9.07)

## 2022-10-19 PROCEDURE — 80053 COMPREHEN METABOLIC PANEL: CPT

## 2022-10-19 PROCEDURE — 85025 COMPLETE CBC W/AUTO DIFF WBC: CPT

## 2022-10-19 PROCEDURE — 36415 COLL VENOUS BLD VENIPUNCTURE: CPT

## 2022-10-20 ENCOUNTER — OFFICE VISIT (OUTPATIENT)
Dept: GASTROENTEROLOGY | Age: 82
End: 2022-10-20
Payer: MEDICARE

## 2022-10-20 VITALS
WEIGHT: 166.8 LBS | HEIGHT: 70 IN | DIASTOLIC BLOOD PRESSURE: 62 MMHG | SYSTOLIC BLOOD PRESSURE: 108 MMHG | OXYGEN SATURATION: 96 % | HEART RATE: 66 BPM | BODY MASS INDEX: 23.88 KG/M2

## 2022-10-20 DIAGNOSIS — K21.9 CHRONIC GERD: ICD-10-CM

## 2022-10-20 DIAGNOSIS — R13.10 DYSPHAGIA, UNSPECIFIED TYPE: Primary | ICD-10-CM

## 2022-10-20 LAB
IGG 1: 1068 MG/DL (ref 240–1118)
IGG 2: 84 MG/DL (ref 124–549)
IGG 3: 95 MG/DL (ref 21–134)
IGG 4: 426 MG/DL (ref 1–123)

## 2022-10-20 PROCEDURE — G8484 FLU IMMUNIZE NO ADMIN: HCPCS | Performed by: NURSE PRACTITIONER

## 2022-10-20 PROCEDURE — 1123F ACP DISCUSS/DSCN MKR DOCD: CPT | Performed by: NURSE PRACTITIONER

## 2022-10-20 PROCEDURE — G8427 DOCREV CUR MEDS BY ELIG CLIN: HCPCS | Performed by: NURSE PRACTITIONER

## 2022-10-20 PROCEDURE — 1036F TOBACCO NON-USER: CPT | Performed by: NURSE PRACTITIONER

## 2022-10-20 PROCEDURE — 99214 OFFICE O/P EST MOD 30 MIN: CPT | Performed by: NURSE PRACTITIONER

## 2022-10-20 PROCEDURE — G8420 CALC BMI NORM PARAMETERS: HCPCS | Performed by: NURSE PRACTITIONER

## 2022-10-20 RX ORDER — SODIUM BICARBONATE 325 MG/1
TABLET ORAL 2 TIMES DAILY
COMMUNITY
Start: 2022-08-22

## 2022-10-20 ASSESSMENT — ENCOUNTER SYMPTOMS
COUGH: 0
VOMITING: 0
TROUBLE SWALLOWING: 1
DIARRHEA: 0
SHORTNESS OF BREATH: 0
ABDOMINAL DISTENTION: 0
BLOOD IN STOOL: 0
ANAL BLEEDING: 0
ABDOMINAL PAIN: 0
RECTAL PAIN: 0
CONSTIPATION: 1
NAUSEA: 0
CHOKING: 0

## 2022-10-20 NOTE — PATIENT INSTRUCTIONS
Schedule colonoscopy and endoscopy. Do not eat or drink after midnight the day of the procedure. Allowed medications can be taken with a small sip of water. Please review your prep instructions for allowed medications. You will not be able to drive for 24 hours after the procedure due to sedation. Must have a responsible adult, 18 years or older, accompany you to drive you home the day of your procedure. If you are on blood thinners, clearance from the prescribing physician will be obtained before your procedure is scheduled. If it is determined it is not safe to hold these medications for a short time an alternative procedure for evaluation may be recommended. No aspirin, ibuprofen, naproxen, fish oil or vitamin E for 5 days before procedure. Risks of colonoscopy and endoscopy include, but are not limited to, perforation, bleeding, and infection, Risk of perforation and bleeding are increased if there is a polyp removed or dilation completed. Anesthesia risks will be reviewed with you before the procedure by a member of the anesthesia department. Your physician may also schedule a follow up appointment with the nurse practitioner to discuss pathology, symptoms or to check if you have had any problems related to your procedure. If you prefer not to return to the office after your procedure please discuss this with your physician on the day of your colonoscopy. The physician will talk with you and/or your family after the procedure is completed. Final recommendations are based on the pathologist report if biopsies or specimens are taken. If polyps are removed during the procedure they will be sent to a pathologist for analysis. Unless you have a follow up appointment scheduled, you will be notified by mail of the pathology results within 4 weeks. If you have not received results after 4 weeks you may call the office to obtain this information. For Colonoscopy:   You will be given specific directions regarding restrictions to diet and bowel prep instructions including laxatives. Please read these instructions one week prior to your scheduled procedure to ensure that you are prepared. If you have any questions regarding these instructions please call our office Mon through Fri from 8:00 am to 4:00 pm.     Follow prep instructions provided for bowel prep. Take all of the bowel prep as directed. If you are having problems with nausea, stop your prep for 30-45 min to allow the nausea to subside before resuming your prep. It is important to drink plenty of fluids throughout the day before taking your laxatives. This will help to protect your kidneys, prevent dehydration and maximize the effect of the bowel prep. Your diet before a colonoscopy bowel preparation is very important to ensure a successful colon exam. It is recommended to consider certain changes to your diet three to four days prior to the procedure. Remember that your bowels need to be completely empty for the exam.    What foods are good to eat? Cut down on heavy solid foods three to four days before the procedure and start introducing lighter meals to your diet. The following food suggestions are a good part of your diet before a colonoscopy bowel preparation. Light meat that is easily digestible such as chicken (without the skin)   Potatoes without skin   Cheese   Eggs   A light meal of steamed white fish   Light clear soups    Foods and drinks to avoid  Avoid foods that contain too much fiber. Stay clear of dark colored beverages. They can stick to the walls of the digestive tract and make it difficult to differentiate from blood.  Some of these foods are:  Red meat, rice, nuts and vegetables   Milk, other milk based fluids and cream   Most fruit and puddings   Whole grain pasta   Cereals, bran and seeds   Colored beverages, especially those that are red or purple in color   Red colored Jell-O   On the day before the colonoscopy, continue to drink plenty of clear fluids. It is important   to keep yourself hydrated before the exam.     Please follow all instructions as provided for cleansing the bowel. Failure to have an adequately prepped colon may cause you to have incomplete exam with further testing required.      [email or web address]

## 2022-10-20 NOTE — PROGRESS NOTES
Subjective:     Patient ID: Kira Nassar is a 80 y.o. male  PCP: Maxx Ward MD  Referring Provider: No ref. provider found    HPI  Patient presents to the office today with the following complaints: Follow-up      Pt seen today for c/o dysphagia. He is due for screening colonoscopy and would like to add EGD at that time as well. He reports burning in throat. He states food is \"hanging up. \"  Dysphagia occurs with every meal.  He is currently taking Protonix 40 mg daily. Pt reports hx constipation. He will use Miralax prn. Last EGD 2017 - w/control of hemorrhage with gold probe cauterization of AVM-cecal avm, small bowel avm, hiatal hernia, margarito's erosions  Last Colonoscopy 2017 - w/control of hemorrage, gold probe cautery of avm-internal hemorrhoids-5 yr recall  Denies any family hx colon cancer or colon polyps    Assessment:     1. Dysphagia, unspecified type    2. Chronic GERD            Plan:   - Schedule Colonoscopy and EGD  Instruct on bowel prep. Nothing to eat or drink after midnight the day of the exam.  Unable to drive for 24 hours after the procedure. No aspirin or nonsteroidal anti-inflammatories for 5 days before procedure. I have discussed the benefits, alternatives, and risks (including bleeding, perforation and death)  for pursuing Endoscopy (EGD/Colonscopy/EUS/ERCP) with the patient and they are willing to continue. We also discussed the need for anesthesia, IV access, proper dietary changes, medication changes if necessary, and need for bowel prep (if ordered) prior to their Endoscopic procedure. They are aware they must have someone accompany them to their scheduled procedure to drive them home - they agree to the above and are willing to continue. Orders  No orders of the defined types were placed in this encounter. Medications  No orders of the defined types were placed in this encounter.         Patient History:     Past Medical History:   Diagnosis Date Allergic rhinitis     Anemia, unspecified     Anxiety     Asthma     BPH (benign prostatic hyperplasia)     Dr Preston Lafleur    CAD (coronary artery disease)     Dr Joel Veronica [facility])     skin-non-melanoma    Carotid artery stenosis     YVON Irene    Chronic kidney disease, stage III (moderate) (HCC)     Colon polyps     Cough     Decreased white blood cell count, unspecified     Disease of immune system (HCC)     IGG 4 Auto Immune Disease    Enlarged lymph nodes, unspecified     Esophageal stricture     GERD (gastroesophageal reflux disease)     HIGH CHOLESTEROL     Hypertension     Insomnia     Leukocytosis     Mediastinal lymphadenopathy     Monoclonal gammopathy     Monocytosis (symptomatic)     Night sweats     Normocytic normochromic anemia     Osteoarthritis     Other iron deficiency anemias     GI blood loss, hiatal hernia w/Jake's erosions and cecal/small bowel AVMs    Peptic ulcer disease     Restless leg syndrome     Rheumatoid arthritis (HCC)     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[facility]     dx @ [facility] 2018    Skin cancer     on left ear lobe    Thrombocytopenia, unspecified (HCC)     Venous insufficiency     Weight loss, abnormal        Past Surgical History:   Procedure Laterality Date    COLONOSCOPY  9/15/2009    : negative    COLONOSCOPY  2006, 2003    hyperplastic colon polyps     COLONOSCOPY  11/2012    minimal diverticula, no polyps (5yr)    CORONARY ANGIOPLASTY WITH STENT PLACEMENT  2016    2 stents    FINE NEEDLE ASPIRATION Right 12/23/2011    Neck mass-Dr Verma    [place] COLSC FLEXIBLE W/CONTROL BLEEDING ANY METHOD N/A 7/13/2017    Dr Osullivan-w/control of hemorrage, gold probe cautery of avm-internal hemorrhoids-5 yr recall    PTCA  3/10/16 ford    rca    SALIVARY GLAND SURGERY Right 03/23/2012    Dr Verma-Excision of right submandibular gland    SKIN CANCER EXCISION      TONSILLECTOMY      TOTAL NEPHRECTOMY Left 2018 [facility] GASTROINTESTINAL ENDOSCOPY  2009    : minimal gastritis, sm hiatal hernia, no active ulcers    UPPER GASTROINTESTINAL ENDOSCOPY  2006    gastritis, nonobst Schatzki's ring, lg hiatal hernia    UPPER GASTROINTESTINAL ENDOSCOPY  2017    Dr Garcia/control of hemorrhage with gold probe cauterization of AVM-cecal avm, small bowel avm, hiatal hernia, margarito's erosions    UPPER GASTROINTESTINAL ENDOSCOPY  2017    Dr Garcia/control of hemorrhage with gold probe cauterization of AVM-cecal avm, small bowel avm, hiatal hernia, margarito's erosions    URETER STENT PLACEMENT Right 2018    [facility]    VASECTOMY  26 years ago    and had it reversed.     VASECTOMY REVERSAL         Family History   Problem Relation Age of Onset    Hypertension Mother     High Cholesterol Mother     Heart Failure Mother         blockages, heart attack    Other Mother         pneumonia    Heart Failure Father         blockages, heart attack    Heart Failure Brother         blockages, heart attack    High Cholesterol Brother     Stroke Brother     Heart Disease Brother     Hypertension Brother     Other Other         mother , age 80 fall    Colon Cancer Neg Hx     Colon Polyps Neg Hx     Esophageal Cancer Neg Hx     Liver Cancer Neg Hx     Liver Disease Neg Hx     Rectal Cancer Neg Hx     Stomach Cancer Neg Hx        Social History     Socioeconomic History    Marital status:    Tobacco Use    Smoking status: Former     Packs/day: 1.00     Years: 20.00     Pack years: 20.00     Types: Cigarettes     Quit date: 1986     Years since quittin.5    Smokeless tobacco: Never   Vaping Use    Vaping Use: Never used   Substance and Sexual Activity    Alcohol use: No    Drug use: No       Current Outpatient Medications   Medication Sig Dispense Refill    sodium bicarbonate 325 MG tablet in the morning and at bedtime      Ergocalciferol (VITAMIN D2 PO) Take by mouth      triamcinolone (KENALOG) 0.1 % cream FREDIS EXT TO BOTH ARMS AND LEGS BID PRN FOR RASH      Calcium-Magnesium-Vitamin D (CALCIUM 1200+D3 PO) Take by mouth       predniSONE (DELTASONE) 5 MG tablet Take 3 mg by mouth daily      HYDROcodone-acetaminophen (NORCO) 7.5-325 MG per tablet Take 1 tablet by mouth every 6 hours as needed for Pain.      vitamin D (CHOLECALCIFEROL) 1000 UNIT TABS tablet Take 1,000 Units by mouth daily      amLODIPine (NORVASC) 5 MG tablet Take 5 mg by mouth daily      clonazePAM (KLONOPIN) 2 MG tablet Take 2 mg by mouth nightly as needed. atorvastatin (LIPITOR) 10 MG tablet Take 10 mg by mouth daily      cetirizine (ZYRTEC) 5 MG tablet Take 5 mg by mouth daily      aspirin 81 MG tablet Take 81 mg by mouth daily. pantoprazole (PROTONIX) 40 MG tablet Take 1 tablet by mouth 2 times daily. 60 tablet 11     No current facility-administered medications for this visit. Allergies   Allergen Reactions    Lyrica [Pregabalin] Other (See Comments)     Pt was unable to breathe when taking medication. Penicillins     Sulfa Antibiotics        Review of Systems   Constitutional:  Negative for activity change, appetite change, fatigue, fever and unexpected weight change. HENT:  Positive for trouble swallowing. Respiratory:  Negative for cough, choking and shortness of breath. Cardiovascular:  Negative for chest pain. Gastrointestinal:  Positive for constipation (controlled). Negative for abdominal distention, abdominal pain, anal bleeding, blood in stool, diarrhea, nausea, rectal pain and vomiting. Reflux    Allergic/Immunologic: Negative for food allergies. All other systems reviewed and are negative. Objective:     /62   Pulse 66   Ht 5' 10\" (1.778 m)   Wt 166 lb 12.8 oz (75.7 kg)   SpO2 96%   BMI 23.93 kg/m²     Physical Exam  Vitals reviewed. Constitutional:       General: He is not in acute distress. Appearance: He is well-developed. HENT:      Head: Normocephalic and atraumatic. Right Ear: External ear normal.      Left Ear: External ear normal.      Nose: Nose normal.   Eyes:      Conjunctiva/sclera: Conjunctivae normal.      Pupils: Pupils are equal, round, and reactive to light. Cardiovascular:      Rate and Rhythm: Normal rate and regular rhythm. Heart sounds: Normal heart sounds. No murmur heard. No friction rub. No gallop. Pulmonary:      Effort: Pulmonary effort is normal. No respiratory distress. Breath sounds: Normal breath sounds. Abdominal:      General: Bowel sounds are normal. There is no distension. Palpations: Abdomen is soft. There is no mass. Tenderness: There is no abdominal tenderness. There is no guarding or rebound. Musculoskeletal:         General: Normal range of motion. Cervical back: Normal range of motion and neck supple. Skin:     General: Skin is warm and dry. Findings: No rash. Nails: There is no clubbing. Neurological:      Mental Status: He is alert and oriented to person, place, and time. Gait: Gait normal.   Psychiatric:         Behavior: Behavior normal.         Thought Content:  Thought content normal.

## 2022-10-21 LAB — BETA-2 MICROGLOBULIN: 4.7 MG/L

## 2022-10-22 LAB
+IMM: ABNORMAL
ALBUMIN SERPL-MCNC: 3.25 G/DL (ref 3.75–5.01)
ALPHA-1-GLOBULIN: 0.29 G/DL (ref 0.19–0.46)
ALPHA-2-GLOBULIN: 0.79 G/DL (ref 0.48–1.05)
BETA GLOBULIN: 0.82 G/DL (ref 0.48–1.1)
GAMMA GLOBULIN: 1.95 G/DL (ref 0.62–1.51)
IGA: 206 MG/DL (ref 68–408)
IGG: 1863 MG/DL (ref 768–1632)
IGM: 245 MG/DL (ref 35–263)
KAPPA FREE LIGHT CHAINS QNT: 182.79 MG/L (ref 3.3–19.4)
KAPPA/LAMBDA FREE LIGHT CHAIN RATIO: 3.4 (ref 0.26–1.65)
LAMBDA FREE LIGHT CHAINS QNT: 53.69 MG/L (ref 5.71–26.3)
SPE/IFE INTERPRETATION: ABNORMAL
TOTAL PROTEIN: 7.1 G/DL (ref 6.3–8.2)

## 2022-10-24 NOTE — PROGRESS NOTES
Patient:  Alexei Vázquez  YOB: 1940  Date of Service: 10/26/2022  MRN: [id number]    Primary Care Physician: Michael Reese MD    Chief Complaint   Patient presents with    Follow-up     IgG Kappa MGUS         Patient Seen, Chart, Consults notes, Labs, Radiology studies reviewed. Subjective:  Adelina Solis is a 80year old  gentleman managed with primary and secondary diagnoses as outlined:  Resection of left retroperitoneal mass and radical left nephrectomy by Dr. Aylin Escudero at [facility] on 10/31/2018. 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 Chad Graff    His ex-wife  in 2021, and their son  in 2021. He is still grieving. Mr. Kesha Oakes is on prednisone 1 mg daily and treatment of joint pains pleuritic-like chest discomfort, rib pain and feeling kind of rough, primarily fatigue. Mr. Kesha Oakes receives weekly Rituxan x 4 periodically for exacerbations of his IgG4 related disease directed by Dr. Susan Galvin. Mr. Kesha Oakes received 4 weekly doses of Rituxan between 2021 and 2021    Aura XM continues broken hearted that his 69-year-old wife of 24 years left him and cleaned out all the furniture in the house with a [employer] company while he was at [employer] in 2022. Since his last visit, wife had left him to get a restraining order and spread rumors around the [employer] that he was going to be harmful. This is all been taken to the authorities and addressed accordingly. He is going to counseling. He was told that maybe he should sell his condo and buy a new place. He sold his condo the next day and is awaiting finishing up some other condominiums so he can buy another and have a new place to start off living. He is also finishing up an 18-week course of divorce counseling.   His outlook is improved, he is smiling and able to joke around a little bit today as opposed to crying all through his previous visits. Luz Leon is has only lost 1 pound in the past 4 months. She he is currently on prednisone 3 mg daily, anticipating to see his rheumatologist Dr. Wanda Aragon next week. HEMATOLOGIC HISTORY:  IgG4 related disease 10/31/2018 with associated leukopenia, Anemia, and adenopathy  Syed Tipton was seen in initial hematologic consultation on 2018, referred by Dr. Author Jaramillo for evaluation of leukopenia and anemia. CBC on 2018 showed a WBC of 3.37, hemoglobin 9.7 with MCV 87.8 and platelet count 675,814. Neutrophils were 56.3%, lymphocytes 9.5% and monocytes 20.5%. CMP included a creatinine of 1.94, GFR 41. Ca+ 8.8, TP 9.6. He is followed by Dr. Jose F Chow for rheumatoid arthritis and possible Sjogren disease. He has a known history of CKD, as well as iron deficiency anemia from GI blood loss. Endoscopy 2017 by Dr. Mino Osullivan revealed a small bowel AVM that was cauterized. A moderate sized hiatal hernia with mild Jake's erosions was noted as well. Colonoscopy 17 was appreciable for cecal AVM, also cauterized as well as internal hemorrhoids. M2A capsule endoscopy 1/3/2018 by Dr. Shanthi Dominguez revealed one irritated area in the colon, though no signs of bleeding or stigmata of small bowel blood loss. Should evidence of GI blood loss persists, consideration should be given for repeat EGD/colonoscopy due to evidence of blood loss on procedures in  with possible need for repeat cauterization. Luz Leon was previously on antiplatelet therapy for CAD, now treated with aspirin only. He denies melena or hematochezia at this time. In addition to the above, Luz Leon has been followed by Dr. Mikey Coe for thoracic adenopathy.     Contrasted Chest CT 1/10/2018 at [facility] documented an interval increase in the size of intrathoracic lymph nodes compared to 2016; 2016; CTA chest 2016 including the followin mm precarinal LN   10 mm hilar LN   14 mm subcarinal LN   17 left hilar LN, previously 10 mm   4 mm medial ALIYA nodule, more conspicuous   4 mm lingular nodule, new   7 mm left major fissure nodule, stable   7 mm LLL pleural based noncalcified nodule, previously 5 mm   6 mm LLL nodule, previously 4 mm   3 mm LLL nodule   8 mm RUL, previously 5 mm   Several RLL nodule, relatively stable    Bronchoscopy with EBUS and biopsy of a station 7 node was performed 1/25/18 by Dr. Alex Campos. Pathology:  1. station 7 lymph node revealed:  A. Small, mature lymphocytes and plasma cells. B. Ciliated columnar bronchial epithelial cells and gallbladder cells. C. background blood. D. negative for malignant cells. 2. Bronchial washings  A. acute inflammation, mild  B. squamous metaplasia  C. blood and mucus  D. negative for malignant cells  E. GMS stain negative for fungal organisms  FLOW cytometry on the station 7 lymph node biopsied by EBUS on 1/25/2018 did NOT reveal immunophenotypic evidence of a clonal B cell population     IN RETROSPECT,  Sue Perez has a history of FNA of a right neck mass performed 12/23/2011 by Dr. Alton Faria. Pathology revealed no malignant cells, with fragments of benign lymphoid tissue and a few benign salivary gland acinar fragments. On 3/23/2012 excision of a right submandibular gland was performed by Dr. Alton Faria that demonstrated severe, chronic follicular sialadenitis with focal areas of glandular fibrosis and heterogeneous lymphoid predominant inflammatory cell population with significant number of plasma cells and eosinophils. There was no evidence of epithelial malignancy. FLOW cytometry revealed no evidence of B-cell or T-cell lymphoma. On exam, left submandibular lymphadenopathy is appreciated and perhaps shotty axillary lymphadenopathy. CBC 4/26/2018 showed a normal WBC of 4.52, though with persistent monocytosis at 22.8%. Hemoglobin is 10.8 with MCV 91.0 and platelet count 381,633.     Sue Perez' main complaint had been of hoarseness and progressive cough, which occurs daily. He dates symptoms back to August, 2017 after having an allergic reaction to Lyrica for post-herpetic neuralgia. In addition to hoarseness and cough, [place] also reports weight loss of 24 pounds over the previous 4-6 weeks prior to presentation at this office, night sweats and chills. He was also referred to [facility] by Dr. Inocencia Bueno regarding persistent pulmonary symptoms. Given the abnormal CBC finding, adenopathy and symptomatology work up was also requested, and he was referred to this office. Serology 4/26/2018  Serum Fe - 32  TIBC - 220  Fe Sat - 15%  Ferritin - 45  B12 - 744  Folate > 20  Retic - 1.6 %  LDH -175  B2 M - 7 (0.6-2.4)  QI - IgG-5051 (700-1600), IgM 170 ()  SPEP - no M spike, TP - 10.2 (6-8.5), globulin - 7.5 (2.2-3.9)  Free serum light chains - kappa 930.3 (3.3-19.4), lambda 228 (5.7-26.3), K/L ratio 4.08 (0.26-1.65)    Bone marrow aspiration and biopsy on 4/30/2018 was normocellular (~20-25 %) with adequate maturing trilineage hematopoiesis, no significant dyspoiesis and no increase in blasts. Megakaryocytes had a spectrum of morphology. There was no stainable storage iron and no increase in reticulin fibrosis. 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 Flow cytometry did not reveal B-cell monoclonality nor T-cell aberrant antigen expression. FISH studies for MDS was negative. There was no morphologic evidence of a myelodysplastic syndrome. There was no diagnostic evidence of a limp low at disorder, granulomas, nor metastatic malignancies on the bone marrow biopsy or aspirate. CT scans of the neck with contrast on 4/30/2018 documented an asymmetry within the left submandibular gland compared to the right. The left submandibular gland measures 4.2 x 2.6 x 2.4 cm compared to 12 mm on the right.   Otherwise there are no discrete neck masses or pathologically enlarged lymph nodes. 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 Prominent right external iliac chain lymph nodes of indeterminate significance were also documented. Diverticulosis without diverticulitis and a large hiatal hernia was described. The prostate gland was enlarged with lifting of the base of the urinary bladder. Also noted was a questionable abnormal appearance of the pancreas with some loss of the normal pancreatic on to work without a discrete mass with MRI recommended. MRI of the abdomen W & WO on 5/8/2018 documented borderline splenomegaly, a large ventral hernia, nonenhancing renal cysts and a 3.5 cm abdominal aortic aneurysm. A 6 mm right middle lobe pulmonary nodule and nodular density along the left major fissure of the region of the lingula were noted as previously described on the CT scan of the chest on 1/10/2018. The nodules on 1/10/2018 had increased compared to 11/22/2016 with metastatic disease being in the differential.   MRI of the pelvis W & WO contrast on 5/8/2018 documented enhancing septations and a small soft tissue nodule within the inferior aspect of the left fatty-containing retroperitoneum positioned below the level of the left kidney. This abnormality is lateral to the psoas muscle (it abuts the psoas muscle, but does not invade the psoas muscle) and extending to the level of the left iliac crest.  An atypical lipomatouse tumor versus liposarcoma considered. Mild prostate enlargement is also noted. Mildly enlarged pelvic lymph nodes measuring 1.2 cm in short axis, reactive or metastatic are also noted.      Abdelrahman Collado saw Dr. Zeyad Palomino and Dr. Honorio Gresham and reviewed Abdelrahman Collado' case with sarcoma tumor board at [address] [place] [address] on 6/8/2018. 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 Unfortunately, a curative resection would require sacrifice of the left kidney leading to progression of CKD and possibly dialysis. Recommendation by Dr Marisol Morales on a clinic visit note faxed 6/20/2018 was for continued observation with repeat CT scan in 3 months as there were no signs of high-grade component on the CT scans from April of 2018 or the MRI of the abdomen/pelvis from 5/8/2018. Consideration could be given for resection if there is documentation of clear growth or concern for dedifferentiation. Pathology from the station 7 lymph node on 1/25/18 was reviewed 6/19/18 at [facility] and noted to be predominantly bronchial cells with few lymphocytes and macrophages, negative for malignancy. Bronchial washings also negative for malignancy. Interpretation of the chest CT 6/13/18 at [facility] was consistent with multiple bilateral pulmonary nodules and mediastinal and hilar adenopathy, possibly representing sarcoidosis with malignancy not excluded. AAA partially imaged. CT scans of the chest, abdomen/pelvis 8/3/18 at [facility] were grossly unchanged with surveillance to continue. Regarding the left submandibular gland, I spoke with Dr. Missael Verma on 6/27/2018. Dr. Claribel Pierce stated he has been monitoring the left submandibular gland for years, without gross change. He did not recommend excision, rather continued monitoring due to stability and concern for xerostomia. Lasix renogram and assessment by Dr. Shanda Paris was completed in in September, 2018. Dr. Cullen  reviewed imaging and confirmed the need for removal of the whole left kidney. Tao's case was again reviewed with the sarcoma tumor Board. Renogram demonstrated differential kidney function of 60% on the right. Dr. Shanda Paris estimated his final GFR may be 25-30, thus likely avoiding hemodialysis postoperatively.  Left nephrectomy appeared more reasonable given recent stone disease and less function. Declan Hercules underwent resection of the left retroperitoneal mass with radical left nephrectomy on 10/31/18 by Dr. Ciara Colby at [facility]. Pathology as follows:  Liver, segment 3, excision: Benign liver parenchyma with calcified granuloma. Left retroperitoneal nodule, excision: Fibroadipose tissue with chronic lymphoplasmacytic and histiocytic inflammation. There were NO features of well-differentiated liposarcoma noted. Left periurethral tissue, excision: Fibroadipose tissue with chronic lymphoplasmacytic and histiocytic inflammation. Left lateral pararenal tissue, excision: Benign fibroadipose tissue with chronic lymphoplasmacytic and histiocytic inflammation. Left nephrectomy: Marked lymphoplasmacytic and histiocytic inflammation, focal fibrosis and changes of the obliterative phlebitis, most consistent with IgG4-related disease. Postoperative noncontrast abdominal/pelvic CT 11/23/18 at [facility] documented stranding of fat in the retroperitoneum, likely related to recent surgery. Diverticulosis of the colon with inflammation of the fat around the mid to distal descending colon close to the prior surgical site present, possibly representing postoperative change. There was a tiny 2 mL stone in the distal right ureter with mild-to-moderate dilatation of the right ureter and mild dilatation of the right renal collecting system. Nonobstructing stones also noted in the right kidney as well as a 4 cm probable cyst in the upper pole right kidney. Surveillance non-contrast CT scans of the chest, abdomen and pelvis 2/22/19 at [facility] documented interval decrease in numerous mediastinal lymph nodes and decrease in size of multiple bilateral pulmonary nodules. There was no residual lymphadenopathy and near complete resolution of inflammatory changes around the distal descending colon. Previous right renal lesions were stable.     Declan Hercules is followed by  Edmund Kumar, treated with prednisone regarding IgG4 systemic disease with known involvement of salivary glands, kidney, and retroperitoneum. He completed prednisone 40 mg daily ×1 month on 2/23/19, and 30 mg daily ×4 weeks on 3/25/19. Tory Race will take 20 mg ×4 weeks, then 10 mg. By June 2020, prednisone have been tapered down to 3 mg daily by Dr. Edmund Kumar. CT scan of the chest without contrast on 10/3/2019 identified 2 discrete subcentimeter nodules in the RLL of the lungs were not present previously, one measuring 3 mm and the other 5 mm. Another 7 mm nodule in the R mL is unchanged. Follow-up recommended. CT scan of the abdomen and pelvis without contrast on 10/3/2019 at hospitals did not reveal evidence of recurrent disease or metastatic disease to the abdomen or pelvis. Prior left nephrectomy is noted    CT scan of the Chest without contrast on 6/25/2020 at hospitals reveals Interval increase in size of pulmonary nodules in the RIGHT upper lobe and LEFT lower lobe. Interval resolution of medial RIGHT lower lobe nodules. Essentially stable RIGHT middle lobe nodule. Continue follow up in 6 months is recommended. CT scan of the Abdomen Pelvis without contrast on 6/25/2020  at hospitals reveals a Stable CT of the abdomen and pelvis with postsurgical changes in the left retroperitoneum. No evidence of recurrent or metastatic disease. 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 Atherosclerotic disease. Large hiatal hernia. Colonic diverticulosis without evidence of acute diverticulitis.  Low-density lesions in the right kidney which are stable but  incompletely characterized on this exam.      Serology on 10/10/2019 revealed:  Iron- 45  TIBC- 255  Saturation%- 17.6%  Ferritin- 13.40  B2M- 2.8  Kappa light chains- 42.4  Lambda light chains- 24.8 with K/L ratio 1.71  IgG 945, IgA 176, IgM 141  IgG 4- 137  M-spike- not observed      Serology on 2/3/2020 revealed:  CMP with BUN 30, creatinine 1.90  Iron- 16  TIBC- 223  Saturation %- 7.2  Ferritin- 7.66  B2M- 2.8  Kappa light chains- 25.8  Lambda light chains- 14.7  K/L ratio- 1.76  IgG 872, IgA 156, IgM 128  IgG 4- 136  M-spike- not observed    Serology on 6/10/2020 revealed:  CMP with BUN 24, creatinine 1.8  B2M- 3.5  Kappa light chains- 134.3  Lambda light chains- 39.3  K/L ratio- 3.42  IgG 1417, IgA 205, IgM 244  IgG 4- 213  Iron- 30  TIBC- 270  Iron sat- 11%  Ferritin- 14.9  M-spike- not observed    Serology on 7/15/2020 revealed:  Kappa light chains- 123.6  Lambda light chains- 36.7  K/L ratio- 3.37  IgG 4- 250    24-Hr Urine electrophoresis on 7/17/2020 revealed:  Protein, 24H- 92  M-spike- not observed    By June 2020, prednisone have been tapered down to 3 mg daily by Dr. Mark Chowdary. With new findings on the CT scan of the chest and increased IgG4 level of 250, prednisone was increased again to 5 mg a day to be delivered along with Rituxan. Based on the new findings on the CT scan of the chest from 6/25/2020 and the elevated IgG4 of 250, Dr. Fredis Hays initiated Rituxan on 7/28/2020. [facility] will receive a second dose of Rituxan on 8/11/2020 with reevaluation every 6 months thereafter. CT chest without contrast on 12/16/2020 was compared to 6/25/2020 and documented:  No definite intrathoracic metastasis. Bilateral interlobular septal thickening with patchy consolidation in the LEFT lower lobe. Imaging appearance favors mild pulmonary edema and atelectasis.  Correlate with clinical exam and laboratory analysis  7 mm RML lung nodule, stable  4 mm adjacent to above RML lung nodule, stable   5 mm LLL lung nodule, stable  5 mm LLL subpleural lung nodule, stable   Decreased size of right apical lung nodule which now appears more linear and scar like  No new or enlarging nodule  No enlarged axillary, supraclavicular, mediastinal or hilar lymph nodes  No acute osseous finding    CT abdomen and pelvis without contrast on 12/16/2020 documented:  No evidence of retroperitoneal mass or lymphadenopathy  Large sliding-type heart of hernia  3.9 cm low-density mass located laterally in the upper pole of left kidney, CT density suggest cyst  0.7 cm smaller low density nodule seen located medially in upper pole of left kidney, CT density suggest a cyst  2 mm calculus in lower pole of right kidney   Left nephrectomy without regional complications. Diverticulosis of the distal colon. No evidence for diverticulitis. Enlarged prostate. Fusiform aneurysmal dilatation of distal abdominal aorta. 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 Further follow-up may be obtained. Fat-containing small inguinal hernias bilaterally    CT ABDOMEN PELVIS WO CONTRAST at [facility]- 12/2/2021   Stable CT abdomen pelvis exam compared to 6/5/2020. Left nephrectomy changes. Similar hypodense right renal lesions favoring cysts. Punctate nonobstructing inferior right intrarenal stone. Stable tubular appearance of the pancreatic tail and proximal pancreatic body. 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 3.6 cm infrarenal abdominal aortic aneurysm with partial chronic dissection based on distribution of intimal calcification. Moderate fecal stasis. No bowel obstruction. Trace left pleural effusion with persistent pleural thickening with left basilar atelectasis. Old left-sided rib fractures. Moderate to large hiatal hernia. CT of the chest without contrast at [facility] on 4/14/2022: COMPARISON: 6/25/2020   6 mm RIGHT middle lobe pulmonary nodule, unchanged  Previous described RIGHT upper lobe pulmonary nodule has resolved.    The previously described LEFT lower lobe pulmonary nodule is obscured by new LEFT lower lobe rounded atelectasis  New mild LEFT pleural thickening and trace effusion  Heavy coronary artery atherosclerotic calcification    CT of the abdomen and pelvis without contrast at [facility] on 4/14/2022:  3 mm nonobstructing RIGHT lower pole renal calculus  3.8 cm RIGHT renal cyst  4.9 cm Prostate   Moderate to large hiatal hernia  3.6 cm Infrarenal abdominal aortic aneurysm   13 mm aortocaval retroperitoneal lymph node  9 mm lymph node posterior to the IVC   10 mm enlarging RIGHT common iliac chain lymph nodes       Dr. Candi Oneill is following and monitoring and treating his IgG4 related disease process. He  received Rituxan weekly x 4 Dr. Jayant Butler in January/Feb 2021. Serology-Dr. Jt Chatman-4/8/2021  IgG4 - 182      TREATMENT SUMMARY:  Resection of left retroperitoneal mass and radical left nephrectomy by Dr. Jason Knight at [facility] on 10/31/2018. Pathology revealed IgG4 related disease. Prednisone-management per Dr. Jina Harp per Dr. Jayant Butler              Allergies:  Lyrica [pregabalin], Penicillins, and Sulfa antibiotics    Medicines:  Current Outpatient Medications   Medication Sig Dispense Refill    sodium bicarbonate 325 MG tablet in the morning and at bedtime      Ergocalciferol (VITAMIN D2 PO) Take by mouth      triamcinolone (KENALOG) 0.1 % cream FREDIS EXT TO BOTH ARMS AND LEGS BID PRN FOR RASH      Calcium-Magnesium-Vitamin D (CALCIUM 1200+D3 PO) Take by mouth       predniSONE (DELTASONE) 5 MG tablet Take 3 mg by mouth daily      HYDROcodone-acetaminophen (NORCO) 7.5-325 MG per tablet Take 1 tablet by mouth every 6 hours as needed for Pain.      vitamin D (CHOLECALCIFEROL) 1000 UNIT TABS tablet Take 1,000 Units by mouth daily      amLODIPine (NORVASC) 5 MG tablet Take 5 mg by mouth daily      clonazePAM (KLONOPIN) 2 MG tablet Take 2 mg by mouth nightly as needed.       atorvastatin (LIPITOR) 10 MG tablet Take 10 mg by mouth daily      cetirizine (ZYRTEC) 5 MG tablet Take 5 mg by mouth daily      aspirin 81 MG tablet Take 81 mg by mouth daily. pantoprazole (PROTONIX) 40 MG tablet Take 1 tablet by mouth 2 times daily. 60 tablet 11     No current facility-administered medications for this visit.        Past Medical History:      Diagnosis Date    Allergic rhinitis     Anemia, unspecified     Anxiety     Asthma     BPH (benign prostatic hyperplasia)     Dr Mc Russell    CAD (coronary artery disease)     Dr Reese [facility])     skin-non-melanoma    Carotid artery stenosis     YVON Webb    Chronic kidney disease, stage III (moderate) (HCC)     Colon polyps     Cough     Decreased white blood cell count, unspecified     Disease of immune system ([facility] [address]     IGG 4 Auto Immune Disease    Enlarged lymph nodes, unspecified     Esophageal stricture     GERD (gastroesophageal reflux disease)     HIGH CHOLESTEROL     Hypertension     Insomnia     Leukocytosis     Mediastinal lymphadenopathy     Monoclonal gammopathy     Monocytosis (symptomatic)     Night sweats     Normocytic normochromic anemia     Osteoarthritis     Other iron deficiency anemias     GI blood loss, hiatal hernia w/Jake's erosions and cecal/small bowel AVMs    Peptic ulcer disease     Restless leg syndrome     Rheumatoid arthritis (HCC)     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[facility] [address]     dx @ [facility] 2018    Skin cancer     on left ear lobe    Thrombocytopenia, unspecified (HCC)     Venous insufficiency     Weight loss, abnormal         Past Surgical History:      Procedure Laterality Date    COLONOSCOPY  9/15/2009    : negative    COLONOSCOPY  2006, 2003    hyperplastic colon polyps     COLONOSCOPY  11/2012    minimal diverticula, no polyps (5yr)    CORONARY ANGIOPLASTY WITH STENT PLACEMENT  2016    2 stents    FINE NEEDLE ASPIRATION Right 12/23/2011    Neck mass-Dr Verma    [place] COLSC FLEXIBLE W/CONTROL BLEEDING ANY METHOD N/A 7/13/2017    Dr Osullivan-w/control of hemorrage, gold probe cautery of avm-internal hemorrhoids-5 yr recall    PTCA 3/10/16 [facility]    rca    SALIVARY GLAND SURGERY Right 2012    Dr Verma-Excision of right submandibular gland    SKIN CANCER EXCISION      TONSILLECTOMY      TOTAL NEPHRECTOMY Left  [place]    UPPER GASTROINTESTINAL ENDOSCOPY  2009    : minimal gastritis, sm hiatal hernia, no active ulcers    UPPER GASTROINTESTINAL ENDOSCOPY  2006    gastritis, nonobst Schatzki's ring, lg hiatal hernia    UPPER GASTROINTESTINAL ENDOSCOPY  2017    Dr Garcia/control of hemorrhage with gold probe cauterization of AVM-cecal avm, small bowel avm, hiatal hernia, margarito's erosions    UPPER GASTROINTESTINAL ENDOSCOPY  2017    Dr Garcia/control of hemorrhage with gold probe cauterization of AVM-cecal avm, small bowel avm, hiatal hernia, margarito's erosions    URETER STENT PLACEMENT Right 2018    [facility]    VASECTOMY  26 years ago    and had it reversed.     VASECTOMY REVERSAL          Family History:      Problem Relation Age of Onset    Hypertension Mother     High Cholesterol Mother     Heart Failure Mother         blockages, heart attack    Other Mother         pneumonia    Heart Failure Father         blockages, heart attack    Heart Failure Brother         blockages, heart attack    High Cholesterol Brother     Stroke Brother     Heart Disease Brother     Hypertension Brother     Other Other         mother , age 80 fall    Colon Cancer Neg Hx     Colon Polyps Neg Hx     Esophageal Cancer Neg Hx     Liver Cancer Neg Hx     Liver Disease Neg Hx     Rectal Cancer Neg Hx     Stomach Cancer Neg Hx         Social History  Social History     Tobacco Use    Smoking status: Former     Packs/day: 1.00     Years: 20.00     Pack years: 20.00     Types: Cigarettes     Quit date: 1986     Years since quittin.5    Smokeless tobacco: Never   Vaping Use    Vaping Use: Never used   Substance Use Topics    Alcohol use: No    Drug use: No            Objective:  Vital Signs: Blood pressure (!) 98/58, pulse 67, height 5' 10\" (1.778 m), weight 166 lb 12.8 oz (75.7 kg), SpO2 98 %. Labs:  BMP:   No results for input(s): NA, K, CL, CO2, PHOS, BUN, CREATININE, CALCIUM in the last 72 hours. CBC:   No results for input(s): WBC, HGB, HCT, MCV, PLT in the last 72 hours. PT/INR: No results for input(s): PROTIME, INR in the last 72 hours. APTT: No results for input(s): APTT in the last 72 hours. Ionized Calcium:No results for input(s): IONCA in the last 72 hours. Magnesium:No results for input(s): MG in the last 72 hours. Phosphorus:No results for input(s): PHOS in the last 72 hours. Hepatic:   No results for input(s): ALKPHOS, ALT, AST, PROT, BILITOT, BILIDIR, LABALBU in the last 72 hours. Cultures:   No results for input(s): CULTURE in the last 72 hours. ASSESSMENT AND PLAN:    Magalis Tanner is a 80year old  gentleman managed with primary and secondary diagnoses as outlined:  Resection of left retroperitoneal mass and radical left nephrectomy by Dr. Jayant Dumont at [facility] on 10/31/2018. 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 Farrah Mcgrath    His ex-wife  in 2021, and their son  in 2021. He is still grieving. Mr. Vira Humphrey is on prednisone 1 mg daily and treatment of joint pains pleuritic-like chest discomfort, rib pain and feeling kind of rough, primarily fatigue. Mr. Vira Humphrey receives weekly Rituxan x 4 periodically for exacerbations of his IgG4 related disease directed by Dr. Lucy Harrison. Mr. Vira Humphrey received 4 weekly doses of Rituxan between 2021 and 2021    Americo Mireles continues broken hearted that his 60-year-old wife of 24 years left him and cleaned out all the furniture in the house with a moving company while he was at [place] in 2022.    Since his last visit, wife had left him to get a restraining order and spread rumors around the [facility] that he was going to be harmful. This is all been taken to the authorities and addressed accordingly. He is going to counseling. He was told that maybe he should sell his condo and buy a new place. He sold his condo the next day and is awaiting finishing up some other condominiums so he can buy another and have a new place to start off living. He is also finishing up an 18-week course of divorce counseling. His outlook is improved, he is smiling and able to joke around a little bit today as opposed to crying all through his previous visits. Anjelica Gunter is has only lost 1 pound in the past 4 months. She he is currently on prednisone 3 mg daily, anticipating to see his rheumatologist Dr. Haley Zaman next week. CBC today 10/19/2022  reveals a WBC of  3.04 Hgb is 9.0 with an MCV of 83.3 and platelet count of 411,269. Physical examination today, 10/26/2022 does not reveal evidence of palpable peripheral lymphadenopathy. Lungs have scattered rales bilaterally in upper and lower lung fields. He does complain on discomfort on deep inspiration, he may have a pleural friction rub.   The heart is regular the abdomen is soft and benign  He does have a cushingoid appearance of the face due to chronic prednisone/steroid use on a slow taper presently    Serology on 10/10/2019 revealed:  Iron- 45  TIBC- 255  Saturation%- 17.6%  Ferritin- 13.40  B2M- 2.8  Kappa light chains- 42.4  Lambda light chains- 24.8 with K/L ratio 1.71  IgG 945, IgA 176, IgM 141  IgG 4- 137  M-spike- not observed      Serology on 2/3/2020 revealed:  CMP with BUN 30, creatinine 1.90  Iron- 16  TIBC- 223  Saturation %- 7.2  Ferritin- 7.66  B2M- 2.8  Kappa light chains- 25.8  Lambda light chains- 14.7  K/L ratio- 1.76  IgG 872, IgA 156, IgM 128  IgG 4- 136  M-spike- not observed    Serology on 6/10/2020 revealed:  CMP with BUN 24, creatinine 1.8  B2M- 3.5  Kappa light chains- 134.3  Lambda light chains- 39.3  K/L ratio- 3.42  IgG 1417, IgA 205, IgM 244  IgG 4- 213  Iron- 30  TIBC- 270  Iron sat- 11%  Ferritin- 14.9  M-spike- not observed    Serology on 7/15/2020 revealed:  Kappa light chains- 123.6  Lambda light chains- 36.7  K/L ratio- 3.37  IgG 4- 250    Dr. Hilario Rao began treatment with Rituxan on 7/28/2020.    24-Hr Urine electrophoresis on 7/17/2020 revealed:  Protein, 24H- 92  M-spike- not observed    Serology on 12/16/2020 revealed:  CMP with creatinine 1.91  B2M- 3.3  Kappa light chains- 129.5  Lambda light chains- 45.0  K/L ratio- 2.88  IgG 1580, IgA 216, IgM 176  M-spike- not observed  IgG4- 264    Serology 4- revealed:  B2M-4.0  KLC-109.9  LLC- 37.6  K/L ratio - 2.92  IgG 1228  IgA- 187  IgM-147  Mspike-not observed  TIBC 279  Iron level   50  Iron saturation    18  Ferritin     76    Serology 9/20/2021 revealed:  B2M-3.9  KLC-213.6  LLC- 55.2  K/L ratio - 3.87  IgG 2056  IgA- 238  IgM-230  Mspike-not observed    Serology 3/30/2022 revealed:  B2M-3.9  KLC-10.67  LLC- 3.74  K/L ratio - 2.85  IgG 1433  IgA- 172  IgM-220    Folate-14.0    Serology 6/15/2022 revealed:  B2M-4.4  KLC-20.03  LLC- 4.95  K/L ratio - 4.05  IgG 1743  IgA- 195  IgM-245      Serology 10/19/2022 revealed:  B2M-4.7  KLC-20.03  LLC- 4.95  K/L ratio - 4.05  IgG 1863  IgA- 206  IgM-245      Summary of IgG4 levels as follows:  10/10/2019  IgG 4- 137  2/3/2020      IgG 4- 136  6/10/2020    IgG 4- 213  7/15/2020    IgG 4- 250  12/16/2020  IgG4- 264  4/8/2021      IgG4- 182  7/14/2021    IgG4- 250  3/30/2022    IgG4- 280  6/14/2022    IgG4- 364  10/19/2022  IgG4-426    Dr. Hilario Rao is co-managing,  monitoring and treating his IgG4 related disease process and adjusting prednisone. CT of the chest without contrast at [facility] on 4/14/2022: COMPARISON: 6/25/2020   6 mm RIGHT middle lobe pulmonary nodule, unchanged  Previous described RIGHT upper lobe pulmonary nodule has resolved.    The previously described LEFT lower lobe pulmonary nodule is obscured by new LEFT lower lobe rounded atelectasis  New mild LEFT pleural thickening and trace effusion  Heavy coronary artery atherosclerotic calcification    CT of the abdomen and pelvis without contrast at [facility] on 4/14/2022:  3 mm nonobstructing RIGHT lower pole renal calculus  3.8 cm RIGHT renal cyst  4.9 cm Prostate   Moderate to large hiatal hernia  3.6 cm Infrarenal abdominal aortic aneurysm   13 mm aortocaval retroperitoneal lymph node  9 mm lymph node posterior to the IVC   10 mm enlarging RIGHT common iliac chain lymph nodes     There is no evidence on physical examination or review of systems of new or active plasma cell dyscrasia or evidence of new symptoms or disease related to his IgG for related diseases. He is currently on prednisone 3 mg daily anticipating an increase when he sees him next week due to the IgG4 level of 426, a little bit higher than it has been. His PCP, Dr. Johnny Lopes provides antianxiolytics during this difficult emotional process of his wife leaving him. I will see him back in follow-up in 3 months anticipating repeat imaging in 6 months which probably can be limited to every year now. 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 CBC on 2/10/2020 had a WBC of 6.52 with a hemoglobin of 8.9, MCV 83.2 and a platelet count of 501,694. CBC 6/10/2020 revealed a WBC of 6.73. Hgb is 12.4 with an MCV of 92.3 and platelet count of 614,781. CBC on 7/29/2020 revealed WBC of 6.26. Hgb 11.9 with an MCV of 92.7 and platelet count of 107,734. CBC 12/21/2020 revealed a WBC of 5.47. Hgb is 12.9 with an MCV of 94.1 and platelet count of 109,995. CMP on 6/8/2020 at [facility] revealed BUN 26, creatinine 2.08, otherwise normal.    CBC 4/19/2021 revealed a WBC of 7.50 with a hemoglobin of 13.1 and platelet count of 525,378.   Normal differential and MCV of 93.6    CBC 9/29/2021 revealed a WBC of 6.59  Hgb is 12.3 with an MCV of  92.1 and platelet count of 578,629 . CBC 3/30/2022 revealed a WBC of 4.64 Hgb is 10.9 with an MCV of 84.9 and platelet count of 182,922. CBC 6/22/2022 revealed a WBC of 5.86 with a normal differential, hemoglobin 10.5 and a platelet count of 699,367. CBC today 10/19/2022  reveals a WBC of 3.04 Hgb is 9.0 with an MCV of  83.3 and platelet count of 916,972. #3  IgG Kappa MGUS, stable   Serology will be repeated prior to his return in 4 months. Serology on 12/16/2020 revealed:  CMP with creatinine 1.91  B2M- 3.3  Kappa light chains- 129.5  Lambda light chains- 45.0  K/L ratio- 2.88  IgG 1580, IgA 216, IgM 176  M-spike- not observed  IgG4- 264    Serology-Dr. Marco A Chatman-4/8/2021  IgG4 - 182    Serology 4- revealed:  B2M-4.0  KLC-109.9  LLC- 37.6  K/L ratio - 2.92  IgG 1228  IgA- 187  IgM-147  Mspike-not observed  TIBC 279  Iron level   50  Iron saturation    18  Ferritin     76    Serology 9/20/2021 revealed:  B2M-3.9  KLC-213.6  LLC- 55.2  K/L ratio - 3.87  IgG 2056  IgA- 238  IgM-230  Mspike-not observed    Serology 3/30/2022 revealed:  B2M-3.9  KLC-10.67  LLC- 3.74  K/L ratio - 2.85  IgG 1433  IgA- 172  IgM-220    Folate-14.0    Serology 10/19/2022 revealed:  B2M-4.7  KLC-20.03  LLC- 4.95  K/L ratio - 4.05  IgG 1863  IgA- 206  IgM-245      No intervention warranted at this juncture. Serology will be repeated prior to his return, see orders below    #4   [place] was found to have right leg swelling that was documented as RLE DVT. He was placed on Eliquis 5 mg twice a day and was to have had a followup rescanning 3 months later. This is being managed by Dr. Ninfa Lee. #5  [place] had issues of right hip pain. This is being managed by orthopedics at the orthopedic clinic with a recent right hip injection. He has had imaging of the area.   I am suspicious he may have avascular necrosis associated with the chronic prednisone use. This is being addressed at the orthopedic clinic and with Dr. Rosa Lemon. He also has low back pain and they are not sure whether his pain is from his low back or from his hip. This continues to be addressed    #6  Skin rash  Itching on arms and torso, Dr. Kelli Osman addressing this issue. I have encouraged again to continue follow-up with Dr. Ruiz Pearce and Dr. Chi Thapa regarding his skin rash. #7  Dr. Ritika Potter was to have addressed a tear in the retina, however Sue Perez did not follow through. #8  GI cancer screening  Colonoscopy performed on 7/13/2017 by Dr. Christine Palomares and revealed avm's, hiatal hernia and camerons erosions. CSC recommended in 5 years given history of colon polyps. Capsule endoscopy recommended to r/o other avm's within the small bowel that may need to be treated. M2A pill cam results read on 1/18/2018 revealed no signs or stigmata of small bowel blood loss. If evidence of GI blood loss continues, EGD recommended. #9  New symptoms of arthritis fatigue dyspnea on exertion    I will defer the management of arthritis to Dr. Rosa Lemon, he is on prednisone 1 mg a day at this time. XR CHEST (2 VW) 4/19/2021 :  COPD and chronic interstitial change. There is no acute cardiopulmonary process. #10  Immunizations:  Immunization History   Administered Date(s) Administered    COVID-19, PFIZER GRAY top, DO NOT Dilute, (age 15 y+), IM, 30 mcg/0.3 mL 04/28/2022    COVID-19, PFIZER PURPLE top, DILUTE for use, (age 15 y+), 30mcg/0.3mL 02/20/2021, 03/13/2021    Influenza Whole 10/24/2015    Influenza, High Dose (Fluzone 65 yrs and older) 09/17/2018    Tdap (Boostrix, Adacel) 03/26/2019             Sue Perez was seen today for follow-up. Diagnoses and all orders for this visit: IgG monoclonal gammopathy  -     Comprehensive Metabolic Panel; Future  -     MONOCLONAL PROTEIN AND FLC, SERUM; Future  -     Beta 2 Microglobulin, Serum;  Future  -     IgG 4; Future  -     Cancel: CT CHEST W CONTRAST; Future  -     Cancel: CT ABDOMEN PELVIS W IV CONTRAST Additional Contrast? Oral; Future  -     CT CHEST WO CONTRAST; Future  -     CT ABDOMEN PELVIS WO CONTRAST Additional Contrast? Oral; Future    Anemia, unspecified type    Stage 3 chronic kidney disease, unspecified whether stage 3a or 3b CKD (HCC)  -     Cancel: CT CHEST W CONTRAST; Future    Health care maintenance         Orders Placed This Encounter   Procedures    CT CHEST WO CONTRAST     Standing Status:   Future     Standing Expiration Date:   10/26/2023     Scheduling Instructions:      At [facility]     Order Specific Question:   Reason for exam:     Answer: IgG MGUS    CT ABDOMEN PELVIS WO CONTRAST Additional Contrast? Oral     Standing Status:   Future     Standing Expiration Date:   10/26/2023     Scheduling Instructions:      At [facility]     Order Specific Question:   Additional Contrast?     Answer:   Oral     Order Specific Question:   Reason for exam:     Answer: IgG MGUS    Comprehensive Metabolic Panel     Standing Status:   Future     Standing Expiration Date:   10/26/2023    MONOCLONAL PROTEIN AND FLC, SERUM     Standing Status:   Future     Standing Expiration Date:   10/26/2023    Beta 2 Microglobulin, Serum     Standing Status:   Future     Standing Expiration Date:   10/26/2023    IgG 4     Standing Status:   Future     Standing Expiration Date:   10/26/2023           Return in about 3 months (around 1/26/2023) for Follow Up with Reece velasco prior.

## 2022-10-26 ENCOUNTER — TRANSCRIBE ORDERS (OUTPATIENT)
Dept: ADMINISTRATIVE | Facility: HOSPITAL | Age: 82
End: 2022-10-26

## 2022-10-26 ENCOUNTER — HOSPITAL ENCOUNTER (OUTPATIENT)
Dept: INFUSION THERAPY | Age: 82
Discharge: HOME | End: 2022-10-26
Payer: MEDICARE

## 2022-10-26 ENCOUNTER — OFFICE VISIT (OUTPATIENT)
Dept: HEMATOLOGY | Age: 82
End: 2022-10-26
Payer: MEDICARE

## 2022-10-26 VITALS
HEIGHT: 70 IN | WEIGHT: 166.8 LBS | OXYGEN SATURATION: 98 % | HEART RATE: 67 BPM | SYSTOLIC BLOOD PRESSURE: 98 MMHG | DIASTOLIC BLOOD PRESSURE: 58 MMHG | BODY MASS INDEX: 23.88 KG/M2

## 2022-10-26 DIAGNOSIS — D64.9 ANEMIA, UNSPECIFIED TYPE: ICD-10-CM

## 2022-10-26 DIAGNOSIS — N18.30 STAGE 3 CHRONIC KIDNEY DISEASE, UNSPECIFIED WHETHER STAGE 3A OR 3B CKD (HCC): ICD-10-CM

## 2022-10-26 DIAGNOSIS — D47.2 IGG MONOCLONAL GAMMOPATHY: Primary | ICD-10-CM

## 2022-10-26 DIAGNOSIS — Z00.00 HEALTH CARE MAINTENANCE: ICD-10-CM

## 2022-10-26 DIAGNOSIS — D47.2 MONOCLONAL PARAPROTEINEMIA: Primary | ICD-10-CM

## 2022-10-26 PROCEDURE — 1036F TOBACCO NON-USER: CPT | Performed by: INTERNAL MEDICINE

## 2022-10-26 PROCEDURE — 1123F ACP DISCUSS/DSCN MKR DOCD: CPT | Performed by: INTERNAL MEDICINE

## 2022-10-26 PROCEDURE — 3078F DIAST BP <80 MM HG: CPT | Performed by: INTERNAL MEDICINE

## 2022-10-26 PROCEDURE — G8427 DOCREV CUR MEDS BY ELIG CLIN: HCPCS | Performed by: INTERNAL MEDICINE

## 2022-10-26 PROCEDURE — 3074F SYST BP LT 130 MM HG: CPT | Performed by: INTERNAL MEDICINE

## 2022-10-26 PROCEDURE — 99214 OFFICE O/P EST MOD 30 MIN: CPT | Performed by: INTERNAL MEDICINE

## 2022-10-26 PROCEDURE — 99212 OFFICE O/P EST SF 10 MIN: CPT

## 2022-10-26 PROCEDURE — G8420 CALC BMI NORM PARAMETERS: HCPCS | Performed by: INTERNAL MEDICINE

## 2022-10-26 PROCEDURE — G8484 FLU IMMUNIZE NO ADMIN: HCPCS | Performed by: INTERNAL MEDICINE

## 2022-11-21 ENCOUNTER — TELEPHONE (OUTPATIENT)
Dept: GASTROENTEROLOGY | Age: 82
End: 2022-11-21

## 2022-11-21 NOTE — TELEPHONE ENCOUNTER
Called patient to remind them of their procedure  at [facility] [place] by [facility]  on 11/28/22 to arrive at 8:15AM     PATIENT VOICED UNDERSTANDING

## 2022-11-23 ENCOUNTER — ANESTHESIA EVENT (OUTPATIENT)
Dept: OPERATING ROOM | Age: 82
End: 2022-11-23

## 2022-11-28 ENCOUNTER — HOSPITAL ENCOUNTER (OUTPATIENT)
Age: 82
Setting detail: OUTPATIENT SURGERY
Discharge: HOME | End: 2022-11-28
Attending: INTERNAL MEDICINE | Admitting: INTERNAL MEDICINE

## 2022-11-28 ENCOUNTER — HOSPITAL ENCOUNTER (OUTPATIENT)
Age: 82
Setting detail: SPECIMEN
Discharge: HOME | End: 2022-11-28
Payer: MEDICARE

## 2022-11-28 ENCOUNTER — ANESTHESIA (OUTPATIENT)
Dept: OPERATING ROOM | Age: 82
End: 2022-11-28

## 2022-11-28 ENCOUNTER — APPOINTMENT (OUTPATIENT)
Dept: OPERATING ROOM | Age: 82
End: 2022-11-28

## 2022-11-28 VITALS
WEIGHT: 163 LBS | SYSTOLIC BLOOD PRESSURE: 115 MMHG | TEMPERATURE: 97.4 F | HEART RATE: 57 BPM | OXYGEN SATURATION: 97 % | DIASTOLIC BLOOD PRESSURE: 60 MMHG | BODY MASS INDEX: 24.14 KG/M2 | HEIGHT: 69 IN | RESPIRATION RATE: 18 BRPM

## 2022-11-28 PROCEDURE — 43239 EGD BIOPSY SINGLE/MULTIPLE: CPT

## 2022-11-28 PROCEDURE — 88342 IMHCHEM/IMCYTCHM 1ST ANTB: CPT

## 2022-11-28 PROCEDURE — 45378 DIAGNOSTIC COLONOSCOPY: CPT

## 2022-11-28 PROCEDURE — 88305 TISSUE EXAM BY PATHOLOGIST: CPT

## 2022-11-28 PROCEDURE — 43248 EGD GUIDE WIRE INSERTION: CPT

## 2022-11-28 RX ORDER — PROPOFOL 10 MG/ML
INJECTION, EMULSION INTRAVENOUS PRN
Status: DISCONTINUED | OUTPATIENT
Start: 2022-11-28 | End: 2022-11-28 | Stop reason: SDUPTHER

## 2022-11-28 RX ORDER — LIDOCAINE HYDROCHLORIDE 10 MG/ML
INJECTION, SOLUTION INFILTRATION; PERINEURAL PRN
Status: DISCONTINUED | OUTPATIENT
Start: 2022-11-28 | End: 2022-11-28 | Stop reason: SDUPTHER

## 2022-11-28 RX ORDER — SODIUM CHLORIDE 9 MG/ML
INJECTION, SOLUTION INTRAVENOUS CONTINUOUS
Status: DISCONTINUED | OUTPATIENT
Start: 2022-11-28 | End: 2022-11-28 | Stop reason: HOSPADM

## 2022-11-28 RX ORDER — SODIUM CHLORIDE, SODIUM LACTATE, POTASSIUM CHLORIDE, CALCIUM CHLORIDE 600; 310; 30; 20 MG/100ML; MG/100ML; MG/100ML; MG/100ML
INJECTION, SOLUTION INTRAVENOUS CONTINUOUS PRN
Status: DISCONTINUED | OUTPATIENT
Start: 2022-11-28 | End: 2022-11-28 | Stop reason: SDUPTHER

## 2022-11-28 RX ADMIN — PROPOFOL 250 MG: 10 INJECTION, EMULSION INTRAVENOUS at 09:24

## 2022-11-28 RX ADMIN — SODIUM CHLORIDE, SODIUM LACTATE, POTASSIUM CHLORIDE, CALCIUM CHLORIDE: 600; 310; 30; 20 INJECTION, SOLUTION INTRAVENOUS at 09:14

## 2022-11-28 RX ADMIN — LIDOCAINE HYDROCHLORIDE 40 MG: 10 INJECTION, SOLUTION INFILTRATION; PERINEURAL at 09:24

## 2022-11-28 RX ADMIN — SODIUM CHLORIDE: 9 INJECTION, SOLUTION INTRAVENOUS at 08:33

## 2022-11-28 NOTE — H&P
Patient Name: Arsen Baker  : 1940  MRN: [id number]  DATE: 22    Allergies: Allergies   Allergen Reactions    Lyrica [Pregabalin] Other (See Comments)     Pt was unable to breathe when taking medication. Penicillins     Sulfa Antibiotics         ENDOSCOPY  History and Physical    Procedure:    [] Diagnostic Colonoscopy       [x] Screening Colonoscopy  [x] EGD      [] ERCP      [] EUS       [] Other    [x] Previous office notes/History and Physical reviewed from the patients chart. Please see EMR for further details of HPI. I have examined the patient's status immediately prior to the procedure and:      Indications/HPI:    []Abdominal Pain   []Barretts  [x]Screening/Surveillance   []History of Polyps  [x]Dysphagia            [] +Cologard/DNA testing  []Abnormal Imaging              []EOE Hx              [] Family Hx of CRC/Polyps  []Anemia                            []Food Impaction       []Recent Poor Prep  []GI Bleed             []Lymphadenopathy  [x]History of Polyps  []Change in bowel habits [x]Heartburn/Reflux  []Cancer- GI/Lung  []Chest Pain - Non Cardiac []Heme (+) Stool []Ulcers  []Constipation  []Hemoptysis  []Incontinence    []Diarrhea  []Hypoxemia  []Rectal Bleed (BRBPR)  []Nausea/Vomiting   [] Varices  []Crohns/Colitis  []Pancreatic Cyst   [] Cirrhosis   []Pancreatitis    []Abnormal MRCP  []Elevated LFT [] Stent Removal, Previous ERCP  []Other:     Anesthesia:   [x] MAC [] Moderate Sedation   [] General   [] None     ROS: 12 pt Review of Symptoms was negative unless mentioned above    Medications:   Prior to Admission medications    Medication Sig Start Date End Date Taking?  Authorizing Provider   sodium bicarbonate 325 MG tablet in the morning and at bedtime 22   Historical Provider, MD   Ergocalciferol (VITAMIN D2 PO) Take by mouth    Historical Provider, MD   triamcinolone (KENALOG) 0.1 % cream FREDIS EXT TO BOTH ARMS AND LEGS BID PRN FOR RASH 20   Historical Provider, MD   Calcium-Magnesium-Vitamin D (CALCIUM 1200+D3 PO) Take by mouth     Historical Provider, MD   predniSONE (DELTASONE) 5 MG tablet Take 3 mg by mouth daily    Historical Provider, MD   HYDROcodone-acetaminophen (NORCO) 7.5-325 MG per tablet Take 1 tablet by mouth every 6 hours as needed for Pain. Historical Provider, MD   vitamin D (CHOLECALCIFEROL) 1000 UNIT TABS tablet Take 1,000 Units by mouth daily    Historical Provider, MD   amLODIPine (NORVASC) 5 MG tablet Take 5 mg by mouth daily    Historical Provider, MD   clonazePAM (KLONOPIN) 2 MG tablet Take 2 mg by mouth nightly as needed. Historical Provider, MD   atorvastatin (LIPITOR) 10 MG tablet Take 10 mg by mouth daily    Historical Provider, MD   cetirizine (ZYRTEC) 5 MG tablet Take 5 mg by mouth daily    Historical Provider, MD   aspirin 81 MG tablet Take 81 mg by mouth daily. Historical Provider, MD   pantoprazole (PROTONIX) 40 MG tablet Take 1 tablet by mouth 2 times daily.  10/8/12   YVON Mcdonnell       Past Medical History:  Past Medical History:   Diagnosis Date    Allergic rhinitis     Anemia, unspecified     Anxiety     Asthma     BPH (benign prostatic hyperplasia)     Dr Arnett December    CAD (coronary artery disease)     Dr Mary Ann Feldman [facility]     skin-non-melanoma    Carotid artery stenosis     YVON Antunez    Chronic kidney disease, stage III (moderate) (HCC)     Colon polyps     Cough     Decreased white blood cell count, unspecified     Disease of immune system (HCC)     IGG 4 Auto Immune Disease    Enlarged lymph nodes, unspecified     Esophageal stricture     GERD (gastroesophageal reflux disease)     HIGH CHOLESTEROL     Hypertension     Insomnia     Leukocytosis     Mediastinal lymphadenopathy     Monoclonal gammopathy     Monocytosis (symptomatic)     Night sweats     Normocytic normochromic anemia     Osteoarthritis     Other iron deficiency anemias     GI blood loss, hiatal hernia w/Jake's erosions and cecal/small bowel AVMs    Peptic ulcer disease     Restless leg syndrome     Rheumatoid arthritis ([facility] [address]     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[facility] [address]     dx @ [facility] 2018    Skin cancer     on left ear lobe    Thrombocytopenia, unspecified (HCC)     Venous insufficiency     Weight loss, abnormal        Past Surgical History:  Past Surgical History:   Procedure Laterality Date    COLONOSCOPY  9/15/2009    : negative    COLONOSCOPY  2006, 2003    hyperplastic colon polyps     COLONOSCOPY  11/2012    minimal diverticula, no polyps (5yr)    CORONARY ANGIOPLASTY WITH STENT PLACEMENT  2016    2 stents    FINE NEEDLE ASPIRATION Right 12/23/2011    Neck mass-Dr Verma    TX COLSC FLEXIBLE W/CONTROL BLEEDING ANY METHOD N/A 7/13/2017    Dr Osullivan-w/control of hemorrage, gold probe cautery of avm-internal hemorrhoids-5 yr recall    PTCA  3/10/16 [facility]    rca    SALIVARY GLAND SURGERY Right 03/23/2012    Dr Verma-Excision of right submandibular gland    SKIN CANCER EXCISION      TONSILLECTOMY      TOTAL NEPHRECTOMY Left 2018 [place]    UPPER GASTROINTESTINAL ENDOSCOPY  7/23/2009    : minimal gastritis, sm hiatal hernia, no active ulcers    UPPER GASTROINTESTINAL ENDOSCOPY  8/2006    gastritis, nonobst Schatzki's ring, lg hiatal hernia    UPPER GASTROINTESTINAL ENDOSCOPY  7/13/2017    Dr Osullivan-w/control of hemorrhage with gold probe cauterization of AVM-cecal avm, small bowel avm, hiatal hernia, margarito's erosions    UPPER GASTROINTESTINAL ENDOSCOPY  7/13/2017    Dr Garcia/control of hemorrhage with gold probe cauterization of AVM-cecal avm, small bowel avm, hiatal hernia, margarito's erosions    URETER STENT PLACEMENT Right 11/27/2018    [facility]    VASECTOMY  26 years ago    and had it reversed.     VASECTOMY REVERSAL         Social History:  Social History     Tobacco Use    Smoking status: Former     Packs/day: 1.00     Years: 20.00     Pack years: 20.00     Types: Cigarettes     Quit date: 1986     Years since quittin.6    Smokeless tobacco: Never   Vaping Use    Vaping Use: Never used   Substance Use Topics    Alcohol use: No    Drug use: No       Vital Signs: There were no vitals filed for this visit. Physical Exam:  Cardiac:  [x]WNL  []Comments:  Pulmonary:  [x]WNL   []Comments:  Neuro/Mental Status:  [x]WNL  []Comments:  Abdominal:  [x]WNL    []Comments:  Other:   []WNL  []Comments:    Informed Consent:  The risks and benefits of the procedure have been discussed with either the patient or if they cannot consent, their representative. Assessment:  Patient examined and appropriate for planned sedation and procedure. Plan:  Proceed with planned sedation and procedure as above. Lois Sepulveda, am scribing for and in the presence of Dr. Tiffanie Pacheco MD.  Electronically signed by Jose Guadalupe Duran RN on 2022 at 8:10 AM    I personally performed the services described in this documentation as scribed by Jim Patel, and it appears accurate and complete.      Tiffanie Pacheco MD  2022

## 2022-11-28 NOTE — DISCHARGE INSTRUCTIONS
EGD RECOMMENDATIONS:    1. Await path results  2. Continue PPI  3. If symptoms persist, could consider surgical referral for hiatal hernia repair. 4.  Repeat EGD with dilation as needed. 5.  Follow up OV with YVON Rosales in 8 weeks. Colonoscopy Recommendations:  1. Repeat colonoscopy: not necessary due to age and lack of polyps    POST-OP ORDERS: ENDOSCOPY & COLONOSCOPY:    1. Rest today. 2. DO NOT eat or drink until wide awake; eat your usual diet today in moderate amount only. 3. DO NOT drive today. 4. Call physician if you have severe pain, vomiting, fever, rectal bleeding or black bowel movements. 5.  If a biopsy was taken or a polyp removed, you should expect to hear results in about 21 days. If you have heard nothing from your physician by then, call the office for results. 6.  Discharge home when patient awake, vitals signs stable and tolerating liquids.

## 2022-11-28 NOTE — ANESTHESIA POSTPROCEDURE EVALUATION
Department of Anesthesiology  Postprocedure Note    Patient: Russell Magana  MRN: [id number]  YOB: 1940  Date of evaluation: 11/28/2022      Procedure Summary     Date: 11/28/22 Room / Location: [facility] ENDO 01 / [address]    Anesthesia Start: 6072 Anesthesia Stop:     Procedures:       EGD BIOPSY (Esophagus)      COLORECTAL CANCER SCREENING, NOT HIGH RISK (Abdomen)      EGD DILATION SAVORY 54 [place] (Esophagus) Diagnosis:       Dysphagia, unspecified type      Screen for colon cancer      (Dysphagia, unspecified type [R13.10])      (Screen for colon cancer [Z12.11])    Surgeons: Rosa Barrett MD Responsible Provider: YVON Dowd CRNA    Anesthesia Type: general, TIVA ASA Status: 3          Anesthesia Type: No value filed.     Yrn Phase I:      Yrn Phase II:        Anesthesia Post Evaluation    Patient location during evaluation: bedside  Patient participation: complete - patient participated  Level of consciousness: sleepy but conscious  Pain score: 0  Airway patency: patent  Nausea & Vomiting: no nausea and no vomiting  Complications: no  Cardiovascular status: hemodynamically stable and blood pressure returned to baseline  Respiratory status: acceptable and nasal cannula  Hydration status: stable

## 2022-11-28 NOTE — OP NOTE
Endoscopic Procedure Note    Patient: Tj Perez: 1940  [facility] Rec#: [id number] Acc#: [id number]     Primary Care Provider Larry Kearns MD  Referring Provider: YVON Rosales    Endoscopist: Boo Ng MD    Date of Procedure:  11/28/2022    Procedure:   1. EGD with biopsy  2. EGD with wire-guided dilation- 47 F    Indications:   1. Reflux   2. Dysphagia     Anesthesia:  Sedation was administered by anesthesia who monitored the patient during the procedure. Estimated Blood Loss: minimal    Procedure:   After reviewing the patient's chart and obtaining informed consent, the patient was placed in the left lateral decubitus position. A forward-viewing Olympus endoscope was lubricated and inserted through the mouth into the oropharynx. Under direct visualization, the upper esophagus was intubated. The scope was advanced to the level of the third portion of duodenum. Scope was slowly withdrawn with careful inspection of the mucosal surfaces. The scope was retroflexed for inspection of the gastric fundus and incisura. Findings and maneuvers are listed in impression below. The patient tolerated the procedure well. The scope was removed. There were no immediate complications. Findings:   Esophagus: abnormal: There is a questionable, 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, dilated due to symptoms. A guidewire was placed through the endoscope and the endoscope was removed. A 54 Swedish savory dilator was advanced down the length of the esophagus using a fixed-point wire technique. The dilator and guidewire were removed. The patient tolerated the procedure well. There is an 8 cm hiatal hernia, with no margarito's erosions present. Stomach:  Abnormal: Mild mucosal changes suggestive of gastritis noted -  Gastric biopsies were taken from the antrum and body to rule out Helicobacter pylori infection.     There are a few small benign-appearing polyps, consistent with fundic gland polyps. Duodenum:  there is a small, non-bleeding AVM noted. IMPRESSION:  1. Large hiatal hernia, likely source of dysphagia     RECOMMENDATIONS:    1. Await path results  2. Continue PPI  3. If symptoms persist, could consider surgical referral for hiatal hernia repair. 4.  Repeat EGD with dilation as needed. 5.  Follow up OV with YVON Arredondo in 8 weeks. The results were discussed with the patient and family. A copy of the images obtained were given to the patient. Willie Antonio am scribing for and in the presence of Dr. Myesha Carey MD.  Electronically signed by Ailyn Cardenas RN on 11/28/2022 at 8:10 AM    I personally performed the services described in this documentation as scribed by Zully Suazo, and it appears accurate and complete.      Jenifer Gifford MD  11/28/2022

## 2022-11-28 NOTE — OP NOTE
Patient: Jessika Medal: 1940  [facility] Rec#: [id number] Acc#: [id number]   Primary Care Provider Urban Boykin MD    Date of Procedure:  11/28/2022    Endoscopist: Ha Jean MD    Referring Provider: Urban Boykin MD, YVON Kapoor    Operation Performed: Colonoscopy     Indications: Screening- hx of polyps    Anesthesia:  Sedation was administered by anesthesia who monitored the patient during the procedure. I met with Nan Souza prior to procedure. We discussed the procedure itself, and I have discussed the risks of endoscopy (including-- but not limited to-- pain, discomfort, bleeding potentially requiring second endoscopic procedure and/or blood transfusion, organ perforation requiring operative repair, damage to organs near the colon, infection, aspiration, cardiopulmonary/allergic reaction), benefits, indications to endoscopy. Additionally, we discussed options other than colonoscopy. The patient expressed understanding. All questions answered. The patient decided to proceed with the procedure. Signed informed consent was placed on the chart. Blood Loss: minimal    Withdrawal time: > 6 min  Bowel Prep: adequate     Complications: no immediate complications    DESCRIPTION OF PROCEDURE:     A time out was performed. After written informed consent was obtained, the patient was placed in the left lateral position. The perianal area was inspected, and a digital rectal exam was performed. A rectal exam was performed: normal tone, no palpable lesions. At this point, a forward viewing Olympus colonoscope was inserted into the anus and carefully advanced to the cecum. The cecum was identified by the ileocecal valve and the appendiceal orifice. The colonoscope was then slowly withdrawn with careful inspection of the mucosa in a linear and circumferential fashion. The scope was retroflexed in the rectum.  Suction was utilized during the procedure to remove as much air as possible from the bowel. The colonoscope was removed from the patient, and the procedure was terminated. Findings are listed below. Findings: The mucosa appeared normal throughout the entire examined colon. There was evidence of diverticular disease throughout the left colon. Retroflexion in the rectum was normal and revealed no further abnormalities. Recommendations:  1. Repeat colonoscopy: not necessary due to age and lack of polyps      Findings and recommendations were discussed w/ the patient. A copy of the images was provided. Lois Sepulveda, am scribing for and in the presence of Dr. Tiffanie Pacheco MD.  Electronically signed by Jose Guadalupe Duran RN on 11/28/2022 at 8:11 AM    I personally performed the services described in this documentation as scribed by Jim Patel, and it appears accurate and complete.      Tiffanie Pacheco MD  11/28/2022

## 2022-11-28 NOTE — ANESTHESIA PRE PROCEDURE
Department of Anesthesiology  Preprocedure Note       Name:  Tammi Amato   Age:  80 y.o.  :  1940                                          MRN:  [id number]         Date:  2022      Surgeon: Shady Slaughter):  Bethany Rendon MD    Procedure: Procedure(s):  EGD BIOPSY  COLORECTAL CANCER SCREENING, NOT HIGH RISK    Medications prior to admission:   Prior to Admission medications    Medication Sig Start Date End Date Taking? Authorizing Provider   sodium bicarbonate 325 MG tablet in the morning and at bedtime 22   Historical Provider, MD   Ergocalciferol (VITAMIN D2 PO) Take by mouth    Historical Provider, MD   triamcinolone (KENALOG) 0.1 % cream FREDIS EXT TO BOTH ARMS AND LEGS BID PRN FOR RASH 20   Historical Provider, MD   Calcium-Magnesium-Vitamin D (CALCIUM 1200+D3 PO) Take by mouth     Historical Provider, MD   predniSONE (DELTASONE) 5 MG tablet Take 3 mg by mouth daily    Historical Provider, MD   HYDROcodone-acetaminophen (NORCO) 7.5-325 MG per tablet Take 1 tablet by mouth every 6 hours as needed for Pain. Historical Provider, MD   vitamin D (CHOLECALCIFEROL) 1000 UNIT TABS tablet Take 1,000 Units by mouth daily    Historical Provider, MD   amLODIPine (NORVASC) 5 MG tablet Take 5 mg by mouth daily    Historical Provider, MD   clonazePAM (KLONOPIN) 2 MG tablet Take 2 mg by mouth nightly as needed. Historical Provider, MD   atorvastatin (LIPITOR) 10 MG tablet Take 10 mg by mouth daily    Historical Provider, MD   cetirizine (ZYRTEC) 5 MG tablet Take 5 mg by mouth daily    Historical Provider, MD   aspirin 81 MG tablet Take 81 mg by mouth daily. Historical Provider, MD   pantoprazole (PROTONIX) 40 MG tablet Take 1 tablet by mouth 2 times daily. 10/8/12   Carrol Osgood, APRN       Current medications:    No current facility-administered medications for this encounter. Allergies:     Allergies   Allergen Reactions    Lyrica [Pregabalin] Other (See Comments)     Pt was unable to breathe when taking medication.     Penicillins     Sulfa Antibiotics        Problem List:    Patient Active Problem List   Diagnosis Code    Allergic rhinitis J30.9    Asthma J45.909    HIGH CHOLESTEROL     Hypertension I10    Restless leg syndrome G25.81    History of colon polyps Z86.010    Hemorrhoids K64.9    Carotid artery stenosis I65.29    History of esophageal stricture Z87.19    S/P coronary artery stent placement Z95.5    Anticoagulated Z79.01    Pill dysphagia R13.10    AAA (abdominal aortic aneurysm) without rupture I71.40    Melena K92.1    Iron deficiency anemia D50.9    Constipation K59.00    Altered bowel function R19.8    Chronic GERD K21.9    Dysphagia R13.10    Esophageal dysphagia R13.19    Iron deficiency anemia due to chronic blood loss D50.0    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    Anemia, unspecified D64.9    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    Normocytic normochromic anemia D64.9    Other iron deficiency anemias D50.8    Enlarged lymph nodes, unspecified R59.9    Rheumatoid arthritis (HCC) M06.9    Decreased white blood cell count, unspecified D72.819    Chronic kidney disease, stage III (moderate) (HCC) N18.30    Weight loss, abnormal R63.4    Thrombocytopenia, unspecified (HCC) D69.6    Monocytosis (symptomatic) V61.527       Past Medical History:        Diagnosis Date    Allergic rhinitis     Anemia, unspecified     Anxiety     Asthma     BPH (benign prostatic hyperplasia)     Dr Zee Santoyo CAD (coronary artery disease)     [facility]     skin-non-melanoma    Carotid artery stenosis     YVON Banerjee    Chronic kidney disease, stage III (moderate) (HCC)     Colon polyps     Cough     Decreased white blood cell count, unspecified     Disease of immune system (HCC)     IGG 4 Auto Immune Disease    Enlarged lymph nodes, unspecified     Esophageal stricture     GERD (gastroesophageal reflux disease)     HIGH CHOLESTEROL     Hypertension     Insomnia     Leukocytosis     Mediastinal lymphadenopathy     Monoclonal gammopathy     Monocytosis (symptomatic)     Night sweats     Normocytic normochromic anemia     Osteoarthritis     Other iron deficiency anemias     GI blood loss, hiatal hernia w/Jake's erosions and cecal/small bowel AVMs    Peptic ulcer disease     Restless leg syndrome     [facility] arthritis [facility] [address]     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[facility] [address]     dx @ [facility] 2018    Skin cancer     on left ear lobe    Thrombocytopenia, unspecified ([facility] [address]     Venous insufficiency     Weight loss, abnormal        Past Surgical History:        Procedure Laterality Date    COLONOSCOPY  9/15/2009    : negative    COLONOSCOPY  2006, 2003    hyperplastic colon polyps     COLONOSCOPY  11/2012    minimal diverticula, no polyps (5yr)    CORONARY ANGIOPLASTY WITH STENT PLACEMENT  2016    2 stents    FINE NEEDLE ASPIRATION Right 12/23/2011    Neck mass-Dr Verma    ME COLSC FLEXIBLE W/CONTROL BLEEDING ANY METHOD N/A 7/13/2017    Dr Garcia/control of hemorrage, gold probe cautery of avm-internal hemorrhoids-5 yr recall    PTCA  3/10/16 ford    rca    SALIVARY GLAND SURGERY Right 03/23/2012    Dr Verma-Excision of right submandibular gland    SKIN CANCER EXCISION      TONSILLECTOMY      TOTAL NEPHRECTOMY Left 2018 [place]    UPPER GASTROINTESTINAL ENDOSCOPY  7/23/2009    : minimal gastritis, sm hiatal hernia, no active ulcers    UPPER GASTROINTESTINAL ENDOSCOPY  8/2006    gastritis, nonobst Schatzki's ring, lg hiatal hernia    UPPER GASTROINTESTINAL ENDOSCOPY  7/13/2017    Dr Garcia/control of hemorrhage with gold probe cauterization of AVM-cecal avm, small bowel avm, hiatal hernia, jake's erosions    UPPER GASTROINTESTINAL ENDOSCOPY  7/13/2017    Dr Garcia/control of hemorrhage with gold probe cauterization of AVM-cecal avm, small bowel avm, hiatal hernia, margarito's erosions    URETER STENT PLACEMENT Right 2018    [facility]    VASECTOMY  26 years ago    and had it reversed.  VASECTOMY REVERSAL         Social History:    Social History     Tobacco Use    Smoking status: Former     Packs/day: 1.00     Years: 20.00     Pack years: 20.00     Types: Cigarettes     Quit date: 1986     Years since quittin.6    Smokeless tobacco: Never   Substance Use Topics    Alcohol use: No                                Counseling given: Not Answered      Vital Signs (Current): There were no vitals filed for this visit.                                            BP Readings from Last 3 Encounters:   10/26/22 (!) 98/58   10/20/22 108/62   22 138/66       NPO Status:                                                                                 BMI:   Wt Readings from Last 3 Encounters:   10/26/22 166 lb 12.8 oz (75.7 kg)   10/20/22 166 lb 12.8 oz (75.7 kg)   22 167 lb (75.8 kg)     There is no height or weight on file to calculate BMI.    CBC:   Lab Results   Component Value Date/Time    WBC 3.04 10/19/2022 09:13 AM    RBC 3.59 10/19/2022 09:13 AM    HGB 9.0 10/19/2022 09:13 AM    HCT 29.9 10/19/2022 09:13 AM    MCV 83.3 10/19/2022 09:13 AM    RDW 14.8 10/19/2022 09:13 AM     10/19/2022 09:13 AM       CMP:   Lab Results   Component Value Date/Time     10/19/2022 10:08 AM    K 4.5 10/19/2022 10:08 AM     10/19/2022 10:08 AM    CO2 25 10/19/2022 10:08 AM    BUN 21 10/19/2022 10:08 AM    CREATININE 1.8 10/19/2022 10:08 AM    GFRAA 41 2021 09:46 AM    AGRATIO 0.9 2021 03:29 PM    LABGLOM 37 10/19/2022 10:08 AM    GLUCOSE 90 10/19/2022 10:08 AM    PROT 7.5 10/19/2022 10:08 AM    CALCIUM 10.7 10/19/2022 10:08 AM    BILITOT 0.8 10/19/2022 10:08 AM    ALKPHOS 103 10/19/2022 10:08 AM    AST 21 10/19/2022 10:08 AM    ALT 18 10/19/2022 10:08 AM       POC Tests: No results for input(s): POCGLU, POCNA, POCK, POCCL, POCBUN, POCHEMO, POCHCT in the last 72 hours. Coags: No results found for: PROTIME, INR, APTT    HCG (If Applicable): No results found for: PREGTESTUR, PREGSERUM, HCG, HCGQUANT     ABGs: No results found for: PHART, PO2ART, FFW6IXT, ACA3FVV, BEART, X4NADOWR     Type & Screen (If Applicable):  No results found for: LABABO, LABRH    Drug/Infectious Status (If Applicable):  No results found for: HIV, HEPCAB    COVID-19 Screening (If Applicable): No results found for: COVID19        Anesthesia Evaluation  Patient summary reviewed  Airway: Mallampati: II  TM distance: >3 FB   Neck ROM: full  Mouth opening: < 3 FB   Dental: normal exam         Pulmonary:normal exam    (+) asthma:                           ROS comment: Former smoker- quit 1986   Cardiovascular:  Exercise tolerance: no interval change,   (+) hypertension:, CAD:, CABG/stent (stents 2016):, hyperlipidemia         Beta Blocker:  Not on Beta Blocker      ROS comment: Denies any CP/SOB     Neuro/Psych:                ROS comment: Chronic narc & benzo use  RLS GI/Hepatic/Renal:   (+) GERD:, PUD, renal disease (CKD III. S/p nephrectomy  r/t IGg4): CRI, bowel prep,          ROS comment: dysphagia. Endo/Other:    (+) blood dyscrasia: arthritis: rheumatoid. , .                  ROS comment: IGg4 related disease Abdominal:             Vascular:   + PVD, aortic or cerebral, . Other Findings:           Anesthesia Plan      general and TIVA     ASA 3       Induction: intravenous. Anesthetic plan and risks discussed with patient.                         YVON Drew - CRNA   11/28/2022

## 2022-12-19 ENCOUNTER — HOSPITAL ENCOUNTER (OUTPATIENT)
Dept: VASCULAR LAB | Age: 82
Discharge: HOME | End: 2022-12-19
Payer: MEDICARE

## 2022-12-19 ENCOUNTER — OFFICE VISIT (OUTPATIENT)
Dept: VASCULAR SURGERY | Age: 82
End: 2022-12-19
Payer: MEDICARE

## 2022-12-19 VITALS
OXYGEN SATURATION: 98 % | HEART RATE: 70 BPM | TEMPERATURE: 98.3 F | DIASTOLIC BLOOD PRESSURE: 81 MMHG | SYSTOLIC BLOOD PRESSURE: 139 MMHG

## 2022-12-19 DIAGNOSIS — I65.23 BILATERAL CAROTID ARTERY STENOSIS: Primary | ICD-10-CM

## 2022-12-19 DIAGNOSIS — I65.23 BILATERAL CAROTID ARTERY STENOSIS: ICD-10-CM

## 2022-12-19 PROCEDURE — 3074F SYST BP LT 130 MM HG: CPT | Performed by: PHYSICIAN ASSISTANT

## 2022-12-19 PROCEDURE — G8420 CALC BMI NORM PARAMETERS: HCPCS | Performed by: PHYSICIAN ASSISTANT

## 2022-12-19 PROCEDURE — 93880 EXTRACRANIAL BILAT STUDY: CPT

## 2022-12-19 PROCEDURE — 1036F TOBACCO NON-USER: CPT | Performed by: PHYSICIAN ASSISTANT

## 2022-12-19 PROCEDURE — G8427 DOCREV CUR MEDS BY ELIG CLIN: HCPCS | Performed by: PHYSICIAN ASSISTANT

## 2022-12-19 PROCEDURE — G8484 FLU IMMUNIZE NO ADMIN: HCPCS | Performed by: PHYSICIAN ASSISTANT

## 2022-12-19 PROCEDURE — 1123F ACP DISCUSS/DSCN MKR DOCD: CPT | Performed by: PHYSICIAN ASSISTANT

## 2022-12-19 PROCEDURE — 99213 OFFICE O/P EST LOW 20 MIN: CPT | Performed by: PHYSICIAN ASSISTANT

## 2022-12-19 PROCEDURE — 3078F DIAST BP <80 MM HG: CPT | Performed by: PHYSICIAN ASSISTANT

## 2022-12-20 DIAGNOSIS — I65.23 BILATERAL CAROTID ARTERY STENOSIS: Primary | ICD-10-CM

## 2022-12-20 NOTE — PROGRESS NOTES
Patient Care Team:  Karen Keen MD as PCP - General (Internal Medicine)  Karen Keen MD as PCP - [facility]  Gaurav Aldana MD (Cardiology)  YVON Cantu as Nurse Practitioner (Family Nurse Practitioner)  Heron Hinson MD as Consulting Physician (Cardiology)      History and Physical:    Leticia Singh is a 80 y.o. male with COPD, CAD, stage III chronic kidney disease, GERD, hyperlipidemia, hypertension, rheumatoid arthritis, carotid artery stone stenosis and a past medical history of tobacco abuse. Today we are following up for his carotid artery stenosis. Currently he is on aspirin 81 mg and atorvastatin 10 mg daily. He denies any new onset of partial or complete loss of vision affecting only one eye, speech difficulty or lateralizing weakness, numbness/tingling. 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 Today he is tearful as he is dealing with some stressful personal issues. He reports that he is a little depression. He denies any suicidal ideations. He has family that lives close.     Leticia Singh is a 80 y.o. 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[facility])      dx @ [facility] 2018      Normocytic normochromic anemia     Other iron deficiency anemias     Enlarged lymph nodes, unspecified     Rheumatoid arthritis ([facility] [address]     Decreased white blood cell count, unspecified     Chronic kidney disease, stage III (moderate) (HCC)     Weight loss, abnormal     Thrombocytopenia, unspecified ([facility])     Monocytosis (symptomatic)     Esophageal dysphagia 07/13/2017    Iron deficiency anemia due to chronic blood loss     Melena 06/07/2017    Iron deficiency anemia 06/07/2017    Constipation 06/07/2017    Altered bowel function 06/07/2017     Updating Deprecated Diagnoses      Chronic GERD 06/07/2017    Dysphagia 06/07/2017     Updating Deprecated Diagnoses      AAA (abdominal aortic aneurysm) without rupture 11/07/2016    History of esophageal stricture 05/24/2016    S/P coronary artery stent placement 05/24/2016    Anticoagulated 05/24/2016    Pill dysphagia 05/24/2016    Carotid artery stenosis 10/24/2012    History of colon polyps 08/13/2012    Hemorrhoids 08/13/2012    Allergic rhinitis     Asthma     HIGH CHOLESTEROL     Hypertension     Restless leg syndrome      Current Outpatient Medications   Medication Sig Dispense Refill    sodium bicarbonate 325 MG tablet in the morning and at bedtime      Ergocalciferol (VITAMIN D2 PO) Take by mouth      Calcium-Magnesium-Vitamin D (CALCIUM 1200+D3 PO) Take by mouth       HYDROcodone-acetaminophen (NORCO) 7.5-325 MG per tablet Take 1 tablet by mouth every 6 hours as needed for Pain.      vitamin D (CHOLECALCIFEROL) 1000 UNIT TABS tablet Take 1,000 Units by mouth daily      amLODIPine (NORVASC) 5 MG tablet Take 5 mg by mouth daily      clonazePAM (KLONOPIN) 2 MG tablet Take 2 mg by mouth nightly as needed. atorvastatin (LIPITOR) 10 MG tablet Take 10 mg by mouth daily      cetirizine (ZYRTEC) 5 MG tablet Take 5 mg by mouth daily      aspirin 81 MG tablet Take 81 mg by mouth daily. pantoprazole (PROTONIX) 40 MG tablet Take 1 tablet by mouth 2 times daily. 60 tablet 11    triamcinolone (KENALOG) 0.1 % cream FREDIS EXT TO BOTH ARMS AND LEGS BID PRN FOR RASH      predniSONE (DELTASONE) 5 MG tablet Take 3 mg by mouth daily       No current facility-administered medications for this visit.      Allergies: Lyrica [pregabalin], Penicillins, and Sulfa antibiotics  Past Medical History:   Diagnosis Date    Allergic rhinitis     Anemia, unspecified     Anxiety     Asthma     BPH (benign prostatic hyperplasia)     Dr Felix Koch    CAD (coronary artery disease)     Dr Flynn [facility])     skin-non-melanoma    Carotid artery stenosis     Eduardo Raul, APRN    Chronic kidney disease, stage III (moderate) (HCC)     Colon polyps     Cough     Decreased white blood cell count, unspecified     Disease of immune system (HCC)     IGG 4 Auto Immune Disease    Enlarged lymph nodes, unspecified     Esophageal stricture     GERD (gastroesophageal reflux disease)     HIGH CHOLESTEROL     Hypertension     Insomnia     Leukocytosis     Mediastinal lymphadenopathy     Monoclonal gammopathy     Monocytosis (symptomatic)     Night sweats     Normocytic normochromic anemia     Osteoarthritis     Other iron deficiency anemias     GI blood loss, hiatal hernia w/Jake's erosions and cecal/small bowel AVMs    Peptic ulcer disease     Restless leg syndrome     Rheumatoid arthritis (HCC)     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 @ [email or web address] 2018    Serum aquaporin 4 IgG antibody positive     Skin cancer     on left ear lobe    Thrombocytopenia, unspecified (HCC)     Venous insufficiency     Weight loss, abnormal      Past Surgical History:   Procedure Laterality Date    COLONOSCOPY  09/15/2009    Dr Astrid Padilla, 3 yr recall    COLONOSCOPY  06/24/2003    Dr Frannie Barrett, 3 yr recall    COLONOSCOPY  11/12/2012    Dr Nora Gilford diverticula, no polyps (5yr)    COLONOSCOPY  07/27/2006    Dr Frannie Barrett x 1, 3 yr recall    COLONOSCOPY N/A 11/28/2022    Dr THOMAS Olivia-Diverticular disease, prn (age)    CORONARY ANGIOPLASTY WITH STENT PLACEMENT  2016    2 stents    FINE NEEDLE ASPIRATION Right 12/23/2011    Neck mass-Dr Verma    TX COLSC FLEXIBLE W/CONTROL BLEEDING ANY METHOD N/A 07/13/2017    Dr Osullivan-w/control of hemorrage, gold probe cautery of avm-internal hemorrhoids-5 yr recall    PTCA  3/10/16 ford    rca    SALIVARY GLAND SURGERY Right 03/23/2012    Dr Verma-Excision of right submandibular gland    SKIN CANCER EXCISION      TONSILLECTOMY      TOTAL NEPHRECTOMY Left 2018 [facility] GASTROINTESTINAL ENDOSCOPY  2009    Dr Beau Rebollar gastritis, sm hiatal hernia, no active ulcers, urea neg    UPPER GASTROINTESTINAL ENDOSCOPY  2006    Dr Trent Gilford, non obst Schatzki's ring, lg hiatal hernia, urea neg    UPPER GASTROINTESTINAL ENDOSCOPY  2017    Dr Garcia/control of hemorrhage with gold probe cauterization of AVM-cecal avm, small bowel avm, hiatal hernia, margarito's erosions    UPPER GASTROINTESTINAL ENDOSCOPY  2017    Dr Garcia/control of hemorrhage with gold probe cauterization of AVM-cecal avm, small bowel avm, hiatal hernia, margarito's erosions    UPPER GASTROINTESTINAL ENDOSCOPY N/A 2022    Dr Jaja Henriquez/[id number], 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, gastritis, few small benign-appearing polyps, consistent with fundic gland polyps, small non-bleeding AVM in duodenum, large HH, likely source of dysphagia-Gastritis, no h pylori    UPPER GASTROINTESTINAL ENDOSCOPY N/A 2022    Dr Jaja Henriquez/[id number], 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, gastritis, few small benign-appearing polyps, consistent with fundic gland polyps, small non-bleeding AVM in duodenum, large HH, likely source of dysphagia-Gastritis, no h pylori    URETER STENT PLACEMENT Right 2018    [facility]    VASECTOMY  26 years ago    and had it reversed.     VASECTOMY REVERSAL       Family History   Problem Relation Age of Onset    Hypertension Mother     High Cholesterol Mother     Heart Failure Mother         blockages, heart attack    Other Mother         pneumonia    Heart Failure Father         blockages, heart attack    Heart Failure Brother         blockages, heart attack    High Cholesterol Brother     Stroke Brother     Heart Disease Brother     Hypertension Brother     Other Other         mother , age 80 fall    Colon Cancer Neg Hx     Colon Polyps Neg Hx     Esophageal Cancer Neg Hx     Liver Cancer Neg Hx     Liver Disease Neg Hx     Rectal Cancer Neg Hx     Stomach Cancer Neg Hx      Social History     Tobacco Use    Smoking status: Former     Packs/day: 1.00     Years: 20.00     Pack years: 20.00     Types: Cigarettes     Quit date: 1986     Years since quittin.7    Smokeless tobacco: Never   Substance Use Topics    Alcohol use: No       Review of Systems    Constitutional -   No fever or chills   HENT - no HA's, tinnitus, rhinorrhea, sore throat  Eyes - no sudden vision change or amaurosis. Respiratory - no SOB or chest pain  Cardiovascular - no chest pain, syncope, or significant dizziness. No palpitations   Gastrointestinal - no significant abdominal pain. No blood in stool. No diarrhea, nausea, or vomiting. Genitourinary - No difficulty urinating, dysuria, frequency, or urgency. No flank pain or hematuria. Musculoskeletal - no back pain or myalgia. Skin - no rashes or wounds   Neurologic - no dizziness, facial asymmetry, or light headedness. No seizures. No new onset of partial or complete loss of vision affecting only one eye, speech difficulty or lateralizing weakness, numbness/tingling   Hematologic - no excessive bleeding. Psychiatric - See HPI  All other review of systems are negative. Physical Exam    /81 (Site: Left Lower Arm, Position: Sitting, Cuff Size: Medium Adult)   Pulse 70   Temp 98.3 °F (36.8 °C)   SpO2 98%     Constitutional - No acute distress. HENT - head normocephalic. Hearing is intact   Eyes - conjunctiva normal.  EOMS normal.  No exudate. No icterus. Neck- ROM appears normal, no tracheal deviation. Cardiovascular - Regular rate and rhythm. Heart sounds are normal.  No murmur, rub, or gallop. Carotid pulses bilaterally without bruit. Extremities - Radial and ulnar pulses are 2+ to palpation bilaterally. No cyanosis, clubbing, or significant edema. No signs atheroembolic event. Pulmonary - effort appears normal.  No respiratory distress.     Lungs - Breath sounds normal.   GI - Abdomen - No distension or palpable mass. Genitourinary - deferred. Musculoskeletal - ROM appears normal.  No significant edema. Neurologic - alert and oriented X 3. Face symmetric. No lateralizing weakness noted. Skin - warm, dry, and intact. No rash, erythema, or pallor. Psychiatric - mood, affect, and behavior appear normal.  Judgment and thought processes appear normal.    Risk factors for atherosclerosis of all vascular beds have been reviewed with the patient including:  Family history, tobacco abuse in all forms, elevated cholesterol, hyperlipidemia, and diabetes. Carotid Doppler results: 12/19/22    Right CCA/ICA <50% stenotic  Left CCA/ICA <50% stenotic  Right vertebral artery flow is antegrade  Left vertebral artery flow is antegrade  Individual velocities reviewed: Yes. Results/imaging were reviewed and compared to previous study without significant changes noted. Results were reviewed with the patient. Assessment      1. Bilateral carotid artery stenosis          Plan      Recommend he continue aspirin 81 mg and atorvastatin 10 mg daily  Recommend no smoking  Recommend good BP control  Recommend low fat, low cholesterol diet  Recommend daily exercise in moderation   We will follow-up in 12 months with a repeat carotid artery duplex scan. Patient was instructed to go to ER or call office immediately with any new onset of partial or complete loss of vision affecting only one eye, speech difficulty or lateralizing weakness, numbness/tingling         Please note that parts of the chart were generated using Dragon dictation software. Although every effort was made to ensure the accuracy of this automated transcription, some errors in transcription may have occurred.

## 2022-12-21 DIAGNOSIS — I65.23 BILATERAL CAROTID ARTERY STENOSIS: Primary | ICD-10-CM

## 2022-12-21 PROBLEM — K59.00 CONSTIPATION: Status: RESOLVED | Noted: 2017-06-07 | Resolved: 2022-12-21

## 2022-12-21 PROBLEM — K92.1 MELENA: Status: RESOLVED | Noted: 2017-06-07 | Resolved: 2022-12-21

## 2022-12-21 PROBLEM — R19.8 ALTERED BOWEL FUNCTION: Status: RESOLVED | Noted: 2017-06-07 | Resolved: 2022-12-21

## 2023-01-25 DIAGNOSIS — D47.2 IGG MONOCLONAL GAMMOPATHY: ICD-10-CM

## 2023-01-31 NOTE — PROGRESS NOTES
Patient:  Mi Fung  YOB: 1940  Date of Service: 2023  MRN: [id number]    Primary Care Physician: David Ferrell MD    Chief Complaint   Patient presents with    Follow-up     IgG monoclonal gammopathy           Patient Seen, Chart, Consults notes, Labs, Radiology studies reviewed. Subjective:    Claudeen Countryman is a 80year old  gentleman managed with primary and secondary diagnoses as outlined:  Resection of left retroperitoneal mass and radical left nephrectomy by Dr. Deandre Nelson at [facility] on 10/31/2018. 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 Christel Mo    His ex-wife  in 2021, and their son  in 2021. He is still grieving. Mr. Hyacinth Pruitt is on prednisone 1 mg daily and treatment of joint pains pleuritic-like chest discomfort, rib pain and feeling kind of rough, primarily fatigue. Mr. Hyacinth Pruitt receives weekly Rituxan x 4 periodically for exacerbations of his IgG4 related disease directed by Dr. Jimenez Munoz. Mr. Hyacinth Pruitt received 4 weekly doses of Rituxan between 2021 and 2021    Sharda Perez is still very disappointed that his 51-year-old wife of 24 years marriage left him and cleaned out all the furniture in the house with a moving company while he was at [employer] in 2022. He is slowly getting his emotions under much better control. His wife had left him and got a restraining order and spread rumors around the [employer] that he was going to be harmful. This was all been taken to the authorities and addressed accordingly. He is going to counseling. He was told that maybe he should sell his condo and buy a new place. He accepted a down payment on his condo the next day and is still awaiting finishing up some other condominiums so he can buy another and have a new place to start off living.   Unfortunately, the gentleman who put a down payment on the hernandez, did this on the condition that he can sell his house in [address]. He has still not been able to sell his house. He finished an 18-week course of divorce counseling. His outlook is improved, he is smiling and able to joke around a little bit today as opposed to crying all through his previous visits. At this point he is able to find humor in the fact that his ex-wife is quite upset that he did not die on the operating table in [place] when he had a large tumor removed. She actually came out and told him that she was banking on his Social Security check that she will now never receive. It is good to see him smile again. Tao's weight is stable at 163 pounds. Sharda Perez received weekly Rituxan at the office of Dr. Jimenez Munoz between mid December 2022 and mid January 2023 for IgG4 related diseases. He is currently on prednisone 5 mg daily. Sharda Perez returns today in follow-up and monitoring. HEMATOLOGIC HISTORY:  IgG4 related disease 10/31/2018 with associated leukopenia, Anemia, and adenopathy  Claudeen Countryman was seen in initial hematologic consultation on 4/26/2018, referred by Dr. Carolyn Villanueva for evaluation of leukopenia and anemia. CBC on 4/19/2018 showed a WBC of 3.37, hemoglobin 9.7 with MCV 87.8 and platelet count 533,543. Neutrophils were 56.3%, lymphocytes 9.5% and monocytes 20.5%. CMP included a creatinine of 1.94, GFR 41. Ca+ 8.8, TP 9.6. He is followed by Dr. Brandan Montes for rheumatoid arthritis and possible Sjogren disease. He has a known history of CKD, as well as iron deficiency anemia from GI blood loss. Endoscopy 7/13/2017 by Dr. Nneka Osorio. Shi revealed a small bowel AVM that was cauterized. A moderate sized hiatal hernia with mild Jake's erosions was noted as well. Colonoscopy 7/13/17 was appreciable for cecal AVM, also cauterized as well as internal hemorrhoids.     M2A capsule endoscopy 1/3/2018 by Dr. Ivonne Hamilton revealed one irritated area in the colon, though no signs of bleeding or stigmata of small bowel blood loss. Should evidence of GI blood loss persists, consideration should be given for repeat EGD/colonoscopy due to evidence of blood loss on procedures in  with possible need for repeat cauterization. Forrest Grove was previously on antiplatelet therapy for CAD, now treated with aspirin only. He denies melena or hematochezia at this time. In addition to the above, Forrest Grove has been followed by Dr. Morgan Rodrigues for thoracic adenopathy. Contrasted Chest CT 1/10/2018 at [facility] documented an interval increase in the size of intrathoracic lymph nodes compared to 2016; 2016; CTA chest 2016 including the followin mm precarinal LN   10 mm hilar LN   14 mm subcarinal LN   17 left hilar LN, previously 10 mm   4 mm medial ALIYA nodule, more conspicuous   4 mm lingular nodule, new   7 mm left major fissure nodule, stable   7 mm LLL pleural based noncalcified nodule, previously 5 mm   6 mm LLL nodule, previously 4 mm   3 mm LLL nodule   8 mm RUL, previously 5 mm   Several RLL nodule, relatively stable    Bronchoscopy with EBUS and biopsy of a station 7 node was performed 18 by Dr. Morgan Rodrigues. Pathology:  1. station 7 lymph node revealed:  A. Small, mature lymphocytes and plasma cells. B. Ciliated columnar bronchial epithelial cells and gallbladder cells. C. background blood. D. negative for malignant cells. 2. Bronchial washings  A. acute inflammation, mild  B. squamous metaplasia  C. blood and mucus  D. negative for malignant cells  E. GMS stain negative for fungal organisms  FLOW cytometry on the station 7 lymph node biopsied by EBUS on 2018 did NOT reveal immunophenotypic evidence of a clonal B cell population     IN RETROSPECT,  Forrest Grove has a history of FNA of a right neck mass performed 2011 by Dr. Jt Knutson.  Pathology revealed no malignant cells, with fragments of benign lymphoid tissue and a few benign salivary gland acinar fragments. On 3/23/2012 excision of a right submandibular gland was performed by Dr. Claribel Lino that demonstrated severe, chronic follicular sialadenitis with focal areas of glandular fibrosis and heterogeneous lymphoid predominant inflammatory cell population with significant number of plasma cells and eosinophils. There was no evidence of epithelial malignancy. FLOW cytometry revealed no evidence of B-cell or T-cell lymphoma. On exam, left submandibular lymphadenopathy is appreciated and perhaps shotty axillary lymphadenopathy. CBC 4/26/2018 showed a normal WBC of 4.52, though with persistent monocytosis at 22.8%. Hemoglobin is 10.8 with MCV 91.0 and platelet count 876,634. Anjelica Gunter' main complaint had been of hoarseness and progressive cough, which occurs daily. He dates symptoms back to August, 2017 after having an allergic reaction to Lyrica for post-herpetic neuralgia. In addition to hoarseness and cough, Anjelica Gunter also reports weight loss of 24 pounds over the previous 4-6 weeks prior to presentation at this office, night sweats and chills. He was also referred to [facility] by Dr. David Moralez regarding persistent pulmonary symptoms. Given the abnormal CBC finding, adenopathy and symptomatology work up was also requested, and he was referred to this office. Serology 4/26/2018  Serum Fe - 32  TIBC - 220  Fe Sat - 15%  Ferritin - 45  B12 - 744  Folate > 20  Retic - 1.6 %  LDH -175  B2 M - 7 (0.6-2.4)  QI - IgG-5051 (700-1600), IgM 170 ()  SPEP - no M spike, TP - 10.2 (6-8.5), globulin - 7.5 (2.2-3.9)  Free serum light chains - kappa 930.3 (3.3-19.4), lambda 228 (5.7-26.3), K/L ratio 4.08 (0.26-1.65)    Bone marrow aspiration and biopsy on 4/30/2018 was normocellular (~20-25 %) with adequate maturing trilineage hematopoiesis, no significant dyspoiesis and no increase in blasts. Megakaryocytes had a spectrum of morphology.   There was no stainable storage iron and no increase in reticulin fibrosis. 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 Flow cytometry did not reveal B-cell monoclonality nor T-cell aberrant antigen expression. FISH studies for MDS was negative. There was no morphologic evidence of a myelodysplastic syndrome. There was no diagnostic evidence of a limp low at disorder, granulomas, nor metastatic malignancies on the bone marrow biopsy or aspirate. CT scans of the neck with contrast on 4/30/2018 documented an asymmetry within the left submandibular gland compared to the right. The left submandibular gland measures 4.2 x 2.6 x 2.4 cm compared to 12 mm on the right. Otherwise there are no discrete neck masses or pathologically enlarged lymph nodes. 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 Prominent right external iliac chain lymph nodes of indeterminate significance were also documented. Diverticulosis without diverticulitis and a large hiatal hernia was described. The prostate gland was enlarged with lifting of the base of the urinary bladder. Also noted was a questionable abnormal appearance of the pancreas with some loss of the normal pancreatic on to work without a discrete mass with MRI recommended. MRI of the abdomen W & WO on 5/8/2018 documented borderline splenomegaly, a large ventral hernia, nonenhancing renal cysts and a 3.5 cm abdominal aortic aneurysm. A 6 mm right middle lobe pulmonary nodule and nodular density along the left major fissure of the region of the lingula were noted as previously described on the CT scan of the chest on 1/10/2018.  The nodules on 1/10/2018 had increased compared to 11/22/2016 with metastatic disease being in the differential.   MRI of the pelvis W & WO contrast on 5/8/2018 documented enhancing septations and a small soft tissue nodule within the inferior aspect of the left fatty-containing retroperitoneum positioned below the level of the left kidney. This abnormality is lateral to the psoas muscle (it abuts the psoas muscle, but does not invade the psoas muscle) and extending to the level of the left iliac crest.  An atypical lipomatouse tumor versus liposarcoma considered. Mild prostate enlargement is also noted. Mildly enlarged pelvic lymph nodes measuring 1.2 cm in short axis, reactive or metastatic are also noted. Magen Ovalle saw Dr. Kimberlee Mayen and Dr. Shannon Ahumada and reviewed Magen NarayananYamile' case with sarcoma tumor board at [facility] on 6/8/2018. 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 Unfortunately, a curative resection would require sacrifice of the left kidney leading to progression of CKD and possibly dialysis. Recommendation by Dr Michael Lr on a clinic visit note faxed 6/20/2018 was for continued observation with repeat CT scan in 3 months as there were no signs of high-grade component on the CT scans from April of 2018 or the MRI of the abdomen/pelvis from 5/8/2018. Consideration could be given for resection if there is documentation of clear growth or concern for dedifferentiation. Pathology from the station 7 lymph node on 1/25/18 was reviewed 6/19/18 at [facility] and noted to be predominantly bronchial cells with few lymphocytes and macrophages, negative for malignancy. Bronchial washings also negative for malignancy. Interpretation of the chest CT 6/13/18 at [facility] was consistent with multiple bilateral pulmonary nodules and mediastinal and hilar adenopathy, possibly representing sarcoidosis with malignancy not excluded. AAA partially imaged.     CT scans of the chest, abdomen/pelvis 8/3/18 at [facility] were grossly unchanged with surveillance to continue. Regarding the left submandibular gland, I spoke with Dr. Missael Verma on 6/27/2018. Dr. Claribel Pierce stated he has been monitoring the left submandibular gland for years, without gross change. He did not recommend excision, rather continued monitoring due to stability and concern for xerostomia. Lasix renogram and assessment by Dr. Shanda Paris was completed in in September, 2018. Dr. Cullen  reviewed imaging and confirmed the need for removal of the whole left kidney. Tao's case was again reviewed with the sarcoma tumor Board. Renogram demonstrated differential kidney function of 60% on the right. Dr. Shanda Paris estimated his final GFR may be 25-30, thus likely avoiding hemodialysis postoperatively. Left nephrectomy appeared more reasonable given recent stone disease and less function. Bret Timmons underwent resection of the left retroperitoneal mass with radical left nephrectomy on 10/31/18 by Dr. Barb Charlton at [facility]. Pathology as follows:  Liver, segment 3, excision: Benign liver parenchyma with calcified granuloma. Left retroperitoneal nodule, excision: Fibroadipose tissue with chronic lymphoplasmacytic and histiocytic inflammation. There were NO features of well-differentiated liposarcoma noted. Left periurethral tissue, excision: Fibroadipose tissue with chronic lymphoplasmacytic and histiocytic inflammation. Left lateral pararenal tissue, excision: Benign fibroadipose tissue with chronic lymphoplasmacytic and histiocytic inflammation. Left nephrectomy: Marked lymphoplasmacytic and histiocytic inflammation, focal fibrosis and changes of the obliterative phlebitis, most consistent with IgG4-related disease. Postoperative noncontrast abdominal/pelvic CT 11/23/18 at [facility] documented stranding of fat in the retroperitoneum, likely related to recent surgery.  Diverticulosis of the colon with inflammation of the fat around the mid to distal descending colon close to the prior surgical site present, possibly representing postoperative change. There was a tiny 2 mL stone in the distal right ureter with mild-to-moderate dilatation of the right ureter and mild dilatation of the right renal collecting system. Nonobstructing stones also noted in the right kidney as well as a 4 cm probable cyst in the upper pole right kidney. Surveillance non-contrast CT scans of the chest, abdomen and pelvis 2/22/19 at [facility] documented interval decrease in numerous mediastinal lymph nodes and decrease in size of multiple bilateral pulmonary nodules. There was no residual lymphadenopathy and near complete resolution of inflammatory changes around the distal descending colon. Previous right renal lesions were stable. Sharlene Rodriguez is followed by Dr. Chris Gallo, treated with prednisone regarding IgG4 systemic disease with known involvement of salivary glands, kidney, and retroperitoneum. He completed prednisone 40 mg daily ×1 month on 2/23/19, and 30 mg daily ×4 weeks on 3/25/19. Sharlene Rodriguez will take 20 mg ×4 weeks, then 10 mg. By June 2020, prednisone have been tapered down to 3 mg daily by Dr. Chris Gallo. CT scan of the chest without contrast on 10/3/2019 identified 2 discrete subcentimeter nodules in the RLL of the lungs were not present previously, one measuring 3 mm and the other 5 mm. Another 7 mm nodule in the R mL is unchanged. Follow-up recommended. CT scan of the abdomen and pelvis without contrast on 10/3/2019 at [facility] did not reveal evidence of recurrent disease or metastatic disease to the abdomen or pelvis. Prior left nephrectomy is noted    CT scan of the Chest without contrast on 6/25/2020 at [facility] reveals Interval increase in size of pulmonary nodules in the RIGHT upper lobe and LEFT lower lobe. Interval resolution of medial RIGHT lower lobe nodules. Essentially stable RIGHT middle lobe nodule. Continue follow up in 6 months is recommended.       CT scan of the Abdomen Pelvis without contrast on 6/25/2020  at [facility] reveals a Stable CT of the abdomen and pelvis with postsurgical changes in the left retroperitoneum. No evidence of recurrent or metastatic disease. 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 Atherosclerotic disease. Large hiatal hernia. Colonic diverticulosis without evidence of acute diverticulitis. Low-density lesions in the right kidney which are stable but  incompletely characterized on this exam.      Serology on 10/10/2019 revealed:  Iron- 45  TIBC- 255  Saturation%- 17.6%  Ferritin- 13.40  B2M- 2.8  Kappa light chains- 42.4  Lambda light chains- 24.8 with K/L ratio 1.71  IgG 945, IgA 176, IgM 141  IgG 4- 137  M-spike- not observed      Serology on 2/3/2020 revealed:  CMP with BUN 30, creatinine 1.90  Iron- 16  TIBC- 223  Saturation %- 7.2  Ferritin- 7.66  B2M- 2.8  Kappa light chains- 25.8  Lambda light chains- 14.7  K/L ratio- 1.76  IgG 872, IgA 156, IgM 128  IgG 4- 136  M-spike- not observed    Serology on 6/10/2020 revealed:  CMP with BUN 24, creatinine 1.8  B2M- 3.5  Kappa light chains- 134.3  Lambda light chains- 39.3  K/L ratio- 3.42  IgG 1417, IgA 205, IgM 244  IgG 4- 213  Iron- 30  TIBC- 270  Iron sat- 11%  Ferritin- 14.9  M-spike- not observed    Serology on 7/15/2020 revealed:  Kappa light chains- 123.6  Lambda light chains- 36.7  K/L ratio- 3.37  IgG 4- 250    24-Hr Urine electrophoresis on 7/17/2020 revealed:  Protein, 24H- 92  M-spike- not observed    By June 2020, prednisone have been tapered down to 3 mg daily by Dr. Jd Negron. With new findings on the CT scan of the chest and increased IgG4 level of 250, prednisone was increased again to 5 mg a day to be delivered along with Rituxan. Based on the new findings on the CT scan of the chest from 6/25/2020 and the elevated IgG4 of 250, Dr. Martin Avila initiated Rituxan on 7/28/2020.   Karen Omer will receive a second dose of Rituxan on 8/11/2020 with reevaluation every 6 months thereafter. CT chest without contrast on 12/16/2020 was compared to 6/25/2020 and documented:  No definite intrathoracic metastasis. Bilateral interlobular septal thickening with patchy consolidation in the LEFT lower lobe. Imaging appearance favors mild pulmonary edema and atelectasis. Correlate with clinical exam and laboratory analysis  7 mm RML lung nodule, stable  4 mm adjacent to above RML lung nodule, stable   5 mm LLL lung nodule, stable  5 mm LLL subpleural lung nodule, stable   Decreased size of right apical lung nodule which now appears more linear and scar like  No new or enlarging nodule  No enlarged axillary, supraclavicular, mediastinal or hilar lymph nodes  No acute osseous finding    CT abdomen and pelvis without contrast on 12/16/2020 documented:  No evidence of retroperitoneal mass or lymphadenopathy  Large sliding-type heart of hernia  3.9 cm low-density mass located laterally in the upper pole of left kidney, CT density suggest cyst  0.7 cm smaller low density nodule seen located medially in upper pole of left kidney, CT density suggest a cyst  2 mm calculus in lower pole of right kidney   Left nephrectomy without regional complications. Diverticulosis of the distal colon. No evidence for diverticulitis. Enlarged prostate. Fusiform aneurysmal dilatation of distal abdominal aorta. 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 Further follow-up may be obtained. Fat-containing small inguinal hernias bilaterally    CT ABDOMEN PELVIS WO CONTRAST at [facility]- 12/2/2021   Stable CT abdomen pelvis exam compared to 6/5/2020. Left nephrectomy changes. Similar hypodense right renal lesions favoring cysts. Punctate nonobstructing inferior right intrarenal stone. Stable tubular appearance of the pancreatic tail and proximal pancreatic body.  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 3.6 cm infrarenal abdominal aortic aneurysm with partial chronic dissection based on distribution of intimal calcification. Moderate fecal stasis. No bowel obstruction. Trace left pleural effusion with persistent pleural thickening with left basilar atelectasis. Old left-sided rib fractures. Moderate to large hiatal hernia. CT of the chest without contrast at [facility] on 4/14/2022: COMPARISON: 6/25/2020   6 mm RIGHT middle lobe pulmonary nodule, unchanged  Previous described RIGHT upper lobe pulmonary nodule has resolved. The previously described LEFT lower lobe pulmonary nodule is obscured by new LEFT lower lobe rounded atelectasis  New mild LEFT pleural thickening and trace effusion  Heavy coronary artery atherosclerotic calcification    CT of the abdomen and pelvis without contrast at [facility] on 4/14/2022:  3 mm nonobstructing RIGHT lower pole renal calculus  3.8 cm RIGHT renal cyst  4.9 cm Prostate   Moderate to large hiatal hernia  3.6 cm Infrarenal abdominal aortic aneurysm   13 mm aortocaval retroperitoneal lymph node  9 mm lymph node posterior to the IVC   10 mm enlarging RIGHT common iliac chain lymph nodes       Dr. Samantha Kothari is following and monitoring and treating his IgG4 related disease process. He  received Rituxan weekly x 4 Dr. Bharath Schmitz in January/Feb 2021. Summary of IgG4 levels as follows:  10/10/2019  IgG 4- 137  2/3/2020      IgG 4- 136  6/10/2020    IgG 4- 213  7/15/2020    IgG 4- 250  12/16/2020  IgG4- 264  4/8/2021      IgG4- 182  7/14/2021    IgG4- 250  3/30/2022    IgG4- 280  6/14/2022    IgG4- 364  10/19/2022  IgG4-426  1/3/2023      IgG4-224              TREATMENT SUMMARY:  Resection of left retroperitoneal mass and radical left nephrectomy by Dr. Rao Cavazos at [facility] on 10/31/2018. Pathology revealed IgG4 related disease.   Prednisone-management per Dr. Reva Cortez per Dr. Ayad Beckford Peter Born              Allergies:  Lyrica [pregabalin], Penicillins, and Sulfa antibiotics    Medicines:  Current Outpatient Medications   Medication Sig Dispense Refill    sodium bicarbonate 325 MG tablet in the morning and at bedtime      Ergocalciferol (VITAMIN D2 PO) Take by mouth      triamcinolone (KENALOG) 0.1 % cream FREDIS EXT TO BOTH ARMS AND LEGS BID PRN FOR RASH      Calcium-Magnesium-Vitamin D (CALCIUM 1200+D3 PO) Take by mouth       predniSONE (DELTASONE) 5 MG tablet Take 3 mg by mouth daily      HYDROcodone-acetaminophen (NORCO) 7.5-325 MG per tablet Take 1 tablet by mouth every 6 hours as needed for Pain.      vitamin D (CHOLECALCIFEROL) 1000 UNIT TABS tablet Take 1,000 Units by mouth daily      amLODIPine (NORVASC) 5 MG tablet Take 5 mg by mouth daily      clonazePAM (KLONOPIN) 2 MG tablet Take 2 mg by mouth nightly as needed. atorvastatin (LIPITOR) 10 MG tablet Take 10 mg by mouth daily      cetirizine (ZYRTEC) 5 MG tablet Take 5 mg by mouth daily      aspirin 81 MG tablet Take 81 mg by mouth daily. pantoprazole (PROTONIX) 40 MG tablet Take 1 tablet by mouth 2 times daily. 60 tablet 11     No current facility-administered medications for this visit.        Past Medical History:      Diagnosis Date    Allergic rhinitis     Anemia, unspecified     Anxiety     Asthma     BPH (benign prostatic hyperplasia)     Dr Levi Marshall    CAD (coronary artery disease)     Dr Smith Form [facility])     skin-non-melanoma    Carotid artery stenosis     Ramirez Celis, APRN    Chronic GERD 6/7/2017    Chronic kidney disease, stage III (moderate) (HCC)     Colon polyps     Cough     Decreased white blood cell count, unspecified     Disease of immune system ([facility] [address]     IGG 4 Auto Immune Disease    Dysphagia 6/7/2017     Updating Deprecated Diagnoses    Enlarged lymph nodes, unspecified     Esophageal dysphagia 7/13/2017    Esophageal stricture     GERD (gastroesophageal reflux disease)     Hemorrhoids 8/13/2012    HIGH CHOLESTEROL     History of colon polyps 8/13/2012    History of esophageal stricture 5/24/2016    Hypertension     IgG monoclonal gammopathy     Insomnia     Iron deficiency anemia 6/7/2017    Iron deficiency anemia due to chronic blood loss     Leukocytosis     Mediastinal lymphadenopathy     Monoclonal gammopathy     Monocytosis (symptomatic)     Night sweats     Normocytic normochromic anemia     Osteoarthritis     Other iron deficiency anemias     GI blood loss, 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 anemias     Peptic ulcer disease     Pill dysphagia 5/24/2016    Restless leg syndrome     Rheumatoid arthritis (HCC)     S/P coronary artery stent placement 5/24/2016    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 @ [facility] 2018    Serum aquaporin 4 IgG antibody positive     Skin cancer     on left ear lobe    Thrombocytopenia, unspecified (HCC)     Venous insufficiency     Weight loss, abnormal         Past Surgical History:      Procedure Laterality Date    COLONOSCOPY  09/15/2009    Dr Wray-Negative, 3 yr recall    COLONOSCOPY  06/24/2003    Dr Wray-HP, 3 yr recall    COLONOSCOPY  11/12/2012    Dr Wray-Minimal diverticula, no polyps (5yr)    COLONOSCOPY  07/27/2006    Dr Wray-HP x 1, 3 yr recall    COLONOSCOPY N/A 11/28/2022    Dr THOMAS Olivia-Diverticular disease, prn (age)    CORONARY ANGIOPLASTY WITH STENT PLACEMENT  2016    2 stents    FINE NEEDLE ASPIRATION Right 12/23/2011    Neck mass-Dr Verma    VA COLSC FLEXIBLE W/CONTROL BLEEDING ANY METHOD N/A 07/13/2017    Dr Osullivan-w/control of hemorrage, gold probe cautery of avm-internal hemorrhoids-5 yr recall    PTCA  3/10/16 ford    rca    SALIVARY GLAND SURGERY Right 03/23/2012    Dr Verma-Excision of right submandibular gland    SKIN CANCER EXCISION      TONSILLECTOMY      TOTAL NEPHRECTOMY Left 2018 [facility]    UPPER GASTROINTESTINAL ENDOSCOPY  07/23/2009      Kamala-Minimal gastritis, sm hiatal hernia, no active ulcers, urea neg    UPPER GASTROINTESTINAL ENDOSCOPY  2006    Dr Lem Brittle, non obst Schatzki's ring, lg hiatal hernia, urea neg    UPPER GASTROINTESTINAL ENDOSCOPY  2017    Dr Garcia/control of hemorrhage with gold probe cauterization of AVM-cecal avm, small bowel avm, hiatal hernia, margarito's erosions    UPPER GASTROINTESTINAL ENDOSCOPY  2017    Dr Garcia/control of hemorrhage with gold probe cauterization of AVM-cecal avm, small bowel avm, hiatal hernia, margarito's erosions    UPPER GASTROINTESTINAL ENDOSCOPY N/A 2022    Dr Jim Batesw/[id number], 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, gastritis, few small benign-appearing polyps, consistent with fundic gland polyps, 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     VASECTOMY REVERSAL          Family History:      Problem Relation Age of Onset    Hypertension Mother     High Cholesterol Mother     Heart Failure Mother         blockages, heart attack    Other Mother         pneumonia    Heart Failure Father         blockages, heart attack    Heart Failure Brother         blockages, heart attack    High Cholesterol Brother     Stroke Brother     Heart Disease Brother     Hypertension Brother     Other Other         mother , age 80 fall    Colon Cancer Neg Hx     Colon Polyps Neg Hx     Esophageal Cancer Neg Hx     Liver Cancer Neg Hx     Liver Disease Neg Hx     Rectal Cancer Neg Hx     Stomach Cancer Neg Hx         Social History  Social History     Tobacco Use    Smoking status: Former     Packs/day: 1.00     Years: 20.00     Pack years: 20.00     Types: Cigarettes     Quit date: 1986     Years since quittin.8    Smokeless tobacco: Never   Vaping Use    Vaping Use: Never used   Substance Use Topics    Alcohol use: No    Drug use: No            Objective:  Vital Signs: Blood pressure 130/80, pulse 64, height 5' 9\" (1.753 m), weight 163 lb (73.9 kg), SpO2 95 %. Labs:  BMP:   Recent Labs     23  0930      K 4.3   *   CO2 25   BUN 22*   CREATININE 1.6*   CALCIUM 10.8*     CBC:   No results for input(s): WBC, HGB, HCT, MCV, PLT in the last 72 hours. PT/INR: No results for input(s): PROTIME, INR in the last 72 hours. APTT: No results for input(s): APTT in the last 72 hours. Ionized Calcium:No results for input(s): IONCA in the last 72 hours. Magnesium:No results for input(s): MG in the last 72 hours. Phosphorus:No results for input(s): PHOS in the last 72 hours. Hepatic:   Recent Labs     23  0930   ALKPHOS 73   ALT 13*   AST 21   PROT 6.7   BILITOT 0.6   LABALBU 3.7       Cultures:   No results for input(s): CULTURE in the last 72 hours. ASSESSMENT AND PLAN:    Kristi Cao is a 80year old  gentleman managed with primary and secondary diagnoses as outlined:  Resection of left retroperitoneal mass and radical left nephrectomy by Dr. Cheryle Glow Dr. Bernett Lot at [facility] on 10/31/2018. 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 Frankie Phelps    His ex-wife  in 2021, and their son  in 2021. He is still grieving. Mr. Ghazal Gann is on prednisone 1 mg daily and treatment of joint pains pleuritic-like chest discomfort, rib pain and feeling kind of rough, primarily fatigue.     Mr. Ghazal Gann receives weekly Rituxan x 4 periodically for exacerbations of his IgG4 related disease directed by Dr. Kisha Stearns. Mr. Jorge L Park received 4 weekly doses of Rituxan between 8/24/2021 and 9/17/2021    Tsering Dominguez is still very disappointed that his 78-year-old wife of 24 years marriage left him and cleaned out all the furniture in the house with a moving company while he was at [employer] in March 2022. He is slowly getting his emotions under much better control. His wife had left him and got a restraining order and spread rumors around the [employer] that he was going to be harmful. This was all been taken to the authorities and addressed accordingly. He is going to counseling. He was told that maybe he should sell his condo and buy a new place. He accepted a down payment on his condo the next day and is still awaiting finishing up some other condominiums so he can buy another and have a new place to start off living. Unfortunately, the gentleman who put a down payment on the condominium, did this on the condition that he can sell his house in [address] [place] [address]. He has still not been able to sell his house. He finished an 18-week course of divorce counseling. His outlook is improved, he is smiling and able to joke around a little bit today as opposed to crying all through his previous visits. At this point he is able to find humor in the fact that his ex-wife is quite upset that he did not die on the operating table in [place] when he had a large tumor removed. She actually came out and told him that she was banking on his Social Security check that she will now never receive. It is good to see him smile again. Tao's weight is stable at 163 pounds. Tsering Dominguez received weekly Rituxan at the office of Dr. Kisha Stearns between mid December 2022 and mid January 2023 for IgG4 related diseases. He is currently on prednisone 5 mg daily. Tsering Dominguez returns today in follow-up and monitoring.   /80   Pulse 64   Ht 5' 9\" (1.753 m)   Wt 163 lb (73.9 kg)   SpO2 95%   BMI 24.07 kg/m²       Physical examination today, 2/1/2023 does not reveal evidence of palpable peripheral lymphadenopathy. Lungs have scattered rales bilaterally in upper and lower lung fields. He does complain on discomfort on deep inspiration, he may have a pleural friction rub. The heart is regular the abdomen is soft and benign  He does have a cushingoid appearance of the face due to chronic prednisone/steroid use on a slow taper presently    CBC today 2/1/2023  reveals a WBC of  4.57 Hgb is 7.5 with an MCV of 72.3 and platelet count of 538,983.     Serology on 10/10/2019 revealed:  Iron- 45  TIBC- 255  Saturation%- 17.6%  Ferritin- 13.40  B2M- 2.8  Kappa light chains- 42.4  Lambda light chains- 24.8 with K/L ratio 1.71  IgG 945, IgA 176, IgM 141  IgG 4- 137  M-spike- not observed      Serology on 2/3/2020 revealed:  CMP with BUN 30, creatinine 1.90  Iron- 16  TIBC- 223  Saturation %- 7.2  Ferritin- 7.66  B2M- 2.8  Kappa light chains- 25.8  Lambda light chains- 14.7  K/L ratio- 1.76  IgG 872, IgA 156, IgM 128  IgG 4- 136  M-spike- not observed    Serology on 6/10/2020 revealed:  CMP with BUN 24, creatinine 1.8  B2M- 3.5  Kappa light chains- 134.3  Lambda light chains- 39.3  K/L ratio- 3.42  IgG 1417, IgA 205, IgM 244  IgG 4- 213  Iron- 30  TIBC- 270  Iron sat- 11%  Ferritin- 14.9  M-spike- not observed    Serology on 7/15/2020 revealed:  Kappa light chains- 123.6  Lambda light chains- 36.7  K/L ratio- 3.37  IgG 4- 250    Dr. Maria C Ortiz began treatment with Rituxan on 7/28/2020.    24-Hr Urine electrophoresis on 7/17/2020 revealed:  Protein, 24H- 92  M-spike- not observed    Serology on 12/16/2020 revealed:  CMP with creatinine 1.91  B2M- 3.3  Kappa light chains- 129.5  Lambda light chains- 45.0  K/L ratio- 2.88  IgG 1580, IgA 216, IgM 176  M-spike- not observed  IgG4- 264    Serology 4- revealed:  B2M-4.0  KLC-109.9  LLC- 37.6  K/L ratio - 2.92  IgG 1228  IgA- 187  IgM-147  Mspike-not observed  TIBC 279  Iron level   50  Iron saturation    18  Ferritin     76    Serology 9/20/2021 revealed:  B2M-3.9  KLC-213.6  LLC- 55.2  K/L ratio - 3.87  IgG 2056  IgA- 238  IgM-230  Mspike-not observed    Serology 3/30/2022 revealed:  B2M-3.9  KLC-10.67  LLC- 3.74  K/L ratio - 2.85  IgG 1433  IgA- 172  IgM-220    Folate-14.0    Serology 6/15/2022 revealed:  B2M-4.4  KLC-20.03  LLC- 4.95  K/L ratio - 4.05  IgG 1743  IgA- 195  IgM-245      Serology 10/19/2022 revealed:  B2M-4.7  KLC-20.03  LLC- 4.95  K/L ratio - 4.05  IgG 1863  IgA- 206  IgM-245        Summary of IgG4 levels as follows:  10/10/2019  IgG 4- 137  2/3/2020      IgG 4- 136  6/10/2020    IgG 4- 213  7/15/2020    IgG 4- 250  12/16/2020  IgG4- 264  4/8/2021      IgG4- 182  7/14/2021    IgG4- 250  3/30/2022    IgG4- 280  6/14/2022    IgG4- 364  10/19/2022  IgG4-426  1/3/2023      IgG4-224    Serology 1/3/2023 revealed: IgG 1104    IgG4 subclass 224    Dr. Shannan Valle is co-managing,  monitoring and treating his IgG4 related disease process and adjusting prednisone. Apollo Caruso received weekly Rituxan at the office of Dr. Loenor Piña between mid December 2022 and mid January 2023 for IgG4 related diseases. He is currently on prednisone 5 mg daily. No new intervention warranted from my standpoint. Conservative continued monitoring follow-up recommended. CBC has a WBC of 4.57, hemoglobin is 7.5 with hematocrit 25.3 with an MCV down at 72.3 and platelet count 307,639. EGD and colonoscopy were performed on 11/28/2022 by Dr. Renard Raphael. Apollo Caruso is having further dysphagia in addition to the worsening anemia with microcytic indices. He wants to follow back up with Dr. Ashley Velásquez. A follow-up appointment is put in for 2/6/2023 for reevaluation. In the meantime, upper GI barium swallow and small bowel follow-through are requested for evaluation of these persistent symptoms    Serological work-up is also requested, see orders below.     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 #3  IgG Kappa MGUS, stable   Serology will be repeated prior to his return in 4 months. Serology on 12/16/2020 revealed:  CMP with creatinine 1.91  B2M- 3.3  Kappa light chains- 129.5  Lambda light chains- 45.0  K/L ratio- 2.88  IgG 1580, IgA 216, IgM 176  M-spike- not observed  IgG4- 264    Serology-Dr. Marco A Chatman-4/8/2021  IgG4 - 182    Serology 4- revealed:  B2M-4.0  KLC-109.9  LLC- 37.6  K/L ratio - 2.92  IgG 1228  IgA- 187  IgM-147  Mspike-not observed  TIBC 279  Iron level   50  Iron saturation    18  Ferritin     76    Serology 9/20/2021 revealed:  B2M-3.9  KLC-213.6  LLC- 55.2  K/L ratio - 3.87  IgG 2056  IgA- 238  IgM-230  Mspike-not observed    Serology 3/30/2022 revealed:  B2M-3.9  KLC-10.67  LLC- 3.74  K/L ratio - 2.85  IgG 1433  IgA- 172  IgM-220    Folate-14.0    Serology 10/19/2022 revealed:  B2M-4.7  KLC-20.03  LLC- 4.95  K/L ratio - 4.05  IgG 1863  IgA- 206  IgM-245      Serology 1/3/2023 revealed: IgG 1104    IgG4 subclass 224      No intervention warranted at this juncture. Serology will be repeated prior to his return, see orders below    #4   Karen Omer was found to have right leg swelling that was documented as RLE DVT. He was placed on Eliquis 5 mg twice a day and was to have had a followup rescanning 3 months later. This is being managed by Dr. Ninfa Lee. #5  Karen Omer had issues of right hip pain. This is being managed by orthopedics at the orthopedic clinic with a recent right hip injection. He has had imaging of the area. I am suspicious he may have avascular necrosis associated with the chronic prednisone use. This is being addressed at the orthopedic clinic and with Dr. Martin Avila.   He also has low back pain and they are not sure whether his pain is from his low back or from his hip. This continues to be addressed    #6  Skin rash  Itching on arms and torso, Dr. Henny Klein addressing this issue. I have encouraged again to continue follow-up with Dr. Estrella Avila and Dr. Inocencia Bueno regarding his skin rash. #7  Dr. Ulises Clifton was to have addressed a tear in the retina, however [place] did not follow through. #8  GI cancer screening  Colonoscopy performed on 11/28/2022 by Dr. Juventino Nugent  no recall due to age    EGD by Dr. Juventino Nuegnt at [facility] on 11/28/2022  FINAL DIAGNOSIS:   Stomach, gastric biopsy:   1. Benign gastric mucosa with minimal chronic inflammation. 2.  No Helicobacter pylori organisms identified by immunohistochemical stain. #9  New symptoms of arthritis fatigue dyspnea on exertion    I will defer the management of arthritis to Dr. Saran Bull, he is on prednisone 1 mg a day at this time. XR CHEST (2 VW) 4/19/2021 :  COPD and chronic interstitial change. There is no acute cardiopulmonary process. #10  Immunizations:  Immunization History   Administered Date(s) Administered    COVID-19, PFIZER GRAY top, DO NOT Dilute, (age 15 y+), IM, 30 mcg/0.3 mL 04/28/2022    COVID-19, PFIZER PURPLE top, DILUTE for use, (age 15 y+), 30mcg/0.3mL 02/20/2021, 03/13/2021    Influenza Whole 10/24/2015    Influenza, High Dose (Fluzone 65 yrs and older) 09/17/2018    Tdap (Boostrix, Adacel) 03/26/2019             [place] was seen today for follow-up. Diagnoses and all orders for this visit: IgG monoclonal gammopathy  -     Comprehensive Metabolic Panel; Future  -     Cancel: Beta 2 Microglobulin, Serum; Future  -     MONOCLONAL PROTEIN AND FLC, SERUM; Future  -     CBC with Auto Differential; Future    Anemia, unspecified type  -     Devika Thomas MD, Gastroenterology, [address];  Future  -     CBC with Auto Differential; Standing    Stage 3 chronic kidney disease, unspecified whether stage 3a or 3b CKD ([facility] [address]    Health care maintenance    Iron deficiency  -     Cancel: Iron and TIBC; Future  -     Cancel: Ferritin; Future  -     Cancel: Vitamin B12; Future  -     Cancel: Folate; Future  -     TSH with Reflex to FT4; Future  -     Ferritin; Future  -     Folate; Future  -     Iron and TIBC; Future  -     Vitamin B12; Future  -     Leonor Yao MD, Gastroenterology, [facility] UGI W SMALL BOWEL; Future    Abnormal results of thyroid function studies   -     TSH with Reflex to FT4; Future    Other dysphagia  -     Nadeen Enciso MD, Gastroenterology, [place]  -     [place] UGI W SMALL BOWEL; Future    Other orders  -     Cancel: Monoclonal Protein Detection Quant.  Ser  -     Cancel: Beta-2 Glycoprotein Antibodies  -     MONOCLONAL PROTEIN AND FLC, SERUM  -     Beta 2 Microglobulin, Serum           Orders Placed This Encounter   Procedures    FL UGI W SMALL BOWEL     Standing Status:   Future     Standing Expiration Date:   2/1/2024     Order Specific Question:   Reason for exam:     Answer:   anemia, low MCV, dysphagia    Comprehensive Metabolic Panel     Standing Status:   Future     Number of Occurrences:   1     Standing Expiration Date:   2/1/2024    MONOCLONAL PROTEIN AND FLC, SERUM     Standing Status:   Future     Number of Occurrences:   1     Standing Expiration Date:   2/1/2024    CBC with Auto Differential     Standing Status:   Future     Standing Expiration Date:   2/1/2024    TSH with Reflex to FT4     Standing Status:   Future     Number of Occurrences:   1     Standing Expiration Date:   2/1/2024    Ferritin     Standing Status:   Future     Number of Occurrences:   1     Standing Expiration Date:   2/1/2024    Folate     Standing Status:   Future     Number of Occurrences:   1     Standing Expiration Date:   2/1/2024    Iron and TIBC     Standing Status:   Future     Number of Occurrences:   1     Standing Expiration Date:   2/1/2024     Order Specific Question:   Is Patient Fasting? Answer:   no     Order Specific Question:   No of Hours? Answer:   na    Vitamin B12     Standing Status:   Future     Number of Occurrences:   1     Standing Expiration Date:   2/1/2024    MONOCLONAL PROTEIN AND FLC, SERUM    CBC with Auto Differential     Standing Status:   Standing     Number of Occurrences:   12     Standing Expiration Date:   2/1/2024    Beta 2 Microglobulin, Serum    Davy Wilson MD, Gastroenterology, [place]     Referral Priority:   Routine     Referral Type:   Eval and Treat     Referral Reason:   Specialty Services Required     Referred to Provider:   Alicia Enriquez MD     Requested Specialty:   Gastroenterology     Number of Visits Requested:   1             Return in about 1 month (around 3/1/2023) for follow-up with .

## 2023-02-01 ENCOUNTER — APPOINTMENT (OUTPATIENT)
Dept: INFUSION THERAPY | Age: 83
End: 2023-02-01
Payer: MEDICARE

## 2023-02-01 ENCOUNTER — OFFICE VISIT (OUTPATIENT)
Dept: HEMATOLOGY | Age: 83
End: 2023-02-01
Payer: MEDICARE

## 2023-02-01 ENCOUNTER — HOSPITAL ENCOUNTER (OUTPATIENT)
Dept: INFUSION THERAPY | Age: 83
Discharge: HOME | End: 2023-02-01
Payer: MEDICARE

## 2023-02-01 VITALS
SYSTOLIC BLOOD PRESSURE: 130 MMHG | WEIGHT: 163 LBS | BODY MASS INDEX: 24.14 KG/M2 | DIASTOLIC BLOOD PRESSURE: 80 MMHG | HEIGHT: 69 IN | HEART RATE: 64 BPM | OXYGEN SATURATION: 95 %

## 2023-02-01 DIAGNOSIS — R94.6 ABNORMAL RESULTS OF THYROID FUNCTION STUDIES: ICD-10-CM

## 2023-02-01 DIAGNOSIS — D47.2 IGG MONOCLONAL GAMMOPATHY: ICD-10-CM

## 2023-02-01 DIAGNOSIS — R13.19 OTHER DYSPHAGIA: ICD-10-CM

## 2023-02-01 DIAGNOSIS — D47.2 IGG MONOCLONAL GAMMOPATHY: Primary | ICD-10-CM

## 2023-02-01 DIAGNOSIS — N18.30 STAGE 3 CHRONIC KIDNEY DISEASE, UNSPECIFIED WHETHER STAGE 3A OR 3B CKD (HCC): ICD-10-CM

## 2023-02-01 DIAGNOSIS — E61.1 IRON DEFICIENCY: ICD-10-CM

## 2023-02-01 DIAGNOSIS — Z00.00 HEALTH CARE MAINTENANCE: ICD-10-CM

## 2023-02-01 DIAGNOSIS — D64.9 ANEMIA, UNSPECIFIED TYPE: ICD-10-CM

## 2023-02-01 LAB
ALBUMIN SERPL-MCNC: 3.7 G/DL (ref 3.5–5.2)
ALP BLD-CCNC: 73 U/L (ref 40–130)
ALT SERPL-CCNC: 13 U/L (ref 21–72)
ANION GAP SERPL CALCULATED.3IONS-SCNC: 4 MMOL/L (ref 7–19)
AST SERPL-CCNC: 21 U/L (ref 17–59)
BILIRUB SERPL-MCNC: 0.6 MG/DL (ref 0.2–1.3)
BUN BLDV-MCNC: 22 MG/DL (ref 9–20)
CALCIUM SERPL-MCNC: 10.8 MG/DL (ref 8.4–10.2)
CHLORIDE BLD-SCNC: 112 MMOL/L (ref 98–111)
CO2: 25 MMOL/L (ref 22–29)
CREAT SERPL-MCNC: 1.6 MG/DL (ref 0.6–1.2)
FERRITIN: 11.7 NG/ML (ref 30–400)
FOLATE: 17.8 NG/ML (ref 4.5–32.2)
GFR SERPL CREATININE-BSD FRML MDRD: 43 ML/MIN/{1.73_M2}
GLOBULIN: 3 G/DL
GLUCOSE BLD-MCNC: 90 MG/DL (ref 74–106)
IRON SATURATION: 5 % (ref 14–50)
IRON: 17 UG/DL (ref 59–158)
POTASSIUM SERPL-SCNC: 4.3 MMOL/L (ref 3.5–5.1)
SODIUM BLD-SCNC: 141 MMOL/L (ref 137–145)
TOTAL IRON BINDING CAPACITY: 360 UG/DL (ref 250–400)
TOTAL PROTEIN: 6.7 G/DL (ref 6.3–8.2)
VITAMIN B-12: 271 PG/ML (ref 211–946)

## 2023-02-01 PROCEDURE — 85025 COMPLETE CBC W/AUTO DIFF WBC: CPT

## 2023-02-01 PROCEDURE — G8427 DOCREV CUR MEDS BY ELIG CLIN: HCPCS | Performed by: INTERNAL MEDICINE

## 2023-02-01 PROCEDURE — 80053 COMPREHEN METABOLIC PANEL: CPT

## 2023-02-01 PROCEDURE — 3075F SYST BP GE 130 - 139MM HG: CPT | Performed by: INTERNAL MEDICINE

## 2023-02-01 PROCEDURE — 1123F ACP DISCUSS/DSCN MKR DOCD: CPT | Performed by: INTERNAL MEDICINE

## 2023-02-01 PROCEDURE — G8484 FLU IMMUNIZE NO ADMIN: HCPCS | Performed by: INTERNAL MEDICINE

## 2023-02-01 PROCEDURE — 99215 OFFICE O/P EST HI 40 MIN: CPT | Performed by: INTERNAL MEDICINE

## 2023-02-01 PROCEDURE — 1036F TOBACCO NON-USER: CPT | Performed by: INTERNAL MEDICINE

## 2023-02-01 PROCEDURE — G8420 CALC BMI NORM PARAMETERS: HCPCS | Performed by: INTERNAL MEDICINE

## 2023-02-01 PROCEDURE — 36415 COLL VENOUS BLD VENIPUNCTURE: CPT

## 2023-02-01 PROCEDURE — 99212 OFFICE O/P EST SF 10 MIN: CPT

## 2023-02-01 PROCEDURE — 36415 COLL VENOUS BLD VENIPUNCTURE: CPT | Performed by: INTERNAL MEDICINE

## 2023-02-01 PROCEDURE — 3079F DIAST BP 80-89 MM HG: CPT | Performed by: INTERNAL MEDICINE

## 2023-02-02 LAB
BASOPHILS ABSOLUTE: 0.03 K/UL (ref 0.01–0.08)
BASOPHILS RELATIVE PERCENT: 0.7 % (ref 0.1–1.2)
BETA-2 MICROGLOBULIN: 3.8 MG/L
EOSINOPHILS ABSOLUTE: 0.19 K/UL (ref 0.04–0.54)
EOSINOPHILS RELATIVE PERCENT: 4.2 % (ref 0.7–7)
HCT VFR BLD CALC: 25.3 % (ref 40.1–51)
HEMOGLOBIN: 7.5 G/DL (ref 13.7–17.5)
LYMPHOCYTES ABSOLUTE: 0.29 K/UL (ref 1.18–3.74)
LYMPHOCYTES RELATIVE PERCENT: 6.3 % (ref 19.3–53.1)
MCH RBC QN AUTO: 21.4 PG (ref 25.7–32.2)
MCHC RBC AUTO-ENTMCNC: 29.6 G/DL (ref 32.3–36.5)
MCV RBC AUTO: 72.3 FL (ref 79–92.2)
MONOCYTES ABSOLUTE: 0.69 K/UL (ref 0.24–0.82)
MONOCYTES RELATIVE PERCENT: 15.1 % (ref 4.7–12.5)
NEUTROPHILS ABSOLUTE: 3.36 K/UL (ref 1.56–6.13)
NEUTROPHILS RELATIVE PERCENT: 73.5 % (ref 34–71.1)
PDW BLD-RTO: 43 % (ref 11.6–14.4)
PLATELET # BLD: 195 K/UL (ref 163–337)
PMV BLD AUTO: 10.3 FL (ref 7.4–10.4)
RBC # BLD: 3.5 M/UL (ref 4.63–6.08)
WBC # BLD: 4.57 K/UL (ref 4.23–9.07)

## 2023-02-03 PROBLEM — D50.9 IRON DEFICIENCY ANEMIA: Status: ACTIVE | Noted: 2023-02-03

## 2023-02-03 LAB
+IMM: ABNORMAL
ALBUMIN SERPL-MCNC: 3.61 G/DL (ref 3.75–5.01)
ALPHA-1-GLOBULIN: 0.27 G/DL (ref 0.19–0.46)
ALPHA-2-GLOBULIN: 0.67 G/DL (ref 0.48–1.05)
BETA GLOBULIN: 0.69 G/DL (ref 0.48–1.1)
GAMMA GLOBULIN: 0.97 G/DL (ref 0.62–1.51)
IGA: 132 MG/DL (ref 68–408)
IGG: 688 MG/DL (ref 768–1632)
IGM: 98 MG/DL (ref 35–263)
KAPPA FREE LIGHT CHAINS QNT: 52.4 MG/L (ref 3.3–19.4)
KAPPA/LAMBDA FREE LIGHT CHAIN RATIO: 2.39 (ref 0.26–1.65)
LAMBDA FREE LIGHT CHAINS QNT: 21.93 MG/L (ref 5.71–26.3)
SPE/IFE INTERPRETATION: ABNORMAL
TOTAL PROTEIN: 6.2 G/DL (ref 6.3–8.2)

## 2023-02-03 NOTE — PROGRESS NOTES
Spoke with Mr Jorge L Park regarding recent labs revealing iron deficiency. He understands to expect call to arrange iron infusions (Injectafer weekly x2) pening insurance authorization.

## 2023-02-05 ENCOUNTER — HOSPITAL ENCOUNTER (EMERGENCY)
Age: 83
Discharge: HOME | End: 2023-02-05
Payer: MEDICARE

## 2023-02-05 ENCOUNTER — APPOINTMENT (OUTPATIENT)
Dept: CT IMAGING | Age: 83
End: 2023-02-05
Payer: MEDICARE

## 2023-02-05 VITALS
OXYGEN SATURATION: 97 % | SYSTOLIC BLOOD PRESSURE: 152 MMHG | TEMPERATURE: 97.7 F | RESPIRATION RATE: 18 BRPM | DIASTOLIC BLOOD PRESSURE: 98 MMHG | HEART RATE: 65 BPM

## 2023-02-05 DIAGNOSIS — M25.551 RIGHT HIP PAIN: Primary | ICD-10-CM
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
## 2023-02-05 PROCEDURE — 72192 CT PELVIS W/O DYE: CPT

## 2023-02-05 PROCEDURE — 99284 EMERGENCY DEPT VISIT MOD MDM: CPT

## 2023-02-05 PROCEDURE — 72192 CT PELVIS W/O DYE: CPT | Performed by: RADIOLOGY

## 2023-02-05 RX ORDER — HYDROCODONE BITARTRATE AND ACETAMINOPHEN 10; 325 MG/1; MG/1
1 TABLET ORAL EVERY 8 HOURS PRN
Qty: 9 TABLET | Refills: 0 | Status: SHIPPED | OUTPATIENT
Start: 2023-02-05 | End: 2023-02-08

## 2023-02-05 ASSESSMENT — ENCOUNTER SYMPTOMS
COUGH: 0
VOMITING: 0
SHORTNESS OF BREATH: 0
BACK PAIN: 0
NAUSEA: 0
ABDOMINAL PAIN: 0
DIARRHEA: 0

## 2023-02-05 NOTE — ED PROVIDER NOTES
[facility] EMERGENCY DEPT  EMERGENCY DEPARTMENT ENCOUNTER      Pt Name: Anthony Chiu  MRN: [id number]  [place] 1940  Date of evaluation: 2/5/2023  Provider: YVON Atwood 0735       Chief Complaint   Patient presents with    Fall     Patient reports that he had a fall from a standing position about 3 days ago, States that he was taking his dog out and slipped on the ice. Complains of right hip pain that radiates down right leg         HISTORY OF PRESENT ILLNESS   (Location/Symptom, Timing/Onset, Context/Setting, Quality, Duration, Modifying Factors, Severity)  Note limiting factors. Anthony Chiu is a 80 y.o. male who presents to the emergency department with concern for right hip pain that radiates toward right groin and down leg toward calf. It started 3 days ago after he fell, slipping on ice while walking his cockapoo dog. It did not initially hurt, but has become more uncomfortable over past few days. HPI    Nursing Notes were reviewed. REVIEW OF SYSTEMS    (2-9 systems for level 4, 10 or more for level 5)     Review of Systems   Constitutional:  Negative for chills and fever. HENT:  Negative for congestion. Respiratory:  Negative for cough and shortness of breath. Cardiovascular:  Negative for chest pain and palpitations. Gastrointestinal:  Negative for abdominal pain, diarrhea, nausea and vomiting. Genitourinary:  Negative for dysuria, flank pain, frequency and urgency. Musculoskeletal:  Negative for back pain and neck pain. Skin:  Negative for wound. Neurological:  Negative for dizziness, syncope, weakness, light-headedness and headaches. Except as noted above the remainder of the review of systems was reviewed and negative.        PAST MEDICAL HISTORY     Past Medical History:   Diagnosis Date    Allergic rhinitis     Anemia, unspecified     Anxiety     Asthma     BPH (benign prostatic hyperplasia)     Dr Tone Camejo    CAD (coronary artery disease)      K Reeves    Cancer [facility])     skin-non-melanoma    Carotid artery stenosis     Liz Castellano, APRN    Chronic GERD 6/7/2017    Chronic kidney disease, stage III (moderate) (HCC)     Colon polyps     Cough     Decreased white blood cell count, unspecified     Disease of immune system ([facility] [address]     IGG 4 Auto Immune Disease    Dysphagia 6/7/2017     Updating Deprecated Diagnoses    Enlarged lymph nodes, unspecified     Esophageal dysphagia 7/13/2017    Esophageal stricture     GERD (gastroesophageal reflux disease)     Hemorrhoids 8/13/2012    HIGH CHOLESTEROL     History of colon polyps 8/13/2012    History of esophageal stricture 5/24/2016    Hypertension     IgG monoclonal gammopathy     Insomnia     Iron deficiency anemia 6/7/2017    Iron deficiency anemia 2/3/2023    Iron deficiency anemia due to chronic blood loss     Leukocytosis     Mediastinal lymphadenopathy     Monoclonal gammopathy     Monocytosis (symptomatic)     Night sweats     Normocytic normochromic anemia     Osteoarthritis     Other iron deficiency anemias     GI blood loss, 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 anemias     Peptic ulcer disease     Pill dysphagia 5/24/2016    Restless leg syndrome     Rheumatoid arthritis (HCC)     S/P coronary artery stent placement 5/24/2016    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[facility] [address]     dx @ [facility] 2018    Serum aquaporin 4 IgG antibody positive     Skin cancer     on left ear lobe    Thrombocytopenia, unspecified (HCC)     Venous insufficiency     Weight loss, abnormal          SURGICAL HISTORY       Past Surgical History:   Procedure Laterality Date    COLONOSCOPY  09/15/2009    Dr Casper Damon, 3 yr recall    COLONOSCOPY  06/24/2003    Dr Jasmin Muhammad, 3 yr recall    COLONOSCOPY  11/12/2012    Dr Dona Brown diverticula, no polyps (5yr)    COLONOSCOPY  07/27/2006    Dr Jasmin Muhammad x 1, 3 yr recall    COLONOSCOPY N/A 11/28/2022 Dr THOMAS Olivia-Diverticular disease, prn (age)    CORONARY ANGIOPLASTY WITH STENT PLACEMENT  2016    2 stents    FINE NEEDLE ASPIRATION Right 12/23/2011    Neck mass-Dr Verma    DE COLSC FLEXIBLE W/CONTROL BLEEDING ANY METHOD N/A 07/13/2017    Dr Osullivan-w/control of hemorrage, gold probe cautery of avm-internal hemorrhoids-5 yr recall    PTCA  3/10/16 ford    rca    SALIVARY GLAND SURGERY Right 03/23/2012    Dr Verma-Excision of right submandibular gland    SKIN CANCER EXCISION      TONSILLECTOMY      TOTAL NEPHRECTOMY Left 2018 [place]    UPPER GASTROINTESTINAL ENDOSCOPY  07/23/2009    Dr Ynes Soriano gastritis, sm hiatal hernia, no active ulcers, urea neg    UPPER GASTROINTESTINAL ENDOSCOPY  08/02/2006    Dr Areli Forte, non obst Schatzki's ring, lg hiatal hernia, urea neg    UPPER GASTROINTESTINAL ENDOSCOPY  07/13/2017    Dr Osullivan-w/control of hemorrhage with gold probe cauterization of AVM-cecal avm, small bowel avm, hiatal hernia, margarito's erosions    UPPER GASTROINTESTINAL ENDOSCOPY  07/13/2017    Dr Garcia/control of hemorrhage with gold probe cauterization of AVM-cecal avm, small bowel avm, hiatal hernia, margarito's erosions    UPPER GASTROINTESTINAL ENDOSCOPY N/A 11/28/2022    Dr Prudence Olivia-w/[id number], 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, gastritis, few small benign-appearing polyps, consistent with fundic gland polyps, small non-bleeding AVM in duodenum, large HH, likely source of dysphagia-Gastritis, no h pylori    UPPER GASTROINTESTINAL ENDOSCOPY N/A 11/28/2022    Dr Prudence Olivia-w/[id number], 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, gastritis, few small benign-appearing polyps, consistent with fundic gland polyps, small non-bleeding AVM in duodenum, large HH, likely source of dysphagia-Gastritis, no h pylori    URETER STENT PLACEMENT Right 11/27/2018    [facility]    VASECTOMY  26 years ago    and had it reversed.     VASECTOMY REVERSAL CURRENT MEDICATIONS       Discharge Medication List as of 2023  3:09 PM        CONTINUE these medications which have NOT CHANGED    Details   sodium bicarbonate 325 MG tablet in the morning and at bedtimeHistorical Med      Ergocalciferol (VITAMIN D2 PO) Take by mouthHistorical Med      triamcinolone (KENALOG) 0.1 % cream FREDIS EXT TO BOTH ARMS AND LEGS BID PRN FOR RASH, Historical Med      Calcium-Magnesium-Vitamin D (CALCIUM 1200+D3 PO) Take by mouth Historical Med      predniSONE (DELTASONE) 5 MG tablet Take 3 mg by mouth dailyHistorical Med      vitamin D (CHOLECALCIFEROL) 1000 UNIT TABS tablet Take 1,000 Units by mouth dailyHistorical Med      amLODIPine (NORVASC) 5 MG tablet Take 5 mg by mouth dailyHistorical Med      clonazePAM (KLONOPIN) 2 MG tablet Take 2 mg by mouth nightly as needed. Historical Med      atorvastatin (LIPITOR) 10 MG tablet Take 10 mg by mouth daily      cetirizine (ZYRTEC) 5 MG tablet Take 5 mg by mouth daily      aspirin 81 MG tablet Take 81 mg by mouth daily. pantoprazole (PROTONIX) 40 MG tablet Take 1 tablet by mouth 2 times daily. , Disp-60 tablet, R-11             ALLERGIES     Lyrica [pregabalin], Penicillins, and Sulfa antibiotics    FAMILY HISTORY       Family History   Problem Relation Age of Onset    Hypertension Mother     High Cholesterol Mother     Heart Failure Mother         blockages, heart attack    Other Mother         pneumonia    Heart Failure Father         blockages, heart attack    Heart Failure Brother         blockages, heart attack    High Cholesterol Brother     Stroke Brother     Heart Disease Brother     Hypertension Brother     Other Other         mother , age 80 fall    Colon Cancer Neg Hx     Colon Polyps Neg Hx     Esophageal Cancer Neg Hx     Liver Cancer Neg Hx     Liver Disease Neg Hx     Rectal Cancer Neg Hx     Stomach Cancer Neg Hx           SOCIAL HISTORY       Social History     Socioeconomic History    Marital status:   Spouse name: None    Number of children: None    Years of education: None    Highest education level: None   Tobacco Use    Smoking status: Former     Packs/day: 1.00     Years: 20.00     Pack years: 20.00     Types: Cigarettes     Quit date: 1986     Years since quittin.8    Smokeless tobacco: Never   Vaping Use    Vaping Use: Never used   Substance and Sexual Activity    Alcohol use: No    Drug use: No       SCREENINGS         Berlin Coma Scale  Eye Opening: Spontaneous  Best Verbal Response: Oriented  Best Motor Response: Obeys commands  Berlin Coma Scale Score: 15                     CIWA Assessment  BP: (!) 152/98  Heart Rate: 65                 PHYSICAL EXAM    (up to 7 for level 4, 8 or more for level 5)     ED Triage Vitals [23 1131]   BP Temp Temp src Heart Rate Resp SpO2 Height Weight   (!) 152/98 97.7 °F (36.5 °C) -- 65 18 97 % -- --       Physical Exam  Vitals reviewed. Constitutional:       General: He is not in acute distress. Appearance: Normal appearance. He is not ill-appearing, toxic-appearing or diaphoretic. HENT:      Head: Normocephalic and atraumatic. Right Ear: Tympanic membrane, ear canal and external ear normal.      Left Ear: Tympanic membrane, ear canal and external ear normal.      Nose: Nose normal.      Mouth/Throat:      Mouth: Mucous membranes are moist.      Pharynx: Oropharynx is clear. Eyes:      Extraocular Movements: Extraocular movements intact. Conjunctiva/sclera: Conjunctivae normal.      Pupils: Pupils are equal, round, and reactive to light. Cardiovascular:      Rate and Rhythm: Normal rate and regular rhythm. Pulses: Normal pulses. Heart sounds: Normal heart sounds. Pulmonary:      Effort: Pulmonary effort is normal.      Breath sounds: Normal breath sounds. Abdominal:      General: Bowel sounds are normal. There is no distension. Palpations: Abdomen is soft. Tenderness: There is no abdominal tenderness. There is no right CVA tenderness, left CVA tenderness or guarding. Musculoskeletal:      Cervical back: Neck supple. No tenderness. Right hip: Tenderness present. No deformity, lacerations, bony tenderness or crepitus. Normal range of motion. Normal strength. Right upper leg: Normal.      Right knee: Normal.      Right lower leg: Tenderness present. No swelling, deformity, lacerations or bony tenderness. No edema. Legs:    Skin:     General: Skin is warm and dry. Neurological:      Mental Status: He is alert. DIAGNOSTIC RESULTS     EKG: All EKG's are interpreted by the Emergency Department Physician who either signs or Co-signs this chart in the absence of a cardiologist.        RADIOLOGY:   Non-plain film images such as CT, Ultrasound and MRI are read by the radiologist. Plain radiographic images are visualized and preliminarily interpreted by the emergency physician with the below findings:        Interpretation per the Radiologist below, if available at the time of this note:    CT PELVIS WO CONTRAST Additional Contrast? None   Final Result   No acute fracture or dislocation. Chronic changes. Recommendation:   Follow up as clinically indicated. All CT scans at this facility utilize dose modulation, iterative reconstruction, and/or weight based dosing when appropriate to reduce radiation dose to as low as reasonably achievable. Dictated and Electronically Signed by Elza Thomas MD at 05-Feb-2023 01:49:06 PM EST                     ED BEDSIDE ULTRASOUND:   Performed by ED Physician - none    LABS:  Labs Reviewed - No data to display    All other labs were within normal range or not returned as of this dictation.     EMERGENCY DEPARTMENT COURSE and DIFFERENTIAL DIAGNOSIS/MDM:   Vitals:    Vitals:    02/05/23 1131   BP: (!) 152/98   Pulse: 65   Resp: 18   Temp: 97.7 °F (36.5 °C)   SpO2: 97%           Medical Decision Making  Amount and/or Complexity of Data Reviewed  Radiology: ordered. Decision-making details documented in ED Course. Risk  Prescription drug management. REASSESSMENT      Discussed CT results. Did not want to wait for ultrasound tech to be called in. I think this is reasonable. My suspicion for clot is low. Counseled to treatment, follow up and return parameters to which he voices understanding and agreement. CRITICAL CARE TIME       CONSULTS:  None    PROCEDURES:  Unless otherwise noted below, none     Procedures         FINAL IMPRESSION      1. Right hip pain    2. Fall due to slipping on ice or snow, initial encounter          DISPOSITION/PLAN   DISPOSITION Decision To Discharge 02/05/2023 03:02:12 PM      PATIENT REFERRED TO:  MD Mayank Benitez [phone]    Call in 1 day      DISCHARGE MEDICATIONS:  Discharge Medication List as of 2/5/2023  3:09 PM        START taking these medications    Details   HYDROcodone-acetaminophen (NORCO)  MG per tablet Take 1 tablet by mouth every 8 hours as needed for Pain for up to 3 days. Intended supply: 30 days Max Daily Amount: 3 tablets, Disp-9 tablet, R-0Normal           Controlled Substances Monitoring:     No flowsheet data found.     (Please note that portions of this note were completed with a voice recognition program.  Efforts were made to edit the dictations but occasionally words are mis-transcribed.)    Bertina Kehr, APRN - CNP (electronically signed)  Attending Emergency Physician          Bertina Kehr, APRN - CNP  02/05/23 [facility] YVON Aguirre CNP  02/05/23 [id number]

## 2023-02-06 ENCOUNTER — OFFICE VISIT (OUTPATIENT)
Dept: GASTROENTEROLOGY | Age: 83
End: 2023-02-06
Payer: MEDICARE

## 2023-02-06 VITALS
HEART RATE: 69 BPM | HEIGHT: 69 IN | SYSTOLIC BLOOD PRESSURE: 139 MMHG | BODY MASS INDEX: 24.14 KG/M2 | OXYGEN SATURATION: 98 % | DIASTOLIC BLOOD PRESSURE: 80 MMHG | WEIGHT: 163 LBS

## 2023-02-06 DIAGNOSIS — K44.9 HIATAL HERNIA: ICD-10-CM

## 2023-02-06 DIAGNOSIS — R13.10 DYSPHAGIA, UNSPECIFIED TYPE: Primary | ICD-10-CM

## 2023-02-06 PROCEDURE — 99214 OFFICE O/P EST MOD 30 MIN: CPT | Performed by: NURSE PRACTITIONER

## 2023-02-06 PROCEDURE — 1123F ACP DISCUSS/DSCN MKR DOCD: CPT | Performed by: NURSE PRACTITIONER

## 2023-02-06 PROCEDURE — G8484 FLU IMMUNIZE NO ADMIN: HCPCS | Performed by: NURSE PRACTITIONER

## 2023-02-06 PROCEDURE — G8427 DOCREV CUR MEDS BY ELIG CLIN: HCPCS | Performed by: NURSE PRACTITIONER

## 2023-02-06 PROCEDURE — G8420 CALC BMI NORM PARAMETERS: HCPCS | Performed by: NURSE PRACTITIONER

## 2023-02-06 PROCEDURE — 3079F DIAST BP 80-89 MM HG: CPT | Performed by: NURSE PRACTITIONER

## 2023-02-06 PROCEDURE — 3075F SYST BP GE 130 - 139MM HG: CPT | Performed by: NURSE PRACTITIONER

## 2023-02-06 PROCEDURE — 1036F TOBACCO NON-USER: CPT | Performed by: NURSE PRACTITIONER

## 2023-02-06 ASSESSMENT — ENCOUNTER SYMPTOMS
ABDOMINAL PAIN: 0
ANAL BLEEDING: 0
ABDOMINAL DISTENTION: 0
COUGH: 0
DIARRHEA: 0
CHOKING: 0
BLOOD IN STOOL: 0
CONSTIPATION: 0
SHORTNESS OF BREATH: 0
VOMITING: 0
RECTAL PAIN: 0
TROUBLE SWALLOWING: 1
NAUSEA: 0

## 2023-02-06 NOTE — PROGRESS NOTES
Subjective:     Patient ID: Elizabet Sullivan is a 80 y.o. male  PCP: Annemarie Schroeder MD  Referring Provider: No ref. provider found    HPI  Patient presents to the office today with the following complaints: Anemia      Pt seen today for follow up after EGD and Colonoscopy on 11/28/2022. Pt has c/o dysphagia at last OV. Since EGD with dilation, swallowing has worsened. Hgb has dropped to 7.5 recently as well. Large hiatal hernia thought be cause of dysphagia. UGI with SBFT was ordered by Dr. Syl Guileln. Dysphagia occurs with liquids and solids. He is taking Protonix 40 mg daily. He admits to some reflux, he will take Rolaids prn for this. All scope and pathology reports were reviewed and discussed with patient. All questions answered, pt verbalized understanding. EGD Findings 11/28/2022:   Esophagus: abnormal: There is a questionable, 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, dilated due to symptoms. A guidewire was placed through the endoscope and the endoscope was removed. A 54 French savory dilator was advanced down the length of the esophagus using a fixed-point wire technique. The dilator and guidewire were removed. The patient tolerated the procedure well. There is an 8 cm hiatal hernia, with no margarito's erosions present. Stomach:  Abnormal: Mild mucosal changes suggestive of gastritis noted -  Gastric biopsies were taken from the antrum and body to rule out Helicobacter pylori infection. There are a few small benign-appearing polyps, consistent with fundic gland polyps. Duodenum:  there is a small, non-bleeding AVM noted. IMPRESSION:  1. Large hiatal hernia, likely source of dysphagia  RECOMMENDATIONS:    1. Await path results  2. Continue PPI  3. If symptoms persist, could consider surgical referral for hiatal hernia repair. 4.  Repeat EGD with dilation as needed. 5.  Follow up OV with YVON Keita in 8 weeks. Colonoscopy Findings 11/28/2022:    The mucosa appeared normal throughout the entire examined colon. There was evidence of diverticular disease throughout the left colon. Retroflexion in the rectum was normal and revealed no further abnormalities. Recommendations:  1. Repeat colonoscopy: not necessary due to age and lack of polyps    FINAL DIAGNOSIS:   Stomach, gastric biopsy:   1. Benign gastric mucosa with minimal chronic inflammation. 2.  No Helicobacter pylori organisms identified by immunohistochemical   stain. Assessment:     1. Dysphagia, unspecified type    2. Hiatal hernia            Plan:   - Schedule UGI with SBFT  - Continue Protonix 40 mg BID  - Consider surgical referral pending UGI  - Call with any questions or concerns       Orders  No orders of the defined types were placed in this encounter. Medications  No orders of the defined types were placed in this encounter.         Patient History:     Past Medical History:   Diagnosis Date    Allergic rhinitis     Anemia, unspecified     Anxiety     Asthma     BPH (benign prostatic hyperplasia)     Dr Matt Brown    CAD (coronary artery disease)     Dr Kacy Serrano [facility])     skin-non-melanoma    Carotid artery stenosis     YVON Russo    Chronic GERD 6/7/2017    Chronic kidney disease, stage III (moderate) (HCC)     Colon polyps     Cough     Decreased white blood cell count, unspecified     Disease of immune system ([facility] [address]     IGG 4 Auto Immune Disease    Dysphagia 6/7/2017     Updating Deprecated Diagnoses    Enlarged lymph nodes, unspecified     Esophageal dysphagia 7/13/2017    Esophageal stricture     GERD (gastroesophageal reflux disease)     Hemorrhoids 8/13/2012    HIGH CHOLESTEROL     History of colon polyps 8/13/2012    History of esophageal stricture 5/24/2016    Hypertension     IgG monoclonal gammopathy     Insomnia     Iron deficiency anemia 6/7/2017    Iron deficiency anemia 2/3/2023    Iron deficiency anemia due to chronic blood loss     Leukocytosis Mediastinal lymphadenopathy     Monoclonal gammopathy     Monocytosis (symptomatic)     Night sweats     Normocytic normochromic anemia     Osteoarthritis     Other iron deficiency anemias     GI blood loss, 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 anemias     Peptic ulcer disease     Pill dysphagia 5/24/2016    Restless leg syndrome     Rheumatoid arthritis (HCC)     S/P coronary artery stent placement 5/24/2016    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 @ [facility] 2018    Serum aquaporin 4 IgG antibody positive     Skin cancer     on left ear lobe    Thrombocytopenia, unspecified (HCC)     Venous insufficiency     Weight loss, abnormal        Past Surgical History:   Procedure Laterality Date    COLONOSCOPY  09/15/2009    Dr Lacey Sr, 3 yr recall    COLONOSCOPY  06/24/2003    Dr Darrell Rogers, 3 yr recall    COLONOSCOPY  11/12/2012    Dr Nunu Hu diverticula, no polyps (5yr)    COLONOSCOPY  07/27/2006    Dr Darrell Rogers x 1, 3 yr recall    COLONOSCOPY N/A 11/28/2022    Dr THOMAS Olivia-Diverticular disease, prn (age)    CORONARY ANGIOPLASTY WITH STENT PLACEMENT  2016    2 stents    FINE NEEDLE ASPIRATION Right 12/23/2011    Neck mass-Dr Verma    NE COLSC FLEXIBLE W/CONTROL BLEEDING ANY METHOD N/A 07/13/2017    Dr Osullivan-w/control of hemorrage, gold probe cautery of avm-internal hemorrhoids-5 yr recall    PTCA  3/10/16 for[facility]    rca    SALIVARY GLAND SURGERY Right 03/23/2012    Dr Verma-Excision of right submandibular gland    SKIN CANCER EXCISION      TONSILLECTOMY      TOTAL NEPHRECTOMY Left 2018 [place]    UPPER GASTROINTESTINAL ENDOSCOPY  07/23/2009    Dr Nunu Hu gastritis, sm hiatal hernia, no active ulcers, urea neg    UPPER GASTROINTESTINAL ENDOSCOPY  08/02/2006    Dr Susana Neil, non obst Schatzki's ring, lg hiatal hernia, urea neg    UPPER GASTROINTESTINAL ENDOSCOPY  07/13/2017     Radha/control of hemorrhage with gold probe cauterization of AVM-cecal avm, small bowel avm, hiatal hernia, margarito's erosions    UPPER GASTROINTESTINAL ENDOSCOPY  2017    Dr Garcia/control of hemorrhage with gold probe cauterization of AVM-cecal avm, small bowel avm, hiatal hernia, margarito's erosions    UPPER GASTROINTESTINAL ENDOSCOPY N/A 2022    Dr Melissa Olivia-cory/[id number]-Ismqckejjlyn, 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, gastritis, few small benign-appearing polyps, consistent with fundic gland polyps, small non-bleeding AVM in duodenum, large HH, likely source of dysphagia-Gastritis, no h pylori    UPPER GASTROINTESTINAL ENDOSCOPY N/A 2022    Dr Melissa Henriquez/[id number]-Wzfneyellioa, 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, gastritis, few small benign-appearing polyps, consistent with fundic gland polyps, small non-bleeding AVM in duodenum, large HH, likely source of dysphagia-Gastritis, no h pylori    URETER STENT PLACEMENT Right 2018    [facility]    VASECTOMY  26 years ago    and had it reversed. VASECTOMY REVERSAL         Family History   Problem Relation Age of Onset    Hypertension Mother     High Cholesterol Mother     Heart Failure Mother         blockages, heart attack    Other Mother         pneumonia    Heart Failure Father         blockages, heart attack    Heart Failure Brother         blockages, heart attack    High Cholesterol Brother     Stroke Brother     Heart Disease Brother     Hypertension Brother     Other Other         mother , age 80 fall    Colon Cancer Neg Hx     Colon Polyps Neg Hx     Esophageal Cancer Neg Hx     Liver Cancer Neg Hx     Liver Disease Neg Hx     Rectal Cancer Neg Hx     Stomach Cancer Neg Hx        Social History     Socioeconomic History    Marital status:     Tobacco Use    Smoking status: Former     Packs/day: 1.00     Years: 20.00     Pack years: 20.00     Types: Cigarettes     Quit date: 1986     Years since quittin.8    Smokeless tobacco: Never   Vaping Use    Vaping Use: Never used   Substance and Sexual Activity    Alcohol use: No    Drug use: No       Current Outpatient Medications   Medication Sig Dispense Refill    HYDROcodone-acetaminophen (NORCO)  MG per tablet Take 1 tablet by mouth every 8 hours as needed for Pain for up to 3 days. Intended supply: 30 days Max Daily Amount: 3 tablets 9 tablet 0    sodium bicarbonate 325 MG tablet in the morning and at bedtime      Ergocalciferol (VITAMIN D2 PO) Take by mouth      predniSONE (DELTASONE) 5 MG tablet Take 3 mg by mouth daily      amLODIPine (NORVASC) 5 MG tablet Take 5 mg by mouth daily      clonazePAM (KLONOPIN) 2 MG tablet Take 2 mg by mouth nightly as needed. atorvastatin (LIPITOR) 10 MG tablet Take 10 mg by mouth daily      cetirizine (ZYRTEC) 5 MG tablet Take 5 mg by mouth daily      aspirin 81 MG tablet Take 81 mg by mouth daily. pantoprazole (PROTONIX) 40 MG tablet Take 1 tablet by mouth 2 times daily. 60 tablet 11     No current facility-administered medications for this visit. Allergies   Allergen Reactions    Lyrica [Pregabalin] Other (See Comments)     Pt was unable to breathe when taking medication. Penicillins     Sulfa Antibiotics        Review of Systems   Constitutional:  Negative for activity change, appetite change, fatigue, fever and unexpected weight change. HENT:  Positive for trouble swallowing. Respiratory:  Negative for cough, choking and shortness of breath. Cardiovascular:  Negative for chest pain. Gastrointestinal:  Negative for abdominal distention, abdominal pain, anal bleeding, blood in stool, constipation, diarrhea, nausea, rectal pain and vomiting. Musculoskeletal:         Right leg pain from fall    Allergic/Immunologic: Negative for food allergies. All other systems reviewed and are negative.       Objective:     /80 (Site: Right Upper Arm)   Pulse 69   Ht 5' 9\" (1.753 m) Wt 163 lb (73.9 kg)   SpO2 98%   BMI 24.07 kg/m²     Physical Exam  Vitals reviewed. Constitutional:       General: He is not in acute distress. Appearance: He is well-developed. HENT:      Head: Normocephalic and atraumatic. Right Ear: External ear normal.      Left Ear: External ear normal.      Nose: Nose normal.   Eyes:      General: No scleral icterus. Right eye: No discharge. Left eye: No discharge. Conjunctiva/sclera: Conjunctivae normal.      Pupils: Pupils are equal, round, and reactive to light. Cardiovascular:      Rate and Rhythm: Normal rate and regular rhythm. Heart sounds: Normal heart sounds. No murmur heard. Pulmonary:      Effort: Pulmonary effort is normal. No respiratory distress. Breath sounds: Normal breath sounds. No wheezing or rales. Abdominal:      General: Bowel sounds are normal. There is no distension. Palpations: Abdomen is soft. There is no mass. Tenderness: There is no abdominal tenderness. There is no guarding or rebound. Musculoskeletal:         General: Normal range of motion. Cervical back: Normal range of motion and neck supple. Skin:     General: Skin is warm and dry. Coloration: Skin is not pale. Neurological:      Mental Status: He is alert and oriented to person, place, and time. Gait: Gait abnormal (limp due to right leg pain).    Psychiatric:         Behavior: Behavior normal.

## 2023-02-07 ENCOUNTER — HOSPITAL ENCOUNTER (OUTPATIENT)
Dept: INFUSION THERAPY | Age: 83
Discharge: HOME | End: 2023-02-07
Payer: MEDICARE

## 2023-02-07 DIAGNOSIS — D64.9 ANEMIA, UNSPECIFIED TYPE: ICD-10-CM

## 2023-02-07 LAB
HCT VFR BLD CALC: 29.9 % (ref 40.1–51)
HEMOGLOBIN: 8.4 G/DL (ref 13.7–17.5)
LYMPHOCYTES ABSOLUTE: 0.3 K/UL (ref 1.18–3.74)
LYMPHOCYTES RELATIVE PERCENT: 3.2 % (ref 19.3–53.1)
MCH RBC QN AUTO: 21.3 PG (ref 25.7–32.2)
MCHC RBC AUTO-ENTMCNC: 28.1 G/DL (ref 32.3–36.5)
MCV RBC AUTO: 75.7 FL (ref 79–92.2)
MONOCYTES ABSOLUTE: 1.1 K/UL (ref 0.24–0.82)
MONOCYTES RELATIVE PERCENT: 12.3 % (ref 4.7–12.5)
NEUTROPHILS ABSOLUTE: 7.2 K/UL (ref 1.56–6.13)
NEUTROPHILS RELATIVE PERCENT: 84.5 % (ref 34–71.1)
PDW BLD-RTO: 16.1 % (ref 11.6–14.4)
PLATELET # BLD: 217 K/UL (ref 163–337)
PMV BLD AUTO: 10 FL (ref 7.4–10.4)
RBC # BLD: 3.95 M/UL (ref 4.63–6.08)
WBC # BLD: 8.6 K/UL (ref 4.23–9.07)

## 2023-02-07 PROCEDURE — 85025 COMPLETE CBC W/AUTO DIFF WBC: CPT

## 2023-02-07 PROCEDURE — 36415 COLL VENOUS BLD VENIPUNCTURE: CPT

## 2023-02-13 ENCOUNTER — HOSPITAL ENCOUNTER (OUTPATIENT)
Dept: INFUSION THERAPY | Age: 83
Setting detail: INFUSION SERIES
Discharge: HOME | End: 2023-02-13
Payer: MEDICARE

## 2023-02-13 VITALS
SYSTOLIC BLOOD PRESSURE: 146 MMHG | DIASTOLIC BLOOD PRESSURE: 66 MMHG | HEART RATE: 67 BPM | TEMPERATURE: 97.4 F | RESPIRATION RATE: 18 BRPM | OXYGEN SATURATION: 99 %

## 2023-02-13 DIAGNOSIS — D50.9 IRON DEFICIENCY ANEMIA, UNSPECIFIED IRON DEFICIENCY ANEMIA TYPE: Primary | ICD-10-CM

## 2023-02-13 PROCEDURE — 6360000002 HC RX W HCPCS: Performed by: INTERNAL MEDICINE

## 2023-02-13 PROCEDURE — 96365 THER/PROPH/DIAG IV INF INIT: CPT

## 2023-02-13 PROCEDURE — 2580000003 HC RX 258: Performed by: INTERNAL MEDICINE

## 2023-02-13 RX ORDER — ONDANSETRON 2 MG/ML
8 INJECTION INTRAMUSCULAR; INTRAVENOUS
Status: CANCELLED | OUTPATIENT
Start: 2023-02-13

## 2023-02-13 RX ORDER — DIPHENHYDRAMINE HYDROCHLORIDE 50 MG/ML
50 INJECTION INTRAMUSCULAR; INTRAVENOUS
OUTPATIENT
Start: 2023-02-20

## 2023-02-13 RX ORDER — DIPHENHYDRAMINE HYDROCHLORIDE 50 MG/ML
50 INJECTION INTRAMUSCULAR; INTRAVENOUS
Status: CANCELLED | OUTPATIENT
Start: 2023-02-13

## 2023-02-13 RX ORDER — EPINEPHRINE 1 MG/ML
0.3 INJECTION, SOLUTION, CONCENTRATE INTRAVENOUS PRN
Status: CANCELLED | OUTPATIENT
Start: 2023-02-13

## 2023-02-13 RX ORDER — SODIUM CHLORIDE 9 MG/ML
5-250 INJECTION, SOLUTION INTRAVENOUS PRN
Status: CANCELLED | OUTPATIENT
Start: 2023-02-20

## 2023-02-13 RX ORDER — SODIUM CHLORIDE 9 MG/ML
5-250 INJECTION, SOLUTION INTRAVENOUS PRN
Status: CANCELLED | OUTPATIENT
Start: 2023-02-13

## 2023-02-13 RX ORDER — ALBUTEROL SULFATE 90 UG/1
4 AEROSOL, METERED RESPIRATORY (INHALATION) PRN
Status: CANCELLED | OUTPATIENT
Start: 2023-02-13

## 2023-02-13 RX ORDER — SODIUM CHLORIDE 9 MG/ML
INJECTION, SOLUTION INTRAVENOUS CONTINUOUS
OUTPATIENT
Start: 2023-02-20

## 2023-02-13 RX ORDER — ALBUTEROL SULFATE 90 UG/1
4 AEROSOL, METERED RESPIRATORY (INHALATION) PRN
OUTPATIENT
Start: 2023-02-20

## 2023-02-13 RX ORDER — ACETAMINOPHEN 325 MG/1
650 TABLET ORAL
Status: CANCELLED | OUTPATIENT
Start: 2023-02-13

## 2023-02-13 RX ORDER — SODIUM CHLORIDE 0.9 % (FLUSH) 0.9 %
5-40 SYRINGE (ML) INJECTION PRN
Status: CANCELLED | OUTPATIENT
Start: 2023-02-13

## 2023-02-13 RX ORDER — ACETAMINOPHEN 325 MG/1
650 TABLET ORAL
OUTPATIENT
Start: 2023-02-20

## 2023-02-13 RX ORDER — HEPARIN SODIUM (PORCINE) LOCK FLUSH IV SOLN 100 UNIT/ML 100 UNIT/ML
500 SOLUTION INTRAVENOUS PRN
Status: CANCELLED | OUTPATIENT
Start: 2023-02-13

## 2023-02-13 RX ORDER — ONDANSETRON 2 MG/ML
8 INJECTION INTRAMUSCULAR; INTRAVENOUS
OUTPATIENT
Start: 2023-02-20

## 2023-02-13 RX ORDER — SODIUM CHLORIDE 9 MG/ML
5-250 INJECTION, SOLUTION INTRAVENOUS PRN
Status: DISCONTINUED | OUTPATIENT
Start: 2023-02-13 | End: 2023-02-14 | Stop reason: HOSPADM

## 2023-02-13 RX ORDER — EPINEPHRINE 1 MG/ML
0.3 INJECTION, SOLUTION, CONCENTRATE INTRAVENOUS PRN
OUTPATIENT
Start: 2023-02-20

## 2023-02-13 RX ORDER — SODIUM CHLORIDE 9 MG/ML
5-250 INJECTION, SOLUTION INTRAVENOUS PRN
OUTPATIENT
Start: 2023-02-20

## 2023-02-13 RX ORDER — SODIUM CHLORIDE 0.9 % (FLUSH) 0.9 %
5-40 SYRINGE (ML) INJECTION PRN
OUTPATIENT
Start: 2023-02-20

## 2023-02-13 RX ORDER — HEPARIN SODIUM (PORCINE) LOCK FLUSH IV SOLN 100 UNIT/ML 100 UNIT/ML
500 SOLUTION INTRAVENOUS PRN
OUTPATIENT
Start: 2023-02-20

## 2023-02-13 RX ORDER — SODIUM CHLORIDE 9 MG/ML
INJECTION, SOLUTION INTRAVENOUS CONTINUOUS
Status: CANCELLED | OUTPATIENT
Start: 2023-02-13

## 2023-02-13 RX ADMIN — SODIUM CHLORIDE 20 ML/HR: 9 INJECTION, SOLUTION INTRAVENOUS at 14:33

## 2023-02-13 RX ADMIN — FERRIC CARBOXYMALTOSE INJECTION 750 MG: 50 INJECTION, SOLUTION INTRAVENOUS at 14:58

## 2023-02-13 NOTE — PROGRESS NOTES
Pt received Injectafer 750mg, as ordered. Patient tolerated infusion well without s/s of adverse event. Patient provided with medication education, future infusion dates. Patient states understanding. PIV removed.   Patient discharged to home Electronically signed by Mora Yarbrough RN on 2/13/2023 at 3:28 PM

## 2023-02-13 NOTE — DISCHARGE INSTRUCTIONS
ferric carboxymaltose  Pronunciation:  PARTH ik gayatri BOX ee ELSIEL daniel  Brand:  Injectafer  What is the most important information I should know about ferric carboxymaltose? Use only as directed. Tell your doctor if you use other medicines or have other medical conditions or allergies. What is ferric carboxymaltose? Ferric carboxymaltose is an iron replacement product that is used in adults used to treat iron deficiency anemia (NIMISHA), which is low red blood cells caused by a lack of iron in the body. Ferric carboxymaltose is given to adults with NIMISHA and chronic kidney disease (not on dialysis), or to adults with NIMISHA when iron taken by mouth is not effective. Ferric carboxymaltose may also be used for purposes not listed in this medication guide. What should I discuss with my healthcare provider before receiving ferric carboxymaltose? You should not use ferric carboxymaltose if you are allergic to it. Tell your doctor if you have ever had:  high blood pressure; or  an allergic reaction to iron injected into a vein. Tell your doctor if you are pregnant or plan to become pregnant. It is not known if ferric carboxymaltose will harm an unborn baby, but this medicine may cause severe reactions in the mother that could affect the baby's heartbeat. Having iron deficiency anemia during pregnancy may increase the risk of premature birth or low birth weight. The benefit of treating this condition with ferric carboxymaltose may outweigh any risks to the baby. If you are breastfeeding, tell your doctor if you notice diarrhea or constipation in the nursing baby. How is ferric carboxymaltose given? Ferric carboxymaltose is injected into a vein by a healthcare provider. Tell your medical caregivers if you feel any burning or pain when ferric carboxymaltose is injected. You will be watched for at least 30 minutes to make sure you do not have an allergic reaction.   This medicine is usually given in 2 doses, 7 days apart. You may need frequent medical tests, even if you have no symptoms. What happens if I miss a dose? Call your doctor for instructions if you miss an appointment for your ferric carboxymaltose injection. What happens if I overdose? Seek emergency medical attention or call the Poison Help line at [phone]. Overdose symptoms may include pain, cough, wheezing, shortness of breath, coughing up blood, weight loss, or slowed growth in a child. What should I avoid after receiving ferric carboxymaltose? Do not take iron supplements or a vitamin/mineral supplement that your doctor has not prescribed or recommended. What are the possible side effects of ferric carboxymaltose? Get emergency medical help if you have signs of an allergic reaction: hives; feeling like you might pass out; wheezing, difficult breathing; swelling of your face, lips, tongue, or throat. Call your doctor at once if you have:  increased blood pressure --dizziness, nausea, sudden warmth or redness in your face, severe headache, pounding in your neck or ears;  low levels of phosphorus in your blood --confusion, bone pain, muscle weakness; or  high levels of iron stored in your body --feeling weak or tired, joint pain, finger pain, stomach pain, weight loss, irregular heartbeats, fluttering in your chest.  Common side effects may include:  nausea;  dizziness;  high blood pressure;  flushing (warmth, redness, or tingly feeling); or  low phosphorus levels. This is not a complete list of side effects and others may occur. Call your doctor for medical advice about side effects. You may report side effects to FDA at 2-737-DIT-6625. What other drugs will affect ferric carboxymaltose? Other drugs may affect ferric carboxymaltose, including prescription and over-the-counter medicines, vitamins, and herbal products. Tell your doctor about all other medicines you use. Where can I get more information?   Your pharmacist can provide more information about ferric carboxymaltose. Remember, keep this and all other medicines out of the reach of children, never share your medicines with others, and use this medication only for the indication prescribed. Every effort has been made to ensure that the information provided by [address] is accurate, up-to-date, and complete, but no guarantee is made to that effect. Drug information contained herein may be time sensitive. [facility] information has been compiled for use by healthcare practitioners and consumers in the [place] and therefore [facility] does not warrant that uses outside of the [place] are appropriate, unless specifically indicated otherwise. [facility]'s drug information does not endorse drugs, diagnose patients or recommend therapy. [facility]'s drug information is an informational resource designed to assist licensed healthcare practitioners in caring for their patients and/or to serve consumers viewing this service as a supplement to, and not a substitute for, the expertise, skill, knowledge and judgment of healthcare practitioners. The absence of a warning for a given drug or drug combination in no way should be construed to indicate that the drug or drug combination is safe, effective or appropriate for any given patient. [facility] does not assume any responsibility for any aspect of healthcare administered with the aid of information [facility] provides. The information contained herein is not intended to cover all possible uses, directions, precautions, warnings, drug interactions, allergic reactions, or adverse effects. If you have questions about the drugs you are taking, check with your doctor, nurse or pharmacist.  Copyright 5469-4262 [address] [place]. Version: 3.01. Revision date: 9/24/2021. Care instructions adapted under license by [facility] ([facility]).  If you have questions about a medical condition or this instruction, always ask your healthcare professional. [employer] disclaims any warranty or liability for your use of this information.

## 2023-02-14 ENCOUNTER — HOSPITAL ENCOUNTER (OUTPATIENT)
Dept: GENERAL RADIOLOGY | Age: 83
Discharge: HOME | End: 2023-02-14
Payer: MEDICARE

## 2023-02-14 DIAGNOSIS — D64.9 ANEMIA, UNSPECIFIED TYPE: ICD-10-CM

## 2023-02-14 DIAGNOSIS — E61.1 IRON DEFICIENCY: ICD-10-CM

## 2023-02-14 DIAGNOSIS — R13.19 OTHER DYSPHAGIA: ICD-10-CM

## 2023-02-14 PROCEDURE — 74240 X-RAY XM UPR GI TRC 1CNTRST: CPT

## 2023-02-15 ENCOUNTER — HOSPITAL ENCOUNTER (OUTPATIENT)
Dept: INFUSION THERAPY | Age: 83
Discharge: HOME | End: 2023-02-15
Payer: MEDICARE

## 2023-02-15 DIAGNOSIS — D64.9 ANEMIA, UNSPECIFIED TYPE: ICD-10-CM

## 2023-02-15 LAB
BASOPHILS ABSOLUTE: 0.04 K/UL (ref 0.01–0.08)
BASOPHILS RELATIVE PERCENT: 0.5 % (ref 0.1–1.2)
EOSINOPHILS ABSOLUTE: 0.13 K/UL (ref 0.04–0.54)
EOSINOPHILS RELATIVE PERCENT: 1.7 % (ref 0.7–7)
HCT VFR BLD CALC: 28.1 % (ref 40.1–51)
HEMOGLOBIN: 8 G/DL (ref 13.7–17.5)
LYMPHOCYTES ABSOLUTE: 0.24 K/UL (ref 1.18–3.74)
LYMPHOCYTES RELATIVE PERCENT: 3.1 % (ref 19.3–53.1)
MCH RBC QN AUTO: 21.6 PG (ref 25.7–32.2)
MCHC RBC AUTO-ENTMCNC: 28.5 G/DL (ref 32.3–36.5)
MCV RBC AUTO: 75.7 FL (ref 79–92.2)
MONOCYTES ABSOLUTE: 0.83 K/UL (ref 0.24–0.82)
MONOCYTES RELATIVE PERCENT: 10.8 % (ref 4.7–12.5)
NEUTROPHILS ABSOLUTE: 6.4 K/UL (ref 1.56–6.13)
NEUTROPHILS RELATIVE PERCENT: 83.5 % (ref 34–71.1)
PDW BLD-RTO: 18 % (ref 11.6–14.4)
PLATELET # BLD: 257 K/UL (ref 163–337)
PMV BLD AUTO: 10.6 FL (ref 7.4–10.4)
RBC # BLD: 3.71 M/UL (ref 4.63–6.08)
WBC # BLD: 7.67 K/UL (ref 4.23–9.07)

## 2023-02-15 PROCEDURE — 36415 COLL VENOUS BLD VENIPUNCTURE: CPT

## 2023-02-15 PROCEDURE — 85025 COMPLETE CBC W/AUTO DIFF WBC: CPT

## 2023-02-20 ENCOUNTER — HOSPITAL ENCOUNTER (OUTPATIENT)
Dept: INFUSION THERAPY | Age: 83
Setting detail: INFUSION SERIES
Discharge: HOME | End: 2023-02-20
Payer: MEDICARE

## 2023-02-20 VITALS
RESPIRATION RATE: 17 BRPM | DIASTOLIC BLOOD PRESSURE: 68 MMHG | OXYGEN SATURATION: 98 % | HEART RATE: 75 BPM | SYSTOLIC BLOOD PRESSURE: 140 MMHG | TEMPERATURE: 97.4 F

## 2023-02-20 DIAGNOSIS — D50.9 IRON DEFICIENCY ANEMIA, UNSPECIFIED IRON DEFICIENCY ANEMIA TYPE: Primary | ICD-10-CM

## 2023-02-20 PROCEDURE — 2580000003 HC RX 258: Performed by: INTERNAL MEDICINE

## 2023-02-20 PROCEDURE — 96365 THER/PROPH/DIAG IV INF INIT: CPT

## 2023-02-20 PROCEDURE — 6360000002 HC RX W HCPCS: Performed by: INTERNAL MEDICINE

## 2023-02-20 RX ORDER — SODIUM CHLORIDE 9 MG/ML
INJECTION, SOLUTION INTRAVENOUS CONTINUOUS
OUTPATIENT
Start: 2023-02-20

## 2023-02-20 RX ORDER — HEPARIN SODIUM (PORCINE) LOCK FLUSH IV SOLN 100 UNIT/ML 100 UNIT/ML
500 SOLUTION INTRAVENOUS PRN
OUTPATIENT
Start: 2023-02-20

## 2023-02-20 RX ORDER — SODIUM CHLORIDE 9 MG/ML
5-250 INJECTION, SOLUTION INTRAVENOUS PRN
Status: CANCELLED | OUTPATIENT
Start: 2023-02-20

## 2023-02-20 RX ORDER — SODIUM CHLORIDE 0.9 % (FLUSH) 0.9 %
5-40 SYRINGE (ML) INJECTION PRN
OUTPATIENT
Start: 2023-02-20

## 2023-02-20 RX ORDER — SODIUM CHLORIDE 9 MG/ML
5-250 INJECTION, SOLUTION INTRAVENOUS PRN
Status: DISCONTINUED | OUTPATIENT
Start: 2023-02-20 | End: 2023-02-21 | Stop reason: HOSPADM

## 2023-02-20 RX ORDER — SODIUM CHLORIDE 9 MG/ML
5-250 INJECTION, SOLUTION INTRAVENOUS PRN
OUTPATIENT
Start: 2023-02-20

## 2023-02-20 RX ORDER — ONDANSETRON 2 MG/ML
8 INJECTION INTRAMUSCULAR; INTRAVENOUS
OUTPATIENT
Start: 2023-02-20

## 2023-02-20 RX ORDER — ALBUTEROL SULFATE 90 UG/1
4 AEROSOL, METERED RESPIRATORY (INHALATION) PRN
OUTPATIENT
Start: 2023-02-20

## 2023-02-20 RX ORDER — DIPHENHYDRAMINE HYDROCHLORIDE 50 MG/ML
50 INJECTION INTRAMUSCULAR; INTRAVENOUS
OUTPATIENT
Start: 2023-02-20

## 2023-02-20 RX ORDER — ACETAMINOPHEN 325 MG/1
650 TABLET ORAL
OUTPATIENT
Start: 2023-02-20

## 2023-02-20 RX ORDER — EPINEPHRINE 1 MG/ML
0.3 INJECTION, SOLUTION, CONCENTRATE INTRAVENOUS PRN
OUTPATIENT
Start: 2023-02-20

## 2023-02-20 RX ADMIN — SODIUM CHLORIDE 20 ML/HR: 9 INJECTION, SOLUTION INTRAVENOUS at 15:10

## 2023-02-20 RX ADMIN — FERRIC CARBOXYMALTOSE INJECTION 750 MG: 50 INJECTION, SOLUTION INTRAVENOUS at 15:11

## 2023-02-21 ENCOUNTER — TELEPHONE (OUTPATIENT)
Dept: GASTROENTEROLOGY | Age: 83
End: 2023-02-21

## 2023-02-21 DIAGNOSIS — K44.9 HIATAL HERNIA: Primary | ICD-10-CM

## 2023-02-21 NOTE — TELEPHONE ENCOUNTER
02-21-23 called and notified patient of results and recommendation as per [address]       Routed results and PCP Aliza Mcfarland MD       Referral placed to [address] [facility] per patient. He is going to see Dr Mai Gomez and will discuss with him also.

## 2023-02-21 NOTE — TELEPHONE ENCOUNTER
----- Message from YVON Orta NP sent at 2/21/2023 10:30 AM CST -----  UGI was normal.  Pt has large hiatal hernia on EGD evaluation.   Pt to consider surgical referral for evaluation of hernia Detail Level: Zone Render Post-Care Instructions In Note?: yes Post-Care Instructions: I reviewed with the patient in detail post-care instructions. Patient is to wear sunprotection, and avoid picking at any of the treated lesions. Pt may apply Vaseline to crusted or scabbing areas. Render Note In Bullet Format When Appropriate: No Consent: The patient's consent was obtained including but not limited to risks of crusting, scabbing, blistering, scarring, darker or lighter pigmentary change, recurrence, incomplete removal and infection. Duration Of Freeze Thaw-Cycle (Seconds): 0

## 2023-02-22 ENCOUNTER — HOSPITAL ENCOUNTER (OUTPATIENT)
Dept: INFUSION THERAPY | Age: 83
Discharge: HOME | End: 2023-02-22
Payer: MEDICARE

## 2023-02-22 DIAGNOSIS — D64.9 ANEMIA, UNSPECIFIED TYPE: ICD-10-CM

## 2023-02-22 LAB
BASOPHILS ABSOLUTE: 0.06 K/UL (ref 0.01–0.08)
BASOPHILS RELATIVE PERCENT: 1 % (ref 0.1–1.2)
EOSINOPHILS ABSOLUTE: 0.14 K/UL (ref 0.04–0.54)
EOSINOPHILS RELATIVE PERCENT: 2.4 % (ref 0.7–7)
HCT VFR BLD CALC: 33.7 % (ref 40.1–51)
HEMOGLOBIN: 9.3 G/DL (ref 13.7–17.5)
LYMPHOCYTES ABSOLUTE: 0.28 K/UL (ref 1.18–3.74)
LYMPHOCYTES RELATIVE PERCENT: 4.8 % (ref 19.3–53.1)
MCH RBC QN AUTO: 23 PG (ref 25.7–32.2)
MCHC RBC AUTO-ENTMCNC: 27.6 G/DL (ref 32.3–36.5)
MCV RBC AUTO: 83.2 FL (ref 79–92.2)
MONOCYTES ABSOLUTE: 0.81 K/UL (ref 0.24–0.82)
MONOCYTES RELATIVE PERCENT: 13.8 % (ref 4.7–12.5)
NEUTROPHILS ABSOLUTE: 4.52 K/UL (ref 1.56–6.13)
NEUTROPHILS RELATIVE PERCENT: 77.3 % (ref 34–71.1)
PDW BLD-RTO: 25.2 % (ref 11.6–14.4)
PLATELET # BLD: 231 K/UL (ref 163–337)
PMV BLD AUTO: 11.1 FL (ref 7.4–10.4)
RBC # BLD: 4.05 M/UL (ref 4.63–6.08)
WBC # BLD: 5.85 K/UL (ref 4.23–9.07)

## 2023-02-22 PROCEDURE — 36415 COLL VENOUS BLD VENIPUNCTURE: CPT

## 2023-02-22 PROCEDURE — 85025 COMPLETE CBC W/AUTO DIFF WBC: CPT

## 2023-02-27 NOTE — PROGRESS NOTES
Patient:  Elizabet Sullivan  YOB: 1940  Date of Service: 3/1/2023  MRN: [id number]    Primary Care Physician: Annemarie Schroeder MD    Chief Complaint   Patient presents with    Follow-up     Multifactorial Anemia  IgG Kappa MGUS      Treatment     Injectafer given 2023 and 2023    Results           Patient Seen, Chart, Consults notes, Labs, Radiology studies reviewed. Subjective:    Shady Thornton is a 80year old  gentleman managed with primary and secondary diagnoses as outlined:  Resection of left retroperitoneal mass and radical left nephrectomy by Dr. Florentino Vargas at [facility] on 10/31/2018. 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 Jennifer Cabrales    His ex-wife  in 2021, and their son  in 2021. He is still grieving. Enrique Rivers' 77year-old wife of 24 years left him and cleaned out all the furniture in the house with a moving company while he was at [employer] in 2022. He moved out of his condo and has had counseling regarding this the pressing issue and has improved significantly over the course of time. Mr. Candido Lancaster receives weekly Rituxan x 4 periodically for exacerbations of his IgG4 related disease directed by Dr. Prashant Bourgeois. Mr. Candido Lancaster last received 4 weekly doses of Rituxan between in 2023    He he is currently on prednisone 5 mg daily in treatment of joint pains pleuritic-like chest discomfort, rib pain and fatigue. per his rheumatologist Dr. Genesis Paiz. IgG4 level was 224 related to him by his rheumatologist, Dr. Genesis Paiz on 2023. Enrique Rivers is has gained 4 pounds in the past 4 months. 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 Darshan Vasquez for evaluation of leukopenia and anemia.     CBC on 2018 showed a WBC of 3.37, hemoglobin 9.7 with MCV 87.8 and platelet count 545,224. Neutrophils were 56.3%, lymphocytes 9.5% and monocytes 20.5%. CMP included a creatinine of 1.94, GFR 41. Ca+ 8.8, TP 9.6. He is followed by Dr. Donna Cox for rheumatoid arthritis and possible Sjogren disease. He has a known history of CKD, as well as iron deficiency anemia from GI blood loss. Endoscopy 2017 by Dr. Yancy Osullivan revealed a small bowel AVM that was cauterized. A moderate sized hiatal hernia with mild Jake's erosions was noted as well. Colonoscopy 17 was appreciable for cecal AVM, also cauterized as well as internal hemorrhoids. M2A capsule endoscopy 1/3/2018 by Dr. Mino Lozoya revealed one irritated area in the colon, though no signs of bleeding or stigmata of small bowel blood loss. Should evidence of GI blood loss persists, consideration should be given for repeat EGD/colonoscopy due to evidence of blood loss on procedures in  with possible need for repeat cauterization. Karon Wray was previously on antiplatelet therapy for CAD, now treated with aspirin only. He denies melena or hematochezia at this time. In addition to the above, Karon Wray has been followed by Dr. Liam Chinchilla for thoracic adenopathy. Contrasted Chest CT 1/10/2018 at [facility] documented an interval increase in the size of intrathoracic lymph nodes compared to 2016; 2016; CTA chest 2016 including the followin mm precarinal LN   10 mm hilar LN   14 mm subcarinal LN   17 left hilar LN, previously 10 mm   4 mm medial ALIYA nodule, more conspicuous   4 mm lingular nodule, new   7 mm left major fissure nodule, stable   7 mm LLL pleural based noncalcified nodule, previously 5 mm   6 mm LLL nodule, previously 4 mm   3 mm LLL nodule   8 mm RUL, previously 5 mm   Several RLL nodule, relatively stable    Bronchoscopy with EBUS and biopsy of a station 7 node was performed 18 by Dr. Liam Chinchilla. Pathology:  1. station 7 lymph node revealed:  A. Small, mature lymphocytes and plasma cells. B. Ciliated columnar bronchial epithelial cells and gallbladder cells. C. background blood. D. negative for malignant cells. 2. Bronchial washings  A. acute inflammation, mild  B. squamous metaplasia  C. blood and mucus  D. negative for malignant cells  E. GMS stain negative for fungal organisms  FLOW cytometry on the station 7 lymph node biopsied by EBUS on 1/25/2018 did NOT reveal immunophenotypic evidence of a clonal B cell population     IN RETROSPECT,  Karen Burciaga has a history of FNA of a right neck mass performed 12/23/2011 by Dr. Zeenat Rodrigues. Pathology revealed no malignant cells, with fragments of benign lymphoid tissue and a few benign salivary gland acinar fragments. On 3/23/2012 excision of a right submandibular gland was performed by Dr. Zeenat Rodrigues that demonstrated severe, chronic follicular sialadenitis with focal areas of glandular fibrosis and heterogeneous lymphoid predominant inflammatory cell population with significant number of plasma cells and eosinophils. There was no evidence of epithelial malignancy. FLOW cytometry revealed no evidence of B-cell or T-cell lymphoma. On exam, left submandibular lymphadenopathy is appreciated and perhaps shotty axillary lymphadenopathy. CBC 4/26/2018 showed a normal WBC of 4.52, though with persistent monocytosis at 22.8%. Hemoglobin is 10.8 with MCV 91.0 and platelet count 749,456. Karen Burciaga' main complaint had been of hoarseness and progressive cough, which occurs daily. He dates symptoms back to August, 2017 after having an allergic reaction to Lyrica for post-herpetic neuralgia. In addition to hoarseness and cough, Karen Burciaga also reports weight loss of 24 pounds over the previous 4-6 weeks prior to presentation at this office, night sweats and chills. He was also referred to [facility] by Dr. Rory Herman regarding persistent pulmonary symptoms.      Given the abnormal CBC finding, adenopathy and symptomatology work up was also requested, and he was referred to this office. Serology 4/26/2018  Serum Fe - 32  TIBC - 220  Fe Sat - 15%  Ferritin - 45  B12 - 744  Folate > 20  Retic - 1.6 %  LDH -175  B2 M - 7 (0.6-2.4)  QI - IgG-5051 (700-1600), IgM 170 ()  SPEP - no M spike, TP - 10.2 (6-8.5), globulin - 7.5 (2.2-3.9)  Free serum light chains - kappa 930.3 (3.3-19.4), lambda 228 (5.7-26.3), K/L ratio 4.08 (0.26-1.65)    Bone marrow aspiration and biopsy on 4/30/2018 was normocellular (~20-25 %) with adequate maturing trilineage hematopoiesis, no significant dyspoiesis and no increase in blasts. Megakaryocytes had a spectrum of morphology. There was no stainable storage iron and no increase in reticulin fibrosis. 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 Flow cytometry did not reveal B-cell monoclonality nor T-cell aberrant antigen expression. FISH studies for MDS was negative. There was no morphologic evidence of a myelodysplastic syndrome. There was no diagnostic evidence of a limp low at disorder, granulomas, nor metastatic malignancies on the bone marrow biopsy or aspirate. CT scans of the neck with contrast on 4/30/2018 documented an asymmetry within the left submandibular gland compared to the right. The left submandibular gland measures 4.2 x 2.6 x 2.4 cm compared to 12 mm on the right. Otherwise there are no discrete neck masses or pathologically enlarged lymph nodes. 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 Prominent right external iliac chain lymph nodes of indeterminate significance were also documented.   Diverticulosis without diverticulitis and a large hiatal hernia was described. The prostate gland was enlarged with lifting of the base of the urinary bladder. Also noted was a questionable abnormal appearance of the pancreas with some loss of the normal pancreatic on to work without a discrete mass with MRI recommended. MRI of the abdomen W & WO on 5/8/2018 documented borderline splenomegaly, a large ventral hernia, nonenhancing renal cysts and a 3.5 cm abdominal aortic aneurysm. A 6 mm right middle lobe pulmonary nodule and nodular density along the left major fissure of the region of the lingula were noted as previously described on the CT scan of the chest on 1/10/2018. The nodules on 1/10/2018 had increased compared to 11/22/2016 with metastatic disease being in the differential.   MRI of the pelvis W & WO contrast on 5/8/2018 documented enhancing septations and a small soft tissue nodule within the inferior aspect of the left fatty-containing retroperitoneum positioned below the level of the left kidney. This abnormality is lateral to the psoas muscle (it abuts the psoas muscle, but does not invade the psoas muscle) and extending to the level of the left iliac crest.  An atypical lipomatouse tumor versus liposarcoma considered. Mild prostate enlargement is also noted. Mildly enlarged pelvic lymph nodes measuring 1.2 cm in short axis, reactive or metastatic are also noted. Ollie Wolfe saw Dr. Niru Valdez and Dr. Elizabeth Brian and reviewed Ollie Wolfe' case with sarcoma tumor board at [facility] on 6/8/2018. 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 Unfortunately, a curative resection would require sacrifice of the left kidney leading to progression of CKD and possibly dialysis.    Recommendation by Dr Hever Carcamo on a clinic visit note faxed 6/20/2018 was for continued observation with repeat CT scan in 3 months as there were no signs of high-grade component on the CT scans from April of 2018 or the MRI of the abdomen/pelvis from 5/8/2018. Consideration could be given for resection if there is documentation of clear growth or concern for dedifferentiation. Pathology from the station 7 lymph node on 1/25/18 was reviewed 6/19/18 at [facility] and noted to be predominantly bronchial cells with few lymphocytes and macrophages, negative for malignancy. Bronchial washings also negative for malignancy. Interpretation of the chest CT 6/13/18 at [facility] was consistent with multiple bilateral pulmonary nodules and mediastinal and hilar adenopathy, possibly representing sarcoidosis with malignancy not excluded. AAA partially imaged. CT scans of the chest, abdomen/pelvis 8/3/18 at \[facility]\"" were grossly unchanged with surveillance to continue. Regarding the left submandibular gland, I spoke with Dr. Graham Verma on 6/27/2018. Dr. Yvette Vázquez stated he has been monitoring the left submandibular gland for years, without gross change. He did not recommend excision, rather continued monitoring due to stability and concern for xerostomia. Lasix renogram and assessment by Dr. Ирина Donald was completed in in September, 2018. Dr. Starr Re reviewed imaging and confirmed the need for removal of the whole left kidney. Tao's case was again reviewed with the sarcoma tumor Board. Renogram demonstrated differential kidney function of 60% on the right. Dr. Ирина Donald estimated his final GFR may be 25-30, thus likely avoiding hemodialysis postoperatively. Left nephrectomy appeared more reasonable given recent stone disease and less function. Pratik [place] underwent resection of the left retroperitoneal mass with radical left nephrectomy on 10/31/18 by Dr. Yasemin Frye at [facility]. Pathology as follows:  Liver, segment 3, excision: Benign liver parenchyma with calcified granuloma. Left retroperitoneal nodule, excision: Fibroadipose tissue with chronic lymphoplasmacytic and histiocytic inflammation.  There were NO features of well-differentiated liposarcoma noted. Left periurethral tissue, excision: Fibroadipose tissue with chronic lymphoplasmacytic and histiocytic inflammation. Left lateral pararenal tissue, excision: Benign fibroadipose tissue with chronic lymphoplasmacytic and histiocytic inflammation. Left nephrectomy: Marked lymphoplasmacytic and histiocytic inflammation, focal fibrosis and changes of the obliterative phlebitis, most consistent with IgG4-related disease. Postoperative noncontrast abdominal/pelvic CT 11/23/18 at [facility] documented stranding of fat in the retroperitoneum, likely related to recent surgery. Diverticulosis of the colon with inflammation of the fat around the mid to distal descending colon close to the prior surgical site present, possibly representing postoperative change. There was a tiny 2 mL stone in the distal right ureter with mild-to-moderate dilatation of the right ureter and mild dilatation of the right renal collecting system. Nonobstructing stones also noted in the right kidney as well as a 4 cm probable cyst in the upper pole right kidney. Surveillance non-contrast CT scans of the chest, abdomen and pelvis 2/22/19 at [facility] documented interval decrease in numerous mediastinal lymph nodes and decrease in size of multiple bilateral pulmonary nodules. There was no residual lymphadenopathy and near complete resolution of inflammatory changes around the distal descending colon. Previous right renal lesions were stable. Myah Lynch is followed by Dr. Suri Foy, treated with prednisone regarding IgG4 systemic disease with known involvement of salivary glands, kidney, and retroperitoneum. He completed prednisone 40 mg daily ×1 month on 2/23/19, and 30 mg daily ×4 weeks on 3/25/19. Myah Lynch will take 20 mg ×4 weeks, then 10 mg. By June 2020, prednisone have been tapered down to 3 mg daily by Dr. Suri Foy.     CT scan of the chest without contrast on 10/3/2019 identified 2 discrete subcentimeter nodules in the RLL of the lungs were not present previously, one measuring 3 mm and the other 5 mm. Another 7 mm nodule in the R mL is unchanged. Follow-up recommended. CT scan of the abdomen and pelvis without contrast on 10/3/2019 at [facility] did not reveal evidence of recurrent disease or metastatic disease to the abdomen or pelvis. Prior left nephrectomy is noted    CT scan of the Chest without contrast on 6/25/2020 at [facility] reveals Interval increase in size of pulmonary nodules in the RIGHT upper lobe and LEFT lower lobe. Interval resolution of medial RIGHT lower lobe nodules. Essentially stable RIGHT middle lobe nodule. Continue follow up in 6 months is recommended. CT scan of the Abdomen Pelvis without contrast on 6/25/2020  at [facility] reveals a Stable CT of the abdomen and pelvis with postsurgical changes in the left retroperitoneum. No evidence of recurrent or metastatic disease. 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 Atherosclerotic disease. Large hiatal hernia. Colonic diverticulosis without evidence of acute diverticulitis.  Low-density lesions in the right kidney which are stable but  incompletely characterized on this exam.      Serology on 10/10/2019 revealed:  Iron- 45  TIBC- 255  Saturation%- 17.6%  Ferritin- 13.40  B2M- 2.8  Kappa light chains- 42.4  Lambda light chains- 24.8 with K/L ratio 1.71  IgG 945, IgA 176, IgM 141  IgG 4- 137  M-spike- not observed      Serology on 2/3/2020 revealed:  CMP with BUN 30, creatinine 1.90  Iron- 16  TIBC- 223  Saturation %- 7.2  Ferritin- 7.66  B2M- 2.8  Kappa light chains- 25.8  Lambda light chains- 14.7  K/L ratio- 1.76  IgG 872, IgA 156, IgM 128  IgG 4- 136  M-spike- not observed    Serology on 6/10/2020 revealed:  CMP with BUN 24, creatinine 1.8  B2M- 3.5  Kappa light chains- 134.3  Lambda light chains- 39.3  K/L ratio- 3.42  IgG 1417, IgA 205, IgM 244  IgG 4- 213  Iron- 30  TIBC- 270  Iron sat- 11%  Ferritin- 14.9  M-spike- not observed    Serology on 7/15/2020 revealed:  Kappa light chains- 123.6  Lambda light chains- 36.7  K/L ratio- 3.37  IgG 4- 250    24-Hr Urine electrophoresis on 7/17/2020 revealed:  Protein, 24H- 92  M-spike- not observed    By June 2020, prednisone have been tapered down to 3 mg daily by Dr. Mikala Walker. With new findings on the CT scan of the chest and increased IgG4 level of 250, prednisone was increased again to 5 mg a day to be delivered along with Rituxan. Based on the new findings on the CT scan of the chest from 6/25/2020 and the elevated IgG4 of 250, Dr. Soni Nunez initiated Rituxan on 7/28/2020. Daljit Kearney will receive a second dose of Rituxan on 8/11/2020 with reevaluation every 6 months thereafter. CT chest without contrast on 12/16/2020 was compared to 6/25/2020 and documented:  No definite intrathoracic metastasis. Bilateral interlobular septal thickening with patchy consolidation in the LEFT lower lobe. Imaging appearance favors mild pulmonary edema and atelectasis. Correlate with clinical exam and laboratory analysis  7 mm RML lung nodule, stable  4 mm adjacent to above RML lung nodule, stable   5 mm LLL lung nodule, stable  5 mm LLL subpleural lung nodule, stable   Decreased size of right apical lung nodule which now appears more linear and scar like  No new or enlarging nodule  No enlarged axillary, supraclavicular, mediastinal or hilar lymph nodes  No acute osseous finding    CT abdomen and pelvis without contrast on 12/16/2020 documented:  No evidence of retroperitoneal mass or lymphadenopathy  Large sliding-type heart of hernia  3.9 cm low-density mass located laterally in the upper pole of left kidney, CT density suggest cyst  0.7 cm smaller low density nodule seen located medially in upper pole of left kidney, CT density suggest a cyst  2 mm calculus in lower pole of right kidney   Left nephrectomy without regional complications. Diverticulosis of the distal colon.  No evidence for diverticulitis. Enlarged prostate. Fusiform aneurysmal dilatation of distal abdominal aorta. 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 Further follow-up may be obtained. Fat-containing small inguinal hernias bilaterally    CT ABDOMEN PELVIS WO CONTRAST at [facility]- 12/2/2021   Stable CT abdomen pelvis exam compared to 6/5/2020. Left nephrectomy changes. Similar hypodense right renal lesions favoring cysts. Punctate nonobstructing inferior right intrarenal stone. Stable tubular appearance of the pancreatic tail and proximal pancreatic body. 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 3.6 cm infrarenal abdominal aortic aneurysm with partial chronic dissection based on distribution of intimal calcification. Moderate fecal stasis. No bowel obstruction. Trace left pleural effusion with persistent pleural thickening with left basilar atelectasis. Old left-sided rib fractures. Moderate to large hiatal hernia. CT of the chest without contrast at [facility] on 4/14/2022: COMPARISON: 6/25/2020   6 mm RIGHT middle lobe pulmonary nodule, unchanged  Previous described RIGHT upper lobe pulmonary nodule has resolved.    The previously described LEFT lower lobe pulmonary nodule is obscured by new LEFT lower lobe rounded atelectasis  New mild LEFT pleural thickening and trace effusion  Heavy coronary artery atherosclerotic calcification    CT of the abdomen and pelvis without contrast at [facility] on 4/14/2022:  3 mm nonobstructing RIGHT lower pole renal calculus  3.8 cm RIGHT renal cyst  4.9 cm Prostate   Moderate to large hiatal hernia  3.6 cm Infrarenal abdominal aortic aneurysm   13 mm aortocaval retroperitoneal lymph node  9 mm lymph node posterior to the IVC   10 mm enlarging RIGHT common iliac chain lymph nodes       Dr. Daniela Crawford is following and monitoring and treating his IgG4 related disease process. He  received Rituxan weekly x 4 Dr. Shannan Springer in January/Feb 2023. Serology-Dr. Jairon Chatman-4/8/2021  IgG4 - 182      TREATMENT SUMMARY:  Resection of left retroperitoneal mass and radical left nephrectomy by Dr. Jennie Monae at [facility] on 10/31/2018. Pathology revealed IgG4 related disease. Prednisone-management per Dr. Rivera Mars per Dr. Shannan Springer              Allergies:  Lyrica [pregabalin], Penicillins, and Sulfa antibiotics    Medicines:  Current Outpatient Medications   Medication Sig Dispense Refill    sodium bicarbonate 325 MG tablet in the morning and at bedtime      predniSONE (DELTASONE) 5 MG tablet Take 3 mg by mouth daily      amLODIPine (NORVASC) 5 MG tablet Take 5 mg by mouth daily      clonazePAM (KLONOPIN) 2 MG tablet Take 2 mg by mouth nightly as needed. atorvastatin (LIPITOR) 10 MG tablet Take 10 mg by mouth daily      cetirizine (ZYRTEC) 5 MG tablet Take 5 mg by mouth daily      aspirin 81 MG tablet Take 81 mg by mouth daily. pantoprazole (PROTONIX) 40 MG tablet Take 1 tablet by mouth 2 times daily. 60 tablet 11    Ergocalciferol (VITAMIN D2 PO) Take by mouth (Patient not taking: Reported on 3/1/2023)       No current facility-administered medications for this visit.        Past Medical History:      Diagnosis Date    Allergic rhinitis     Anemia, unspecified     Anxiety     Asthma     BPH (benign prostatic hyperplasia)     Dr Edi Price    CAD (coronary artery disease)     Dr Montine Fabry [facility])     skin-non-melanoma    Carotid artery stenosis     YVON Pagan    Chronic GERD 6/7/2017    Chronic kidney disease, stage III (moderate) (HCC)     Colon polyps     Cough     Decreased white blood cell count, unspecified     Disease of immune system ([facility] [address]     IGG 4 Auto Immune Disease    Dysphagia 6/7/2017     Updating Deprecated Diagnoses    Enlarged lymph nodes, unspecified     Esophageal dysphagia 7/13/2017    Esophageal stricture     GERD (gastroesophageal reflux disease)     Hemorrhoids 8/13/2012    HIGH CHOLESTEROL     History of colon polyps 8/13/2012    History of esophageal stricture 5/24/2016    Hypertension     IgG monoclonal gammopathy     Insomnia     Iron deficiency anemia 6/7/2017    Iron deficiency anemia 2/3/2023    Iron deficiency anemia due to chronic blood loss     Leukocytosis     Mediastinal lymphadenopathy     Monoclonal gammopathy     Monocytosis (symptomatic)     Night sweats     Normocytic normochromic anemia     Osteoarthritis     Other iron deficiency anemias     GI blood loss, 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 anemias     Peptic ulcer disease     Pill dysphagia 5/24/2016    Restless leg syndrome     Rheumatoid arthritis (HCC)     S/P coronary artery stent placement 5/24/2016    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 @ [facility] 2018    Serum aquaporin 4 IgG antibody positive     Skin cancer     on left ear lobe    Thrombocytopenia, unspecified (HCC)     Venous insufficiency     Weight loss, abnormal         Past Surgical History:      Procedure Laterality Date    COLONOSCOPY  09/15/2009    Dr Ramandeep Nielson, 3 yr recall    COLONOSCOPY  06/24/2003    Dr Nas Arroyo, 3 yr recall    COLONOSCOPY  11/12/2012    Dr Gurwinder Grimaldo diverticula, no polyps (5yr)    COLONOSCOPY  07/27/2006    Dr Nas Arroyo x 1, 3 yr recall    COLONOSCOPY N/A 11/28/2022    Dr THOMAS Olivia-Diverticular disease, prn (age)    CORONARY ANGIOPLASTY WITH STENT PLACEMENT  2016    2 stents    FINE NEEDLE ASPIRATION Right 12/23/2011    Neck mass-Dr Verma    GA COLSC FLEXIBLE W/CONTROL BLEEDING ANY METHOD N/A 07/13/2017    Dr Osullivan-w/control of hemorrage, gold probe cautery of avm-internal hemorrhoids-5 yr recall    PTCA  3/10/16 [place]    [facility]a    SALIVARY GLAND SURGERY Right 03/23/2012    Dr Verma-Excision of right submandibular gland    SKIN CANCER EXCISION      TONSILLECTOMY      TOTAL NEPHRECTOMY Left 2018 [place]    UPPER GASTROINTESTINAL ENDOSCOPY  07/23/2009    Dr Tammie Barbour gastritis, sm hiatal hernia, no active ulcers, urea neg    UPPER GASTROINTESTINAL ENDOSCOPY  08/02/2006    Dr Llanos Staff, non obst Schatzki's ring, lg hiatal hernia, urea neg    UPPER GASTROINTESTINAL ENDOSCOPY  07/13/2017    Dr Garcia/control of hemorrhage with gold probe cauterization of AVM-cecal avm, small bowel avm, hiatal hernia, margarito's erosions    UPPER GASTROINTESTINAL ENDOSCOPY  07/13/2017    Dr Garcia/control of hemorrhage with gold probe cauterization of AVM-cecal avm, small bowel avm, hiatal hernia, margarito's erosions    UPPER GASTROINTESTINAL ENDOSCOPY N/A 11/28/2022    Dr Amairani [id number], 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, gastritis, few small benign-appearing polyps, consistent with fundic gland polyps, 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     VASECTOMY REVERSAL          Family History:      Problem Relation Age of Onset    Hypertension Mother     High Cholesterol Mother     Heart Failure Mother         blockages, heart attack    Other Mother         pneumonia    Heart Failure Father         blockages, heart attack    Heart Failure Brother         blockages, heart attack    High Cholesterol Brother     Stroke Brother     Heart Disease Brother     Hypertension Brother     Other Other         mother , age 80 fall    Colon Cancer Neg Hx     Colon Polyps Neg Hx     Esophageal Cancer Neg Hx     Liver Cancer Neg Hx     Liver Disease Neg Hx     Rectal Cancer Neg Hx     Stomach Cancer Neg Hx         Social History  Social History     Tobacco Use    Smoking status: Former     Packs/day: 1.00     Years: 20.00     Pack years: 20.00     Types: Cigarettes     Quit date: 1986     Years since quittin.9    Smokeless tobacco: Never   Vaping Use    Vaping Use: Never used   Substance Use Topics    Alcohol use: No    Drug use: No            Objective:  Vital Signs: Blood pressure 120/80, pulse 65, height 5' 9\" (1.753 m), weight 166 lb (75.3 kg), SpO2 96 %. Labs:  BMP:   No results for input(s): NA, K, CL, CO2, PHOS, BUN, CREATININE, CALCIUM in the last 72 hours. CBC:   No results for input(s): WBC, HGB, HCT, MCV, PLT in the last 72 hours. PT/INR: No results for input(s): PROTIME, INR in the last 72 hours. APTT: No results for input(s): APTT in the last 72 hours. Ionized Calcium:No results for input(s): IONCA in the last 72 hours. Magnesium:No results for input(s): MG in the last 72 hours. Phosphorus:No results for input(s): PHOS in the last 72 hours. Hepatic:   No results for input(s): ALKPHOS, ALT, AST, PROT, BILITOT, BILIDIR, LABALBU in the last 72 hours. Cultures:   No results for input(s): CULTURE in the last 72 hours. ASSESSMENT AND PLAN:    Willy Blizzard is a 80year old  gentleman managed with primary and secondary diagnoses as outlined:  Resection of left retroperitoneal mass and radical left nephrectomy by Dr. Ana Maria Edwards at [facility] on 10/31/2018. 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 Edda Ortega    His ex-wife  in 2021, and their son  in 2021. He is still grieving.   Juarez Kulkarni' 77year-old wife of 24 years left him and cleaned out all the furniture in the house with a moving company while he was at [employer] in March 2022. He moved out of his condo and has had counseling regarding this the pressing issue and has improved significantly over the course of time. Mr. Shawn Baig receives weekly Rituxan x 4 periodically for exacerbations of his IgG4 related disease directed by Dr. Omar Aguilar. Mr. Shawn Baig last received 4 weekly doses of Rituxan between in February 2023    He he is currently on prednisone 5 mg daily in treatment of joint pains pleuritic-like chest discomfort, rib pain and fatigue. per his rheumatologist Dr. Idris Aguilar. IgG4 level was 224 related to him by his rheumatologist, Dr. Idris Aguilar on 2/28/2023. Gt Mccurdy is has gained 4 pounds in the past 4 months. /80   Pulse 65   Ht 5' 9\" (1.753 m)   Wt 166 lb (75.3 kg)   SpO2 96%   BMI 24.51 kg/m²     Physical examination today, 3/1/2023:   Gt Mccurdy has a 1.5 cm left mandibular soft round mobile lymph node but otherwise does not reveal further evidence of palpable peripheral lymphadenopathy. Lungs have scattered rales bilaterally in upper and lower lung fields. He does complain on discomfort on deep inspiration, he may have a pleural friction rub. The heart is regular the abdomen is soft and benign  He does have a cushingoid appearance of the face due to chronic prednisone/steroid use on a slow taper presently    CBC 2/22/2023  reveals a WBC of 5.85 Hgb is 9.3 with an MCV of 83.2 and platelet count of 568,511.     Serology on 10/10/2019 revealed:  Iron- 45  TIBC- 255  Saturation%- 17.6%  Ferritin- 13.40  B2M- 2.8  Kappa light chains- 42.4  Lambda light chains- 24.8 with K/L ratio 1.71  IgG 945, IgA 176, IgM 141  IgG 4- 137  M-spike- not observed      Serology on 2/3/2020 revealed:  CMP with BUN 30, creatinine 1.90  Iron- 16  TIBC- 223  Saturation %- 7.2  Ferritin- 7.66  B2M- 2.8  Kappa light chains- 25.8  Lambda light chains- 14.7  K/L ratio- 1.76  IgG 872, IgA 156, IgM 128  IgG 4- 136  M-spike- not observed    Serology on 6/10/2020 revealed:  CMP with BUN 24, creatinine 1.8  B2M- 3.5  Kappa light chains- 134.3  Lambda light chains- 39.3  K/L ratio- 3.42  IgG 1417, IgA 205, IgM 244  IgG 4- 213  Iron- 30  TIBC- 270  Iron sat- 11%  Ferritin- 14.9  M-spike- not observed    Serology on 7/15/2020 revealed:  Kappa light chains- 123.6  Lambda light chains- 36.7  K/L ratio- 3.37  IgG 4- 250    Dr. Maya Garcia began treatment with Rituxan on 7/28/2020.    24-Hr Urine electrophoresis on 7/17/2020 revealed:  Protein, 24H- 92  M-spike- not observed    Serology on 12/16/2020 revealed:  CMP with creatinine 1.91  B2M- 3.3  Kappa light chains- 129.5  Lambda light chains- 45.0  K/L ratio- 2.88  IgG 1580, IgA 216, IgM 176  M-spike- not observed  IgG4- 264    Serology 4- revealed:  B2M-4.0  KLC-109.9  LLC- 37.6  K/L ratio - 2.92  IgG 1228  IgA- 187  IgM-147  Mspike-not observed  TIBC 279  Iron level   50  Iron saturation    18  Ferritin     76    Serology 9/20/2021 revealed:  B2M-3.9  KLC-213.6  LLC- 55.2  K/L ratio - 3.87  IgG 2056  IgA- 238  IgM-230  Mspike-not observed    Serology 3/30/2022 revealed:  B2M-3.9  KLC-10.67  LLC- 3.74  K/L ratio - 2.85  IgG 1433  IgA- 172  IgM-220    Folate-14.0    Serology 6/15/2022 revealed:  B2M-4.4  KLC-20.03  LLC- 4.95  K/L ratio - 4.05  IgG 1743  IgA- 195  IgM-245      Serology 10/19/2022 revealed:  B2M-4.7  KLC-20.03  LLC- 4.95  K/L ratio - 4.05  IgG 1863  IgA- 206  IgM-245    Serology 2/1/2023 revealed:  B2M-3.8  KLC-52.40  LLC- 21.93  K/L ratio - 2.39  IgG 688  IgA- 132  IgM-98    Folate-17.8  Ferritin-11.7  Iron-17  TIBC-360  Iron Sat-5%        Summary of IgG4 levels as follows:  10/10/2019  IgG 4- 137  2/3/2020      IgG 4- 136  6/10/2020    IgG 4- 213  7/15/2020    IgG 4- 250  12/16/2020  IgG4- 264  4/8/2021      IgG4- 182  7/14/2021    IgG4- 250  3/30/2022    IgG4- 280  6/14/2022    IgG4- 364  10/19/2022  IgG4-426      CT of the chest without contrast at [facility] on 4/14/2022: COMPARISON: 6/25/2020   6 mm RIGHT middle lobe pulmonary nodule, unchanged  Previous described RIGHT upper lobe pulmonary nodule has resolved. The previously described LEFT lower lobe pulmonary nodule is obscured by new LEFT lower lobe rounded atelectasis  New mild LEFT pleural thickening and trace effusion  Heavy coronary artery atherosclerotic calcification    CT of the abdomen and pelvis without contrast at [facility] on 4/14/2022:  3 mm nonobstructing RIGHT lower pole renal calculus  3.8 cm RIGHT renal cyst  4.9 cm Prostate   Moderate to large hiatal hernia  3.6 cm Infrarenal abdominal aortic aneurysm   13 mm aortocaval retroperitoneal lymph node  9 mm lymph node posterior to the IVC   10 mm enlarging RIGHT common iliac chain lymph nodes       CT OF THE BONY PELVIS WITHOUT CONTRAST at [facility] on 2/5/2023:  Borderline distal abdominal aortic aneurysm at approximately 3.8 cm in diameter  Chronic changes    There is no evidence on physical examination or review of systems of new or active plasma cell dyscrasia or evidence of new symptoms or disease related to his IgG for related diseases. Dr. Mikala Walker is co-managing,  monitoring and treating his IgG4 related disease process with Rituxan and adjusting prednisone. His PCP, Dr. Simone Wilson provides antianxiolytics during this difficult emotional process of his wife leaving him. I will see him back in follow-up in 3 months anticipating repeat imaging in 6 months which probably can be limited to every year now. PLAN:  CBC, CMP, SPEP, quantitative immunoglobulins, kappa and lambda light chains ordered to be drawn before his follow-up visit in 3 months.   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 Injectafer given 2/13/2023 and 2/20/2023      #3  IgG Kappa MGUS, stable   Serology will be repeated prior to his return in 4 months. Serology on 12/16/2020 revealed:  CMP with creatinine 1.91  B2M- 3.3  Kappa light chains- 129.5  Lambda light chains- 45.0  K/L ratio- 2.88  IgG 1580, IgA 216, IgM 176  M-spike- not observed  IgG4- 264    Serology-Dr. Jd Chatman-4/8/2021  IgG4 - 182    Serology 4- revealed:  B2M-4.0  KLC-109.9  LLC- 37.6  K/L ratio - 2.92  IgG 1228  IgA- 187  IgM-147  Mspike-not observed  TIBC 279  Iron level   50  Iron saturation    18  Ferritin     76    Serology 9/20/2021 revealed:  B2M-3.9  KLC-213.6  LLC- 55.2  K/L ratio - 3.87  IgG 2056  IgA- 238  IgM-230  Mspike-not observed    Serology 3/30/2022 revealed:  B2M-3.9  KLC-10.67  LLC- 3.74  K/L ratio - 2.85  IgG 1433  IgA- 172  IgM-220    Folate-14.0    Serology 10/19/2022 revealed:  B2M-4.7  KLC-20.03  LLC- 4.95  K/L ratio - 4.05  IgG 1863  IgA- 206  IgM-245      Serology 1/3/2023 revealed: IgG 1104      Serology 2/1/2023 revealed:  B2M-3.8  KLC-52.40  LLC- 21.93  K/L ratio - 2.39  IgG 688  IgA- 132  IgM-98    Folate-17.8          No intervention warranted at this juncture. Serology will be repeated prior to his return, see orders below    #4   [facility] was found to have right leg swelling that was documented as RLE DVT. He was placed on Eliquis 5 mg twice a day and was to have had a followup rescanning 3 months later. This is being managed by Dr. Rachel Elise. #5  [facility] had issues of right hip pain. This is being managed by orthopedics at the orthopedic clinic with a recent right hip injection. He has had imaging of the area. I am suspicious he may have avascular necrosis associated with the chronic prednisone use. This is being addressed at the orthopedic clinic and with Dr. Bobie Siemens.   He also has low back pain and they are not sure whether his pain is from his low back or from his hip. This continues to be addressed    #6  Skin rash  Itching on arms and torso, Dr. Mario Cavazos addressing this issue. I have encouraged again to continue follow-up with Dr. Dilan Diaz and Dr. Simone Wilson regarding his skin rash. #7  Dr. Perla Self was to have addressed a tear in the retina, however Daljit Kearney did not follow through. #8  GI cancer screening  Colonoscopy performed on 11/28/2022 by Dr. Vern Mireles  no recall due to age    EGD by Dr. Vern Mireles at [facility] on 11/28/2022  FINAL DIAGNOSIS:   Stomach, gastric biopsy:   1. Benign gastric mucosa with minimal chronic inflammation. 2.  No Helicobacter pylori organisms identified by immunohistochemical stain. FL UGI W SMALL BOWEL at [facility] on 2/14/2023:  Normal upper GI series with small bowel follow-through        #9  New symptoms of arthritis fatigue dyspnea on exertion    I will defer the management of arthritis to Dr. Soni Nunez, he is on prednisone 1 mg a day at this time. XR CHEST (2 VW) 4/19/2021 :  COPD and chronic interstitial change. There is no acute cardiopulmonary process. #10  Immunizations:  Immunization History   Administered Date(s) Administered    COVID-19, PFIZER Bivalent BOOSTER, DO NOT Dilute, (age 12y+), IM, 30 mcg/0.3 mL 01/19/2023    COVID-19, PFIZER GRAY top, DO NOT Dilute, (age 15 y+), IM, 30 mcg/0.3 mL 04/28/2022    COVID-19, PFIZER PURPLE top, DILUTE for use, (age 15 y+), 30mcg/0.3mL 02/20/2021, 03/13/2021, 02/22/2022    Influenza Virus Vaccine 10/31/2017, 10/11/2019, 10/09/2020, 10/01/2021, 10/20/2022    Influenza Whole 10/24/2015    Influenza, FLUAD, (age 72 y+), Adjuvanted, 0.5mL 02/22/2022    Influenza, High Dose (Fluzone 65 yrs and older) 09/17/2018    Tdap (Boostrix, Adacel) 03/26/2019             Romeljenna Kearney was seen today for follow-up, treatment and results. Diagnoses and all orders for this visit: IgG monoclonal gammopathy  -     CT SOFT TISSUE NECK WO CONTRAST;  Future  -     Beta 2 Microglobulin, Serum; Future  -     MONOCLONAL PROTEIN AND FLC, SERUM; Future  -     Comprehensive Metabolic Panel; Future    Anemia, unspecified type  -     CBC with Auto Differential; Future    Stage 3 chronic kidney disease, unspecified whether stage 3a or 3b CKD (HCC)  -     Comprehensive Metabolic Panel; Future    Iron deficiency    Health care maintenance    IgG4 related disease ([facility] [address]  -     CT CHEST WO CONTRAST; Future  -     CT ABDOMEN PELVIS WO CONTRAST Additional Contrast? Oral; Future  -     CT SOFT TISSUE NECK WO CONTRAST; Future  -     MONOCLONAL PROTEIN AND FLC, SERUM; Future    Lung nodules  -     CT CHEST WO CONTRAST; Future  -     CT ABDOMEN PELVIS WO CONTRAST Additional Contrast? Oral; Future  -     CT SOFT TISSUE NECK WO CONTRAST; Future    Enlarged lymph node in neck  -     CT SOFT TISSUE NECK WO CONTRAST;  Future           Orders Placed This Encounter   Procedures    CT CHEST WO CONTRAST     Standing Status:   Future     Standing Expiration Date:   3/1/2024     Order Specific Question:   Reason for exam:     Answer:   compare to prev imaging at \[facility]\"" 4/14/2022; hx pelvic mass, Igg4 related disease, lung nodules    CT ABDOMEN PELVIS WO CONTRAST Additional Contrast? Oral     Standing Status:   Future     Standing Expiration Date:   3/1/2024     Scheduling Instructions:      At \[facility]\""     Order Specific Question:   Additional Contrast?     Answer:   Oral     Order Specific Question:   Reason for exam:     Answer:   compare to prev imaging at \[facility]\"" 4/14/2022; hx pelvic mass, Igg4 related disease, lung nodules    CT SOFT TISSUE NECK WO CONTRAST     Standing Status:   Future     Standing Expiration Date:   3/1/2024     Order Specific Question:   Reason for exam:     Answer:   enlarge dlymph nodes, compare to prev imaging at \[facility]\"" ; hx pelvic mass, Igg4 related disease, lung nodules    Beta 2 Microglobulin, Serum     Standing Status:   Future     Standing Expiration Date:   3/1/2024    MONOCLONAL PROTEIN AND FLC, SERUM Standing Status:   Future     Standing Expiration Date:   3/1/2024    Comprehensive Metabolic Panel     Standing Status:   Future     Standing Expiration Date:   3/1/2024    CBC with Auto Differential     Standing Status:   Future     Standing Expiration Date:   3/1/2024             Return in about 3 months (around 6/1/2023) for Follow-up with Dr. Kavya Armando with labs prior to.

## 2023-03-01 ENCOUNTER — HOSPITAL ENCOUNTER (OUTPATIENT)
Dept: INFUSION THERAPY | Age: 83
Discharge: HOME | End: 2023-03-01
Payer: MEDICARE

## 2023-03-01 ENCOUNTER — TRANSCRIBE ORDERS (OUTPATIENT)
Dept: ADMINISTRATIVE | Facility: HOSPITAL | Age: 83
End: 2023-03-01
Payer: MEDICARE

## 2023-03-01 ENCOUNTER — OFFICE VISIT (OUTPATIENT)
Dept: HEMATOLOGY | Age: 83
End: 2023-03-01
Payer: MEDICARE

## 2023-03-01 VITALS
BODY MASS INDEX: 24.59 KG/M2 | SYSTOLIC BLOOD PRESSURE: 120 MMHG | WEIGHT: 166 LBS | HEIGHT: 69 IN | OXYGEN SATURATION: 96 % | DIASTOLIC BLOOD PRESSURE: 80 MMHG | HEART RATE: 65 BPM

## 2023-03-01 DIAGNOSIS — E61.1 IRON DEFICIENCY: ICD-10-CM

## 2023-03-01 DIAGNOSIS — D47.2 IGG MONOCLONAL GAMMOPATHY: Primary | ICD-10-CM

## 2023-03-01 DIAGNOSIS — Z00.00 HEALTH CARE MAINTENANCE: ICD-10-CM

## 2023-03-01 DIAGNOSIS — R91.8 LUNG NODULES: ICD-10-CM

## 2023-03-01 DIAGNOSIS — R59.0 ENLARGED LYMPH NODE IN NECK: Primary | ICD-10-CM

## 2023-03-01 DIAGNOSIS — D64.9 ANEMIA, UNSPECIFIED TYPE: ICD-10-CM

## 2023-03-01 DIAGNOSIS — D89.89 IGG4 RELATED DISEASE (HCC): ICD-10-CM

## 2023-03-01 DIAGNOSIS — R59.0 ENLARGED LYMPH NODE IN NECK: ICD-10-CM

## 2023-03-01 DIAGNOSIS — N18.30 STAGE 3 CHRONIC KIDNEY DISEASE, UNSPECIFIED WHETHER STAGE 3A OR 3B CKD (HCC): ICD-10-CM

## 2023-03-01 PROCEDURE — 3079F DIAST BP 80-89 MM HG: CPT | Performed by: INTERNAL MEDICINE

## 2023-03-01 PROCEDURE — 99214 OFFICE O/P EST MOD 30 MIN: CPT | Performed by: INTERNAL MEDICINE

## 2023-03-01 PROCEDURE — 3074F SYST BP LT 130 MM HG: CPT | Performed by: INTERNAL MEDICINE

## 2023-03-01 PROCEDURE — 1036F TOBACCO NON-USER: CPT | Performed by: INTERNAL MEDICINE

## 2023-03-01 PROCEDURE — G8420 CALC BMI NORM PARAMETERS: HCPCS | Performed by: INTERNAL MEDICINE

## 2023-03-01 PROCEDURE — G8427 DOCREV CUR MEDS BY ELIG CLIN: HCPCS | Performed by: INTERNAL MEDICINE

## 2023-03-01 PROCEDURE — 1123F ACP DISCUSS/DSCN MKR DOCD: CPT | Performed by: INTERNAL MEDICINE

## 2023-03-01 PROCEDURE — G8484 FLU IMMUNIZE NO ADMIN: HCPCS | Performed by: INTERNAL MEDICINE

## 2023-03-01 PROCEDURE — 99212 OFFICE O/P EST SF 10 MIN: CPT

## 2023-03-03 ENCOUNTER — TRANSCRIBE ORDERS (OUTPATIENT)
Dept: ADMINISTRATIVE | Facility: HOSPITAL | Age: 83
End: 2023-03-03
Payer: MEDICARE

## 2023-03-03 ENCOUNTER — HOSPITAL ENCOUNTER (OUTPATIENT)
Dept: GENERAL RADIOLOGY | Facility: HOSPITAL | Age: 83
Discharge: HOME | End: 2023-03-03
Admitting: NURSE PRACTITIONER
Payer: MEDICARE

## 2023-03-03 DIAGNOSIS — M25.572 PAIN IN LEFT ANKLE AND JOINTS OF LEFT FOOT: Primary | ICD-10-CM

## 2023-03-03 DIAGNOSIS — M25.572 PAIN IN LEFT ANKLE AND JOINTS OF LEFT FOOT: ICD-10-CM

## 2023-03-03 PROCEDURE — 73630 X-RAY EXAM OF FOOT: CPT

## 2023-03-06 ENCOUNTER — OFFICE VISIT (OUTPATIENT)
Dept: WOUND CARE | Facility: HOSPITAL | Age: 83
End: 2023-03-06
Payer: MEDICARE

## 2023-03-06 ENCOUNTER — LAB REQUISITION (OUTPATIENT)
Dept: LAB | Facility: HOSPITAL | Age: 83
End: 2023-03-06
Payer: MEDICARE

## 2023-03-06 DIAGNOSIS — L02.612 CUTANEOUS ABSCESS OF LEFT FOOT: ICD-10-CM

## 2023-03-06 DIAGNOSIS — D80.3 SELECTIVE DEFICIENCY OF IMMUNOGLOBULIN G (IGG) SUBCLASSES: ICD-10-CM

## 2023-03-06 DIAGNOSIS — Z00.00 ENCOUNTER FOR GENERAL ADULT MEDICAL EXAMINATION WITHOUT ABNORMAL FINDINGS: ICD-10-CM

## 2023-03-06 DIAGNOSIS — L03.116 CELLULITIS OF LEFT LOWER LIMB: ICD-10-CM

## 2023-03-06 PROCEDURE — 87075 CULTR BACTERIA EXCEPT BLOOD: CPT | Performed by: PODIATRIST

## 2023-03-06 PROCEDURE — 87176 TISSUE HOMOGENIZATION CULTR: CPT | Performed by: PODIATRIST

## 2023-03-06 PROCEDURE — 87205 SMEAR GRAM STAIN: CPT | Performed by: PODIATRIST

## 2023-03-06 PROCEDURE — 87070 CULTURE OTHR SPECIMN AEROBIC: CPT | Performed by: PODIATRIST

## 2023-03-06 PROCEDURE — 99204 OFFICE O/P NEW MOD 45 MIN: CPT | Performed by: PODIATRIST

## 2023-03-06 PROCEDURE — 87186 SC STD MICRODIL/AGAR DIL: CPT | Performed by: PODIATRIST

## 2023-03-06 PROCEDURE — G0463 HOSPITAL OUTPT CLINIC VISIT: HCPCS

## 2023-03-06 PROCEDURE — 10060 I&D ABSCESS SIMPLE/SINGLE: CPT | Performed by: PODIATRIST

## 2023-03-06 PROCEDURE — 87147 CULTURE TYPE IMMUNOLOGIC: CPT | Performed by: PODIATRIST

## 2023-03-08 ENCOUNTER — HOSPITAL ENCOUNTER (OUTPATIENT)
Dept: INFUSION THERAPY | Age: 83
Discharge: HOME | End: 2023-03-08
Payer: MEDICARE

## 2023-03-08 DIAGNOSIS — D64.9 ANEMIA, UNSPECIFIED TYPE: ICD-10-CM

## 2023-03-08 LAB
BASOPHILS ABSOLUTE: 0.06 K/UL (ref 0.01–0.08)
BASOPHILS RELATIVE PERCENT: 1.2 % (ref 0.1–1.2)
EOSINOPHILS ABSOLUTE: 0.2 K/UL (ref 0.04–0.54)
EOSINOPHILS RELATIVE PERCENT: 4.1 % (ref 0.7–7)
HCT VFR BLD CALC: 37.4 % (ref 40.1–51)
HEMOGLOBIN: 10.7 G/DL (ref 13.7–17.5)
LYMPHOCYTES ABSOLUTE: 0.33 K/UL (ref 1.18–3.74)
LYMPHOCYTES RELATIVE PERCENT: 6.8 % (ref 19.3–53.1)
MCH RBC QN AUTO: 25.1 PG (ref 25.7–32.2)
MCHC RBC AUTO-ENTMCNC: 28.6 G/DL (ref 32.3–36.5)
MCV RBC AUTO: 87.8 FL (ref 79–92.2)
MONOCYTES ABSOLUTE: 1.24 K/UL (ref 0.24–0.82)
MONOCYTES RELATIVE PERCENT: 25.6 % (ref 4.7–12.5)
NEUTROPHILS ABSOLUTE: 2.97 K/UL (ref 1.56–6.13)
NEUTROPHILS RELATIVE PERCENT: 61.5 % (ref 34–71.1)
PLATELET # BLD: 144 K/UL (ref 163–337)
PMV BLD AUTO: 11.5 FL (ref 7.4–10.4)
RBC # BLD: 4.26 M/UL (ref 4.63–6.08)
WBC # BLD: 4.84 K/UL (ref 4.23–9.07)

## 2023-03-08 PROCEDURE — 85025 COMPLETE CBC W/AUTO DIFF WBC: CPT

## 2023-03-08 PROCEDURE — 36415 COLL VENOUS BLD VENIPUNCTURE: CPT

## 2023-03-09 LAB
BACTERIA SPEC AEROBE CULT: ABNORMAL
GRAM STN SPEC: ABNORMAL
GRAM STN SPEC: ABNORMAL

## 2023-03-12 LAB — BACTERIA SPEC ANAEROBE CULT: NORMAL

## 2023-03-13 ENCOUNTER — OFFICE VISIT (OUTPATIENT)
Dept: WOUND CARE | Facility: HOSPITAL | Age: 83
End: 2023-03-13
Payer: MEDICARE

## 2023-03-13 DIAGNOSIS — D80.3 SELECTIVE DEFICIENCY OF IMMUNOGLOBULIN G (IGG) SUBCLASSES: ICD-10-CM

## 2023-03-13 DIAGNOSIS — L03.116 CELLULITIS OF LEFT LOWER LIMB: ICD-10-CM

## 2023-03-13 DIAGNOSIS — L02.612 CUTANEOUS ABSCESS OF LEFT FOOT: ICD-10-CM

## 2023-03-13 PROCEDURE — G0463 HOSPITAL OUTPT CLINIC VISIT: HCPCS

## 2023-03-13 PROCEDURE — 99024 POSTOP FOLLOW-UP VISIT: CPT | Performed by: PODIATRIST

## 2023-03-20 ENCOUNTER — OFFICE VISIT (OUTPATIENT)
Dept: WOUND CARE | Facility: HOSPITAL | Age: 83
End: 2023-03-20
Payer: MEDICARE

## 2023-03-20 DIAGNOSIS — L02.612 CUTANEOUS ABSCESS OF LEFT FOOT: ICD-10-CM

## 2023-03-20 DIAGNOSIS — D80.3 SELECTIVE DEFICIENCY OF IMMUNOGLOBULIN G (IGG) SUBCLASSES: ICD-10-CM

## 2023-03-20 DIAGNOSIS — L03.116 CELLULITIS OF LEFT LOWER LIMB: ICD-10-CM

## 2023-03-22 ENCOUNTER — HOSPITAL ENCOUNTER (OUTPATIENT)
Dept: INFUSION THERAPY | Age: 83
Discharge: HOME | End: 2023-03-22
Payer: MEDICARE

## 2023-03-22 DIAGNOSIS — D64.9 ANEMIA, UNSPECIFIED TYPE: ICD-10-CM

## 2023-03-22 LAB
BASOPHILS # BLD: 0.06 K/UL (ref 0.01–0.08)
BASOPHILS NFR BLD: 1.2 % (ref 0.1–1.2)
EOSINOPHIL # BLD: 0.13 K/UL (ref 0.04–0.54)
EOSINOPHIL NFR BLD: 2.5 % (ref 0.7–7)
HCT VFR BLD AUTO: 42.8 % (ref 40.1–51)
HGB BLD-MCNC: 12.1 G/DL (ref 13.7–17.5)
LYMPHOCYTES # BLD: 0.4 K/UL (ref 1.18–3.74)
LYMPHOCYTES NFR BLD: 7.8 % (ref 19.3–53.1)
MCH RBC QN AUTO: 26.4 PG (ref 25.7–32.2)
MCHC RBC AUTO-ENTMCNC: 28.3 G/DL (ref 32.3–36.5)
MCV RBC AUTO: 93.2 FL (ref 79–92.2)
MONOCYTES # BLD: 0.88 K/UL (ref 0.24–0.82)
MONOCYTES NFR BLD: 17.1 % (ref 4.7–12.5)
NEUTROPHILS # BLD: 3.65 K/UL (ref 1.56–6.13)
NEUTS SEG NFR BLD: 70.8 % (ref 34–71.1)
PLATELET # BLD AUTO: 176 K/UL (ref 163–337)
PMV BLD AUTO: 11.3 FL (ref 7.4–10.4)
RBC # BLD AUTO: 4.59 M/UL (ref 4.63–6.08)
WBC # BLD AUTO: 5.15 K/UL (ref 4.23–9.07)

## 2023-03-22 PROCEDURE — 36415 COLL VENOUS BLD VENIPUNCTURE: CPT

## 2023-03-22 PROCEDURE — 85025 COMPLETE CBC W/AUTO DIFF WBC: CPT

## 2023-03-28 ENCOUNTER — OFFICE VISIT (OUTPATIENT)
Dept: WOUND CARE | Facility: HOSPITAL | Age: 83
End: 2023-03-28
Payer: MEDICARE

## 2023-04-03 ENCOUNTER — OFFICE VISIT (OUTPATIENT)
Dept: WOUND CARE | Facility: HOSPITAL | Age: 83
End: 2023-04-03
Payer: MEDICARE

## 2023-04-03 DIAGNOSIS — D80.3 SELECTIVE DEFICIENCY OF IMMUNOGLOBULIN G (IGG) SUBCLASSES: ICD-10-CM

## 2023-04-03 DIAGNOSIS — L97.522 NON-PRESSURE CHRONIC ULCER OF OTHER PART OF LEFT FOOT WITH FAT LAYER EXPOSED: ICD-10-CM

## 2023-04-05 ENCOUNTER — HOSPITAL ENCOUNTER (OUTPATIENT)
Dept: INFUSION THERAPY | Age: 83
Discharge: HOME | End: 2023-04-05
Payer: MEDICARE

## 2023-04-05 DIAGNOSIS — D64.9 ANEMIA, UNSPECIFIED TYPE: ICD-10-CM

## 2023-04-05 LAB
BASOPHILS # BLD: 0.05 K/UL (ref 0.01–0.08)
BASOPHILS NFR BLD: 0.7 % (ref 0.1–1.2)
EOSINOPHIL # BLD: 0.12 K/UL (ref 0.04–0.54)
EOSINOPHIL NFR BLD: 1.8 % (ref 0.7–7)
HCT VFR BLD AUTO: 40.4 % (ref 40.1–51)
HGB BLD-MCNC: 12.4 G/DL (ref 13.7–17.5)
LYMPHOCYTES # BLD: 0.37 K/UL (ref 1.18–3.74)
LYMPHOCYTES NFR BLD: 5.5 % (ref 19.3–53.1)
MCH RBC QN AUTO: 28 PG (ref 25.7–32.2)
MCHC RBC AUTO-ENTMCNC: 30.7 G/DL (ref 32.3–36.5)
MCV RBC AUTO: 91.2 FL (ref 79–92.2)
MONOCYTES # BLD: 0.81 K/UL (ref 0.24–0.82)
MONOCYTES NFR BLD: 12.1 % (ref 4.7–12.5)
NEUTROPHILS # BLD: 5.34 K/UL (ref 1.56–6.13)
NEUTS SEG NFR BLD: 79.6 % (ref 34–71.1)
PLATELET # BLD AUTO: 115 K/UL (ref 163–337)
PMV BLD AUTO: 11.1 FL (ref 7.4–10.4)
RBC # BLD AUTO: 4.43 M/UL (ref 4.63–6.08)
WBC # BLD AUTO: 6.71 K/UL (ref 4.23–9.07)

## 2023-04-05 PROCEDURE — 85025 COMPLETE CBC W/AUTO DIFF WBC: CPT

## 2023-04-05 PROCEDURE — 36415 COLL VENOUS BLD VENIPUNCTURE: CPT

## 2023-04-10 ENCOUNTER — OFFICE VISIT (OUTPATIENT)
Dept: WOUND CARE | Facility: HOSPITAL | Age: 83
End: 2023-04-10
Payer: MEDICARE

## 2023-04-10 PROCEDURE — G0463 HOSPITAL OUTPT CLINIC VISIT: HCPCS

## 2023-04-19 ENCOUNTER — HOSPITAL ENCOUNTER (OUTPATIENT)
Dept: INFUSION THERAPY | Age: 83
Discharge: HOME | End: 2023-04-19
Payer: MEDICARE

## 2023-04-19 DIAGNOSIS — D64.9 ANEMIA, UNSPECIFIED TYPE: ICD-10-CM

## 2023-04-19 LAB
BASOPHILS # BLD: 0.04 K/UL (ref 0.01–0.08)
BASOPHILS NFR BLD: 0.9 % (ref 0.1–1.2)
EOSINOPHIL # BLD: 0.11 K/UL (ref 0.04–0.54)
EOSINOPHIL NFR BLD: 2.3 % (ref 0.7–7)
ERYTHROCYTE [DISTWIDTH] IN BLOOD BY AUTOMATED COUNT: 25.6 % (ref 11.6–14.4)
HCT VFR BLD AUTO: 43.3 % (ref 40.1–51)
HGB BLD-MCNC: 12.9 G/DL (ref 13.7–17.5)
LYMPHOCYTES # BLD: 0.28 K/UL (ref 1.18–3.74)
LYMPHOCYTES NFR BLD: 6 % (ref 19.3–53.1)
MCH RBC QN AUTO: 28.2 PG (ref 25.7–32.2)
MCHC RBC AUTO-ENTMCNC: 29.8 G/DL (ref 32.3–36.5)
MCV RBC AUTO: 94.7 FL (ref 79–92.2)
MONOCYTES # BLD: 0.73 K/UL (ref 0.24–0.82)
MONOCYTES NFR BLD: 15.6 % (ref 4.7–12.5)
NEUTROPHILS # BLD: 3.52 K/UL (ref 1.56–6.13)
NEUTS SEG NFR BLD: 75 % (ref 34–71.1)
PLATELET # BLD AUTO: 132 K/UL (ref 163–337)
PMV BLD AUTO: 10.9 FL (ref 7.4–10.4)
RBC # BLD AUTO: 4.57 M/UL (ref 4.63–6.08)
WBC # BLD AUTO: 4.69 K/UL (ref 4.23–9.07)

## 2023-04-19 PROCEDURE — 85025 COMPLETE CBC W/AUTO DIFF WBC: CPT

## 2023-04-19 PROCEDURE — 36415 COLL VENOUS BLD VENIPUNCTURE: CPT

## 2023-04-26 ENCOUNTER — APPOINTMENT (OUTPATIENT)
Dept: CT IMAGING | Facility: HOSPITAL | Age: 83
End: 2023-04-26

## 2023-04-26 ENCOUNTER — HOSPITAL ENCOUNTER (OUTPATIENT)
Dept: CT IMAGING | Facility: HOSPITAL | Age: 83
Discharge: HOME | End: 2023-04-26
Payer: MEDICARE

## 2023-04-26 DIAGNOSIS — R59.0 ENLARGED LYMPH NODE IN NECK: ICD-10-CM

## 2023-04-26 DIAGNOSIS — D47.2 MONOCLONAL PARAPROTEINEMIA: ICD-10-CM

## 2023-04-26 PROCEDURE — 70490 CT SOFT TISSUE NECK W/O DYE: CPT

## 2023-04-26 PROCEDURE — 71250 CT THORAX DX C-: CPT

## 2023-04-26 PROCEDURE — 74176 CT ABD & PELVIS W/O CONTRAST: CPT

## 2023-05-03 ENCOUNTER — HOSPITAL ENCOUNTER (OUTPATIENT)
Dept: INFUSION THERAPY | Age: 83
Discharge: HOME | End: 2023-05-03
Payer: MEDICARE

## 2023-05-03 DIAGNOSIS — D64.9 ANEMIA, UNSPECIFIED TYPE: ICD-10-CM

## 2023-05-03 LAB
BASOPHILS # BLD: 0.04 K/UL (ref 0.01–0.08)
BASOPHILS NFR BLD: 0.7 % (ref 0.1–1.2)
EOSINOPHIL # BLD: 0.07 K/UL (ref 0.04–0.54)
EOSINOPHIL NFR BLD: 1.2 % (ref 0.7–7)
ERYTHROCYTE [DISTWIDTH] IN BLOOD BY AUTOMATED COUNT: 22.2 % (ref 11.6–14.4)
HCT VFR BLD AUTO: 42.7 % (ref 40.1–51)
HGB BLD-MCNC: 13.2 G/DL (ref 13.7–17.5)
LYMPHOCYTES # BLD: 0.27 K/UL (ref 1.18–3.74)
LYMPHOCYTES NFR BLD: 4.6 % (ref 19.3–53.1)
MCH RBC QN AUTO: 28.9 PG (ref 25.7–32.2)
MCHC RBC AUTO-ENTMCNC: 30.9 G/DL (ref 32.3–36.5)
MCV RBC AUTO: 93.4 FL (ref 79–92.2)
MONOCYTES # BLD: 0.68 K/UL (ref 0.24–0.82)
MONOCYTES NFR BLD: 11.6 % (ref 4.7–12.5)
NEUTROPHILS # BLD: 4.79 K/UL (ref 1.56–6.13)
NEUTS SEG NFR BLD: 81.6 % (ref 34–71.1)
PLATELET # BLD AUTO: 144 K/UL (ref 163–337)
PMV BLD AUTO: 10.9 FL (ref 7.4–10.4)
RBC # BLD AUTO: 4.57 M/UL (ref 4.63–6.08)
WBC # BLD AUTO: 5.87 K/UL (ref 4.23–9.07)

## 2023-05-03 PROCEDURE — 36415 COLL VENOUS BLD VENIPUNCTURE: CPT

## 2023-05-03 PROCEDURE — 85025 COMPLETE CBC W/AUTO DIFF WBC: CPT

## 2023-05-31 ENCOUNTER — HOSPITAL ENCOUNTER (OUTPATIENT)
Dept: INFUSION THERAPY | Age: 83
Discharge: HOME | End: 2023-05-31
Payer: MEDICARE

## 2023-05-31 DIAGNOSIS — D47.2 IGG MONOCLONAL GAMMOPATHY: ICD-10-CM

## 2023-05-31 DIAGNOSIS — N18.30 STAGE 3 CHRONIC KIDNEY DISEASE, UNSPECIFIED WHETHER STAGE 3A OR 3B CKD (HCC): ICD-10-CM

## 2023-05-31 DIAGNOSIS — D64.9 ANEMIA, UNSPECIFIED TYPE: ICD-10-CM

## 2023-05-31 DIAGNOSIS — D89.89 IGG4 RELATED DISEASE (HCC): ICD-10-CM

## 2023-05-31 LAB
ALBUMIN SERPL-MCNC: 3.6 G/DL (ref 3.5–5.2)
ALP SERPL-CCNC: 84 U/L (ref 40–130)
ALT SERPL-CCNC: 21 U/L (ref 21–72)
ANION GAP SERPL CALCULATED.3IONS-SCNC: 5 MMOL/L (ref 7–19)
AST SERPL-CCNC: 25 U/L (ref 17–59)
BILIRUB SERPL-MCNC: 0.5 MG/DL (ref 0.2–1.3)
BUN SERPL-MCNC: 18 MG/DL (ref 9–20)
CALCIUM SERPL-MCNC: 10.4 MG/DL (ref 8.4–10.2)
CHLORIDE SERPL-SCNC: 107 MMOL/L (ref 98–111)
CO2 SERPL-SCNC: 25 MMOL/L (ref 22–29)
CREAT SERPL-MCNC: 1.5 MG/DL (ref 0.6–1.2)
ERYTHROCYTE [DISTWIDTH] IN BLOOD BY AUTOMATED COUNT: 17.2 % (ref 11.6–14.4)
GLOBULIN: 2.7 G/DL
GLUCOSE SERPL-MCNC: 88 MG/DL (ref 74–106)
HCT VFR BLD AUTO: 39.7 % (ref 40.1–51)
HGB BLD-MCNC: 12.5 G/DL (ref 13.7–17.5)
LYMPHOCYTES # BLD: 0.24 K/UL (ref 1.18–3.74)
LYMPHOCYTES NFR BLD: 4.4 % (ref 19.3–53.1)
MCH RBC QN AUTO: 29.8 PG (ref 25.7–32.2)
MCHC RBC AUTO-ENTMCNC: 31.5 G/DL (ref 32.3–36.5)
MCV RBC AUTO: 94.7 FL (ref 79–92.2)
MONOCYTES # BLD: 0.85 K/UL (ref 0.24–0.82)
MONOCYTES NFR BLD: 15.5 % (ref 4.7–12.5)
NEUTROPHILS # BLD: 4.18 K/UL (ref 1.56–6.13)
NEUTS SEG NFR BLD: 76.1 % (ref 34–71.1)
PLATELET # BLD AUTO: 170 K/UL (ref 163–337)
PMV BLD AUTO: 10 FL (ref 7.4–10.4)
POTASSIUM SERPL-SCNC: 4.5 MMOL/L (ref 3.5–5.1)
PROT SERPL-MCNC: 6.4 G/DL (ref 6.3–8.2)
RBC # BLD AUTO: 4.19 M/UL (ref 4.63–6.08)
SODIUM SERPL-SCNC: 137 MMOL/L (ref 137–145)
WBC # BLD AUTO: 5.49 K/UL (ref 4.23–9.07)

## 2023-05-31 PROCEDURE — 85025 COMPLETE CBC W/AUTO DIFF WBC: CPT

## 2023-05-31 PROCEDURE — 36415 COLL VENOUS BLD VENIPUNCTURE: CPT

## 2023-05-31 PROCEDURE — 80053 COMPREHEN METABOLIC PANEL: CPT

## 2023-06-02 LAB — B2 MICROGLOB SERPL-MCNC: 3.4 MG/L

## 2023-06-03 LAB
ALBUMIN SERPL-MCNC: 3.73 G/DL (ref 3.75–5.01)
ALPHA1 GLOB SERPL ELPH-MCNC: 0.27 G/DL (ref 0.19–0.46)
ALPHA2 GLOB SERPL ELPH-MCNC: 0.64 G/DL (ref 0.48–1.05)
B-GLOBULIN SERPL ELPH-MCNC: 0.64 G/DL (ref 0.48–1.1)
DEPRECATED KAPPA LC FREE/LAMBDA SER: 2.2 {RATIO} (ref 0.26–1.65)
GAMMA GLOB SERPL ELPH-MCNC: 0.73 G/DL (ref 0.62–1.51)
IGA SERPL-MCNC: 171 MG/DL (ref 68–408)
IGG SERPL-MCNC: 805 MG/DL (ref 768–1632)
IGM SERPL-MCNC: 104 MG/DL (ref 35–263)
INTERPRETATION SERPL IFE-IMP: ABNORMAL
INTERPRETATION SERPL IFE-IMP: ABNORMAL
KAPPA LC FREE SER-MCNC: 45.02 MG/L (ref 3.3–19.4)
LAMBDA LC FREE SERPL-MCNC: 20.5 MG/L (ref 5.71–26.3)
PROT SERPL-MCNC: 6 G/DL (ref 6.3–8.2)

## 2023-06-05 NOTE — PROGRESS NOTES
2 Microglobulin, Serum     Standing Status:   Future     Standing Expiration Date:   6/5/2024    IgG 4     Standing Status:   Future     Standing Expiration Date:   6/7/2024    IgG 4     Standing Status:   Future     Standing Expiration Date:   6/7/2024             Return in about 3 months (around 9/7/2023) for Follow up with Dr. Gomez Sharp labs prior to return, CBC, CMP, Monoclonal, B2M.

## 2023-06-07 ENCOUNTER — HOSPITAL ENCOUNTER (OUTPATIENT)
Dept: INFUSION THERAPY | Age: 83
Discharge: HOME | End: 2023-06-07
Payer: MEDICARE

## 2023-06-07 ENCOUNTER — OFFICE VISIT (OUTPATIENT)
Dept: HEMATOLOGY | Age: 83
End: 2023-06-07
Payer: MEDICARE

## 2023-06-07 VITALS
BODY MASS INDEX: 24.4 KG/M2 | WEIGHT: 164.7 LBS | HEIGHT: 69 IN | HEART RATE: 53 BPM | OXYGEN SATURATION: 98 % | DIASTOLIC BLOOD PRESSURE: 78 MMHG | SYSTOLIC BLOOD PRESSURE: 160 MMHG

## 2023-06-07 DIAGNOSIS — D64.9 ANEMIA, UNSPECIFIED TYPE: ICD-10-CM

## 2023-06-07 DIAGNOSIS — D47.2 IGG MONOCLONAL GAMMOPATHY: Primary | ICD-10-CM

## 2023-06-07 DIAGNOSIS — D47.2 IGG MONOCLONAL GAMMOPATHY: ICD-10-CM

## 2023-06-07 DIAGNOSIS — Z71.2 ENCOUNTER TO DISCUSS TEST RESULTS: ICD-10-CM

## 2023-06-07 DIAGNOSIS — Z00.00 HEALTH CARE MAINTENANCE: ICD-10-CM

## 2023-06-07 PROCEDURE — G8427 DOCREV CUR MEDS BY ELIG CLIN: HCPCS | Performed by: INTERNAL MEDICINE

## 2023-06-07 PROCEDURE — 1036F TOBACCO NON-USER: CPT | Performed by: INTERNAL MEDICINE

## 2023-06-07 PROCEDURE — 3077F SYST BP >= 140 MM HG: CPT | Performed by: INTERNAL MEDICINE

## 2023-06-07 PROCEDURE — 3078F DIAST BP <80 MM HG: CPT | Performed by: INTERNAL MEDICINE

## 2023-06-07 PROCEDURE — G8420 CALC BMI NORM PARAMETERS: HCPCS | Performed by: INTERNAL MEDICINE

## 2023-06-07 PROCEDURE — 99214 OFFICE O/P EST MOD 30 MIN: CPT | Performed by: INTERNAL MEDICINE

## 2023-06-07 PROCEDURE — 99212 OFFICE O/P EST SF 10 MIN: CPT

## 2023-06-07 PROCEDURE — 1123F ACP DISCUSS/DSCN MKR DOCD: CPT | Performed by: INTERNAL MEDICINE

## 2023-06-10 LAB — IGG4 SER-MCNC: 123 MG/DL (ref 1–123)

## 2023-06-12 ENCOUNTER — OFFICE VISIT (OUTPATIENT)
Dept: CARDIOLOGY | Facility: CLINIC | Age: 83
End: 2023-06-12
Payer: MEDICARE

## 2023-06-12 VITALS
HEART RATE: 60 BPM | HEIGHT: 69 IN | WEIGHT: 166 LBS | DIASTOLIC BLOOD PRESSURE: 64 MMHG | SYSTOLIC BLOOD PRESSURE: 122 MMHG | OXYGEN SATURATION: 96 % | BODY MASS INDEX: 24.59 KG/M2

## 2023-06-12 DIAGNOSIS — I10 PRIMARY HYPERTENSION: ICD-10-CM

## 2023-06-12 DIAGNOSIS — D89.89 IGG4 RELATED DISEASE: ICD-10-CM

## 2023-06-12 DIAGNOSIS — E78.2 MIXED HYPERLIPIDEMIA: ICD-10-CM

## 2023-06-12 DIAGNOSIS — I25.10 CORONARY ARTERY DISEASE INVOLVING NATIVE CORONARY ARTERY OF NATIVE HEART WITHOUT ANGINA PECTORIS: Primary | ICD-10-CM

## 2023-06-12 PROCEDURE — 99214 OFFICE O/P EST MOD 30 MIN: CPT | Performed by: NURSE PRACTITIONER

## 2023-06-12 PROCEDURE — 3078F DIAST BP <80 MM HG: CPT | Performed by: NURSE PRACTITIONER

## 2023-06-12 PROCEDURE — 1159F MED LIST DOCD IN RCRD: CPT | Performed by: NURSE PRACTITIONER

## 2023-06-12 PROCEDURE — 1160F RVW MEDS BY RX/DR IN RCRD: CPT | Performed by: NURSE PRACTITIONER

## 2023-06-12 PROCEDURE — 3074F SYST BP LT 130 MM HG: CPT | Performed by: NURSE PRACTITIONER

## 2023-06-12 PROCEDURE — 93000 ELECTROCARDIOGRAM COMPLETE: CPT | Performed by: NURSE PRACTITIONER

## 2023-06-12 RX ORDER — FLUOCINONIDE TOPICAL SOLUTION USP, 0.05% 0.5 MG/ML
SOLUTION TOPICAL
COMMUNITY

## 2023-06-12 RX ORDER — PREDNISONE 5 MG/1
5 TABLET ORAL DAILY
COMMUNITY

## 2023-06-12 NOTE — PROGRESS NOTES
"    Subjective:     Encounter Date:06/12/2023      Patient ID: Zeb Adams is a 83 y.o. male     Chief Complaint:\"no complaints\"  Coronary Artery Disease  Presents for follow-up visit. Pertinent negatives include no chest pain, dizziness, leg swelling, palpitations, shortness of breath or weight gain. Risk factors include hyperlipidemia. The symptoms have been stable.   Hyperlipidemia  This is a chronic problem. The current episode started more than 1 year ago. The problem is uncontrolled. Recent lipid tests were reviewed and are high. Pertinent negatives include no chest pain or shortness of breath.     Patient presents today for a routine follow up. Patient is followed for CAD s/p TAYLOR to the ostium and mid RCA in 2016. He had a low risk stress echo in 2018.     He reports he has been well. His SHABAZZ has gotten better since his last visit. He notes occasional swelling in his right ankle due to a previous injury. 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 He denies chest pain and palpitations.     The following portions of the patient's history were reviewed and updated as appropriate: allergies, current medications, past family history, past medical history, past social history, past surgical history and problem list.    Allergies   Allergen Reactions    Pregabalin Anaphylaxis    Penicillins Rash    Sulfa Antibiotics Rash    Lisinopril Cough       Current Outpatient Medications:     amLODIPine (NORVASC) 5 MG tablet, Take 1 tablet by mouth Daily., Disp: , Rfl:     aspirin 81 MG tablet, Take 81 mg by mouth daily.  LAST DOSE FRIDAY JAN19TH, Disp: , Rfl:     atorvastatin (LIPITOR) 10 MG tablet, Take 1 tablet by mouth Daily., Disp: , Rfl:     cetirizine (ZyrTEC) 10 MG tablet, Take 1 tablet by mouth Daily As Needed for Allergies., Disp: , Rfl:     clonazePAM (KlonoPIN) 2 MG tablet, As Needed., Disp: , Rfl:     fluocinonide (LIDEX) 0.05 % external solution, fluocinonide 0.05 % " topical solution  APPLY 5 TO 10 DROPS TOPICALLY TO THE SCALP EVERY NIGHT AT BEDTIME, Disp: , Rfl:     HYDROcodone-acetaminophen (NORCO) 7.5-325 MG per tablet, Take 1 tablet by mouth Every 8 (Eight) Hours As Needed for Severe Pain ., Disp: 9 tablet, Rfl: 0    losartan (COZAAR) 25 MG tablet, Take 1 tablet by mouth Daily., Disp: , Rfl:     pantoprazole (PROTONIX) 40 MG EC tablet, 2 (Two) Times a Day., Disp: , Rfl:     predniSONE (DELTASONE) 5 MG tablet, Take 1 tablet by mouth Daily., Disp: , Rfl:   Past Medical History:   Diagnosis Date    Allergic rhinitis     Anxiety     Arthritis     Asthma     Benign neoplasm of submandibular gland     BPH with urinary obstruction     Chest pain     Chronic laryngitis     Chronic rhinitis     Epistaxis     Esophageal stricture     GERD (gastroesophageal reflux disease)     Hiatal hernia     Histoplasmosis     Hypercholesterolemia     Hypertension     IgG4 deficiency     IgG4 related disease     Impotence of organic origin     Kidney disease     stage 3    LPRD (laryngopharyngeal reflux disease)     Lung collapse     Mediastinal adenopathy     Poor urinary stream     Presence of stent in coronary artery in patient with coronary artery disease     RLS (restless legs syndrome)     Sialoadenitis     Skin cancer     SCALP    SOB (shortness of breath) on exertion     Stroke     sometime in his life he has had a mini stroke found on a ct scan ewcently       Social History     Socioeconomic History    Marital status:    Tobacco Use    Smoking status: Former     Types: Cigarettes     Start date:      Quit date:      Years since quittin.4     Passive exposure: Past    Smokeless tobacco: Never    Tobacco comments:     30 yrs ago   Vaping Use    Vaping Use: Never used   Substance and Sexual Activity    Alcohol use: Never    Drug use: Never    Sexual activity: Defer       Review of Systems   Constitutional: Negative for malaise/fatigue, weight gain and weight loss.    Cardiovascular:  Negative for chest pain, dyspnea on exertion, irregular heartbeat, leg swelling, near-syncope, orthopnea, palpitations, paroxysmal nocturnal dyspnea and syncope.   Respiratory:  Negative for cough, shortness of breath, sleep disturbances due to breathing, sputum production and wheezing.    Skin:  Negative for dry skin, flushing, itching and rash.   Gastrointestinal:  Negative for hematemesis and hematochezia.   Neurological:  Negative for dizziness, light-headedness, loss of balance and weakness.   All other systems reviewed and are negative.       Objective:     Vitals reviewed.   Constitutional:       General: Not in acute distress.     Appearance: Healthy appearance. Well-developed. Not diaphoretic.   Eyes:      General: No scleral icterus.     Conjunctiva/sclera: Conjunctivae normal.      Pupils: Pupils are equal, round, and reactive to light.   HENT:      Head: Normocephalic.    Mouth/Throat:      Pharynx: No oropharyngeal exudate.   Neck:      Vascular: No JVR.   Pulmonary:      Effort: Pulmonary effort is normal. No respiratory distress.      Breath sounds: Examination of the right-lower field reveals rales. Examination of the left-lower field reveals rales. No wheezing. No rhonchi. Rales (minimal) present.   Chest:      Chest wall: Not tender to palpatation.   Cardiovascular:      Normal rate. Regular rhythm.   Pulses:     Intact distal pulses.   Edema:     Peripheral edema absent.   Abdominal:      General: Bowel sounds are normal. There is no distension.      Palpations: Abdomen is soft.      Tenderness: There is no abdominal tenderness.   Musculoskeletal: Normal range of motion.      Cervical back: Normal range of motion and neck supple. Skin:     General: Skin is warm and dry.      Coloration: Skin is not pale.      Findings: No erythema or rash.   Neurological:      Mental Status: Alert, oriented to person, place, and time and oriented to person, place and time.      Deep Tendon  "Reflexes: Reflexes are normal and symmetric.   Psychiatric:         Behavior: Behavior normal.           ECG 12 Lead    Date/Time: 6/12/2023 3:16 PM  Performed by: Sharon Barros APRN  Authorized by: Sharon Barros APRN   Comparison: compared with previous ECG from 6/9/2022  Similar to previous ECG  Rhythm: sinus rhythm  Rate: normal  BPM: 60  Q waves: II, III, V5, V6 and aVF    ST Segments: ST segments normal  T inversion: III  QRS axis: normal  Other findings: T wave abnormality    Clinical impression: abnormal EKG      /64 (BP Location: Left arm, Patient Position: Sitting, Cuff Size: Adult)   Pulse 60   Ht 175.3 cm (69\")   Wt 75.3 kg (166 lb)   SpO2 96%   BMI 24.51 kg/m²     Lab Review:   I have reviewed previous office notes, recent labs and recent cardiac testing.     Lipid panel 8/2021:      Stress echo 10/2018:  Interpretation Summary    Normal stress echo with no significant clinical, ECG or echocardiographic evidence for myocardial ischemia.      Cath 3/2016:  CONCLUSION:  1.  Successful stent placement to the ostium and mid right coronary artery with  drug-eluting stents.   2.  Nonobstructive disease in the left coronary system.   3.  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   4.  Small infrarenal abdominal aortic aneurysm.          Assessment:          Diagnosis Plan   1. Coronary artery disease involving native coronary artery of native heart without angina pectoris        2. Primary hypertension        3. Mixed hyperlipidemia  Lipid Panel      4. IgG4 related disease               Plan:       1. CAD- stable. S/p TAYLOR to ostial and mid RCA in 2016. No clinical signs of ongoing ischemia. low risk stress echo in 2018. Continue ASA, statin, ARB and CCB.   2. HTN- controlled. Followed by PCP.   3. HLD- last LDL on file from 8/2021 and was uncontrolled at 120. Lipid panel was previously ordered at last visit and never completed. Lipid ordered and will fax to [facility] as he " typically has routine labs completed there.   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 Noyola.     Follow up in 1 year or sooner if symptoms worsen.     I spent 30 minutes caring for Zeb on this date of service. This time includes time spent by me in the following activities:preparing for the visit, reviewing tests, obtaining and/or reviewing a separately obtained history, performing a medically appropriate examination and/or evaluation , counseling and educating the patient/family/caregiver, ordering medications, tests, or procedures, documenting information in the medical record, independently interpreting results and communicating that information with the patient/family/caregiver and care coordination    Advance Care Planning   ACP discussion was held with the patient during this visit. Patient does not have an advance directive, information provided.

## 2023-07-26 ENCOUNTER — TRANSCRIBE ORDERS (OUTPATIENT)
Dept: GENERAL RADIOLOGY | Facility: HOSPITAL | Age: 83
End: 2023-07-26
Payer: MEDICARE

## 2023-07-26 ENCOUNTER — HOSPITAL ENCOUNTER (OUTPATIENT)
Dept: GENERAL RADIOLOGY | Facility: HOSPITAL | Age: 83
Discharge: HOME | End: 2023-07-26
Admitting: NURSE PRACTITIONER
Payer: MEDICARE

## 2023-07-26 DIAGNOSIS — M25.511 RIGHT SHOULDER PAIN, UNSPECIFIED CHRONICITY: Primary | ICD-10-CM

## 2023-07-26 DIAGNOSIS — M25.511 RIGHT SHOULDER PAIN, UNSPECIFIED CHRONICITY: ICD-10-CM

## 2023-07-26 PROCEDURE — 73030 X-RAY EXAM OF SHOULDER: CPT

## 2023-08-07 ENCOUNTER — TRANSCRIBE ORDERS (OUTPATIENT)
Dept: ADMINISTRATIVE | Facility: HOSPITAL | Age: 83
End: 2023-08-07
Payer: MEDICARE

## 2023-08-07 DIAGNOSIS — M79.672 FOOT PAIN, LEFT: Primary | ICD-10-CM

## 2023-08-07 DIAGNOSIS — M25.511 RIGHT SHOULDER PAIN, UNSPECIFIED CHRONICITY: Primary | ICD-10-CM

## 2023-08-15 ENCOUNTER — HOSPITAL ENCOUNTER (OUTPATIENT)
Dept: MRI IMAGING | Facility: HOSPITAL | Age: 83
Discharge: HOME | End: 2023-08-15
Admitting: INTERNAL MEDICINE
Payer: MEDICARE

## 2023-08-15 DIAGNOSIS — M25.511 RIGHT SHOULDER PAIN, UNSPECIFIED CHRONICITY: ICD-10-CM

## 2023-08-15 PROCEDURE — 73221 MRI JOINT UPR EXTREM W/O DYE: CPT

## 2023-08-31 ENCOUNTER — HOSPITAL ENCOUNTER (OUTPATIENT)
Dept: INFUSION THERAPY | Age: 83
Discharge: HOME | End: 2023-08-31
Payer: MEDICARE

## 2023-08-31 DIAGNOSIS — D47.2 IGG MONOCLONAL GAMMOPATHY: ICD-10-CM

## 2023-08-31 DIAGNOSIS — D64.9 ANEMIA, UNSPECIFIED TYPE: ICD-10-CM

## 2023-08-31 LAB
ALBUMIN SERPL-MCNC: 4 G/DL (ref 3.5–5.2)
ALP SERPL-CCNC: 81 U/L (ref 40–130)
ALT SERPL-CCNC: 20 U/L (ref 21–72)
ANION GAP SERPL CALCULATED.3IONS-SCNC: 13 MMOL/L (ref 7–19)
AST SERPL-CCNC: 25 U/L (ref 17–59)
BILIRUB SERPL-MCNC: 1 MG/DL (ref 0.2–1.3)
BUN SERPL-MCNC: 23 MG/DL (ref 9–20)
CALCIUM SERPL-MCNC: 10.6 MG/DL (ref 8.4–10.2)
CHLORIDE SERPL-SCNC: 107 MMOL/L (ref 98–111)
CO2 SERPL-SCNC: 22 MMOL/L (ref 22–29)
CREAT SERPL-MCNC: 1.5 MG/DL (ref 0.6–1.2)
ERYTHROCYTE [DISTWIDTH] IN BLOOD BY AUTOMATED COUNT: 13.9 % (ref 11.6–14.4)
GLOBULIN: 2.5 G/DL
GLUCOSE SERPL-MCNC: 126 MG/DL (ref 74–106)
HCT VFR BLD AUTO: 37.9 % (ref 40.1–51)
HGB BLD-MCNC: 12.3 G/DL (ref 13.7–17.5)
LYMPHOCYTES # BLD: 0.2 K/UL (ref 1.18–3.74)
LYMPHOCYTES NFR BLD: 4 % (ref 19.3–53.1)
MCH RBC QN AUTO: 30.6 PG (ref 25.7–32.2)
MCHC RBC AUTO-ENTMCNC: 32.5 G/DL (ref 32.3–36.5)
MCV RBC AUTO: 94.3 FL (ref 79–92.2)
MONOCYTES # BLD: 0.66 K/UL (ref 0.24–0.82)
MONOCYTES NFR BLD: 13 % (ref 4.7–12.5)
NEUTROPHILS # BLD: 4.09 K/UL (ref 1.56–6.13)
NEUTS SEG NFR BLD: 80.8 % (ref 34–71.1)
PLATELET # BLD AUTO: 193 K/UL (ref 163–337)
PMV BLD AUTO: 10.4 FL (ref 7.4–10.4)
POTASSIUM SERPL-SCNC: 4.1 MMOL/L (ref 3.5–5.1)
PROT SERPL-MCNC: 6.6 G/DL (ref 6.3–8.2)
RBC # BLD AUTO: 4.02 M/UL (ref 4.63–6.08)
SODIUM SERPL-SCNC: 142 MMOL/L (ref 137–145)
WBC # BLD AUTO: 5.06 K/UL (ref 4.23–9.07)

## 2023-08-31 PROCEDURE — 36415 COLL VENOUS BLD VENIPUNCTURE: CPT

## 2023-08-31 PROCEDURE — 80053 COMPREHEN METABOLIC PANEL: CPT

## 2023-08-31 PROCEDURE — 85025 COMPLETE CBC W/AUTO DIFF WBC: CPT

## 2023-09-01 LAB — IGG4 SER-MCNC: 96 MG/DL (ref 1–123)

## 2023-09-01 NOTE — PROGRESS NOTES
associated with the chronic prednisone use. This is being addressed at the orthopedic clinic and with Dr. Ector Santizo. He also has low back pain and they are not sure whether his pain is from his low back or from his hip. This continues to be addressed    #6  Skin rash  Itching on arms and torso, Dr. Felicity Mark addressing this issue. I have encouraged again to continue follow-up with Dr. April Mcclendon and Dr. Braxton Quach regarding his skin rash. #7  Dr. Chris Gardner was to have addressed a tear in the retina, however Reecechel Coffman did not follow through. #8  GI cancer screening  Colonoscopy performed on 11/28/2022 by Dr. Quynh Thomas  no recall due to age    EGD by Dr. Quynh Thomas at [facility] on 11/28/2022  FINAL DIAGNOSIS:   Stomach, gastric biopsy:   1. Benign gastric mucosa with minimal chronic inflammation. 2.  No Helicobacter pylori organisms identified by immunohistochemical stain. FL UGI W SMALL BOWEL at [facility] on 2/14/2023:  Normal upper GI series with small bowel follow-through    #9  New symptoms of arthritis fatigue dyspnea on exertion    I will defer the management of arthritis to Dr. Ector Santizo, he is on prednisone 1 mg a day at this time. XR CHEST (2 VW) 4/19/2021 :  COPD and chronic interstitial change. There is no acute cardiopulmonary process.       #10  Immunizations:  Immunization History   Administered Date(s) Administered    COVID-19, PFIZER Bivalent, DO NOT Dilute, (age 12y+), IM, 30 mcg/0.3 mL 01/19/2023    COVID-19, PFIZER PURPLE top, DILUTE for use, (age 15 y+), 30mcg/0.3mL 02/20/2021, 03/13/2021, 02/22/2022    Influenza Virus Vaccine 10/31/2017, 10/11/2019, 10/09/2020, 10/01/2021, 10/20/2022    Influenza Whole 10/24/2015    Influenza, FLUAD, (age 72 y+), Adjuvanted, 0.5mL 02/22/2022    Influenza, High Dose (Fluzone 65 yrs and older) 09/17/2018    TDaP, ADACEL (age 6y-58y), BOOSTRIX (age 10y+), IM, 0.5mL 03/26/2019       Zafar YEN MA am precharting for Orlando Santana MD.

## 2023-09-02 LAB — B2 MICROGLOB SERPL-MCNC: 3.1 MG/L

## 2023-09-03 LAB
ALBUMIN SERPL-MCNC: 3.92 G/DL (ref 3.75–5.01)
ALPHA1 GLOB SERPL ELPH-MCNC: 0.29 G/DL (ref 0.19–0.46)
ALPHA2 GLOB SERPL ELPH-MCNC: 0.6 G/DL (ref 0.48–1.05)
B-GLOBULIN SERPL ELPH-MCNC: 0.68 G/DL (ref 0.48–1.1)
DEPRECATED KAPPA LC FREE/LAMBDA SER: 2.2 {RATIO} (ref 0.26–1.65)
EER MONOCLONAL PROTEIN AND FLC, SERUM: ABNORMAL
GAMMA GLOB SERPL ELPH-MCNC: 0.72 G/DL (ref 0.62–1.51)
IGA SERPL-MCNC: 158 MG/DL (ref 68–408)
IGG SERPL-MCNC: 712 MG/DL (ref 768–1632)
IGM SERPL-MCNC: 110 MG/DL (ref 35–263)
INTERPRETATION SERPL IFE-IMP: ABNORMAL
INTERPRETATION SERPL IFE-IMP: ABNORMAL
KAPPA LC FREE SER-MCNC: 39.49 MG/L (ref 3.3–19.4)
LAMBDA LC FREE SERPL-MCNC: 17.97 MG/L (ref 5.71–26.3)
MONOCLONAL PROTEIN, SERUM: ABNORMAL G/DL
PROT SERPL-MCNC: 6.2 G/DL (ref 6.3–8.2)

## 2023-09-06 ENCOUNTER — TELEPHONE (OUTPATIENT)
Dept: HEMATOLOGY | Age: 83
End: 2023-09-06

## 2023-09-06 NOTE — TELEPHONE ENCOUNTER
Pt was called to remind of appt date and time. The pt verbalized understanding.         Electronically signed by Dmitriy Xiao MA on 9/6/2023 at 12:21 PM

## 2023-09-07 ENCOUNTER — HOSPITAL ENCOUNTER (OUTPATIENT)
Dept: INFUSION THERAPY | Age: 83
Discharge: HOME | End: 2023-09-07
Payer: MEDICARE

## 2023-09-07 ENCOUNTER — OFFICE VISIT (OUTPATIENT)
Dept: HEMATOLOGY | Age: 83
End: 2023-09-07
Payer: MEDICARE

## 2023-09-07 VITALS
HEART RATE: 56 BPM | OXYGEN SATURATION: 97 % | BODY MASS INDEX: 24.44 KG/M2 | DIASTOLIC BLOOD PRESSURE: 76 MMHG | SYSTOLIC BLOOD PRESSURE: 122 MMHG | WEIGHT: 165.5 LBS

## 2023-09-07 DIAGNOSIS — D89.89 IGG4 RELATED DISEASE (HCC): ICD-10-CM

## 2023-09-07 DIAGNOSIS — Z00.00 HEALTH CARE MAINTENANCE: ICD-10-CM

## 2023-09-07 DIAGNOSIS — D47.2 IGG MONOCLONAL GAMMOPATHY: Primary | ICD-10-CM

## 2023-09-07 DIAGNOSIS — D64.9 ANEMIA, UNSPECIFIED TYPE: ICD-10-CM

## 2023-09-07 PROCEDURE — 1123F ACP DISCUSS/DSCN MKR DOCD: CPT | Performed by: INTERNAL MEDICINE

## 2023-09-07 PROCEDURE — 3078F DIAST BP <80 MM HG: CPT | Performed by: INTERNAL MEDICINE

## 2023-09-07 PROCEDURE — G8427 DOCREV CUR MEDS BY ELIG CLIN: HCPCS | Performed by: INTERNAL MEDICINE

## 2023-09-07 PROCEDURE — 1036F TOBACCO NON-USER: CPT | Performed by: INTERNAL MEDICINE

## 2023-09-07 PROCEDURE — 3074F SYST BP LT 130 MM HG: CPT | Performed by: INTERNAL MEDICINE

## 2023-09-07 PROCEDURE — 99212 OFFICE O/P EST SF 10 MIN: CPT

## 2023-09-07 PROCEDURE — G8420 CALC BMI NORM PARAMETERS: HCPCS | Performed by: INTERNAL MEDICINE

## 2023-09-07 PROCEDURE — 99214 OFFICE O/P EST MOD 30 MIN: CPT | Performed by: INTERNAL MEDICINE

## 2023-10-04 NOTE — PROGRESS NOTES
[facility] - PODIATRY    Today's Date: 10/10/2023     Patient Name: Zeb Adams  MRN: [id number]  CSN: [id number]  PCP: Adi Jack MD  Referring Provider: No ref. provider found    SUBJECTIVE     Chief Complaint   Patient presents with    Establish Care     Adi Jack MD 07/2023 A  HISTORY OF INJURY ON RIGHT FOOT/PER MYRIAM- pt states right foot, going on for years. Does know arch bone is broke.- pt pain 6/10 at worst after being on it for while     HPI: Zeb Adams, a 83 y.o.male, comes to clinic as a(n) established patient complaining of foot pain. Patient has h/o anxiety, arthritis, asthma, GERD, Histoplasmosis, Hypercholesterolemia, HTN,  IgG4 Def, Kidney Disease, LRPD, RLS, Sialoadenitis, Stroke .  Patient had previously been treated at [facility] wound care for a left dorsal foot abscess which healed uneventfully.  Relates that he has had chronic issues with his right foot and ankle.  Relates that many years ago, he was told that he had a tear in a tendon of his foot and ultimately was placed in a cam boot for 6 months.  Since that time, his arch has slowly fallen in his foot worsened.  He does utilize OTC inserts with only minimal improvement.  Has increased pain in the more that he is on his feet. Admits pain at 6/10 level and described as aching, nagging, and throbbing. Relates previous treatment(s) including Inserts . Denies any constitutional symptoms. No other pedal complaints at this time.    Past Medical History:   Diagnosis Date    Allergic rhinitis     Anxiety     Arthritis     Asthma     Benign neoplasm of submandibular gland     BPH with urinary obstruction     Chest pain     Chronic laryngitis     Chronic rhinitis     Epistaxis     Esophageal stricture     GERD (gastroesophageal reflux disease)     Hiatal hernia     Histoplasmosis     Hypercholesterolemia     Hypertension     IgG4 deficiency     IgG4 related disease     Impotence of organic origin     Kidney  disease     stage 3    LPRD (laryngopharyngeal reflux disease)     Lung collapse     Mediastinal adenopathy     Poor urinary stream     Presence of stent in coronary artery in patient with coronary artery disease     RLS (restless legs syndrome)     Sialoadenitis     Skin cancer     SCALP    SOB (shortness of breath) on exertion     Stroke     sometime in his life he has had a mini stroke found on a ct scan ewcently     Past Surgical History:   Procedure Laterality Date    BONE MARROW ASPIRATE W/ LUMBAR PUNCTURE      BRONCHOSCOPY Bilateral 2018    Procedure: BRONCHOSCOPY WITH ENDOBRONCHIAL ULTRASOUND;  Surgeon: Cecilio Hurtado MD;  Location:  [facility] OR;  Service:     CARDIAC SURGERY      CORONARY ANGIOPLASTY WITH STENT PLACEMENT      CORONARY ANGIOPLASTY WITH STENT PLACEMENT      KIDNEY SURGERY      NEPHRECTOMY      NEPHRECTOMY RADICAL Left     SUBMANDIBULAR GLAND EXCISION      TONSILLECTOMY      TURBINOPLASTY      VASECTOMY       and a reveral     Family History   Problem Relation Age of Onset    Diabetes Father     Heart failure Father     Diabetes Brother     Heart failure Brother     Heart attack Brother     Coronary artery disease Other     Heart attack Other     Heart disease Mother     Hyperlipidemia Mother     No Known Problems Maternal Grandmother     Heart disease Maternal Grandfather     No Known Problems Paternal Grandmother     No Known Problems Paternal Grandfather      Social History     Socioeconomic History    Marital status:    Tobacco Use    Smoking status: Former     Types: Cigarettes     Start date:      Quit date:      Years since quittin.7     Passive exposure: Past    Smokeless tobacco: Never    Tobacco comments:     30 yrs ago   Vaping Use    Vaping Use: Never used   Substance and Sexual Activity    Alcohol use: Never    Drug use: Never    Sexual activity: Defer     Allergies   Allergen Reactions    Pregabalin Anaphylaxis    Penicillins Rash    Sulfa  Antibiotics Rash    Lisinopril Cough     Current Outpatient Medications   Medication Sig Dispense Refill    amLODIPine (NORVASC) 5 MG tablet Take 1 tablet by mouth Daily.      aspirin 81 MG tablet Take 81 mg by mouth daily.  LAST DOSE FRIDAY JAN19TH      atorvastatin (LIPITOR) 10 MG tablet Take 1 tablet by mouth Daily.      cetirizine (ZyrTEC) 10 MG tablet Take 1 tablet by mouth Daily As Needed for Allergies.      clonazePAM (KlonoPIN) 2 MG tablet As Needed.      fluocinonide (LIDEX) 0.05 % external solution fluocinonide 0.05 % topical solution   APPLY 5 TO 10 DROPS TOPICALLY TO THE SCALP EVERY NIGHT AT BEDTIME      HYDROcodone-acetaminophen (NORCO) 7.5-325 MG per tablet Take 1 tablet by mouth Every 8 (Eight) Hours As Needed for Severe Pain . 9 tablet 0    losartan (COZAAR) 25 MG tablet Take 1 tablet by mouth Daily.      pantoprazole (PROTONIX) 40 MG EC tablet 2 (Two) Times a Day.      predniSONE (DELTASONE) 5 MG tablet Take 1 tablet by mouth Daily.       No current facility-administered medications for this visit.     Review of Systems   Constitutional:  Negative for chills and fever.   HENT:  Negative for congestion.    Respiratory:  Negative for shortness of breath.    Cardiovascular:  Positive for leg swelling. Negative for chest pain.   Gastrointestinal:  Negative for constipation, diarrhea, nausea and vomiting.   Musculoskeletal:  Positive for arthralgias and joint swelling.        Foot pain   Skin:  Negative for wound.   Neurological:  Negative for numbness.       OBJECTIVE     Vitals:    10/10/23 0800   BP: 128/80   Pulse: 57   SpO2: 100%       PHYSICAL EXAM  GEN:   Accompanied by none.     Foot/Ankle Exam    GENERAL  Appearance:  appears stated age and elderly  Orientation:  AAOx3  Affect:  appropriate  Gait:  unimpaired  Assistance:  independent  Right shoe gear: casual shoe  Left shoe gear: casual shoe    VASCULAR     Right Foot Vascularity   Dorsalis pedis:  2+  Posterior tibial:  2+  Skin temperature:   warm  Edema gradin+ and pitting  CFT:  3  Pedal hair growth:  Present  Varicosities:  moderate varicosities     Left Foot Vascularity   Dorsalis pedis:  2+  Posterior tibial:  2+  Skin temperature:  warm  Edema grading:  Trace  CFT:  3  Pedal hair growth:  Present  Varicosities:  mild varicosities     NEUROLOGIC     Right Foot Neurologic   Light touch sensation: diminished  Vibratory sensation: normal  Hot/Cold sensation: normal     Left Foot Neurologic   Light touch sensation: diminished  Vibratory sensation: normal  Hot/Cold sensation:  normal    MUSCULOSKELETAL     Right Foot Musculoskeletal   Ecchymosis:  none  Tenderness:  ankle joint tenderness, plantar arch tenderness, dorsal foot, posterior tibial tendon tenderness and subtalar joint tenderness    Arch:  Pes planus     Left Foot Musculoskeletal   Ecchymosis:  none  Tenderness:  none  Arch:  Normal    MUSCLE STRENGTH     Right Foot Muscle Strength   Foot dorsiflexion:  5  Foot plantar flexion:  5  Foot inversion:  4-  Foot eversion:  5     Left Foot Muscle Strength   Foot dorsiflexion:  5  Foot plantar flexion:  5  Foot inversion:  5  Foot eversion:  5    RANGE OF MOTION     Right Foot Range of Motion   Foot and ankle ROM within normal limits       Left Foot Range of Motion   Foot and ankle ROM within normal limits      DERMATOLOGIC      Right Foot Dermatologic   Skin  Right foot skin is intact.      Left Foot Dermatologic   Skin  Left foot skin is intact.     TESTS     Right Foot Tests   Talar tilt: positive      RADIOLOGY/NUCLEAR:  XR Ankle 3+ View Right, XR Foot 3+ View Right    Result Date: 10/10/2023  Narrative: XR ANKLE 3+ VW RIGHT-, XR FOOT 3+ VW RIGHT- 10/10/2023 8:44 AM CDT  HISTORY: pes planus; M79.671-Pain in right foot   COMPARISON: None  FINDINGS: Frontal, lateral and oblique weightbearing radiographs of the right foot and ankle were provided for review.  No visualized acute fracture. Interphalangeal joint flexion deformities. Lisfranc  alignment is anatomic. Pes planus with sagging of the midfoot arch. Plantar calcaneal spur. Hindfoot valgus deformity with severe ankle joint osteoarthritis change, bone-on-bone at the lateral tibiotalar articulation. Nonspecific soft tissue swelling around the ankle joint. Atheromatous vascular calcification.      Impression: 1. 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 2. Interphalangeal joint flexion deformities. 3. No acute osseous injury.  This report was signed and finalized on 10/10/2023 9:28 AM CDT by Dr Ramiro Hampton.       LABORATORY/CULTURE RESULTS:      PATHOLOGY RESULTS:       ASSESSMENT/PLAN     Diagnoses and all orders for this visit:    1. Foot pain, right (Primary)  -     XR Foot 3+ View Right; Future  -     XR Ankle 3+ View Right    2. Pes planus of right foot    3. Arthritis of foot      Comprehensive lower extremity examination and evaluation was performed.  Discussed findings and treatment plan including risks, benefits, and treatment options with patient in detail. Patient agreed with treatment plan.  Xrays ordered for further evaluation of structure.  Findings consistent with right foot pes planus and arthritis of several joints.  Discussed conservative versus surgical options for the above issues.  Due to patient's age and extent of surgery needed, would initially suggest all conservative therapies prior to consideration of surgery.  Rx: Custom AFO to immobilize and hold foot in a corrected position.  An After Visit Summary was printed and given to the patient at discharge, including (if requested) any available informative/educational handouts regarding diagnosis, treatment, or medications. All questions were answered to patient/family satisfaction. Should symptoms fail to improve or worsen they agree to call or return to clinic or to go to the Emergency Department. Discussed the importance of following up with any needed screening tests/labs/specialist  appointments and any requested follow-up recommended by me today. Importance of maintaining follow-up discussed and patient accepts that missed appointments can delay diagnosis and potentially lead to worsening of conditions.  Return in about 6 months (around 4/10/2024) for Recheck, Follow-up with Dr. Ferris., or sooner if acute issues arise.    Lab Frequency Next Occurrence   CT Abdomen Pelvis With Contrast Once 10/31/2022   CT Chest With Contrast Once 10/31/2022   Lipid Panel Once 06/12/2024   XR Foot 3+ View Left Once 08/12/2023       This document has been electronically signed by Jose Rafael Ferris DPM on October 10, 2023 16:31 CDT

## 2023-10-09 ENCOUNTER — TELEPHONE (OUTPATIENT)
Dept: PODIATRY | Facility: CLINIC | Age: 83
End: 2023-10-09
Payer: MEDICARE

## 2023-10-09 NOTE — TELEPHONE ENCOUNTER
Called patient regarding appt on 10/10/2023. Left message for patient to return call if any questions or concerns arise.

## 2023-10-10 ENCOUNTER — OFFICE VISIT (OUTPATIENT)
Dept: PODIATRY | Facility: CLINIC | Age: 83
End: 2023-10-10
Payer: MEDICARE

## 2023-10-10 ENCOUNTER — HOSPITAL ENCOUNTER (OUTPATIENT)
Dept: GENERAL RADIOLOGY | Facility: HOSPITAL | Age: 83
Discharge: HOME | End: 2023-10-10
Admitting: PODIATRIST
Payer: MEDICARE

## 2023-10-10 VITALS
BODY MASS INDEX: 24.88 KG/M2 | WEIGHT: 168 LBS | OXYGEN SATURATION: 100 % | SYSTOLIC BLOOD PRESSURE: 128 MMHG | HEART RATE: 57 BPM | DIASTOLIC BLOOD PRESSURE: 80 MMHG | HEIGHT: 69 IN

## 2023-10-10 DIAGNOSIS — M79.671 FOOT PAIN, RIGHT: Primary | ICD-10-CM

## 2023-10-10 DIAGNOSIS — M79.671 FOOT PAIN, RIGHT: ICD-10-CM

## 2023-10-10 DIAGNOSIS — M19.079 ARTHRITIS OF FOOT: ICD-10-CM

## 2023-10-10 DIAGNOSIS — M21.41 PES PLANUS OF RIGHT FOOT: ICD-10-CM

## 2023-10-10 PROCEDURE — 1159F MED LIST DOCD IN RCRD: CPT | Performed by: PODIATRIST

## 2023-10-10 PROCEDURE — 1160F RVW MEDS BY RX/DR IN RCRD: CPT | Performed by: PODIATRIST

## 2023-10-10 PROCEDURE — 73610 X-RAY EXAM OF ANKLE: CPT

## 2023-10-10 PROCEDURE — 3079F DIAST BP 80-89 MM HG: CPT | Performed by: PODIATRIST

## 2023-10-10 PROCEDURE — 99213 OFFICE O/P EST LOW 20 MIN: CPT | Performed by: PODIATRIST

## 2023-10-10 PROCEDURE — 73630 X-RAY EXAM OF FOOT: CPT

## 2023-10-10 PROCEDURE — 3074F SYST BP LT 130 MM HG: CPT | Performed by: PODIATRIST

## 2023-10-12 ENCOUNTER — PATIENT ROUNDING (BHMG ONLY) (OUTPATIENT)
Dept: PODIATRY | Facility: CLINIC | Age: 83
End: 2023-10-12
Payer: MEDICARE

## 2023-10-12 NOTE — PROGRESS NOTES
October 12, 2023    Hello, may I speak with Zeb Adams?    My name is jose martin      I am  with [facility]  [address], [address]  [place] [address] [phone].    Before we get started may I verify your date of birth? 1940    I am calling to officially welcome you to our practice and ask about your recent visit. Is this a good time to talk? yes    Tell me about your visit with us. What things went well?  visit was fine, got script for brace       We're always looking for ways to make our patients' experiences even better. Do you have recommendations on ways we may improve?  no    Overall were you satisfied with your first visit to our practice? yes       I appreciate you taking the time to speak with me today. Is there anything else I can do for you? no      Thank you, and have a great day.

## 2023-11-07 ENCOUNTER — TELEPHONE (OUTPATIENT)
Dept: CARDIOLOGY | Facility: CLINIC | Age: 83
End: 2023-11-07
Payer: MEDICARE

## 2023-11-07 NOTE — TELEPHONE ENCOUNTER
Pt called stating that he had to su his little dog x 2 miles when it accidentally got out.  He then had to walk back 2 miles.  He got really out of breath and it has not gotten any better.  He wanted to be seen.  Dr. Mark did not have an appointment until 11/20 and pt did not want to wait.  Made appt with Sharon garces tomorrow at 2:15.  Griffin Galindo, CMA

## 2023-11-08 ENCOUNTER — TELEPHONE (OUTPATIENT)
Dept: VASCULAR SURGERY | Age: 83
End: 2023-11-08

## 2023-11-08 ENCOUNTER — OFFICE VISIT (OUTPATIENT)
Dept: CARDIOLOGY | Facility: CLINIC | Age: 83
End: 2023-11-08
Payer: MEDICARE

## 2023-11-08 VITALS
DIASTOLIC BLOOD PRESSURE: 70 MMHG | SYSTOLIC BLOOD PRESSURE: 118 MMHG | OXYGEN SATURATION: 98 % | HEART RATE: 76 BPM | WEIGHT: 170 LBS | BODY MASS INDEX: 25.18 KG/M2 | HEIGHT: 69 IN

## 2023-11-08 DIAGNOSIS — I25.10 CORONARY ARTERY DISEASE INVOLVING NATIVE CORONARY ARTERY OF NATIVE HEART WITHOUT ANGINA PECTORIS: Primary | ICD-10-CM

## 2023-11-08 DIAGNOSIS — E78.2 MIXED HYPERLIPIDEMIA: ICD-10-CM

## 2023-11-08 DIAGNOSIS — I10 PRIMARY HYPERTENSION: ICD-10-CM

## 2023-11-08 DIAGNOSIS — D89.84 IGG4 RELATED DISEASE: ICD-10-CM

## 2023-11-08 PROCEDURE — 1159F MED LIST DOCD IN RCRD: CPT | Performed by: NURSE PRACTITIONER

## 2023-11-08 PROCEDURE — 3078F DIAST BP <80 MM HG: CPT | Performed by: NURSE PRACTITIONER

## 2023-11-08 PROCEDURE — 99214 OFFICE O/P EST MOD 30 MIN: CPT | Performed by: NURSE PRACTITIONER

## 2023-11-08 PROCEDURE — 1160F RVW MEDS BY RX/DR IN RCRD: CPT | Performed by: NURSE PRACTITIONER

## 2023-11-08 PROCEDURE — 3074F SYST BP LT 130 MM HG: CPT | Performed by: NURSE PRACTITIONER

## 2023-11-08 PROCEDURE — 93000 ELECTROCARDIOGRAM COMPLETE: CPT | Performed by: NURSE PRACTITIONER

## 2023-11-08 RX ORDER — SODIUM BICARBONATE 325 MG/1
325 TABLET ORAL 2 TIMES DAILY
COMMUNITY

## 2023-11-08 RX ORDER — BETAMETHASONE VALERATE 0.1 %
LOTION (ML) TOPICAL
COMMUNITY

## 2023-11-08 NOTE — PROGRESS NOTES
"    Subjective:     Encounter Date:11/08/2023      Patient ID: Zeb Adams is a 83 y.o. male     Chief Complaint:\"2\"  Coronary Artery Disease  Presents for follow-up visit. Pertinent negatives include no chest pain, dizziness, leg swelling, palpitations, shortness of breath or weight gain. Risk factors include hyperlipidemia. The symptoms have been stable.   Hyperlipidemia  This is a chronic problem. The current episode started more than 1 year ago. The problem is uncontrolled. Recent lipid tests were reviewed and are high. Pertinent negatives include no chest pain or shortness of breath.     Patient presents today with complaints of worsen shortness of breath with exertion. Patient is followed for CAD s/p TAYLOR to the ostium and mid RCA in 2016. He had a low risk stress echo in 2018.     He notes a few days ago, his dog escaped his house and took off outside. The patient \"chased her for 2.2 miles\" via intermittent running and walking. Once he caught her, he picked her up to carry her home and notes he got about 1/2 way home and was asked by a neighbor if he would like a ride home. He reports his dog weighs roughly 30lbs and he was significantly short of breath during the entire process. He notes he typically does not run nor does he carry heavy objects while exerting himself. He notes his symptoms of dyspnea reminded him of his symptoms he had prior to his abdominal tumor and kidney removal 4 years ago. 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 He denies chest pain and palpitations.     The following portions of the patient's history were reviewed and updated as appropriate: allergies, current medications, past family history, past medical history, past social history, past surgical history and problem list.    Allergies   Allergen Reactions    Pregabalin Anaphylaxis    Penicillins Rash    Sulfa Antibiotics Rash    Lisinopril Cough       Current Outpatient Medications:    "  amLODIPine (NORVASC) 5 MG tablet, Take 1 tablet by mouth Daily., Disp: , Rfl:     aspirin 81 MG tablet, Take 81 mg by mouth daily.  LAST DOSE FRIDAY JAN19TH, Disp: , Rfl:     atorvastatin (LIPITOR) 10 MG tablet, Take 1 tablet by mouth Daily., Disp: , Rfl:     betamethasone valerate (VALISONE) 0.1 % lotion, APPLY THIN LAYER TOPICALLY TO THE AFFECTED AREA EVERY DAY, Disp: , Rfl:     cetirizine (ZyrTEC) 10 MG tablet, Take 1 tablet by mouth Daily As Needed for Allergies., Disp: , Rfl:     Cholecalciferol 50 MCG (2000 UT) tablet, Daily., Disp: , Rfl:     clonazePAM (KlonoPIN) 2 MG tablet, As Needed., Disp: , Rfl:     fluocinonide (LIDEX) 0.05 % external solution, fluocinonide 0.05 % topical solution  APPLY 5 TO 10 DROPS TOPICALLY TO THE SCALP EVERY NIGHT AT BEDTIME, Disp: , Rfl:     HYDROcodone-acetaminophen (NORCO) 7.5-325 MG per tablet, Take 1 tablet by mouth Every 8 (Eight) Hours As Needed for Severe Pain ., Disp: 9 tablet, Rfl: 0    losartan (COZAAR) 25 MG tablet, Take 1 tablet by mouth Daily., Disp: , Rfl:     pantoprazole (PROTONIX) 40 MG EC tablet, 2 (Two) Times a Day., Disp: , Rfl:     predniSONE (DELTASONE) 5 MG tablet, Take 1 tablet by mouth Daily., Disp: , Rfl:     sodium bicarbonate 325 MG tablet, Take 1 tablet by mouth 2 (Two) Times a Day., Disp: , Rfl:   Past Medical History:   Diagnosis Date    Allergic rhinitis     Anxiety     Arthritis     Asthma     Benign neoplasm of submandibular gland     BPH with urinary obstruction     Chest pain     Chronic laryngitis     Chronic rhinitis     Epistaxis     Esophageal stricture     GERD (gastroesophageal reflux disease)     Hiatal hernia     Histoplasmosis     Hypercholesterolemia     Hypertension     IgG4 deficiency     IgG4 related disease     Impotence of organic origin     Kidney disease     stage 3    LPRD (laryngopharyngeal reflux disease)     Lung collapse     Mediastinal adenopathy     Poor urinary stream     Presence of stent in coronary artery in patient  with coronary artery disease     RLS (restless legs syndrome)     Sialoadenitis     Skin cancer     SCALP    SOB (shortness of breath) on exertion     Stroke     sometime in his life he has had a mini stroke found on a ct scan ewcently       Social History     Socioeconomic History    Marital status:    Tobacco Use    Smoking status: Former     Types: Cigarettes     Start date:      Quit date:      Years since quittin.8     Passive exposure: Past    Smokeless tobacco: Never    Tobacco comments:     30 yrs ago   Vaping Use    Vaping Use: Never used   Substance and Sexual Activity    Alcohol use: Never    Drug use: Never    Sexual activity: Defer       Review of Systems   Constitutional: Negative for malaise/fatigue, weight gain and weight loss.   Cardiovascular:  Positive for dyspnea on exertion. Negative for chest pain, irregular heartbeat, leg swelling, near-syncope, orthopnea, palpitations, paroxysmal nocturnal dyspnea and syncope.   Respiratory:  Negative for cough, shortness of breath, sleep disturbances due to breathing, sputum production and wheezing.    Skin:  Negative for dry skin, flushing, itching and rash.   Gastrointestinal:  Negative for hematemesis and hematochezia.   Neurological:  Negative for dizziness, light-headedness, loss of balance and weakness.   All other systems reviewed and are negative.         Objective:     Vitals reviewed.   Constitutional:       General: Not in acute distress.     Appearance: Healthy appearance. Well-developed. Not diaphoretic.   Eyes:      General: No scleral icterus.     Conjunctiva/sclera: Conjunctivae normal.      Pupils: Pupils are equal, round, and reactive to light.   HENT:      Head: Normocephalic.    Mouth/Throat:      Pharynx: No oropharyngeal exudate.   Neck:      Vascular: No JVR.   Pulmonary:      Effort: Pulmonary effort is normal. No respiratory distress.      Breath sounds: Examination of the right-lower field reveals rales.  "Examination of the left-lower field reveals rales. No wheezing. No rhonchi. Rales (minimal) present.   Chest:      Chest wall: Not tender to palpatation.   Cardiovascular:      Normal rate. Regular rhythm.   Pulses:     Intact distal pulses.   Edema:     Peripheral edema absent.   Abdominal:      General: Bowel sounds are normal. There is no distension.      Palpations: Abdomen is soft.      Tenderness: There is no abdominal tenderness.   Musculoskeletal: Normal range of motion.      Cervical back: Normal range of motion and neck supple. Skin:     General: Skin is warm and dry.      Coloration: Skin is not pale.      Findings: No erythema or rash.   Neurological:      Mental Status: Alert, oriented to person, place, and time and oriented to person, place and time.      Deep Tendon Reflexes: Reflexes are normal and symmetric.   Psychiatric:         Behavior: Behavior normal.             ECG 12 Lead    Date/Time: 11/8/2023 2:58 PM  Performed by: Sharon Barros APRN    Authorized by: Sharon Barros APRN  Comparison: compared with previous ECG from 6/12/2023  Similar to previous ECG  Rhythm: sinus rhythm  Rate: normal  BPM: 76  Conduction: conduction normal  Q waves: III, aVF, V1, V5 and V6    T inversion: III and V1  QRS axis: normal  Other findings: T wave abnormality    Clinical impression: abnormal EKG        /70 (BP Location: Left arm, Patient Position: Sitting, Cuff Size: Adult)   Pulse 76   Ht 175.3 cm (69\")   Wt 77.1 kg (170 lb)   SpO2 98%   BMI 25.10 kg/m²     Lab Review:   I have reviewed previous office notes, recent labs and recent cardiac testing.     Stress echo 10/2018:  Interpretation Summary    Normal stress echo with no significant clinical, ECG or echocardiographic evidence for myocardial ischemia.      Cath 3/2016:  CONCLUSION:  1.  Successful stent placement to the ostium and mid right coronary artery with  drug-eluting stents.   2.  Nonobstructive disease in the left coronary system. "   3.  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   4.  Small infrarenal abdominal aortic aneurysm.          Assessment:          Diagnosis Plan   1. Coronary artery disease involving native coronary artery of native heart without angina pectoris  Adult Stress Echo W/ Cont or Stress Agent if Necessary Per Protocol      2. Primary hypertension        3. Mixed hyperlipidemia        4. IgG4 related disease                 Plan:       1. CAD-  S/p TAYLOR to ostial and mid RCA in 2016. SHABAZZ is likely due to his extreme exertion while being deconditioned. His EKGs from 6/2022 until now have remained unchanged with evidence of Q waves in inferior leads which is new since 6/2021. Due to lack of ischemic symptoms, he has not undergone further testing. Due to his current complaints will order stress echo for further evaluation. Continue ASA, statin, ARB and CCB.   2. HTN- controlled. Followed by PCP.   3. HLD- last LDL on file from 8/2021 and was uncontrolled at 120. Lipid panel was ordered at last visit and faxed to [facility] however was noted obtained. He thinks his PCP has a recent lipid panel and if not he asks that we check with Dr. Ocasio. If neither have an updated lipid panel, will refax and call Dr. Noyola's office to ask them to please obtain a lipid panel at the time of his labs in Dec.   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 Noyola. On chronic steroids.      Keep follow up in June 2024 however he wishes to transition to Dr. Taylor after Dr. Mark retires. Return sooner if symptoms worsen.     I spent 30 minutes caring for Zeb on this date of service. This time includes time spent by me in the following activities:preparing for the visit, reviewing tests, obtaining and/or reviewing a separately obtained history, performing a medically appropriate examination and/or evaluation , counseling and educating the patient/family/caregiver, ordering medications, tests, or procedures,  documenting information in the medical record, independently interpreting results and communicating that information with the patient/family/caregiver and care coordination    Advance Care Planning   ACP discussion was held with the patient during this visit. Patient does not have an advance directive, information provided.       I spent 2 minutes on the separately reported service of EKG interpretation. This time is not included in the time used to support the E/M service also reported today.

## 2023-11-08 NOTE — TELEPHONE ENCOUNTER
Patient states he has usually been seeing toya right after his carotid US and it is too much trouble to come back and forth for appointments he is wanting to see toya after his carotid  on 12/27. Please return call to discuss.    Thank you

## 2023-11-10 ENCOUNTER — TELEPHONE (OUTPATIENT)
Dept: CARDIOLOGY | Facility: CLINIC | Age: 83
End: 2023-11-10
Payer: MEDICARE

## 2023-11-10 RX ORDER — ATORVASTATIN CALCIUM 20 MG/1
20 TABLET, FILM COATED ORAL DAILY
Qty: 90 TABLET | Refills: 3 | Status: SHIPPED | OUTPATIENT
Start: 2023-11-10

## 2023-11-10 NOTE — TELEPHONE ENCOUNTER
Patient notified, voiced understanding and agreement to proceed.  Please place order for 20 mg to be sent to [facility] on file.  Tae Kimball MA

## 2023-11-10 NOTE — TELEPHONE ENCOUNTER
----- Message from JANE Vazquez sent at 11/8/2023  1:26 PM CST -----  Can you see if Dr. Jack has a copy of his lipid panel? Within the last 4 weeks?

## 2023-11-10 NOTE — TELEPHONE ENCOUNTER
----- Message from JANE Vazquez sent at 11/10/2023  8:37 AM CST -----  His LDL was elevated at 98. His Lipitor need to be increased to 20mg daily.   ----- Message -----  From: Madison Kimball MA  Sent: 11/10/2023   7:58 AM CST  To: JANE Vazquez    Per your request.

## 2023-11-21 ENCOUNTER — HOSPITAL ENCOUNTER (OUTPATIENT)
Dept: CARDIOLOGY | Facility: HOSPITAL | Age: 83
Discharge: HOME | End: 2023-11-21
Admitting: NURSE PRACTITIONER
Payer: MEDICARE

## 2023-11-21 VITALS — BODY MASS INDEX: 25.18 KG/M2 | WEIGHT: 169.97 LBS | HEIGHT: 69 IN

## 2023-11-21 DIAGNOSIS — I25.10 CORONARY ARTERY DISEASE INVOLVING NATIVE CORONARY ARTERY OF NATIVE HEART WITHOUT ANGINA PECTORIS: ICD-10-CM

## 2023-11-21 PROCEDURE — 93350 STRESS TTE ONLY: CPT

## 2023-11-21 PROCEDURE — 93017 CV STRESS TEST TRACING ONLY: CPT

## 2023-11-21 PROCEDURE — 25510000001 PERFLUTREN 6.52 MG/ML SUSPENSION: Performed by: INTERNAL MEDICINE

## 2023-11-21 RX ADMIN — PERFLUTREN 8.48 MG: 6.52 INJECTION, SUSPENSION INTRAVENOUS at 11:29

## 2023-11-22 ENCOUNTER — NURSE TRIAGE (OUTPATIENT)
Dept: CALL CENTER | Facility: HOSPITAL | Age: 83
End: 2023-11-22
Payer: MEDICARE

## 2023-11-22 LAB
BH CV STRESS BP STAGE 1: NORMAL
BH CV STRESS DURATION MIN STAGE 1: 2
BH CV STRESS DURATION SEC STAGE 1: 43
BH CV STRESS GRADE STAGE 1: 10
BH CV STRESS HR STAGE 1: 123
BH CV STRESS METS STAGE 1: 5
BH CV STRESS PROTOCOL 1: NORMAL
BH CV STRESS RECOVERY BP: NORMAL MMHG
BH CV STRESS RECOVERY HR: 83 BPM
BH CV STRESS SPEED STAGE 1: 1.7
BH CV STRESS STAGE 1: 1
MAXIMAL PREDICTED HEART RATE: 137 BPM
PERCENT MAX PREDICTED HR: 89.78 %
STRESS BASELINE BP: NORMAL MMHG
STRESS BASELINE HR: 74 BPM
STRESS PERCENT HR: 106 %
STRESS POST ESTIMATED WORKLOAD: 5 METS
STRESS POST EXERCISE DUR MIN: 2 MIN
STRESS POST EXERCISE DUR SEC: 43 SEC
STRESS POST PEAK BP: NORMAL MMHG
STRESS POST PEAK HR: 123 BPM
STRESS TARGET HR: 116 BPM

## 2023-11-22 RX ORDER — LEVOFLOXACIN 500 MG/1
500 TABLET, FILM COATED ORAL DAILY
Qty: 7 TABLET | Refills: 0 | OUTPATIENT
Start: 2023-11-22

## 2023-11-22 NOTE — TELEPHONE ENCOUNTER
"Patient diagnosed with bronchitis. Completed course of Levofloxacin. Symptoms still remain. C/o shortness of breath, yellow sputum in the morning that clears throughout the day. Had stress test yesterday, patient has not received results. Uses [facility] on [address] [address]. Called JANE Bailey. Reorder Levaquin 500 mg x 7 days. Verified with readback. Prescription called in to Meagan Pharmacist at [facility]. Patient notified.    Reason for Disposition   [1] Pharmacy calling with prescription questions AND [2] triager unable to answer question    Additional Information   Negative: New-onset or worsening symptoms, see that guideline (e.g., diarrhea, runny nose, sore throat)   Negative: Medicine question not related to refill or renewal   Negative: Caller (e.g., patient or pharmacist) requesting information about a new medicine   Negative: Caller requesting information unrelated to medicine   Negative: [1] Prescription refill request for ESSENTIAL medicine (i.e., likelihood of harm to patient if not taken) AND [2] triager unable to refill per department policy   Negative: [1] Prescription not at pharmacy AND [2] was prescribed by PCP recently  (Exception: Triager has access to EMR and prescription is recorded there. Go to Home Care and confirm for pharmacy.)    Answer Assessment - Initial Assessment Questions  1. DRUG NAME: \"What medicine do you need to have refilled?\"      Levaquin  2. REFILLS REMAINING: \"How many refills are remaining?\" (Note: The label on the medicine or pill bottle will show how many refills are remaining. If there are no refills remaining, then a renewal may be needed.)      0  3. EXPIRATION DATE: \"What is the expiration date?\" (Note: The label states when the prescription will , and thus can no longer be refilled.)      NA  4. PRESCRIBING HCP: \"Who prescribed it?\" Reason: If prescribed by specialist, call should be referred to that group.      Eliezer Crespo " "APRN  5. SYMPTOMS: \"Do you have any symptoms?\"      Mild shortness of breath, productive cough  6. PREGNANCY: \"Is there any chance that you are pregnant?\" \"When was your last menstrual period?\"      NA    Protocols used: Medication Refill and Renewal Call-ADULT-    "

## 2023-11-24 ENCOUNTER — NURSE TRIAGE (OUTPATIENT)
Dept: CALL CENTER | Facility: HOSPITAL | Age: 83
End: 2023-11-24
Payer: MEDICARE

## 2023-11-24 NOTE — TELEPHONE ENCOUNTER
"Patient called stating he is out Prednisone. He saw JANE Bailey who told him to double up on his 5mg for a 10mg dose. Patient has refill available on . Requesting 10mg tablets to get him to . Called on call provider ANN Partida, she states she will call prescription in. Returned call to patient regarding prescription being called in. Patient verbalizes understanding.            Reason for Disposition   [1] Prescription not at pharmacy AND [2] was prescribed by PCP recently  (Exception: Triager has access to EMR and prescription is recorded there. Go to Home Care and confirm for pharmacy.)    Additional Information   Negative: New-onset or worsening symptoms, see that guideline (e.g., diarrhea, runny nose, sore throat)   Negative: Medicine question not related to refill or renewal   Negative: Caller (e.g., patient or pharmacist) requesting information about a new medicine   Negative: Caller requesting information unrelated to medicine   Negative: [1] Prescription refill request for ESSENTIAL medicine (i.e., likelihood of harm to patient if not taken) AND [2] triager unable to refill per department policy    Answer Assessment - Initial Assessment Questions  1. DRUG NAME: \"What medicine do you need to have refilled?\"      Prednisone  2. REFILLS REMAINING: \"How many refills are remaining?\" (Note: The label on the medicine or pill bottle will show how many refills are remaining. If there are no refills remaining, then a renewal may be needed.)      Next refill   3. EXPIRATION DATE: \"What is the expiration date?\" (Note: The label states when the prescription will , and thus can no longer be refilled.)      N/a  4. PRESCRIBING HCP: \"Who prescribed it?\" Reason: If prescribed by specialist, call should be referred to that group.      JANE Bailey   5. SYMPTOMS: \"Do you have any symptoms?\"     MENDEZ  6. PREGNANCY: \"Is there any chance that you are pregnant?\" \"When was your last menstrual " "period?\"      N/a    Protocols used: Medication Refill and Renewal Call-ADULT-    "

## 2023-11-27 ENCOUNTER — TELEPHONE (OUTPATIENT)
Dept: CARDIOLOGY | Facility: CLINIC | Age: 83
End: 2023-11-27
Payer: MEDICARE

## 2023-11-27 NOTE — TELEPHONE ENCOUNTER
----- Message from JANE Vazquez sent at 11/26/2023  3:23 PM CST -----  Please let patient know his stress test is low risk for ischemia. It also revealed he had poor exercise tolerance which is likely the cause of his shortness of breath when chasing his dog.

## 2023-11-30 ENCOUNTER — HOSPITAL ENCOUNTER (OUTPATIENT)
Dept: INFUSION THERAPY | Age: 83
Discharge: HOME | End: 2023-11-30
Payer: MEDICARE

## 2023-11-30 DIAGNOSIS — D47.2 IGG MONOCLONAL GAMMOPATHY: ICD-10-CM

## 2023-11-30 LAB
ALBUMIN SERPL-MCNC: 3.5 G/DL (ref 3.5–5.2)
ALP SERPL-CCNC: 88 U/L (ref 40–130)
ALT SERPL-CCNC: 28 U/L (ref 21–72)
ANION GAP SERPL CALCULATED.3IONS-SCNC: 8 MMOL/L (ref 7–19)
AST SERPL-CCNC: 29 U/L (ref 17–59)
BASOPHILS # BLD: 0.03 K/UL (ref 0.01–0.08)
BASOPHILS NFR BLD: 0.4 % (ref 0.1–1.2)
BILIRUB SERPL-MCNC: 1 MG/DL (ref 0.2–1.3)
BUN SERPL-MCNC: 27 MG/DL (ref 9–20)
CALCIUM SERPL-MCNC: 10.2 MG/DL (ref 8.4–10.2)
CHLORIDE SERPL-SCNC: 108 MMOL/L (ref 98–111)
CO2 SERPL-SCNC: 23 MMOL/L (ref 22–29)
CREAT SERPL-MCNC: 1.5 MG/DL (ref 0.6–1.2)
EOSINOPHIL # BLD: 0.05 K/UL (ref 0.04–0.54)
EOSINOPHIL NFR BLD: 0.7 % (ref 0.7–7)
ERYTHROCYTE [DISTWIDTH] IN BLOOD BY AUTOMATED COUNT: 15.1 % (ref 11.6–14.4)
GLOBULIN: 2.6 G/DL
GLUCOSE SERPL-MCNC: 104 MG/DL (ref 74–106)
HCT VFR BLD AUTO: 35.2 % (ref 40.1–51)
HGB BLD-MCNC: 11.4 G/DL (ref 13.7–17.5)
LYMPHOCYTES # BLD: 0.28 K/UL (ref 1.18–3.74)
LYMPHOCYTES NFR BLD: 4.1 % (ref 19.3–53.1)
MCH RBC QN AUTO: 28.4 PG (ref 25.7–32.2)
MCHC RBC AUTO-ENTMCNC: 32.4 G/DL (ref 32.3–36.5)
MCV RBC AUTO: 87.8 FL (ref 79–92.2)
MONOCYTES # BLD: 0.88 K/UL (ref 0.24–0.82)
MONOCYTES NFR BLD: 13 % (ref 4.7–12.5)
NEUTROPHILS # BLD: 5.44 K/UL (ref 1.56–6.13)
NEUTS SEG NFR BLD: 80.2 % (ref 34–71.1)
PLATELET # BLD AUTO: 201 K/UL (ref 163–337)
PMV BLD AUTO: 10.3 FL (ref 7.4–10.4)
POTASSIUM SERPL-SCNC: 4.1 MMOL/L (ref 3.5–5.1)
PROT SERPL-MCNC: 6 G/DL (ref 6.3–8.2)
RBC # BLD AUTO: 4.01 M/UL (ref 4.63–6.08)
SODIUM SERPL-SCNC: 139 MMOL/L (ref 137–145)
WBC # BLD AUTO: 6.79 K/UL (ref 4.23–9.07)

## 2023-11-30 PROCEDURE — 36415 COLL VENOUS BLD VENIPUNCTURE: CPT

## 2023-11-30 PROCEDURE — 85025 COMPLETE CBC W/AUTO DIFF WBC: CPT

## 2023-11-30 PROCEDURE — 80053 COMPREHEN METABOLIC PANEL: CPT

## 2023-12-01 DIAGNOSIS — D89.89 IGG4 RELATED DISEASE: ICD-10-CM

## 2023-12-01 DIAGNOSIS — D47.2 IGG MONOCLONAL GAMMOPATHY: Primary | ICD-10-CM

## 2023-12-03 LAB
B2 MICROGLOB SERPL-MCNC: 3.6 MG/L
IGG4 SER-MCNC: 47 MG/DL (ref 1–123)

## 2023-12-04 LAB
ALBUMIN SERPL-MCNC: 3.47 G/DL (ref 3.75–5.01)
ALPHA1 GLOB SERPL ELPH-MCNC: 0.33 G/DL (ref 0.19–0.46)
ALPHA2 GLOB SERPL ELPH-MCNC: 0.73 G/DL (ref 0.48–1.05)
B-GLOBULIN SERPL ELPH-MCNC: 0.62 G/DL (ref 0.48–1.1)
DEPRECATED KAPPA LC FREE/LAMBDA SER: 2.09 {RATIO} (ref 0.26–1.65)
EER MONOCLONAL PROTEIN AND FLC, SERUM: ABNORMAL
GAMMA GLOB SERPL ELPH-MCNC: 0.55 G/DL (ref 0.62–1.51)
IGA SERPL-MCNC: 156 MG/DL (ref 68–408)
IGG SERPL-MCNC: 532 MG/DL (ref 768–1632)
IGM SERPL-MCNC: 87 MG/DL (ref 35–263)
INTERPRETATION SERPL IFE-IMP: ABNORMAL
INTERPRETATION SERPL IFE-IMP: ABNORMAL
KAPPA LC FREE SER-MCNC: 35.82 MG/L (ref 3.3–19.4)
LAMBDA LC FREE SERPL-MCNC: 17.15 MG/L (ref 5.71–26.3)
MONOCLONAL PROTEIN, SERUM: ABNORMAL G/DL
PROT SERPL-MCNC: 5.7 G/DL (ref 6.3–8.2)

## 2023-12-05 NOTE — PROGRESS NOTES
identified 2 discrete subcentimeter nodules in the RLL of the lungs were not present previously, one measuring 3 mm and the other 5 mm. Another 7 mm nodule in the R mL is unchanged. Follow-up recommended. CT scan of the abdomen and pelvis without contrast on 10/3/2019 at hospitals did not reveal evidence of recurrent disease or metastatic disease to the abdomen or pelvis. Prior left nephrectomy is noted    CT scan of the Chest without contrast on 6/25/2020 at hospitals reveals Interval increase in size of pulmonary nodules in the RIGHT upper lobe and LEFT lower lobe. Interval resolution of medial RIGHT lower lobe nodules. Essentially stable RIGHT middle lobe nodule. Continue follow up in 6 months is recommended. CT scan of the Abdomen Pelvis without contrast on 6/25/2020  at hospitals reveals a Stable CT of the abdomen and pelvis with postsurgical changes in the left retroperitoneum. No evidence of recurrent or metastatic disease. 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 Atherosclerotic disease. Large hiatal hernia. Colonic diverticulosis without evidence of acute diverticulitis.  Low-density lesions in the right kidney which are stable but  incompletely characterized on this exam.      Serology on 10/10/2019 revealed:  Iron- 45  TIBC- 255  Saturation%- 17.6%  Ferritin- 13.40  B2M- 2.8  Kappa light chains- 42.4  Lambda light chains- 24.8 with K/L ratio 1.71  IgG 945, IgA 176, IgM 141  IgG 4- 137  M-spike- not observed      Serology on 2/3/2020 revealed:  CMP with BUN 30, creatinine 1.90  Iron- 16  TIBC- 223  Saturation %- 7.2  Ferritin- 7.66  B2M- 2.8  Kappa light chains- 25.8  Lambda light chains- 14.7  K/L ratio- 1.76  IgG 872, IgA 156, IgM 128  IgG 4- 136  M-spike- not observed    Serology on 6/10/2020 revealed:  CMP with BUN 24, creatinine 1.8  B2M- 3.5  Kappa light chains- 134.3  Lambda light chains- 39.3  K/L ratio- 3.42  IgG 1417, IgA 205, IgM 244  IgG 4-

## 2023-12-06 ENCOUNTER — TELEPHONE (OUTPATIENT)
Dept: HEMATOLOGY | Age: 83
End: 2023-12-06

## 2023-12-06 NOTE — TELEPHONE ENCOUNTER
Called patient and reminded patient of their appointment on 12/07/2023 and patient confirmed they would be here.

## 2023-12-07 ENCOUNTER — HOSPITAL ENCOUNTER (OUTPATIENT)
Dept: INFUSION THERAPY | Age: 83
Discharge: HOME | End: 2023-12-07
Payer: MEDICARE

## 2023-12-07 ENCOUNTER — OFFICE VISIT (OUTPATIENT)
Dept: HEMATOLOGY | Age: 83
End: 2023-12-07

## 2023-12-07 VITALS
SYSTOLIC BLOOD PRESSURE: 140 MMHG | WEIGHT: 170.8 LBS | DIASTOLIC BLOOD PRESSURE: 90 MMHG | BODY MASS INDEX: 25.3 KG/M2 | TEMPERATURE: 98.6 F | HEIGHT: 69 IN | HEART RATE: 63 BPM | OXYGEN SATURATION: 95 %

## 2023-12-07 DIAGNOSIS — D47.2 IGG MONOCLONAL GAMMOPATHY: ICD-10-CM

## 2023-12-07 DIAGNOSIS — D47.2 IGG MONOCLONAL GAMMOPATHY: Primary | ICD-10-CM

## 2023-12-07 DIAGNOSIS — D89.89 IGG4 RELATED DISEASE: ICD-10-CM

## 2023-12-07 PROCEDURE — 99212 OFFICE O/P EST SF 10 MIN: CPT

## 2023-12-13 NOTE — PROGRESS NOTES
Tao Cotton (:  1940) is a 83 y.o. male,Established patient, here for evaluation of the following chief complaint(s):  Follow-up            SUBJECTIVE/OBJECTIVE:  He presents for follow up of carotid artery stenosis.  He has a known history of carotid artery stenosis for 1 - 5 years. His current treatment includes ASA EC daily.He denies a history of CVA.  He reports has not had TIA's, episodes of lateralizing weakness and episodes of amaurosis fugax.    I have personally reviewed the following: problem list, current meds, allergies, PMH, PSH, family hx, and social hx  Tao Cotton is a 83 y.o. male with the following history as recorded in [facility]:  Patient Active Problem List    Diagnosis Date Noted    Iron deficiency anemia 2023    Carotid artery stenosis 10/24/2012    HIGH CHOLESTEROL     Hypertension      Current Outpatient Medications   Medication Sig Dispense Refill    HYDROcodone-acetaminophen (NORCO) 7.5-325 MG per tablet Take 1 tablet by mouth every 6 hours as needed for Pain. Max Daily Amount: 4 tablets      losartan (COZAAR) 25 MG tablet Take 1 tablet by mouth daily      sodium bicarbonate 325 MG tablet in the morning and at bedtime      Ergocalciferol (VITAMIN D2 PO) Take by mouth      predniSONE (DELTASONE) 5 MG tablet Take 3 mg by mouth daily      amLODIPine (NORVASC) 5 MG tablet Take 1 tablet by mouth daily      clonazePAM (KLONOPIN) 2 MG tablet Take 1 tablet by mouth nightly as needed.      atorvastatin (LIPITOR) 10 MG tablet Take 1 tablet by mouth daily      cetirizine (ZYRTEC) 5 MG tablet Take 1 tablet by mouth daily      aspirin 81 MG tablet Take 1 tablet by mouth daily      pantoprazole (PROTONIX) 40 MG tablet Take 1 tablet by mouth 2 times daily. 60 tablet 11     No current facility-administered medications for this visit.     Allergies: Lyrica [pregabalin], Penicillins, and Sulfa antibiotics  Past Medical History:   Diagnosis Date    Allergic rhinitis     Anemia,

## 2024-01-02 ENCOUNTER — OFFICE VISIT (OUTPATIENT)
Dept: VASCULAR SURGERY | Age: 84
End: 2024-01-02
Payer: MEDICARE

## 2024-01-02 ENCOUNTER — HOSPITAL ENCOUNTER (OUTPATIENT)
Dept: VASCULAR LAB | Age: 84
Discharge: HOME | End: 2024-01-02
Payer: MEDICARE

## 2024-01-02 VITALS
HEART RATE: 74 BPM | SYSTOLIC BLOOD PRESSURE: 118 MMHG | TEMPERATURE: 98.4 F | OXYGEN SATURATION: 98 % | DIASTOLIC BLOOD PRESSURE: 76 MMHG

## 2024-01-02 DIAGNOSIS — I71.43 INFRARENAL ABDOMINAL AORTIC ANEURYSM (AAA) WITHOUT RUPTURE (HCC): ICD-10-CM

## 2024-01-02 DIAGNOSIS — I65.23 BILATERAL CAROTID ARTERY STENOSIS: Primary | ICD-10-CM

## 2024-01-02 DIAGNOSIS — I65.23 BILATERAL CAROTID ARTERY STENOSIS: ICD-10-CM

## 2024-01-02 PROCEDURE — 1036F TOBACCO NON-USER: CPT | Performed by: NURSE PRACTITIONER

## 2024-01-02 PROCEDURE — G8419 CALC BMI OUT NRM PARAM NOF/U: HCPCS | Performed by: NURSE PRACTITIONER

## 2024-01-02 PROCEDURE — G8484 FLU IMMUNIZE NO ADMIN: HCPCS | Performed by: NURSE PRACTITIONER

## 2024-01-02 PROCEDURE — 3074F SYST BP LT 130 MM HG: CPT | Performed by: NURSE PRACTITIONER

## 2024-01-02 PROCEDURE — 99214 OFFICE O/P EST MOD 30 MIN: CPT | Performed by: NURSE PRACTITIONER

## 2024-01-02 PROCEDURE — 1123F ACP DISCUSS/DSCN MKR DOCD: CPT | Performed by: NURSE PRACTITIONER

## 2024-01-02 PROCEDURE — 3078F DIAST BP <80 MM HG: CPT | Performed by: NURSE PRACTITIONER

## 2024-01-02 PROCEDURE — G8427 DOCREV CUR MEDS BY ELIG CLIN: HCPCS | Performed by: NURSE PRACTITIONER

## 2024-01-02 PROCEDURE — 93880 EXTRACRANIAL BILAT STUDY: CPT

## 2024-01-02 RX ORDER — HYDROCODONE BITARTRATE AND ACETAMINOPHEN 7.5; 325 MG/1; MG/1
1 TABLET ORAL EVERY 6 HOURS PRN
COMMUNITY

## 2024-01-02 RX ORDER — LOSARTAN POTASSIUM 25 MG/1
25 TABLET ORAL DAILY
COMMUNITY

## 2024-02-26 ENCOUNTER — HOSPITAL ENCOUNTER (OUTPATIENT)
Dept: INFUSION THERAPY | Age: 84
Discharge: HOME | End: 2024-02-26
Payer: MEDICARE

## 2024-02-26 DIAGNOSIS — D64.9 ANEMIA, UNSPECIFIED TYPE: ICD-10-CM

## 2024-02-26 DIAGNOSIS — D89.84 IGG4 RELATED DISEASE: ICD-10-CM

## 2024-02-26 DIAGNOSIS — D47.2 IGG MONOCLONAL GAMMOPATHY: ICD-10-CM

## 2024-02-26 DIAGNOSIS — D64.9 ANEMIA, UNSPECIFIED TYPE: Primary | ICD-10-CM

## 2024-02-26 LAB
ALBUMIN SERPL-MCNC: 3.6 G/DL (ref 3.5–5.2)
ALP SERPL-CCNC: 77 U/L (ref 40–130)
ALT SERPL-CCNC: 21 U/L (ref 21–72)
ANION GAP SERPL CALCULATED.3IONS-SCNC: 8 MMOL/L (ref 7–19)
AST SERPL-CCNC: 26 U/L (ref 17–59)
BASOPHILS # BLD: 0.02 K/UL (ref 0.01–0.08)
BASOPHILS NFR BLD: 0.4 % (ref 0.1–1.2)
BILIRUB SERPL-MCNC: 0.5 MG/DL (ref 0.2–1.3)
BUN SERPL-MCNC: 19 MG/DL (ref 9–20)
CALCIUM SERPL-MCNC: 10.8 MG/DL (ref 8.4–10.2)
CHLORIDE SERPL-SCNC: 109 MMOL/L (ref 98–111)
CO2 SERPL-SCNC: 23 MMOL/L (ref 22–29)
CREAT SERPL-MCNC: 1.5 MG/DL (ref 0.6–1.2)
EOSINOPHIL # BLD: 0.07 K/UL (ref 0.04–0.54)
EOSINOPHIL NFR BLD: 1.5 % (ref 0.7–7)
ERYTHROCYTE [DISTWIDTH] IN BLOOD BY AUTOMATED COUNT: 15.4 % (ref 11.6–14.4)
FERRITIN SERPL-MCNC: 39.1 NG/ML (ref 30–400)
FOLATE SERPL-MCNC: 18.3 NG/ML (ref 4.5–32.2)
GLOBULIN: 2.3 G/DL
GLUCOSE SERPL-MCNC: 99 MG/DL (ref 74–106)
HAPTOGLOB SERPL-MCNC: 126 MG/DL (ref 30–200)
HCT VFR BLD AUTO: 32.9 % (ref 40.1–51)
HCT VFR BLD AUTO: 32.9 % (ref 40.1–51)
HGB BLD-MCNC: 10.1 G/DL (ref 13.7–17.5)
IRON SATN MFR SERPL: 10 % (ref 14–50)
IRON SERPL-MCNC: 31 UG/DL (ref 59–158)
LYMPHOCYTES # BLD: 0.35 K/UL (ref 1.18–3.74)
LYMPHOCYTES NFR BLD: 7.6 % (ref 19.3–53.1)
MCH RBC QN AUTO: 25.6 PG (ref 25.7–32.2)
MCHC RBC AUTO-ENTMCNC: 30.7 G/DL (ref 32.3–36.5)
MCV RBC AUTO: 83.5 FL (ref 79–92.2)
MONOCYTES # BLD: 0.76 K/UL (ref 0.24–0.82)
MONOCYTES NFR BLD: 16.5 % (ref 4.7–12.5)
NEUTROPHILS # BLD: 3.37 K/UL (ref 1.56–6.13)
NEUTS SEG NFR BLD: 73.3 % (ref 34–71.1)
PLATELET # BLD AUTO: 229 K/UL (ref 163–337)
PMV BLD AUTO: 10 FL (ref 7.4–10.4)
POTASSIUM SERPL-SCNC: 3.8 MMOL/L (ref 3.5–5.1)
PROT SERPL-MCNC: 5.8 G/DL (ref 6.3–8.2)
RBC # BLD AUTO: 3.94 M/UL (ref 4.63–6.08)
RETICS # AUTO: 0.06 M/UL (ref 0.03–0.12)
RETICS/RBC NFR: 1.59 % (ref 0.5–1.5)
SODIUM SERPL-SCNC: 140 MMOL/L (ref 137–145)
TIBC SERPL-MCNC: 323 UG/DL (ref 250–400)
VIT B12 SERPL-MCNC: 286 PG/ML (ref 211–946)
WBC # BLD AUTO: 4.6 K/UL (ref 4.23–9.07)

## 2024-02-26 PROCEDURE — 80053 COMPREHEN METABOLIC PANEL: CPT

## 2024-02-26 PROCEDURE — 85025 COMPLETE CBC W/AUTO DIFF WBC: CPT

## 2024-02-26 PROCEDURE — 36415 COLL VENOUS BLD VENIPUNCTURE: CPT

## 2024-02-26 PROCEDURE — 85045 AUTOMATED RETICULOCYTE COUNT: CPT

## 2024-02-26 NOTE — RESULT ENCOUNTER NOTE
Regimen submitted for insurance authorization for iron replacement as indicated for iron deficient anemia.

## 2024-02-27 LAB
B2 MICROGLOB SERPL-MCNC: 3.7 MG/L
EPO SERPL-ACNC: 34 MU/ML (ref 4–27)

## 2024-02-28 LAB — IGG4 SER-MCNC: 33 MG/DL (ref 1–123)

## 2024-02-29 LAB
ALBUMIN SERPL-MCNC: 3.63 G/DL (ref 3.75–5.01)
ALPHA1 GLOB SERPL ELPH-MCNC: 0.26 G/DL (ref 0.19–0.46)
ALPHA2 GLOB SERPL ELPH-MCNC: 0.67 G/DL (ref 0.48–1.05)
B-GLOBULIN SERPL ELPH-MCNC: 0.64 G/DL (ref 0.48–1.1)
DEPRECATED KAPPA LC FREE/LAMBDA SER: 1.82 {RATIO} (ref 0.26–1.65)
EER MONOCLONAL PROTEIN AND FLC, SERUM: ABNORMAL
GAMMA GLOB SERPL ELPH-MCNC: 0.5 G/DL (ref 0.62–1.51)
IGA SERPL-MCNC: 147 MG/DL (ref 68–408)
IGG SERPL-MCNC: 478 MG/DL (ref 768–1632)
IGM SERPL-MCNC: 79 MG/DL (ref 35–263)
INTERPRETATION SERPL IFE-IMP: ABNORMAL
INTERPRETATION SERPL IFE-IMP: ABNORMAL
KAPPA LC FREE SER-MCNC: 30.34 MG/L (ref 3.3–19.4)
LAMBDA LC FREE SERPL-MCNC: 16.66 MG/L (ref 5.71–26.3)
MONOCLONAL PROTEIN, SERUM: ABNORMAL G/DL
PROT SERPL-MCNC: 5.7 G/DL (ref 6.3–8.2)

## 2024-03-04 ENCOUNTER — TELEPHONE (OUTPATIENT)
Dept: HEMATOLOGY | Age: 84
End: 2024-03-04

## 2024-03-04 NOTE — TELEPHONE ENCOUNTER
Called Patient and reminded patient of their appointment on 03/07/2024 and patient confirmed they would be here.

## 2024-03-05 ENCOUNTER — HOSPITAL ENCOUNTER (OUTPATIENT)
Dept: INFUSION THERAPY | Age: 84
Setting detail: INFUSION SERIES
Discharge: HOME | End: 2024-03-05
Payer: MEDICARE

## 2024-03-05 VITALS
OXYGEN SATURATION: 92 % | HEART RATE: 62 BPM | TEMPERATURE: 97.6 F | RESPIRATION RATE: 18 BRPM | SYSTOLIC BLOOD PRESSURE: 138 MMHG | DIASTOLIC BLOOD PRESSURE: 76 MMHG

## 2024-03-05 DIAGNOSIS — D50.9 IRON DEFICIENCY ANEMIA, UNSPECIFIED IRON DEFICIENCY ANEMIA TYPE: Primary | ICD-10-CM

## 2024-03-05 PROCEDURE — 6360000002 HC RX W HCPCS: Performed by: INTERNAL MEDICINE

## 2024-03-05 PROCEDURE — 96365 THER/PROPH/DIAG IV INF INIT: CPT

## 2024-03-05 PROCEDURE — 2580000003 HC RX 258: Performed by: INTERNAL MEDICINE

## 2024-03-05 RX ORDER — SODIUM CHLORIDE 9 MG/ML
5-250 INJECTION, SOLUTION INTRAVENOUS PRN
OUTPATIENT
Start: 2024-03-12

## 2024-03-05 RX ORDER — SODIUM CHLORIDE 0.9 % (FLUSH) 0.9 %
5-40 SYRINGE (ML) INJECTION PRN
OUTPATIENT
Start: 2024-03-12

## 2024-03-05 RX ORDER — SODIUM CHLORIDE 9 MG/ML
5-250 INJECTION, SOLUTION INTRAVENOUS PRN
Status: DISCONTINUED | OUTPATIENT
Start: 2024-03-05 | End: 2024-03-06 | Stop reason: HOSPADM

## 2024-03-05 RX ORDER — SODIUM CHLORIDE 0.9 % (FLUSH) 0.9 %
5-40 SYRINGE (ML) INJECTION PRN
Status: DISCONTINUED | OUTPATIENT
Start: 2024-03-05 | End: 2024-03-06 | Stop reason: HOSPADM

## 2024-03-05 RX ADMIN — FERRIC CARBOXYMALTOSE INJECTION 750 MG: 50 INJECTION, SOLUTION INTRAVENOUS at 10:11

## 2024-03-05 RX ADMIN — SODIUM CHLORIDE 20 ML/HR: 9 INJECTION, SOLUTION INTRAVENOUS at 10:11

## 2024-03-05 NOTE — DISCHARGE INSTRUCTIONS
ferric carboxymaltose  Pronunciation:  PARTH ik gayatri BOX ee ELSIEL daniel  Brand:  Injectafer  What is the most important information I should know about ferric carboxymaltose?  Use only as directed. Tell your doctor if you use other medicines or have other medical conditions or allergies.  What is ferric carboxymaltose?  Ferric carboxymaltose is an iron replacement product that is used in adults used to treat iron deficiency anemia (NIMISHA), which is low red blood cells caused by a lack of iron in the body.  Ferric carboxymaltose is given to adults with NIMISHA and chronic kidney disease (not on dialysis), or to adults with NIMISHA when iron taken by mouth is not effective.  Ferric carboxymaltose may also be used for purposes not listed in this medication guide.  What should I discuss with my healthcare provider before receiving ferric carboxymaltose?  You should not use ferric carboxymaltose if you are allergic to it.  Tell your doctor if you have ever had:  high blood pressure; or  an allergic reaction to iron injected into a vein.  Tell your doctor if you are pregnant or plan to become pregnant. It is not known if ferric carboxymaltose will harm an unborn baby, but this medicine may cause severe reactions in the mother that could affect the baby's heartbeat.  Having iron deficiency anemia during pregnancy may increase the risk of premature birth or low birth weight. The benefit of treating this condition with ferric carboxymaltose may outweigh any risks to the baby.  If you are breastfeeding, tell your doctor if you notice diarrhea or constipation in the nursing baby.  How is ferric carboxymaltose given?  Ferric carboxymaltose is injected into a vein by a healthcare provider.  Tell your medical caregivers if you feel any burning or pain when ferric carboxymaltose is injected.  You will be watched for at least 30 minutes to make sure you do not have an allergic reaction.  This medicine is usually given in 2 doses, 7 days  [employer] disclaims any warranty or liability for your use of this information.

## 2024-03-05 NOTE — FLOWSHEET NOTE
03/05/24 1107   AVS Reviewed   AVS & discharge instructions reviewed with patient and/or representative? Yes   Reviewed instructions with Patient   Level of Understanding Verbalized understanding;Questions answered

## 2024-03-05 NOTE — PROGRESS NOTES
PROGRESS NOTE  Patient:  Tao Cotton  YOB: 1940  Date of Service: 3/13/2024  MRN: [id number]    Primary Care Physician: Ten Lockwood MD    Chief Complaint   Patient presents with    Follow-up     IgG monoclonal gammopathy    Results     Serology on 2024 at [facility]        Patient Seen, Chart, Consults notes, Labs, Radiology studies reviewed.    Subjective:    Tao Cotton is an 84 year old  gentleman managed with primary and secondary diagnoses as outlined:  Resection of left retroperitoneal mass and radical left nephrectomy by Dr. Acosta Matt at [facility] on 10/31/2018. Pathology revealed IgG4 related disease.  Multifactorial anemia  IgG kappa MGUS  Right lower extremity DVT on Eliquis bid by Dr. Lockwood    His ex-wife  in 2021, and their son  in 2021.  He is still grieving.  Tao' 66-year-old wife of 24 years left him and cleaned out all the furniture in the house with a moving company while he was at [employer] in 2022.  He moved out of his condo and has had counseling regarding this the pressing issue and has improved significantly over the course of time.    Mr. Cotton receives weekly Rituxan x 4 periodically for exacerbations of his IgG4 related disease directed by Dr. Kings Chatman.     Mr. Cotton last received 4 weekly doses of Rituxan between in 2023    He he is currently on prednisone 5 mg daily in treatment of joint pains pleuritic-like chest discomfort, rib pain and fatigue. per his rheumatologist Dr. Chatman.   IgG4 level was 47 on 2023.    Tao' weight is down a little bit today at 167 pounds.    Inocencio injured his right shoulder and is under the care of orthopedic surgery for management of this.  Right shoulder pain is his main complaint today.    HEMATOLOGIC HISTORY:  IgG4 related disease 10/31/2018 with associated leukopenia, Anemia, and adenopathy  Tao Cotton was seen in initial hematologic consultation on

## 2024-03-05 NOTE — PROGRESS NOTES
Patient received Injectafer 750mg infusion, per MD order.  Patient monitored 40min post infusion.  No s/s of adverse event noted, patient tolerated well.  Pt provided with medication education and infusion appointment schedule.  Patient states understanding.  Discharge to home. Electronically signed by Susan Baig RN on 3/5/2024 at 11:09 AM

## 2024-03-11 ENCOUNTER — TELEPHONE (OUTPATIENT)
Dept: HEMATOLOGY | Age: 84
End: 2024-03-11

## 2024-03-11 NOTE — TELEPHONE ENCOUNTER
Called Patient and reminded patient of their appointment on 03/14/2024 and patient confirmed they would be here.

## 2024-03-12 ENCOUNTER — HOSPITAL ENCOUNTER (OUTPATIENT)
Dept: INFUSION THERAPY | Age: 84
Setting detail: INFUSION SERIES
Discharge: HOME | End: 2024-03-12
Payer: MEDICARE

## 2024-03-12 VITALS
OXYGEN SATURATION: 95 % | SYSTOLIC BLOOD PRESSURE: 126 MMHG | DIASTOLIC BLOOD PRESSURE: 69 MMHG | HEART RATE: 66 BPM | TEMPERATURE: 97.4 F | RESPIRATION RATE: 18 BRPM

## 2024-03-12 DIAGNOSIS — D50.9 IRON DEFICIENCY ANEMIA, UNSPECIFIED IRON DEFICIENCY ANEMIA TYPE: Primary | ICD-10-CM

## 2024-03-12 PROCEDURE — 2580000003 HC RX 258: Performed by: INTERNAL MEDICINE

## 2024-03-12 PROCEDURE — 6360000002 HC RX W HCPCS: Performed by: INTERNAL MEDICINE

## 2024-03-12 PROCEDURE — 96365 THER/PROPH/DIAG IV INF INIT: CPT

## 2024-03-12 RX ORDER — SODIUM CHLORIDE 0.9 % (FLUSH) 0.9 %
5-40 SYRINGE (ML) INJECTION PRN
OUTPATIENT
Start: 2024-03-12

## 2024-03-12 RX ORDER — SODIUM CHLORIDE 9 MG/ML
5-250 INJECTION, SOLUTION INTRAVENOUS PRN
Status: DISCONTINUED | OUTPATIENT
Start: 2024-03-12 | End: 2024-03-13 | Stop reason: HOSPADM

## 2024-03-12 RX ORDER — SODIUM CHLORIDE 9 MG/ML
5-250 INJECTION, SOLUTION INTRAVENOUS PRN
Status: CANCELLED | OUTPATIENT
Start: 2024-03-12

## 2024-03-12 RX ORDER — SODIUM CHLORIDE 0.9 % (FLUSH) 0.9 %
5-40 SYRINGE (ML) INJECTION PRN
Status: DISCONTINUED | OUTPATIENT
Start: 2024-03-12 | End: 2024-03-13 | Stop reason: HOSPADM

## 2024-03-12 RX ORDER — SODIUM CHLORIDE 9 MG/ML
5-250 INJECTION, SOLUTION INTRAVENOUS PRN
OUTPATIENT
Start: 2024-03-12

## 2024-03-12 RX ADMIN — FERRIC CARBOXYMALTOSE INJECTION 750 MG: 50 INJECTION, SOLUTION INTRAVENOUS at 08:10

## 2024-03-12 RX ADMIN — SODIUM CHLORIDE 20 ML/HR: 9 INJECTION, SOLUTION INTRAVENOUS at 08:10

## 2024-03-12 NOTE — PROGRESS NOTES
PIV inserted and injectafer given as ordered. Pt monitored for 30 mins post infusion. Pt tolerated well.

## 2024-03-13 ENCOUNTER — OFFICE VISIT (OUTPATIENT)
Dept: HEMATOLOGY | Age: 84
End: 2024-03-13
Payer: MEDICARE

## 2024-03-13 ENCOUNTER — HOSPITAL ENCOUNTER (OUTPATIENT)
Dept: INFUSION THERAPY | Age: 84
Discharge: HOME | End: 2024-03-13
Payer: MEDICARE

## 2024-03-13 VITALS
DIASTOLIC BLOOD PRESSURE: 70 MMHG | OXYGEN SATURATION: 99 % | SYSTOLIC BLOOD PRESSURE: 120 MMHG | HEART RATE: 74 BPM | WEIGHT: 167 LBS | BODY MASS INDEX: 24.73 KG/M2 | HEIGHT: 69 IN | TEMPERATURE: 98 F

## 2024-03-13 DIAGNOSIS — D47.2 IGG MONOCLONAL GAMMOPATHY: Primary | ICD-10-CM

## 2024-03-13 DIAGNOSIS — D47.2 IGG MONOCLONAL GAMMOPATHY: ICD-10-CM

## 2024-03-13 DIAGNOSIS — R91.8 LUNG NODULES: ICD-10-CM

## 2024-03-13 DIAGNOSIS — D89.84 IGG4 RELATED DISEASE: ICD-10-CM

## 2024-03-13 PROCEDURE — 99212 OFFICE O/P EST SF 10 MIN: CPT

## 2024-03-13 PROCEDURE — 1123F ACP DISCUSS/DSCN MKR DOCD: CPT | Performed by: INTERNAL MEDICINE

## 2024-03-13 PROCEDURE — G8484 FLU IMMUNIZE NO ADMIN: HCPCS | Performed by: INTERNAL MEDICINE

## 2024-03-13 PROCEDURE — G8420 CALC BMI NORM PARAMETERS: HCPCS | Performed by: INTERNAL MEDICINE

## 2024-03-13 PROCEDURE — 1036F TOBACCO NON-USER: CPT | Performed by: INTERNAL MEDICINE

## 2024-03-13 PROCEDURE — 3078F DIAST BP <80 MM HG: CPT | Performed by: INTERNAL MEDICINE

## 2024-03-13 PROCEDURE — 3074F SYST BP LT 130 MM HG: CPT | Performed by: INTERNAL MEDICINE

## 2024-03-13 PROCEDURE — 99214 OFFICE O/P EST MOD 30 MIN: CPT | Performed by: INTERNAL MEDICINE

## 2024-03-13 PROCEDURE — G8427 DOCREV CUR MEDS BY ELIG CLIN: HCPCS | Performed by: INTERNAL MEDICINE

## 2024-03-20 DIAGNOSIS — R91.8 LUNG NODULES: Primary | ICD-10-CM

## 2024-03-20 DIAGNOSIS — D47.2 IGG MONOCLONAL GAMMOPATHY: ICD-10-CM

## 2024-03-20 DIAGNOSIS — R59.0 ENLARGED LYMPH NODE IN NECK: ICD-10-CM

## 2024-03-20 DIAGNOSIS — D89.84 IGG4 RELATED DISEASE: ICD-10-CM

## 2024-03-21 ENCOUNTER — TRANSCRIBE ORDERS (OUTPATIENT)
Dept: ADMINISTRATIVE | Facility: HOSPITAL | Age: 84
End: 2024-03-21
Payer: MEDICARE

## 2024-03-21 DIAGNOSIS — D89.84 IGG4 RELATED DISEASE: ICD-10-CM

## 2024-03-21 DIAGNOSIS — R59.0 ENLARGED LYMPH NODE IN NECK: ICD-10-CM

## 2024-03-21 DIAGNOSIS — R91.8 LUNG NODULES: Primary | ICD-10-CM

## 2024-03-21 DIAGNOSIS — D47.2 IGA MONOCLONAL GAMMOPATHY OF UNCERTAIN SIGNIFICANCE: ICD-10-CM

## 2024-04-15 ENCOUNTER — HOSPITAL ENCOUNTER (OUTPATIENT)
Dept: CT IMAGING | Facility: HOSPITAL | Age: 84
Discharge: HOME | End: 2024-04-15
Admitting: INTERNAL MEDICINE
Payer: MEDICARE

## 2024-04-15 DIAGNOSIS — R59.0 ENLARGED LYMPH NODE IN NECK: ICD-10-CM

## 2024-04-15 DIAGNOSIS — R91.8 LUNG NODULES: ICD-10-CM

## 2024-04-15 DIAGNOSIS — D47.2 IGA MONOCLONAL GAMMOPATHY OF UNCERTAIN SIGNIFICANCE: ICD-10-CM

## 2024-04-15 DIAGNOSIS — D89.84 IGG4 RELATED DISEASE: ICD-10-CM

## 2024-04-15 PROCEDURE — 74176 CT ABD & PELVIS W/O CONTRAST: CPT

## 2024-04-15 PROCEDURE — 70490 CT SOFT TISSUE NECK W/O DYE: CPT

## 2024-04-15 PROCEDURE — 71250 CT THORAX DX C-: CPT

## 2024-04-23 ENCOUNTER — TELEPHONE (OUTPATIENT)
Dept: INFUSION THERAPY | Age: 84
End: 2024-04-23

## 2024-04-23 NOTE — TELEPHONE ENCOUNTER
Received message from patient requesting to know results of CT scans.  Attempted to contact patient to discuss results with no answer.

## 2024-06-10 ENCOUNTER — HOSPITAL ENCOUNTER (OUTPATIENT)
Dept: INFUSION THERAPY | Age: 84
Discharge: HOME | End: 2024-06-10
Payer: MEDICARE

## 2024-06-10 DIAGNOSIS — D47.2 IGG MONOCLONAL GAMMOPATHY: ICD-10-CM

## 2024-06-10 DIAGNOSIS — D89.84 IGG4 RELATED DISEASE (HCC): ICD-10-CM

## 2024-06-10 DIAGNOSIS — D64.9 ANEMIA, UNSPECIFIED TYPE: Primary | ICD-10-CM

## 2024-06-10 DIAGNOSIS — D64.9 ANEMIA, UNSPECIFIED TYPE: ICD-10-CM

## 2024-06-10 LAB
ALBUMIN SERPL-MCNC: 4.3 G/DL (ref 3.5–5.2)
ALP SERPL-CCNC: 115 U/L (ref 40–130)
ALT SERPL-CCNC: 37 U/L (ref 21–72)
ANION GAP SERPL CALCULATED.3IONS-SCNC: 6 MMOL/L (ref 7–19)
AST SERPL-CCNC: 29 U/L (ref 17–59)
BASOPHILS # BLD: 0.03 K/UL (ref 0.01–0.08)
BASOPHILS NFR BLD: 0.6 % (ref 0.1–1.2)
BILIRUB SERPL-MCNC: 1 MG/DL (ref 0.2–1.3)
BUN SERPL-MCNC: 21 MG/DL (ref 9–20)
CALCIUM SERPL-MCNC: 11.5 MG/DL (ref 8.4–10.2)
CHLORIDE SERPL-SCNC: 103 MMOL/L (ref 98–111)
CO2 SERPL-SCNC: 27 MMOL/L (ref 22–29)
CREAT SERPL-MCNC: 1.4 MG/DL (ref 0.6–1.2)
EOSINOPHIL # BLD: 0.07 K/UL (ref 0.04–0.54)
EOSINOPHIL NFR BLD: 1.3 % (ref 0.7–7)
ERYTHROCYTE [DISTWIDTH] IN BLOOD BY AUTOMATED COUNT: 16.8 % (ref 11.6–14.4)
FERRITIN SERPL-MCNC: 290.3 NG/ML (ref 30–400)
FOLATE SERPL-MCNC: 12 NG/ML (ref 4.5–32.2)
GLOBULIN: 2 G/DL
GLUCOSE SERPL-MCNC: 74 MG/DL (ref 74–106)
HCT VFR BLD AUTO: 40.3 % (ref 40.1–51)
HGB BLD-MCNC: 13.3 G/DL (ref 13.7–17.5)
IRON SATN MFR SERPL: 42 % (ref 14–50)
IRON SERPL-MCNC: 101 UG/DL (ref 59–158)
LYMPHOCYTES # BLD: 0.46 K/UL (ref 1.18–3.74)
LYMPHOCYTES NFR BLD: 8.7 % (ref 19.3–53.1)
MCH RBC QN AUTO: 30.3 PG (ref 25.7–32.2)
MCHC RBC AUTO-ENTMCNC: 33 G/DL (ref 32.3–36.5)
MCV RBC AUTO: 91.8 FL (ref 79–92.2)
MONOCYTES # BLD: 0.84 K/UL (ref 0.24–0.82)
MONOCYTES NFR BLD: 15.8 % (ref 4.7–12.5)
NEUTROPHILS # BLD: 3.87 K/UL (ref 1.56–6.13)
NEUTS SEG NFR BLD: 72.8 % (ref 34–71.1)
PLATELET # BLD AUTO: 193 K/UL (ref 163–337)
PMV BLD AUTO: 11 FL (ref 7.4–10.4)
POTASSIUM SERPL-SCNC: 4.5 MMOL/L (ref 3.5–5.1)
PROT SERPL-MCNC: 6.3 G/DL (ref 6.3–8.2)
RBC # BLD AUTO: 4.39 M/UL (ref 4.63–6.08)
SODIUM SERPL-SCNC: 136 MMOL/L (ref 137–145)
TIBC SERPL-MCNC: 242 UG/DL (ref 250–400)
VIT B12 SERPL-MCNC: 329 PG/ML (ref 211–946)
WBC # BLD AUTO: 5.31 K/UL (ref 4.23–9.07)

## 2024-06-10 PROCEDURE — 85025 COMPLETE CBC W/AUTO DIFF WBC: CPT

## 2024-06-10 PROCEDURE — 80053 COMPREHEN METABOLIC PANEL: CPT

## 2024-06-10 PROCEDURE — 36415 COLL VENOUS BLD VENIPUNCTURE: CPT

## 2024-06-10 NOTE — PROGRESS NOTES
[facility] - PODIATRY    Today's Date: 06/14/2024     Patient Name: Zeb Adams  MRN: [id number]  CSN: [id number]  PCP: Adi Jack MD  Referring Provider: No ref. provider found    SUBJECTIVE     Chief Complaint   Patient presents with    Follow-up     Adi Jack MD  06/07/2024 discuss surgery with dr greene wants to fuse his ankle- pt states right ankle issues, braces that were recommended didn't work. Has tried everything over the years. Has been going on for 20 years or so. Would like to discuss fusing the ankle if possible, has fallen just walking in yard 4 times in last week- pt pain 10/10 at worst      HPI: Zeb Adams, a 84 y.o.male, comes to clinic as a(n) established patient complaining of foot pain. Patient has h/o anxiety, arthritis, asthma, GERD, Histoplasmosis, Hypercholesterolemia, HTN,  IgG4 Def, Kidney Disease, LRPD, RLS, Sialoadenitis, Stroke .  Patient had previously been treated at [facility] wound care for a left dorsal foot abscess which healed uneventfully.  Relates that he has had chronic issues with his right foot and ankle.  Relates that many years ago, he was told that he had a tear in a tendon of his foot and ultimately was placed in a cam boot for 6 months.  Since that time, his arch has slowly fallen in his foot worsened.  He does utilize OTC inserts with only minimal improvement.  Has increased pain in the more that he is on his feet.  States that the pain in his ankle is causing him to fall and inhibits his activity. He is interested in surgical correction. Admits pain at 10/10 level and described as aching, nagging, and throbbing. Relates previous treatment(s) including Inserts . Denies any constitutional symptoms. No other pedal complaints at this time.    Past Medical History:   Diagnosis Date    Allergic rhinitis     Anxiety     Arthritis     Asthma     Benign neoplasm of submandibular gland     BPH with urinary obstruction     Chest pain      Chronic laryngitis     Chronic rhinitis     Epistaxis     Esophageal stricture     GERD (gastroesophageal reflux disease)     Hiatal hernia     Histoplasmosis     Hypercholesterolemia     Hypertension     IgG4 deficiency     IgG4 related disease     Impotence of organic origin     Kidney disease     stage 3    LPRD (laryngopharyngeal reflux disease)     Lung collapse     Mediastinal adenopathy     Poor urinary stream     Presence of stent in coronary artery in patient with coronary artery disease     RLS (restless legs syndrome)     Sialoadenitis     Skin cancer     SCALP    SOB (shortness of breath) on exertion     Stroke     sometime in his life he has had a mini stroke found on a ct scan ewcently     Past Surgical History:   Procedure Laterality Date    BONE MARROW ASPIRATE W/ LUMBAR PUNCTURE      BRONCHOSCOPY Bilateral 2018    Procedure: BRONCHOSCOPY WITH ENDOBRONCHIAL ULTRASOUND;  Surgeon: Cecilio Hurtado MD;  Location:  [facility] OR;  Service:     CARDIAC SURGERY      CORONARY ANGIOPLASTY WITH STENT PLACEMENT      CORONARY ANGIOPLASTY WITH STENT PLACEMENT      KIDNEY SURGERY      NEPHRECTOMY      NEPHRECTOMY RADICAL Left     SUBMANDIBULAR GLAND EXCISION      TONSILLECTOMY      TURBINOPLASTY      VASECTOMY       and a reveral     Family History   Problem Relation Age of Onset    Diabetes Father     Heart failure Father     Diabetes Brother     Heart failure Brother     Heart attack Brother     Coronary artery disease Other     Heart attack Other     Heart disease Mother     Hyperlipidemia Mother     No Known Problems Maternal Grandmother     Heart disease Maternal Grandfather     No Known Problems Paternal Grandmother     No Known Problems Paternal Grandfather      Social History     Socioeconomic History    Marital status:    Tobacco Use    Smoking status: Former     Current packs/day: 0.00     Types: Cigarettes     Start date:      Quit date:      Years since quittin.4      Passive exposure: Past    Smokeless tobacco: Never    Tobacco comments:     30 yrs ago   Vaping Use    Vaping status: Never Used   Substance and Sexual Activity    Alcohol use: Never    Drug use: Never    Sexual activity: Defer     Allergies   Allergen Reactions    Pregabalin Anaphylaxis    Penicillins Rash    Sulfa Antibiotics Rash    Lisinopril Cough     Current Outpatient Medications   Medication Sig Dispense Refill    amLODIPine (NORVASC) 5 MG tablet Take 1 tablet by mouth Daily.      aspirin 81 MG tablet Take 81 mg by mouth daily.  LAST DOSE FRIDAY JAN19TH      atorvastatin (LIPITOR) 20 MG tablet Take 1 tablet by mouth Daily. 90 tablet 3    betamethasone valerate (VALISONE) 0.1 % lotion APPLY THIN LAYER TOPICALLY TO THE AFFECTED AREA EVERY DAY      cetirizine (ZyrTEC) 10 MG tablet Take 1 tablet by mouth Daily As Needed for Allergies.      Cholecalciferol 50 MCG (2000 UT) tablet Daily.      fluocinonide (LIDEX) 0.05 % external solution fluocinonide 0.05 % topical solution   APPLY 5 TO 10 DROPS TOPICALLY TO THE SCALP EVERY NIGHT AT BEDTIME      HYDROcodone-acetaminophen (NORCO) 7.5-325 MG per tablet Take 1 tablet by mouth Every 8 (Eight) Hours As Needed for Severe Pain . 9 tablet 0    losartan (COZAAR) 25 MG tablet Take 1 tablet by mouth Daily.      pantoprazole (PROTONIX) 40 MG EC tablet 2 (Two) Times a Day.      predniSONE (DELTASONE) 5 MG tablet Take 1 tablet by mouth Daily.      sodium bicarbonate 325 MG tablet Take 1 tablet by mouth 2 (Two) Times a Day.      clonazePAM (KlonoPIN) 2 MG tablet As Needed. (Patient not taking: Reported on 6/14/2024)      levoFLOXacin (Levaquin) 500 MG tablet Take 1 tablet by mouth Daily. (Patient not taking: Reported on 6/14/2024) 7 tablet 0     No current facility-administered medications for this visit.     Review of Systems   Constitutional:  Negative for chills and fever.   HENT:  Negative for congestion.    Respiratory:  Negative for shortness of breath.    Cardiovascular:   Positive for leg swelling. Negative for chest pain.   Gastrointestinal:  Negative for constipation, diarrhea, nausea and vomiting.   Musculoskeletal:  Positive for arthralgias and joint swelling.        Foot pain   Skin:  Negative for wound.   Neurological:  Negative for numbness.       OBJECTIVE     Vitals:    24 0802   BP: 154/76   Pulse: 59   SpO2: 97%         PHYSICAL EXAM  GEN:   Accompanied by none.     Foot/Ankle Exam    GENERAL  Appearance:  appears stated age and elderly  Orientation:  AAOx3  Affect:  appropriate  Gait:  unimpaired  Assistance:  independent  Right shoe gear: casual shoe  Left shoe gear: casual shoe    VASCULAR     Right Foot Vascularity   Dorsalis pedis:  2+  Posterior tibial:  2+  Skin temperature:  warm  Edema gradin+ and pitting  CFT:  3  Pedal hair growth:  Present  Varicosities:  moderate varicosities     Left Foot Vascularity   Dorsalis pedis:  2+  Posterior tibial:  2+  Skin temperature:  warm  Edema grading:  Trace  CFT:  3  Pedal hair growth:  Present  Varicosities:  mild varicosities     NEUROLOGIC     Right Foot Neurologic   Light touch sensation: diminished  Vibratory sensation: normal  Hot/Cold sensation: normal     Left Foot Neurologic   Light touch sensation: diminished  Vibratory sensation: normal  Hot/Cold sensation:  normal    MUSCULOSKELETAL     Right Foot Musculoskeletal   Ecchymosis:  none  Tenderness:  ankle joint tenderness, plantar arch tenderness, dorsal foot, posterior tibial tendon tenderness and subtalar joint tenderness    Arch:  Pes planus     Left Foot Musculoskeletal   Ecchymosis:  none  Tenderness:  none  Arch:  Normal    MUSCLE STRENGTH     Right Foot Muscle Strength   Foot dorsiflexion:  5  Foot plantar flexion:  5  Foot inversion:  4-  Foot eversion:  5     Left Foot Muscle Strength   Foot dorsiflexion:  5  Foot plantar flexion:  5  Foot inversion:  5  Foot eversion:  5    RANGE OF MOTION     Right Foot Range of Motion   Ankle dorsiflexion:  decreased  with pain  Ankle plantar flexion: decreased  with pain  Foot eversion:  decreased  with pain  Foot inversion:  decreased  with pain     Left Foot Range of Motion   Foot and ankle ROM within normal limits      DERMATOLOGIC      Right Foot Dermatologic   Skin  Right foot skin is intact.      Left Foot Dermatologic   Skin  Left foot skin is intact.     TESTS     Right Foot Tests   Talar tilt: positive      RADIOLOGY/NUCLEAR:  No results found.    LABORATORY/CULTURE RESULTS:      PATHOLOGY RESULTS:       ASSESSMENT/PLAN     Diagnoses and all orders for this visit:    1. Arthritis of ankle, right (Primary)  -     CT ANKLE RIGHT WITHOUT CONTRAST; Future    2. Chronic pain of right ankle        Comprehensive lower extremity examination and evaluation was performed.  Discussed findings and treatment plan including risks, benefits, and treatment options with patient in detail. Patient agreed with treatment plan.  Xrays reviewed showing significant talar tilt and arthritis.  Findings consistent with right foot pes planus and arthritis of several joints.  Discussed conservative versus surgical options for the above issues.    Will order CT for further evaluation.   Discussed potential surgical options including TTC fusion or replacement.  An After Visit Summary was printed and given to the patient at discharge, including (if requested) any available informative/educational handouts regarding diagnosis, treatment, or medications. All questions were answered to patient/family satisfaction. Should symptoms fail to improve or worsen they agree to call or return to clinic or to go to the Emergency Department. Discussed the importance of following up with any needed screening tests/labs/specialist appointments and any requested follow-up recommended by me today. Importance of maintaining follow-up discussed and patient accepts that missed appointments can delay diagnosis and potentially lead to worsening of  conditions.  Return in about 3 weeks (around 7/5/2024) for Recheck, Follow-up with Dr. Ferris., or sooner if acute issues arise.    Lab Frequency Next Occurrence   CT Abdomen Pelvis With Contrast Once 10/31/2022   CT Chest With Contrast Once 10/31/2022   Lipid Panel Once 06/12/2024   XR Foot 3+ View Left Once 08/12/2023       This document has been electronically signed by Jose Rafael Ferris DPM on June 14, 2024 08:17 CDT

## 2024-06-13 ENCOUNTER — TELEPHONE (OUTPATIENT)
Dept: PODIATRY | Facility: CLINIC | Age: 84
End: 2024-06-13
Payer: MEDICARE

## 2024-06-13 LAB — IGG4 SER-MCNC: 60 MG/DL (ref 1–123)

## 2024-06-13 NOTE — TELEPHONE ENCOUNTER
Hub to relay  Spoke with patient regarding appt on 06/14/2024. Patient confirmed date and time off appt.

## 2024-06-14 ENCOUNTER — OFFICE VISIT (OUTPATIENT)
Dept: PODIATRY | Facility: CLINIC | Age: 84
End: 2024-06-14
Payer: MEDICARE

## 2024-06-14 VITALS
OXYGEN SATURATION: 97 % | BODY MASS INDEX: 23.11 KG/M2 | DIASTOLIC BLOOD PRESSURE: 76 MMHG | WEIGHT: 156 LBS | HEIGHT: 69 IN | SYSTOLIC BLOOD PRESSURE: 154 MMHG | HEART RATE: 59 BPM

## 2024-06-14 DIAGNOSIS — M19.071 ARTHRITIS OF ANKLE, RIGHT: Primary | ICD-10-CM

## 2024-06-14 DIAGNOSIS — D89.84 IGG4 RELATED DISEASE (HCC): ICD-10-CM

## 2024-06-14 DIAGNOSIS — M25.571 CHRONIC PAIN OF RIGHT ANKLE: ICD-10-CM

## 2024-06-14 DIAGNOSIS — D47.2 IGG MONOCLONAL GAMMOPATHY: ICD-10-CM

## 2024-06-14 DIAGNOSIS — G89.29 CHRONIC PAIN OF RIGHT ANKLE: ICD-10-CM

## 2024-06-14 PROCEDURE — 36415 COLL VENOUS BLD VENIPUNCTURE: CPT | Performed by: INTERNAL MEDICINE

## 2024-06-14 NOTE — PROGRESS NOTES
04/30/2019  No suspicious cervical adenopathy.   Previously resected right submandibular gland.   Multilevel cervical spine osteoarthritis change  Slight degenerative anterolisthesis  at C4-C5 and C5-C6 .   No acute bony abnormality.    CT CHEST WITHOUT CONTRAST AT [facility] ON 4/26/2023:  COMPARISON CT 4/14/2022  No pathologic intrathoracic or axillary lymphadenopathy.   6 mm irregular pulmonary nodule, STABLE Similar left lower lobe  rounded atelectasis with stable left posterior pleural thickening.   Similar moderate size hiatal hernia.   Atherosclerosis with moderate coronary artery calcification.     CT ABDOMEN PELVIS WITHOUT CONTRAST AT [facility] ON 4/26/2023:  Resolution of the previous pathologic retroperitoneal and pelvic  lymphadenopathy. No pathologically appearing abdominal and pelvic lymph nodes seen on today's exam.   Previous left nephrectomy. Right renal cysts with punctate nonobstructing inferior right intrarenal stone.   3.7 cm abdominal aortic aneurysm with atherosclerosis, STABLE  No aggressive regional bony lesions.   4.6 cm prostate   Incidental fatty containing left inguinal hernia.     MRI RIGHT SHOULDER WO CONTRAST AT [facility] on 8/15/2023:  COMPARISON: Radiograph from 07/26/2023 Ordered by Dr. Kings Chatman  Small high-grade partial-thickness articular sided insertional tear of supraspinatus at the footplate.   Small moderate grade partial-thickness insertional tear of infraspinatus at the footplate.   Type IV SLAP tear with extension of the biceps tendon.   Partial-thickness tearing of the distal superior fibers of subscapularis.   Some mild intramuscular edema within the infraspinatus may represent strain but is nonspecific.     CT SOFT TISSUE NECK WO at [facility] on 4/15/2024: Compared to 4/26/2023  No cervical adenopathy.   Bilateral carotid bulb atheromatous calcification   Multilevel osteoarthritis change including C4-C5 and C5-C6 level degenerative anterolisthesis.     CT CHEST WO at [facility] on

## 2024-06-16 LAB — B2 MICROGLOB SERPL-MCNC: 3.2 MG/L

## 2024-06-17 LAB
ALBUMIN SERPL-MCNC: 4.14 G/DL (ref 3.75–5.01)
ALPHA1 GLOB SERPL ELPH-MCNC: 0.23 G/DL (ref 0.19–0.46)
ALPHA2 GLOB SERPL ELPH-MCNC: 0.66 G/DL (ref 0.48–1.05)
B-GLOBULIN SERPL ELPH-MCNC: 0.58 G/DL (ref 0.48–1.1)
DEPRECATED KAPPA LC FREE/LAMBDA SER: 1.7 {RATIO} (ref 0.26–1.65)
EER MONOCLONAL PROTEIN AND FLC, SERUM: ABNORMAL
GAMMA GLOB SERPL ELPH-MCNC: 0.6 G/DL (ref 0.62–1.51)
IGA SERPL-MCNC: 149 MG/DL (ref 68–408)
IGG SERPL-MCNC: 581 MG/DL (ref 768–1632)
IGM SERPL-MCNC: 92 MG/DL (ref 35–263)
INTERPRETATION SERPL IFE-IMP: ABNORMAL
INTERPRETATION SERPL IFE-IMP: ABNORMAL
KAPPA LC FREE SER-MCNC: 27.02 MG/L (ref 3.3–19.4)
LAMBDA LC FREE SERPL-MCNC: 15.9 MG/L (ref 5.71–26.3)
MONOCLONAL PROTEIN, SERUM: ABNORMAL G/DL
PROT SERPL-MCNC: 6.2 G/DL (ref 6.3–8.2)

## 2024-06-18 ENCOUNTER — OFFICE VISIT (OUTPATIENT)
Dept: CARDIOLOGY | Facility: CLINIC | Age: 84
End: 2024-06-18
Payer: MEDICARE

## 2024-06-18 VITALS
DIASTOLIC BLOOD PRESSURE: 82 MMHG | HEART RATE: 60 BPM | SYSTOLIC BLOOD PRESSURE: 138 MMHG | BODY MASS INDEX: 24.96 KG/M2 | WEIGHT: 159 LBS | HEIGHT: 67 IN | OXYGEN SATURATION: 100 %

## 2024-06-18 DIAGNOSIS — I25.10 CORONARY ARTERY DISEASE INVOLVING NATIVE CORONARY ARTERY OF NATIVE HEART WITHOUT ANGINA PECTORIS: Primary | ICD-10-CM

## 2024-06-18 DIAGNOSIS — D89.84 IGG4 RELATED DISEASE: ICD-10-CM

## 2024-06-18 DIAGNOSIS — E78.2 MIXED HYPERLIPIDEMIA: ICD-10-CM

## 2024-06-18 DIAGNOSIS — I10 PRIMARY HYPERTENSION: ICD-10-CM

## 2024-06-18 PROCEDURE — 99214 OFFICE O/P EST MOD 30 MIN: CPT | Performed by: NURSE PRACTITIONER

## 2024-06-18 PROCEDURE — 3075F SYST BP GE 130 - 139MM HG: CPT | Performed by: NURSE PRACTITIONER

## 2024-06-18 PROCEDURE — 3079F DIAST BP 80-89 MM HG: CPT | Performed by: NURSE PRACTITIONER

## 2024-06-18 PROCEDURE — 1159F MED LIST DOCD IN RCRD: CPT | Performed by: NURSE PRACTITIONER

## 2024-06-18 PROCEDURE — 1160F RVW MEDS BY RX/DR IN RCRD: CPT | Performed by: NURSE PRACTITIONER

## 2024-06-18 PROCEDURE — 93000 ELECTROCARDIOGRAM COMPLETE: CPT | Performed by: NURSE PRACTITIONER

## 2024-06-18 RX ORDER — ATORVASTATIN CALCIUM 10 MG/1
TABLET, FILM COATED ORAL
COMMUNITY
Start: 2024-05-15

## 2024-06-18 NOTE — PROGRESS NOTES
"    Subjective:     Encounter Date:06/18/2024      Patient ID: Zeb Adams is a 84 y.o. male     Chief Complaint:\"no complaints\"  Coronary Artery Disease  Presents for follow-up visit. Pertinent negatives include no chest pain, dizziness, leg swelling, palpitations, shortness of breath or weight gain. Risk factors include hyperlipidemia. The symptoms have been stable.   Hyperlipidemia  This is a chronic problem. The current episode started more than 1 year ago. The problem is uncontrolled. Recent lipid tests were reviewed and are high. Pertinent negatives include no chest pain or shortness of breath.   Hypertension  Pertinent negatives include no chest pain, malaise/fatigue, orthopnea, palpitations, PND or shortness of breath.     Patient presents today as a routine follow up. Patient is followed for CAD s/p TAYLOR to the ostium and mid RCA in 2016. He was seen in November with complaints of exertional dyspnea after he chased his dog who had escaped for 2.2 miles. He underwent a stress echo that was noted to be low risk for ischemia.     Since his last visit he reports from a cardiac standpoint he has been well. He has not had to su his dog or overly exert himself recently. His only complaint today is right ankle/foot pain with plans to \"freeze it\" with podiatry. 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 He denies chest pain and palpitations.     The following portions of the patient's history were reviewed and updated as appropriate: allergies, current medications, past family history, past medical history, past social history, past surgical history and problem list.    Allergies   Allergen Reactions    Pregabalin Anaphylaxis    Penicillins Rash    Sulfa Antibiotics Rash    Lisinopril Cough       Current Outpatient Medications:     amLODIPine (NORVASC) 5 MG tablet, Take 1 tablet by mouth Daily., Disp: , Rfl:     aspirin " 81 MG tablet, Take 81 mg by mouth daily.  LAST DOSE FRIDAY JAN19TH, Disp: , Rfl:     atorvastatin (LIPITOR) 10 MG tablet, , Disp: , Rfl:     cetirizine (ZyrTEC) 10 MG tablet, Take 1 tablet by mouth Daily As Needed for Allergies., Disp: , Rfl:     Cholecalciferol 50 MCG (2000 UT) tablet, Daily., Disp: , Rfl:     clonazePAM (KlonoPIN) 2 MG tablet, As Needed., Disp: , Rfl:     fluocinonide (LIDEX) 0.05 % external solution, fluocinonide 0.05 % topical solution  APPLY 5 TO 10 DROPS TOPICALLY TO THE SCALP EVERY NIGHT AT BEDTIME, Disp: , Rfl:     HYDROcodone-acetaminophen (NORCO) 7.5-325 MG per tablet, Take 1 tablet by mouth Every 8 (Eight) Hours As Needed for Severe Pain ., Disp: 9 tablet, Rfl: 0    losartan (COZAAR) 25 MG tablet, Take 1 tablet by mouth Daily., Disp: , Rfl:     pantoprazole (PROTONIX) 40 MG EC tablet, 2 (Two) Times a Day., Disp: , Rfl:     predniSONE (DELTASONE) 5 MG tablet, Take 1 tablet by mouth Daily., Disp: , Rfl:     sodium bicarbonate 325 MG tablet, Take 1 tablet by mouth 2 (Two) Times a Day., Disp: , Rfl:     betamethasone valerate (VALISONE) 0.1 % lotion, APPLY THIN LAYER TOPICALLY TO THE AFFECTED AREA EVERY DAY (Patient not taking: Reported on 6/18/2024), Disp: , Rfl:     levoFLOXacin (Levaquin) 500 MG tablet, Take 1 tablet by mouth Daily. (Patient not taking: Reported on 6/14/2024), Disp: 7 tablet, Rfl: 0  Past Medical History:   Diagnosis Date    Allergic rhinitis     Anxiety     Arthritis     Asthma     Benign neoplasm of submandibular gland     BPH with urinary obstruction     Chest pain     Chronic laryngitis     Chronic rhinitis     Epistaxis     Esophageal stricture     GERD (gastroesophageal reflux disease)     Hiatal hernia     Histoplasmosis     Hypercholesterolemia     Hypertension     IgG4 deficiency     IgG4 related disease     Impotence of organic origin     Kidney disease     stage 3    LPRD (laryngopharyngeal reflux disease)     Lung collapse     Mediastinal adenopathy     Poor  urinary stream     Presence of stent in coronary artery in patient with coronary artery disease     RLS (restless legs syndrome)     Sialoadenitis     Skin cancer     SCALP    SOB (shortness of breath) on exertion     Stroke     sometime in his life he has had a mini stroke found on a ct scan ewcently       Social History     Socioeconomic History    Marital status:    Tobacco Use    Smoking status: Former     Current packs/day: 0.00     Types: Cigarettes     Start date:      Quit date:      Years since quittin.4     Passive exposure: Past    Smokeless tobacco: Never    Tobacco comments:     30 yrs ago   Vaping Use    Vaping status: Never Used   Substance and Sexual Activity    Alcohol use: Never    Drug use: Never    Sexual activity: Defer       Review of Systems   Constitutional: Negative for malaise/fatigue, weight gain and weight loss.   Cardiovascular:  Negative for chest pain, dyspnea on exertion, irregular heartbeat, leg swelling, near-syncope, orthopnea, palpitations, paroxysmal nocturnal dyspnea and syncope.   Respiratory:  Negative for cough, shortness of breath, sleep disturbances due to breathing, sputum production and wheezing.    Skin:  Negative for dry skin, flushing, itching and rash.   Gastrointestinal:  Negative for hematemesis and hematochezia.   Neurological:  Negative for dizziness, light-headedness, loss of balance and weakness.   All other systems reviewed and are negative.         Objective:     Vitals reviewed.   Constitutional:       General: Not in acute distress.     Appearance: Healthy appearance. Well-developed. Not diaphoretic.   Eyes:      General: No scleral icterus.     Conjunctiva/sclera: Conjunctivae normal.      Pupils: Pupils are equal, round, and reactive to light.   HENT:      Head: Normocephalic.    Mouth/Throat:      Pharynx: No oropharyngeal exudate.   Neck:      Vascular: No JVR.   Pulmonary:      Effort: Pulmonary effort is normal. No respiratory  "distress.      Breath sounds: Examination of the right-lower field reveals rales. Examination of the left-lower field reveals rales. No wheezing. No rhonchi. Rales (minimal) present.   Chest:      Chest wall: Not tender to palpatation.   Cardiovascular:      Normal rate. Regular rhythm.   Pulses:     Intact distal pulses.   Edema:     Peripheral edema present.     Ankle: 1+ non-pitting edema of the right ankle.  Abdominal:      General: Bowel sounds are normal. There is no distension.      Palpations: Abdomen is soft.      Tenderness: There is no abdominal tenderness.   Musculoskeletal: Normal range of motion.      Cervical back: Normal range of motion and neck supple. Skin:     General: Skin is warm and dry.      Coloration: Skin is not pale.      Findings: No erythema or rash.   Neurological:      Mental Status: Alert, oriented to person, place, and time and oriented to person, place and time.      Deep Tendon Reflexes: Reflexes are normal and symmetric.   Psychiatric:         Behavior: Behavior normal.             ECG 12 Lead    Date/Time: 6/18/2024 12:31 PM  Performed by: Sharon Barros APRN    Authorized by: Sharon Barros APRN  Comparison: compared with previous ECG from 11/8/2023  Similar to previous ECG  Rhythm: sinus rhythm  Rate: normal  BPM: 60  Conduction: conduction normal  Q waves: II, III, aVF, V5 and V6    T inversion: III and aVR  QRS axis: normal  Other findings: T wave abnormality    Clinical impression: abnormal EKG        /82 (BP Location: Left arm, Patient Position: Sitting, Cuff Size: Adult)   Pulse 60   Ht 170.2 cm (67\")   Wt 72.1 kg (159 lb)   SpO2 100%   BMI 24.90 kg/m²     Lab Review:   I have reviewed previous office notes, recent labs and recent cardiac testing.   Lipid 8/2023:    Results for orders placed during the hospital encounter of 11/21/23    Adult Stress Echo W/ Cont or Stress Agent if Necessary Per Protocol    Interpretation Summary    Normal stress echo with no " significant echocardiographic evidence for myocardial ischemia.    Poor excercise tolerance    Low risk for ischemia    Cath 3/2016:  CONCLUSION:  1.  Successful stent placement to the ostium and mid right coronary artery with  drug-eluting stents.   2.  Nonobstructive disease in the left coronary system.   3.  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   4.  Small infrarenal abdominal aortic aneurysm.          Assessment:          Diagnosis Plan   1. Coronary artery disease involving native coronary artery of native heart without angina pectoris        2. Primary hypertension        3. Mixed hyperlipidemia        4. IgG4 related disease                 Plan:       1. CAD-  S/p TAYLOR to ostial and mid RCA in 2016. low risk stress echo completed in November. Continue ASA, statin, ARB and CCB.   2. HTN- controlled. Followed by PCP.   3. HLD- last LDL on file from 8/2023 and was uncontrolled at 98 and his lipitro was increased to 20mg daily in 11/2023. He is due for labs with his PCP in August. I will obtain a copy of results and call with medication adjustment if needed.    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 Noyola. On chronic steroids.      Follow up in 1 year or sooner if symptoms worse. He would like for Dr. Taylor to be his cardiologist going forward.    I spent 30 minutes caring for Zeb on this date of service. This time includes time spent by me in the following activities:preparing for the visit, reviewing tests, obtaining and/or reviewing a separately obtained history, performing a medically appropriate examination and/or evaluation , counseling and educating the patient/family/caregiver, ordering medications, tests, or procedures, documenting information in the medical record, independently interpreting results and communicating that information with the patient/family/caregiver and care coordination    Advance Care Planning   ACP discussion was declined by the patient.  Patient does not have an advance directive, declines further assistance.       I spent 2 minutes on the separately reported service of EKG interpretation. This time is not included in the time used to support the E/M service also reported today.

## 2024-06-24 ENCOUNTER — TELEPHONE (OUTPATIENT)
Dept: HEMATOLOGY | Age: 84
End: 2024-06-24

## 2024-06-25 ENCOUNTER — HOSPITAL ENCOUNTER (OUTPATIENT)
Dept: CT IMAGING | Facility: HOSPITAL | Age: 84
Discharge: HOME | End: 2024-06-25
Admitting: PODIATRIST
Payer: MEDICARE

## 2024-06-25 DIAGNOSIS — M19.071 ARTHRITIS OF ANKLE, RIGHT: ICD-10-CM

## 2024-06-25 PROCEDURE — 73700 CT LOWER EXTREMITY W/O DYE: CPT

## 2024-06-26 ENCOUNTER — OFFICE VISIT (OUTPATIENT)
Dept: HEMATOLOGY | Age: 84
End: 2024-06-26
Payer: MEDICARE

## 2024-06-26 ENCOUNTER — HOSPITAL ENCOUNTER (OUTPATIENT)
Dept: INFUSION THERAPY | Age: 84
Discharge: HOME | End: 2024-06-26
Payer: MEDICARE

## 2024-06-26 VITALS
OXYGEN SATURATION: 98 % | SYSTOLIC BLOOD PRESSURE: 140 MMHG | WEIGHT: 159 LBS | BODY MASS INDEX: 23.55 KG/M2 | TEMPERATURE: 98.5 F | DIASTOLIC BLOOD PRESSURE: 80 MMHG | HEIGHT: 69 IN | HEART RATE: 57 BPM

## 2024-06-26 DIAGNOSIS — D89.84 IGG4 RELATED DISEASE (HCC): ICD-10-CM

## 2024-06-26 DIAGNOSIS — R59.0 ENLARGED LYMPH NODE IN NECK: ICD-10-CM

## 2024-06-26 DIAGNOSIS — R91.8 LUNG NODULES: ICD-10-CM

## 2024-06-26 DIAGNOSIS — Z71.2 ENCOUNTER TO DISCUSS TEST RESULTS: ICD-10-CM

## 2024-06-26 DIAGNOSIS — D47.2 IGG MONOCLONAL GAMMOPATHY: Primary | ICD-10-CM

## 2024-06-26 DIAGNOSIS — Z00.00 HEALTH CARE MAINTENANCE: ICD-10-CM

## 2024-06-26 DIAGNOSIS — D64.9 ANEMIA, UNSPECIFIED TYPE: ICD-10-CM

## 2024-06-26 PROCEDURE — 99214 OFFICE O/P EST MOD 30 MIN: CPT | Performed by: INTERNAL MEDICINE

## 2024-06-26 PROCEDURE — 1123F ACP DISCUSS/DSCN MKR DOCD: CPT | Performed by: INTERNAL MEDICINE

## 2024-06-26 PROCEDURE — 1036F TOBACCO NON-USER: CPT | Performed by: INTERNAL MEDICINE

## 2024-06-26 PROCEDURE — 3079F DIAST BP 80-89 MM HG: CPT | Performed by: INTERNAL MEDICINE

## 2024-06-26 PROCEDURE — 3077F SYST BP >= 140 MM HG: CPT | Performed by: INTERNAL MEDICINE

## 2024-06-26 PROCEDURE — G8420 CALC BMI NORM PARAMETERS: HCPCS | Performed by: INTERNAL MEDICINE

## 2024-06-26 PROCEDURE — G8427 DOCREV CUR MEDS BY ELIG CLIN: HCPCS | Performed by: INTERNAL MEDICINE

## 2024-06-26 PROCEDURE — 99212 OFFICE O/P EST SF 10 MIN: CPT

## 2024-07-03 NOTE — PROGRESS NOTES
[facility] - PODIATRY    Today's Date: 07/09/2024     Patient Name: Zeb Adams  MRN: [id number]  CSN: [id number]  PCP: Adi Jack MD  Referring Provider: No ref. provider found    SUBJECTIVE     Chief Complaint   Patient presents with    Follow-up     Adi Jack MD- -3 WK FU WITH DR STEEN FOR RECHECK- pt states had imaging done and here for the results- pt pain 8/10 at worst      HPI: Zeb Adams, a 84 y.o.male, comes to clinic as a(n) established patient complaining of foot pain and complaining of ankle pain. Patient has h/o anxiety, arthritis, asthma, GERD, Histoplasmosis, Hypercholesterolemia, HTN,  IgG4 Def, Kidney Disease, LRPD, RLS, Sialoadenitis, Stroke .  Patient had previously been treated at [facility] wound care for a left dorsal foot abscess which healed uneventfully.  Relates that he has had chronic issues with his right foot and ankle.  Relates that many years ago, he was told that he had a tear in a tendon of his foot and ultimately was placed in a cam boot for 6 months.  Since that time, his arch has slowly fallen in his foot worsened.  He does utilize OTC inserts with only minimal improvement.  Has increased pain in the more that he is on his feet.  States that the pain in his ankle is causing him to fall and inhibits his activity. He is interested in surgical correction.  Since last visit he has had a CT performed. Notes that he is about to start a 4 week course of a cancer drug for his IgG4.  He is chronically on prednisone.  Admits pain at 8/10 level and described as aching, nagging, and throbbing. Relates previous treatment(s) including Inserts . Denies any constitutional symptoms. No other pedal complaints at this time.    Past Medical History:   Diagnosis Date    Allergic rhinitis     Anxiety     Arthritis     Asthma     Benign neoplasm of submandibular gland     BPH with urinary obstruction     Chest pain     Chronic laryngitis     Chronic obstructive  lung disease 2023    Chronic rhinitis     Epistaxis     Esophageal stricture     GERD (gastroesophageal reflux disease)     Hiatal hernia     Histoplasmosis     Hypercholesterolemia     Hypertension     IgG4 deficiency     IgG4 related disease     Impotence of organic origin     Kidney disease     stage 3    LPRD (laryngopharyngeal reflux disease)     Lung collapse     Mediastinal adenopathy     Poor urinary stream     Presence of stent in coronary artery in patient with coronary artery disease     RLS (restless legs syndrome)     Sialoadenitis     Skin cancer     SCALP    SOB (shortness of breath) on exertion     Stroke     sometime in his life he has had a mini stroke found on a ct scan ewcently     Past Surgical History:   Procedure Laterality Date    BONE MARROW ASPIRATE W/ LUMBAR PUNCTURE      BRONCHOSCOPY Bilateral 2018    Procedure: BRONCHOSCOPY WITH ENDOBRONCHIAL ULTRASOUND;  Surgeon: Cecilio Hurtado MD;  Location:  [facility] OR;  Service:     CARDIAC SURGERY      CORONARY ANGIOPLASTY WITH STENT PLACEMENT      CORONARY ANGIOPLASTY WITH STENT PLACEMENT      KIDNEY SURGERY      NEPHRECTOMY      NEPHRECTOMY RADICAL Left     SUBMANDIBULAR GLAND EXCISION      TONSILLECTOMY      TURBINOPLASTY      VASECTOMY       and a reveral     Family History   Problem Relation Age of Onset    Diabetes Father     Heart failure Father     Diabetes Brother     Heart failure Brother     Heart attack Brother     Coronary artery disease Other     Heart attack Other     Heart disease Mother     Hyperlipidemia Mother     No Known Problems Maternal Grandmother     Heart disease Maternal Grandfather     No Known Problems Paternal Grandmother     No Known Problems Paternal Grandfather      Social History     Socioeconomic History    Marital status:    Tobacco Use    Smoking status: Former     Current packs/day: 0.00     Types: Cigarettes     Start date:      Quit date:      Years since quittin.5      Passive exposure: Past    Smokeless tobacco: Never    Tobacco comments:     30 yrs ago   Vaping Use    Vaping status: Never Used   Substance and Sexual Activity    Alcohol use: Never    Drug use: Never    Sexual activity: Defer     Allergies   Allergen Reactions    Pregabalin Anaphylaxis    Penicillins Rash    Sulfa Antibiotics Rash    Lisinopril Cough     Current Outpatient Medications   Medication Sig Dispense Refill    amLODIPine (NORVASC) 5 MG tablet Take 1 tablet by mouth Daily.      aspirin 81 MG tablet Take 81 mg by mouth daily.  LAST DOSE FRIDAY JAN19TH      atorvastatin (LIPITOR) 10 MG tablet       betamethasone valerate (VALISONE) 0.1 % lotion       cetirizine (ZyrTEC) 10 MG tablet Take 1 tablet by mouth Daily As Needed for Allergies.      Cholecalciferol 50 MCG (2000 UT) tablet Daily.      clonazePAM (KlonoPIN) 2 MG tablet As Needed.      fluocinonide (LIDEX) 0.05 % external solution fluocinonide 0.05 % topical solution   APPLY 5 TO 10 DROPS TOPICALLY TO THE SCALP EVERY NIGHT AT BEDTIME      HYDROcodone-acetaminophen (NORCO) 7.5-325 MG per tablet Take 1 tablet by mouth Every 8 (Eight) Hours As Needed for Severe Pain . 9 tablet 0    levoFLOXacin (Levaquin) 500 MG tablet Take 1 tablet by mouth Daily. 7 tablet 0    losartan (COZAAR) 25 MG tablet Take 1 tablet by mouth Daily.      pantoprazole (PROTONIX) 40 MG EC tablet 2 (Two) Times a Day.      predniSONE (DELTASONE) 5 MG tablet Take 1 tablet by mouth Daily.      sodium bicarbonate 325 MG tablet Take 1 tablet by mouth 2 (Two) Times a Day.       No current facility-administered medications for this visit.     Review of Systems   Constitutional:  Negative for chills and fever.   HENT:  Negative for congestion.    Respiratory:  Negative for shortness of breath.    Cardiovascular:  Positive for leg swelling. Negative for chest pain.   Gastrointestinal:  Negative for constipation, diarrhea, nausea and vomiting.   Musculoskeletal:  Positive for arthralgias and  joint swelling.        Foot pain   Skin:  Negative for wound.   Neurological:  Negative for numbness.       OBJECTIVE     Vitals:    24 0851   BP: 140/80       PHYSICAL EXAM  GEN:   Accompanied by none.     Physical Exam  Vitals reviewed.   Constitutional:       Appearance: Normal appearance.   HENT:      Head: Normocephalic and atraumatic.      Right Ear: Tympanic membrane normal.      Left Ear: Tympanic membrane normal.      Nose: Nose normal.      Mouth/Throat:      Pharynx: Oropharynx is clear.   Eyes:      Extraocular Movements: Extraocular movements intact.      Pupils: Pupils are equal, round, and reactive to light.   Cardiovascular:      Rate and Rhythm: Normal rate and regular rhythm.      Pulses: Normal pulses.           Dorsalis pedis pulses are 2+ on the right side and 2+ on the left side.        Posterior tibial pulses are 2+ on the right side and 2+ on the left side.      Heart sounds: Normal heart sounds.   Pulmonary:      Effort: Pulmonary effort is normal.      Breath sounds: Normal breath sounds.   Abdominal:      General: Bowel sounds are normal.   Musculoskeletal:      Cervical back: Normal range of motion and neck supple.   Feet:      Right foot:      Skin integrity: Warmth present.      Left foot:      Skin integrity: Warmth present.   Neurological:      General: No focal deficit present.      Mental Status: He is alert and oriented to person, place, and time.   Psychiatric:         Mood and Affect: Mood normal.         Behavior: Behavior normal.         Thought Content: Thought content normal.         Judgment: Judgment normal.          Foot/Ankle Exam    GENERAL  Appearance:  appears stated age and elderly  Orientation:  AAOx3  Affect:  appropriate  Gait:  unimpaired  Assistance:  independent  Right shoe gear: casual shoe  Left shoe gear: casual shoe    VASCULAR     Right Foot Vascularity   Dorsalis pedis:  2+  Posterior tibial:  2+  Skin temperature:  warm  Edema gradin+ and  pitting  CFT:  3  Pedal hair growth:  Present  Varicosities:  moderate varicosities     Left Foot Vascularity   Dorsalis pedis:  2+  Posterior tibial:  2+  Skin temperature:  warm  Edema grading:  Trace  CFT:  3  Pedal hair growth:  Present  Varicosities:  mild varicosities     NEUROLOGIC     Right Foot Neurologic   Light touch sensation: diminished  Vibratory sensation: normal  Hot/Cold sensation: normal     Left Foot Neurologic   Light touch sensation: diminished  Vibratory sensation: normal  Hot/Cold sensation:  normal    MUSCULOSKELETAL     Right Foot Musculoskeletal   Ecchymosis:  none  Tenderness:  ankle joint tenderness, plantar arch tenderness, dorsal foot, posterior tibial tendon tenderness and subtalar joint tenderness    Arch:  Pes planus     Left Foot Musculoskeletal   Ecchymosis:  none  Tenderness:  none  Arch:  Normal    MUSCLE STRENGTH     Right Foot Muscle Strength   Foot dorsiflexion:  5  Foot plantar flexion:  5  Foot inversion:  4-  Foot eversion:  5     Left Foot Muscle Strength   Foot dorsiflexion:  5  Foot plantar flexion:  5  Foot inversion:  5  Foot eversion:  5    RANGE OF MOTION     Right Foot Range of Motion   Ankle dorsiflexion: decreased  with pain  Ankle plantar flexion: decreased  with pain  Foot eversion:  decreased  with pain  Foot inversion:  decreased  with pain     Left Foot Range of Motion   Foot and ankle ROM within normal limits      DERMATOLOGIC      Right Foot Dermatologic   Skin  Right foot skin is intact.      Left Foot Dermatologic   Skin  Left foot skin is intact.     TESTS     Right Foot Tests   Talar tilt: positive      RADIOLOGY/NUCLEAR:  CT ankle right wo contrast    Result Date: 6/25/2024  Narrative: EXAMINATION:  CT ANKLE RIGHT WO CONTRAST-  6/25/2024 7:55 AM  HISTORY: M19.071-Primary osteoarthritis, right ankle and foot.  TECHNIQUE: Spiral CT was performed of the right ankle. Sagittal and coronal images were reconstructed.  DLP: 151.58 mGy.cm Automated dosage  reduction technique was utilized to reduce patient dosage.  COMPARISON: Ankle x-rays on 10/10/2023.  FINDINGS: Seen best on the coronal images, there is severe narrowing of the tibiotalar joint space. There is a bone-on-bone appearance. There is a tilted appearance of the talus compared to the articular surface of the distal tibia. The ankle created by the tilting of the articular surfaces is about 11 degrees. There are subchondral cystic changes along the articular surfaces of the distal tibia and the talus. There is severe narrowing of the space between the lateral malleolus and the talus which appears to form a pseudoarticulation. The distal tip of the lateral malleolus also appears to form a pseudoarticulation along the lateral and posterior aspect of the calcaneus. On the sagittal images, there is mild narrowing of the posterior subtalar joint space. The middle facet of the subtalar joint appears to be fairly well-preserved with minor narrowing. The calcaneocuboid joint appears to be fairly well-maintained. There is some narrowing of the talonavicular joint space. No acute appearing fracture is seen. There is edema within the subcutaneous fat of the lower leg, ankle and proximal foot. The visualized tendons appear to be fairly normal in size. There does appear to be some muscular atrophy in the lower leg.       Impression: 1. Severe narrowing of the tibiotalar joint space with a bone-on-bone appearance. There are subchondral cystic degenerative changes. There is also a tilted appearance of the talus with respect to the distal tibia with the angle of the tilting approximately 11 degrees. There is also severe narrowing of the space between the lateral malleolus and the talus which appears to form a pseudoarthrosis. There is also a pseudoarthrosis between the distal tip of the lateral malleolus and the lateral aspect of the posterior calcaneus. 2. There is some narrowing and degenerative change of the posterior  subtalar joint. There is minimal narrowing of the middle facet of the subtalar joints. There is also narrowing of the talonavicular joint space. 3. There appears to be some muscular atrophy in the lower leg. There is subcutaneous edema.   This report was signed and finalized on 6/25/2024 10:04 AM by Dr. Julio Delarosa MD.       LABORATORY/CULTURE RESULTS:      PATHOLOGY RESULTS:       ASSESSMENT/PLAN     Diagnoses and all orders for this visit:    1. Arthritis of ankle, right (Primary)  -     ethyl alcohol 62 % 2 each  -     Case Request; Standing  -     CBC & Differential; Future  -     Basic Metabolic Panel; Future  -     ECG 12 Lead; Future  -     XR chest 2 vw; Future  -     Ambulatory Referral to Cardiology  -     ceFAZolin (ANCEF) 2,000 mg in sodium chloride 0.9 % 100 mL IVPB  -     Case Request    2. Chronic pain of right ankle  -     ethyl alcohol 62 % 2 each  -     Case Request; Standing  -     CBC & Differential; Future  -     Basic Metabolic Panel; Future  -     ECG 12 Lead; Future  -     XR chest 2 vw; Future  -     Ambulatory Referral to Cardiology  -     ceFAZolin (ANCEF) 2,000 mg in sodium chloride 0.9 % 100 mL IVPB  -     Case Request    3. Arthritis of right subtalar joint  -     ethyl alcohol 62 % 2 each  -     Case Request; Standing  -     CBC & Differential; Future  -     Basic Metabolic Panel; Future  -     ECG 12 Lead; Future  -     XR chest 2 vw; Future  -     Ambulatory Referral to Cardiology  -     ceFAZolin (ANCEF) 2,000 mg in sodium chloride 0.9 % 100 mL IVPB  -     Case Request    4. Deformity, foot acquired, right  -     ethyl alcohol 62 % 2 each  -     Case Request; Standing  -     CBC & Differential; Future  -     Basic Metabolic Panel; Future  -     ECG 12 Lead; Future  -     XR chest 2 vw; Future  -     Ambulatory Referral to Cardiology  -     ceFAZolin (ANCEF) 2,000 mg in sodium chloride 0.9 % 100 mL IVPB  -     Case Request    5. IgG4 related disease    6. Coronary artery disease  involving native coronary artery of native heart without angina pectoris  -     Ambulatory Referral to Cardiology    Other orders  -     Follow Anesthesia Guidelines / Protocol; Future  -     Follow Anesthesia Guidelines / Protocol; Standing  -     Obtain Informed Consent; Future  -     Provide Instructions to Patient Regarding NPO Status; Future  -     Chlorhexidine Skin Prep - Educate and Review With Patient; Future  -     Nerve Block; Standing  -     Verify NPO Status; Standing  -     Obtain Informed Consent (If Not Done Inpatient or PAT); Standing  -     Instructions on coughing, deep breathing, and incentive spirometry.; Standing  -     Notify Provider - Standard; Standing  -     Provide NPO Instructions to Patient          Comprehensive lower extremity examination and evaluation was performed.  Discussed findings and treatment plan including risks, benefits, and treatment options with patient in detail. Patient agreed with treatment plan.  Xrays and CT reviewed showing significant talar tilt and arthritis.  Findings consistent with right foot pes planus and arthritis of several joints.  Discussed conservative versus surgical options for the above issues.    Patient is considering surgical intervention and would opt for TibioTaloCalcaneal Arthrodesis, Bone Graft [place], all related procedures - Right .  All options, benefits, and risks associate with surgery have discussed with the patient including but not limited to: Standard risk of anesthesia, pain, bleeding, infection, nonhealing/dehiscence, nonunion, malunion, deformity, loss of limb or life.  Pre and postoperative course were discussed in detail including 6 to 8 weeks nonweightbearing status postoperatively.  No guarantees were inferred.  It was discussed in detail regarding patient's increased risk for complication given his age, chronic use of prednisone, and IgG4 disease.  He will need cardiac clearance.  An After Visit Summary was printed and given  to the patient at discharge, including (if requested) any available informative/educational handouts regarding diagnosis, treatment, or medications. All questions were answered to patient/family satisfaction. Should symptoms fail to improve or worsen they agree to call or return to clinic or to go to the Emergency Department. Discussed the importance of following up with any needed screening tests/labs/specialist appointments and any requested follow-up recommended by me today. Importance of maintaining follow-up discussed and patient accepts that missed appointments can delay diagnosis and potentially lead to worsening of conditions.  Return if symptoms worsen or fail to improve, for Post-Op appointment., or sooner if acute issues arise.    Lab Frequency Next Occurrence   CT Abdomen Pelvis With Contrast Once 10/31/2022   CT Chest With Contrast Once 10/31/2022   Lipid Panel Once 06/12/2024   XR Foot 3+ View Left Once 08/12/2023       This document has been electronically signed by Jose Rafael Ferris DPM on July 9, 2024 17:33 CDT

## 2024-07-08 ENCOUNTER — TELEPHONE (OUTPATIENT)
Age: 84
End: 2024-07-08
Payer: MEDICARE

## 2024-07-09 ENCOUNTER — OFFICE VISIT (OUTPATIENT)
Age: 84
End: 2024-07-09
Payer: MEDICARE

## 2024-07-09 VITALS
SYSTOLIC BLOOD PRESSURE: 140 MMHG | HEIGHT: 67 IN | BODY MASS INDEX: 24.96 KG/M2 | DIASTOLIC BLOOD PRESSURE: 80 MMHG | WEIGHT: 159 LBS

## 2024-07-09 DIAGNOSIS — M25.571 CHRONIC PAIN OF RIGHT ANKLE: ICD-10-CM

## 2024-07-09 DIAGNOSIS — I25.10 CORONARY ARTERY DISEASE INVOLVING NATIVE CORONARY ARTERY OF NATIVE HEART WITHOUT ANGINA PECTORIS: ICD-10-CM

## 2024-07-09 DIAGNOSIS — M19.071 ARTHRITIS OF ANKLE, RIGHT: Primary | ICD-10-CM

## 2024-07-09 DIAGNOSIS — D89.84 IGG4 RELATED DISEASE: ICD-10-CM

## 2024-07-09 DIAGNOSIS — M21.961: ICD-10-CM

## 2024-07-09 DIAGNOSIS — G89.29 CHRONIC PAIN OF RIGHT ANKLE: ICD-10-CM

## 2024-07-09 DIAGNOSIS — M19.071 ARTHRITIS OF RIGHT SUBTALAR JOINT: ICD-10-CM

## 2024-07-09 PROBLEM — Z90.5 S/P NEPHRECTOMY: Status: ACTIVE | Noted: 2019-05-30

## 2024-07-09 PROBLEM — M51.369 DEGENERATION OF LUMBAR INTERVERTEBRAL DISC: Status: ACTIVE | Noted: 2023-08-28

## 2024-07-09 PROBLEM — F41.9 ANXIETY: Status: ACTIVE | Noted: 2023-05-24

## 2024-07-09 PROBLEM — D69.6 THROMBOCYTOPENIC DISORDER: Status: ACTIVE | Noted: 2023-05-24

## 2024-07-09 PROBLEM — M19.90 OSTEOARTHRITIS: Status: ACTIVE | Noted: 2023-05-24

## 2024-07-09 PROBLEM — J44.9 CHRONIC OBSTRUCTIVE LUNG DISEASE: Status: ACTIVE | Noted: 2023-05-24

## 2024-07-09 PROBLEM — I87.2 PERIPHERAL VENOUS INSUFFICIENCY: Status: ACTIVE | Noted: 2023-05-24

## 2024-07-09 PROBLEM — N18.32 STAGE 3B CHRONIC KIDNEY DISEASE: Status: ACTIVE | Noted: 2023-05-24

## 2024-07-09 PROBLEM — N40.0 BENIGN PROSTATIC HYPERPLASIA: Status: ACTIVE | Noted: 2019-05-30

## 2024-07-09 PROBLEM — M51.36 DEGENERATION OF LUMBAR INTERVERTEBRAL DISC: Status: ACTIVE | Noted: 2023-08-28

## 2024-07-09 PROBLEM — M54.16 LUMBAR RADICULOPATHY: Status: ACTIVE | Noted: 2023-05-24

## 2024-07-09 PROBLEM — R73.03 PREDIABETES: Status: ACTIVE | Noted: 2023-05-24

## 2024-07-09 PROBLEM — K21.9 CHRONIC GERD: Status: ACTIVE | Noted: 2017-06-07

## 2024-07-09 PROBLEM — N18.9 CHRONIC KIDNEY DISEASE: Status: ACTIVE | Noted: 2018-09-17

## 2024-07-09 PROBLEM — J45.909 ASTHMA: Status: ACTIVE | Noted: 2018-06-14

## 2024-07-09 PROBLEM — G25.81 RESTLESS LEG SYNDROME: Status: ACTIVE | Noted: 2018-06-14

## 2024-07-09 PROCEDURE — 1160F RVW MEDS BY RX/DR IN RCRD: CPT | Performed by: PODIATRIST

## 2024-07-09 PROCEDURE — 99214 OFFICE O/P EST MOD 30 MIN: CPT | Performed by: PODIATRIST

## 2024-07-09 PROCEDURE — 1159F MED LIST DOCD IN RCRD: CPT | Performed by: PODIATRIST

## 2024-07-09 PROCEDURE — 3079F DIAST BP 80-89 MM HG: CPT | Performed by: PODIATRIST

## 2024-07-09 PROCEDURE — 3077F SYST BP >= 140 MM HG: CPT | Performed by: PODIATRIST

## 2024-07-09 NOTE — H&P
[facility] - PODIATRY    Today's Date: 07/09/2024     Patient Name: Zeb Adams  MRN: [id number]  CSN: [id number]  PCP: Adi Jack MD  Referring Provider: No ref. provider found    SUBJECTIVE     Chief Complaint   Patient presents with    Follow-up     Adi Jack MD- -3 WK FU WITH DR STEEN FOR RECHECK- pt states had imaging done and here for the results- pt pain 8/10 at worst      HPI: Zeb Adams, a 84 y.o.male, comes to clinic as a(n) established patient complaining of foot pain and complaining of ankle pain. Patient has h/o anxiety, arthritis, asthma, GERD, Histoplasmosis, Hypercholesterolemia, HTN,  IgG4 Def, Kidney Disease, LRPD, RLS, Sialoadenitis, Stroke .  Patient had previously been treated at [facility] wound care for a left dorsal foot abscess which healed uneventfully.  Relates that he has had chronic issues with his right foot and ankle.  Relates that many years ago, he was told that he had a tear in a tendon of his foot and ultimately was placed in a cam boot for 6 months.  Since that time, his arch has slowly fallen in his foot worsened.  He does utilize OTC inserts with only minimal improvement.  Has increased pain in the more that he is on his feet.  States that the pain in his ankle is causing him to fall and inhibits his activity. He is interested in surgical correction.  Since last visit he has had a CT performed. Notes that he is about to start a 4 week course of a cancer drug for his IgG4.  He is chronically on prednisone.  Admits pain at 8/10 level and described as aching, nagging, and throbbing. Relates previous treatment(s) including Inserts . Denies any constitutional symptoms. No other pedal complaints at this time.    Past Medical History:   Diagnosis Date    Allergic rhinitis     Anxiety     Arthritis     Asthma     Benign neoplasm of submandibular gland     BPH with urinary obstruction     Chest pain     Chronic laryngitis     Chronic obstructive  lung disease 2023    Chronic rhinitis     Epistaxis     Esophageal stricture     GERD (gastroesophageal reflux disease)     Hiatal hernia     Histoplasmosis     Hypercholesterolemia     Hypertension     IgG4 deficiency     IgG4 related disease     Impotence of organic origin     Kidney disease     stage 3    LPRD (laryngopharyngeal reflux disease)     Lung collapse     Mediastinal adenopathy     Poor urinary stream     Presence of stent in coronary artery in patient with coronary artery disease     RLS (restless legs syndrome)     Sialoadenitis     Skin cancer     SCALP    SOB (shortness of breath) on exertion     Stroke     sometime in his life he has had a mini stroke found on a ct scan ewcently     Past Surgical History:   Procedure Laterality Date    BONE MARROW ASPIRATE W/ LUMBAR PUNCTURE      BRONCHOSCOPY Bilateral 2018    Procedure: BRONCHOSCOPY WITH ENDOBRONCHIAL ULTRASOUND;  Surgeon: Cecilio Hurtado MD;  Location:  [facility] OR;  Service:     CARDIAC SURGERY      CORONARY ANGIOPLASTY WITH STENT PLACEMENT      CORONARY ANGIOPLASTY WITH STENT PLACEMENT      KIDNEY SURGERY      NEPHRECTOMY      NEPHRECTOMY RADICAL Left     SUBMANDIBULAR GLAND EXCISION      TONSILLECTOMY      TURBINOPLASTY      VASECTOMY       and a reveral     Family History   Problem Relation Age of Onset    Diabetes Father     Heart failure Father     Diabetes Brother     Heart failure Brother     Heart attack Brother     Coronary artery disease Other     Heart attack Other     Heart disease Mother     Hyperlipidemia Mother     No Known Problems Maternal Grandmother     Heart disease Maternal Grandfather     No Known Problems Paternal Grandmother     No Known Problems Paternal Grandfather      Social History     Socioeconomic History    Marital status:    Tobacco Use    Smoking status: Former     Current packs/day: 0.00     Types: Cigarettes     Start date:      Quit date:      Years since quittin.5      Passive exposure: Past    Smokeless tobacco: Never    Tobacco comments:     30 yrs ago   Vaping Use    Vaping status: Never Used   Substance and Sexual Activity    Alcohol use: Never    Drug use: Never    Sexual activity: Defer     Allergies   Allergen Reactions    Pregabalin Anaphylaxis    Penicillins Rash    Sulfa Antibiotics Rash    Lisinopril Cough     Current Outpatient Medications   Medication Sig Dispense Refill    amLODIPine (NORVASC) 5 MG tablet Take 1 tablet by mouth Daily.      aspirin 81 MG tablet Take 81 mg by mouth daily.  LAST DOSE FRIDAY JAN19TH      atorvastatin (LIPITOR) 10 MG tablet       betamethasone valerate (VALISONE) 0.1 % lotion       cetirizine (ZyrTEC) 10 MG tablet Take 1 tablet by mouth Daily As Needed for Allergies.      Cholecalciferol 50 MCG (2000 UT) tablet Daily.      clonazePAM (KlonoPIN) 2 MG tablet As Needed.      fluocinonide (LIDEX) 0.05 % external solution fluocinonide 0.05 % topical solution   APPLY 5 TO 10 DROPS TOPICALLY TO THE SCALP EVERY NIGHT AT BEDTIME      HYDROcodone-acetaminophen (NORCO) 7.5-325 MG per tablet Take 1 tablet by mouth Every 8 (Eight) Hours As Needed for Severe Pain . 9 tablet 0    levoFLOXacin (Levaquin) 500 MG tablet Take 1 tablet by mouth Daily. 7 tablet 0    losartan (COZAAR) 25 MG tablet Take 1 tablet by mouth Daily.      pantoprazole (PROTONIX) 40 MG EC tablet 2 (Two) Times a Day.      predniSONE (DELTASONE) 5 MG tablet Take 1 tablet by mouth Daily.      sodium bicarbonate 325 MG tablet Take 1 tablet by mouth 2 (Two) Times a Day.       No current facility-administered medications for this visit.     Review of Systems   Constitutional:  Negative for chills and fever.   HENT:  Negative for congestion.    Respiratory:  Negative for shortness of breath.    Cardiovascular:  Positive for leg swelling. Negative for chest pain.   Gastrointestinal:  Negative for constipation, diarrhea, nausea and vomiting.   Musculoskeletal:  Positive for arthralgias and  joint swelling.        Foot pain   Skin:  Negative for wound.   Neurological:  Negative for numbness.       OBJECTIVE     Vitals:    24 0851   BP: 140/80       PHYSICAL EXAM  GEN:   Accompanied by none.     Physical Exam  Vitals reviewed.   Constitutional:       Appearance: Normal appearance.   HENT:      Head: Normocephalic and atraumatic.      Right Ear: Tympanic membrane normal.      Left Ear: Tympanic membrane normal.      Nose: Nose normal.      Mouth/Throat:      Pharynx: Oropharynx is clear.   Eyes:      Extraocular Movements: Extraocular movements intact.      Pupils: Pupils are equal, round, and reactive to light.   Cardiovascular:      Rate and Rhythm: Normal rate and regular rhythm.      Pulses: Normal pulses.           Dorsalis pedis pulses are 2+ on the right side and 2+ on the left side.        Posterior tibial pulses are 2+ on the right side and 2+ on the left side.      Heart sounds: Normal heart sounds.   Pulmonary:      Effort: Pulmonary effort is normal.      Breath sounds: Normal breath sounds.   Abdominal:      General: Bowel sounds are normal.   Musculoskeletal:      Cervical back: Normal range of motion and neck supple.   Feet:      Right foot:      Skin integrity: Warmth present.      Left foot:      Skin integrity: Warmth present.   Neurological:      General: No focal deficit present.      Mental Status: He is alert and oriented to person, place, and time.   Psychiatric:         Mood and Affect: Mood normal.         Behavior: Behavior normal.         Thought Content: Thought content normal.         Judgment: Judgment normal.          Foot/Ankle Exam    GENERAL  Appearance:  appears stated age and elderly  Orientation:  AAOx3  Affect:  appropriate  Gait:  unimpaired  Assistance:  independent  Right shoe gear: casual shoe  Left shoe gear: casual shoe    VASCULAR     Right Foot Vascularity   Dorsalis pedis:  2+  Posterior tibial:  2+  Skin temperature:  warm  Edema gradin+ and  pitting  CFT:  3  Pedal hair growth:  Present  Varicosities:  moderate varicosities     Left Foot Vascularity   Dorsalis pedis:  2+  Posterior tibial:  2+  Skin temperature:  warm  Edema grading:  Trace  CFT:  3  Pedal hair growth:  Present  Varicosities:  mild varicosities     NEUROLOGIC     Right Foot Neurologic   Light touch sensation: diminished  Vibratory sensation: normal  Hot/Cold sensation: normal     Left Foot Neurologic   Light touch sensation: diminished  Vibratory sensation: normal  Hot/Cold sensation:  normal    MUSCULOSKELETAL     Right Foot Musculoskeletal   Ecchymosis:  none  Tenderness:  ankle joint tenderness, plantar arch tenderness, dorsal foot, posterior tibial tendon tenderness and subtalar joint tenderness    Arch:  Pes planus     Left Foot Musculoskeletal   Ecchymosis:  none  Tenderness:  none  Arch:  Normal    MUSCLE STRENGTH     Right Foot Muscle Strength   Foot dorsiflexion:  5  Foot plantar flexion:  5  Foot inversion:  4-  Foot eversion:  5     Left Foot Muscle Strength   Foot dorsiflexion:  5  Foot plantar flexion:  5  Foot inversion:  5  Foot eversion:  5    RANGE OF MOTION     Right Foot Range of Motion   Ankle dorsiflexion: decreased  with pain  Ankle plantar flexion: decreased  with pain  Foot eversion:  decreased  with pain  Foot inversion:  decreased  with pain     Left Foot Range of Motion   Foot and ankle ROM within normal limits      DERMATOLOGIC      Right Foot Dermatologic   Skin  Right foot skin is intact.      Left Foot Dermatologic   Skin  Left foot skin is intact.     TESTS     Right Foot Tests   Talar tilt: positive      RADIOLOGY/NUCLEAR:  CT ankle right wo contrast    Result Date: 6/25/2024  Narrative: EXAMINATION:  CT ANKLE RIGHT WO CONTRAST-  6/25/2024 7:55 AM  HISTORY: M19.071-Primary osteoarthritis, right ankle and foot.  TECHNIQUE: Spiral CT was performed of the right ankle. Sagittal and coronal images were reconstructed.  DLP: 151.58 mGy.cm Automated dosage  reduction technique was utilized to reduce patient dosage.  COMPARISON: Ankle x-rays on 10/10/2023.  FINDINGS: Seen best on the coronal images, there is severe narrowing of the tibiotalar joint space. There is a bone-on-bone appearance. There is a tilted appearance of the talus compared to the articular surface of the distal tibia. The ankle created by the tilting of the articular surfaces is about 11 degrees. There are subchondral cystic changes along the articular surfaces of the distal tibia and the talus. There is severe narrowing of the space between the lateral malleolus and the talus which appears to form a pseudoarticulation. The distal tip of the lateral malleolus also appears to form a pseudoarticulation along the lateral and posterior aspect of the calcaneus. On the sagittal images, there is mild narrowing of the posterior subtalar joint space. The middle facet of the subtalar joint appears to be fairly well-preserved with minor narrowing. The calcaneocuboid joint appears to be fairly well-maintained. There is some narrowing of the talonavicular joint space. No acute appearing fracture is seen. There is edema within the subcutaneous fat of the lower leg, ankle and proximal foot. The visualized tendons appear to be fairly normal in size. There does appear to be some muscular atrophy in the lower leg.       Impression: 1. Severe narrowing of the tibiotalar joint space with a bone-on-bone appearance. There are subchondral cystic degenerative changes. There is also a tilted appearance of the talus with respect to the distal tibia with the angle of the tilting approximately 11 degrees. There is also severe narrowing of the space between the lateral malleolus and the talus which appears to form a pseudoarthrosis. There is also a pseudoarthrosis between the distal tip of the lateral malleolus and the lateral aspect of the posterior calcaneus. 2. There is some narrowing and degenerative change of the posterior  subtalar joint. There is minimal narrowing of the middle facet of the subtalar joints. There is also narrowing of the talonavicular joint space. 3. There appears to be some muscular atrophy in the lower leg. There is subcutaneous edema.   This report was signed and finalized on 6/25/2024 10:04 AM by Dr. Julio Delarosa MD.       LABORATORY/CULTURE RESULTS:      PATHOLOGY RESULTS:       ASSESSMENT/PLAN     Diagnoses and all orders for this visit:    1. Arthritis of ankle, right (Primary)  -     ethyl alcohol 62 % 2 each  -     Case Request; Standing  -     CBC & Differential; Future  -     Basic Metabolic Panel; Future  -     ECG 12 Lead; Future  -     XR chest 2 vw; Future  -     Ambulatory Referral to Cardiology  -     ceFAZolin (ANCEF) 2,000 mg in sodium chloride 0.9 % 100 mL IVPB  -     Case Request    2. Chronic pain of right ankle  -     ethyl alcohol 62 % 2 each  -     Case Request; Standing  -     CBC & Differential; Future  -     Basic Metabolic Panel; Future  -     ECG 12 Lead; Future  -     XR chest 2 vw; Future  -     Ambulatory Referral to Cardiology  -     ceFAZolin (ANCEF) 2,000 mg in sodium chloride 0.9 % 100 mL IVPB  -     Case Request    3. Arthritis of right subtalar joint  -     ethyl alcohol 62 % 2 each  -     Case Request; Standing  -     CBC & Differential; Future  -     Basic Metabolic Panel; Future  -     ECG 12 Lead; Future  -     XR chest 2 vw; Future  -     Ambulatory Referral to Cardiology  -     ceFAZolin (ANCEF) 2,000 mg in sodium chloride 0.9 % 100 mL IVPB  -     Case Request    4. Deformity, foot acquired, right  -     ethyl alcohol 62 % 2 each  -     Case Request; Standing  -     CBC & Differential; Future  -     Basic Metabolic Panel; Future  -     ECG 12 Lead; Future  -     XR chest 2 vw; Future  -     Ambulatory Referral to Cardiology  -     ceFAZolin (ANCEF) 2,000 mg in sodium chloride 0.9 % 100 mL IVPB  -     Case Request    5. IgG4 related disease    6. Coronary artery disease  involving native coronary artery of native heart without angina pectoris  -     Ambulatory Referral to Cardiology    Other orders  -     Follow Anesthesia Guidelines / Protocol; Future  -     Follow Anesthesia Guidelines / Protocol; Standing  -     Obtain Informed Consent; Future  -     Provide Instructions to Patient Regarding NPO Status; Future  -     Chlorhexidine Skin Prep - Educate and Review With Patient; Future  -     Nerve Block; Standing  -     Verify NPO Status; Standing  -     Obtain Informed Consent (If Not Done Inpatient or PAT); Standing  -     Instructions on coughing, deep breathing, and incentive spirometry.; Standing  -     Notify Provider - Standard; Standing  -     Provide NPO Instructions to Patient          Comprehensive lower extremity examination and evaluation was performed.  Discussed findings and treatment plan including risks, benefits, and treatment options with patient in detail. Patient agreed with treatment plan.  Xrays and CT reviewed showing significant talar tilt and arthritis.  Findings consistent with right foot pes planus and arthritis of several joints.  Discussed conservative versus surgical options for the above issues.    Patient is considering surgical intervention and would opt for TibioTaloCalcaneal Arthrodesis, Bone Graft Haines, all related procedures - Right .  All options, benefits, and risks associate with surgery have discussed with the patient including but not limited to: Standard risk of anesthesia, pain, bleeding, infection, nonhealing/dehiscence, nonunion, malunion, deformity, loss of limb or life.  Pre and postoperative course were discussed in detail including 6 to 8 weeks nonweightbearing status postoperatively.  No guarantees were inferred.  It was discussed in detail regarding patient's increased risk for complication given his age, chronic use of prednisone, and IgG4 disease.  He will need cardiac clearance.  An After Visit Summary was printed and given  to the patient at discharge, including (if requested) any available informative/educational handouts regarding diagnosis, treatment, or medications. All questions were answered to patient/family satisfaction. Should symptoms fail to improve or worsen they agree to call or return to clinic or to go to the Emergency Department. Discussed the importance of following up with any needed screening tests/labs/specialist appointments and any requested follow-up recommended by me today. Importance of maintaining follow-up discussed and patient accepts that missed appointments can delay diagnosis and potentially lead to worsening of conditions.  Return if symptoms worsen or fail to improve, for Post-Op appointment., or sooner if acute issues arise.    Lab Frequency Next Occurrence   CT Abdomen Pelvis With Contrast Once 10/31/2022   CT Chest With Contrast Once 10/31/2022   Lipid Panel Once 06/12/2024   XR Foot 3+ View Left Once 08/12/2023       This document has been electronically signed by Jose Rafael Ferris DPM on July 9, 2024 17:35 CDT

## 2024-07-11 ENCOUNTER — TELEPHONE (OUTPATIENT)
Age: 84
End: 2024-07-11
Payer: MEDICARE

## 2024-07-11 NOTE — TELEPHONE ENCOUNTER
Called patient and left a message for him to call back to set up his procedure. He will need cardiac clearance prior.   
Patient called back and he isn't going to have the procedure done he is worried about his age and health with going through with surgery.   
58 y/o F c/o low back pain x 4 years without preceding accident, injury or trauma. The patient ambulates without assistive walking devices. Complains of low back pain which radiates down her right lower extremity and is associated with numbness/tingling down her right lower extremity. Has a history of fibromyalgia for which she takes gabapentin. Denies history of DVT. Of note the patient had a laminectomy 2 years ago, she states she had a right foot drop after that surgery which has resolved.

## 2024-09-18 ENCOUNTER — HOSPITAL ENCOUNTER (OUTPATIENT)
Dept: INFUSION THERAPY | Age: 84
Discharge: HOME | End: 2024-09-18
Payer: MEDICARE

## 2024-09-18 ENCOUNTER — TELEPHONE (OUTPATIENT)
Dept: CARDIOLOGY | Facility: CLINIC | Age: 84
End: 2024-09-18
Payer: MEDICARE

## 2024-09-18 DIAGNOSIS — D89.84 IGG4 RELATED DISEASE (HCC): ICD-10-CM

## 2024-09-18 DIAGNOSIS — D64.9 ANEMIA, UNSPECIFIED TYPE: ICD-10-CM

## 2024-09-18 DIAGNOSIS — D47.2 IGG MONOCLONAL GAMMOPATHY: ICD-10-CM

## 2024-09-18 LAB
ALBUMIN SERPL-MCNC: 4 G/DL (ref 3.5–5.2)
ALP SERPL-CCNC: 91 U/L (ref 40–129)
ALT SERPL-CCNC: 19 U/L (ref 5–41)
ANION GAP SERPL CALCULATED.3IONS-SCNC: 8 MMOL/L (ref 7–19)
AST SERPL-CCNC: 22 U/L (ref 5–40)
BASOPHILS # BLD: 0.03 K/UL (ref 0.01–0.08)
BASOPHILS NFR BLD: 0.5 % (ref 0.1–1.2)
BILIRUB SERPL-MCNC: 0.7 MG/DL (ref 0–1.2)
BUN SERPL-MCNC: 22 MG/DL (ref 8–23)
CALCIUM SERPL-MCNC: 10.8 MG/DL (ref 8.8–10.2)
CHLORIDE SERPL-SCNC: 105 MMOL/L (ref 98–107)
CO2 SERPL-SCNC: 24 MMOL/L (ref 22–29)
CREAT SERPL-MCNC: 1.3 MG/DL (ref 0.7–1.2)
EOSINOPHIL # BLD: 0.13 K/UL (ref 0.04–0.54)
EOSINOPHIL NFR BLD: 2.4 % (ref 0.7–7)
ERYTHROCYTE [DISTWIDTH] IN BLOOD BY AUTOMATED COUNT: 13.5 % (ref 11.6–14.4)
GLUCOSE SERPL-MCNC: 84 MG/DL (ref 70–99)
HCT VFR BLD AUTO: 38.8 % (ref 40.1–51)
HGB BLD-MCNC: 12.9 G/DL (ref 13.7–17.5)
LYMPHOCYTES # BLD: 0.41 K/UL (ref 1.18–3.74)
LYMPHOCYTES NFR BLD: 7.5 % (ref 19.3–53.1)
MCH RBC QN AUTO: 32.4 PG (ref 25.7–32.2)
MCHC RBC AUTO-ENTMCNC: 33.2 G/DL (ref 32.3–36.5)
MCV RBC AUTO: 97.5 FL (ref 79–92.2)
MONOCYTES # BLD: 0.91 K/UL (ref 0.24–0.82)
MONOCYTES NFR BLD: 16.5 % (ref 4.7–12.5)
NEUTROPHILS # BLD: 3.98 K/UL (ref 1.56–6.13)
NEUTS SEG NFR BLD: 72.4 % (ref 34–71.1)
PLATELET # BLD AUTO: 187 K/UL (ref 163–337)
PMV BLD AUTO: 10.9 FL (ref 7.4–10.4)
POTASSIUM SERPL-SCNC: 4.2 MMOL/L (ref 3.5–5.1)
PROT SERPL-MCNC: 6.3 G/DL (ref 6.4–8.3)
RBC # BLD AUTO: 3.98 M/UL (ref 4.63–6.08)
SODIUM SERPL-SCNC: 137 MMOL/L (ref 136–145)
WBC # BLD AUTO: 5.5 K/UL (ref 4.23–9.07)

## 2024-09-18 PROCEDURE — 80053 COMPREHEN METABOLIC PANEL: CPT

## 2024-09-18 PROCEDURE — 36415 COLL VENOUS BLD VENIPUNCTURE: CPT

## 2024-09-18 PROCEDURE — 85025 COMPLETE CBC W/AUTO DIFF WBC: CPT

## 2024-09-18 RX ORDER — ATORVASTATIN CALCIUM 40 MG/1
40 TABLET, FILM COATED ORAL DAILY
Qty: 90 TABLET | Refills: 3 | Status: SHIPPED | OUTPATIENT
Start: 2024-09-18

## 2024-09-20 ENCOUNTER — TELEPHONE (OUTPATIENT)
Dept: HEMATOLOGY | Age: 84
End: 2024-09-20

## 2024-09-20 ENCOUNTER — CLINICAL DOCUMENTATION (OUTPATIENT)
Dept: HEMATOLOGY | Age: 84
End: 2024-09-20

## 2024-09-20 LAB — B2 MICROGLOB SERPL-MCNC: 3.4 MG/L

## 2024-09-25 ENCOUNTER — HOSPITAL ENCOUNTER (OUTPATIENT)
Dept: INFUSION THERAPY | Age: 84
Discharge: HOME | End: 2024-09-25
Payer: MEDICARE

## 2024-09-25 ENCOUNTER — OFFICE VISIT (OUTPATIENT)
Dept: HEMATOLOGY | Age: 84
End: 2024-09-25
Payer: MEDICARE

## 2024-09-25 VITALS
SYSTOLIC BLOOD PRESSURE: 150 MMHG | HEIGHT: 69 IN | HEART RATE: 67 BPM | OXYGEN SATURATION: 93 % | WEIGHT: 166.1 LBS | TEMPERATURE: 97.4 F | BODY MASS INDEX: 24.6 KG/M2 | DIASTOLIC BLOOD PRESSURE: 84 MMHG

## 2024-09-25 DIAGNOSIS — Z00.00 HEALTH CARE MAINTENANCE: ICD-10-CM

## 2024-09-25 DIAGNOSIS — D47.2 IGG MONOCLONAL GAMMOPATHY: Primary | ICD-10-CM

## 2024-09-25 DIAGNOSIS — D89.84 IGG4 RELATED DISEASE (HCC): ICD-10-CM

## 2024-09-25 PROCEDURE — 99212 OFFICE O/P EST SF 10 MIN: CPT

## 2024-09-25 PROCEDURE — G8420 CALC BMI NORM PARAMETERS: HCPCS | Performed by: INTERNAL MEDICINE

## 2024-09-25 PROCEDURE — 3079F DIAST BP 80-89 MM HG: CPT | Performed by: INTERNAL MEDICINE

## 2024-09-25 PROCEDURE — 3077F SYST BP >= 140 MM HG: CPT | Performed by: INTERNAL MEDICINE

## 2024-09-25 PROCEDURE — G8427 DOCREV CUR MEDS BY ELIG CLIN: HCPCS | Performed by: INTERNAL MEDICINE

## 2024-09-25 PROCEDURE — 1123F ACP DISCUSS/DSCN MKR DOCD: CPT | Performed by: INTERNAL MEDICINE

## 2024-09-25 PROCEDURE — 1036F TOBACCO NON-USER: CPT | Performed by: INTERNAL MEDICINE

## 2024-09-25 PROCEDURE — 99213 OFFICE O/P EST LOW 20 MIN: CPT | Performed by: INTERNAL MEDICINE

## 2024-10-02 ENCOUNTER — TELEPHONE (OUTPATIENT)
Dept: NEUROSURGERY | Age: 84
End: 2024-10-02

## 2024-10-02 DIAGNOSIS — M54.2 CERVICALGIA: Primary | ICD-10-CM

## 2024-10-02 NOTE — TELEPHONE ENCOUNTER
[facility] New Patient Questionnaire    Diagnosis/Reason for Referral?    Cervicalgia     2. Who is completing questionnaire?      Patient  Caregiver Family      3. Has the patient had any previous spinal/brain surgeries?  NO      A. If yes, what is the name of the facility in which the surgery was performed?       B. Procedure/Surgery performed?       C. Who was the surgeon?       D. When was the surgery?    MM/YY       E. Did the patient improve after the surgery?        4. Is this a second opinion?   If yes, Dr. Frost would like to review patient first before making the appointment.      5. Have MRI Images been obtain within the last year?     Yes  No      XR  CT     If yes, where was the imaging performed?     If yes, what part of the body?      Lumbar  Cervical  Thoracic  Brain     If yes, when was it obtained?      MM/YY    NEEDS XR     Note: if the scan was performed at a facility other than [facility], the disc will need to be brought to the appointment or we need to reach out to obtain the disc.     A. Was the patient instructed to provide the disc?      Yes   No      8. Has the patient had a NCV/EMG within the last year?      Yes  No     If yes, where was it performed and date?      MM/YY  Location:      9. Has the patient been to Physical Therapy?      Yes  No     If yes, what location, how long attended, and last visit?    Location:        Therapy Lasted:    Date of Last Visit:      10. Has the patient been to Pain Management?     Yes  No     If yes, what location and last visit     Location:   Last Visit:   Is it helping?

## 2024-10-09 ENCOUNTER — OFFICE VISIT (OUTPATIENT)
Age: 84
End: 2024-10-09

## 2024-10-09 VITALS — BODY MASS INDEX: 24.59 KG/M2 | HEIGHT: 69 IN | WEIGHT: 166 LBS

## 2024-10-09 DIAGNOSIS — M54.12 RIGHT CERVICAL RADICULOPATHY: ICD-10-CM

## 2024-10-09 DIAGNOSIS — M54.2 CERVICALGIA: ICD-10-CM

## 2024-10-09 DIAGNOSIS — M75.121 NONTRAUMATIC COMPLETE TEAR OF ROTATOR CUFF, RIGHT: Primary | ICD-10-CM

## 2024-10-09 RX ORDER — LIDOCAINE HYDROCHLORIDE 10 MG/ML
2 INJECTION, SOLUTION INFILTRATION; PERINEURAL ONCE
Status: COMPLETED | OUTPATIENT
Start: 2024-10-09 | End: 2024-10-09

## 2024-10-09 RX ORDER — TRIAMCINOLONE ACETONIDE 40 MG/ML
40 INJECTION, SUSPENSION INTRA-ARTICULAR; INTRAMUSCULAR ONCE
Status: COMPLETED | OUTPATIENT
Start: 2024-10-09 | End: 2024-10-09

## 2024-10-09 RX ORDER — BUPIVACAINE HYDROCHLORIDE 2.5 MG/ML
2 INJECTION, SOLUTION INFILTRATION; PERINEURAL ONCE
Status: COMPLETED | OUTPATIENT
Start: 2024-10-09 | End: 2024-10-09

## 2024-10-09 RX ADMIN — BUPIVACAINE HYDROCHLORIDE 5 MG: 2.5 INJECTION, SOLUTION INFILTRATION; PERINEURAL at 09:46

## 2024-10-09 RX ADMIN — LIDOCAINE HYDROCHLORIDE 2 ML: 10 INJECTION, SOLUTION INFILTRATION; PERINEURAL at 09:46

## 2024-10-09 RX ADMIN — TRIAMCINOLONE ACETONIDE 40 MG: 40 INJECTION, SUSPENSION INTRA-ARTICULAR; INTRAMUSCULAR at 09:46

## 2024-10-09 NOTE — PROGRESS NOTES
Details   Constitutional Negative Fatigue and Fever.   Respiratory Negative Cough and Dyspnea.   Cardio Negative Chest pain.   GI Negative Abdominal pain and Vomiting.    Negative Urinary incontinence.   Neuro Negative Headache.   Psych Negative Psychiatric symptoms.       PHYSICAL EXAM:    General:  Appears stated age, no distress.  Orientation:  Alert and oriented to time, place, and person.  Mood and Affect:  Cooperative and pleasant.    Musculoskeletal:  right shoulder exam: Tenderness in the lateral shoulder. No effusion, ecchymosis or deformity noted. He has excellent range of motion, moves the shoulder freely. Tenderness to lateral neck and trapezius musculature. Strength 5/5. Joint appears stable. Neurovascular intact. Skin intact without signs of infection.    SHOULDER INJECTION:  SHOULDER INJECTION: Risks/benefits were explained and verbal consent was given. Under sterile conditions the skin was cleansed with Chloraprep and alcohol. Using sterile conditions 2 cc of 1% Lidocaine without epinephrine, 2 cc of Marcaine, and 40 mg of Kenalog was injected into the subacromial space of the laterality: right using a 22 gauge 1-1/2 inch needle. Needle was withdrawn. Hemostasis was achieved. Sterile dressing was applied in the form of an adhesive bandage. The patient tolerated the procedure well.   --------------------------------------------------------------------------------------------------------------------    Assessment:   Encounter Diagnoses   Name Primary?    Nontraumatic complete tear of rotator cuff, right Yes    Cervicalgia     Right cervical radiculopathy         Plan:  At the patient's request, we will proceed with a repeat corticosteroid injection of right  shoulder today. We encouraged him to keep appointment with neurosurgery for evaluation of his neck. Patient will return in 3 months for repeat injection as needed. All questions were answered.      Electronically signed by Chloe Conway PA-C on

## 2024-10-14 ENCOUNTER — HOSPITAL ENCOUNTER (OUTPATIENT)
Dept: GENERAL RADIOLOGY | Age: 84
Discharge: HOME | End: 2024-10-14
Payer: MEDICARE

## 2024-10-14 DIAGNOSIS — M54.2 CERVICALGIA: ICD-10-CM

## 2024-10-14 PROCEDURE — 72050 X-RAY EXAM NECK SPINE 4/5VWS: CPT

## 2024-10-15 ENCOUNTER — OFFICE VISIT (OUTPATIENT)
Dept: NEUROSURGERY | Age: 84
End: 2024-10-15
Payer: MEDICARE

## 2024-10-15 VITALS
BODY MASS INDEX: 24.59 KG/M2 | SYSTOLIC BLOOD PRESSURE: 140 MMHG | DIASTOLIC BLOOD PRESSURE: 78 MMHG | HEIGHT: 69 IN | HEART RATE: 77 BPM | WEIGHT: 166.01 LBS

## 2024-10-15 DIAGNOSIS — M54.12 CERVICAL RADICULOPATHY: Primary | ICD-10-CM

## 2024-10-15 DIAGNOSIS — G89.29 CHRONIC NECK PAIN: ICD-10-CM

## 2024-10-15 DIAGNOSIS — M25.511 CHRONIC RIGHT SHOULDER PAIN: ICD-10-CM

## 2024-10-15 DIAGNOSIS — G89.29 CHRONIC RIGHT SHOULDER PAIN: ICD-10-CM

## 2024-10-15 DIAGNOSIS — M54.2 CHRONIC NECK PAIN: ICD-10-CM

## 2024-10-15 PROCEDURE — G8420 CALC BMI NORM PARAMETERS: HCPCS | Performed by: NEUROLOGICAL SURGERY

## 2024-10-15 PROCEDURE — 3078F DIAST BP <80 MM HG: CPT | Performed by: NEUROLOGICAL SURGERY

## 2024-10-15 PROCEDURE — 1123F ACP DISCUSS/DSCN MKR DOCD: CPT | Performed by: NEUROLOGICAL SURGERY

## 2024-10-15 PROCEDURE — 99203 OFFICE O/P NEW LOW 30 MIN: CPT | Performed by: NEUROLOGICAL SURGERY

## 2024-10-15 PROCEDURE — 3077F SYST BP >= 140 MM HG: CPT | Performed by: NEUROLOGICAL SURGERY

## 2024-10-15 PROCEDURE — 1036F TOBACCO NON-USER: CPT | Performed by: NEUROLOGICAL SURGERY

## 2024-10-15 PROCEDURE — G8484 FLU IMMUNIZE NO ADMIN: HCPCS | Performed by: NEUROLOGICAL SURGERY

## 2024-10-15 PROCEDURE — G8427 DOCREV CUR MEDS BY ELIG CLIN: HCPCS | Performed by: NEUROLOGICAL SURGERY

## 2024-10-15 ASSESSMENT — ENCOUNTER SYMPTOMS
RESPIRATORY NEGATIVE: 1
EYES NEGATIVE: 1
GASTROINTESTINAL NEGATIVE: 1

## 2024-10-15 NOTE — PROGRESS NOTES
[facility]  Office Visit        Chief Complaint   Patient presents with    New Patient     Establishing care    Results     XR 10/14/24    Neck Pain     Patient states that his neck pain radiates into his right shoulder. Dr Suh sent him over here to see if he needs shoulder surgery or if pain is coming from neck issues. Pain is constant and affecting ADLs. He states that nothing helps with pain, has tried injections and medication. He states that he has to lay flat with hands straight down.     Numbness     Patient states that he has numbness and tinging RUE.        HISTORY OF PRESENT ILLNESS:      The patient is a 84 y.o. male who presents as a referral from Dr. Richardson in Orthopedic Surgery for evaluation of neck pain and right shoulder/arm pain.  This has been a longstanding problem for him that has gotten significantly worse in the past year.  He describes primarily pain in his right shoulder that is worsened by movement.  He also describes pain in his neck that will radiate to his right shoulder.  His neck pain is worsened by head movement, particularly looking to the right.  He will also experience numbness that radiates from his right shoulder to his forearm and occasionally to his wrist.  He denies associated weakness.  He denies any numbness or clumsiness in his hands.  He denies changes in his balance and he denies difficulty with bowel or bladder control.  He has not attempted any physical therapy for his neck pain, he has never seen pain management or attempted injections and he has no previous history of cervical spine surgery.      MEDICAL HISTORY:             Past Medical History:   Diagnosis Date    Allergic rhinitis     Anemia, unspecified     Anxiety     Asthma     BPH (benign prostatic hyperplasia)     Dr Hernandez    CAD (coronary artery disease)     Dr ROSMERY Reeves    Cancer ([facility])     skin-non-melanoma    Carotid artery stenosis     YVON Augustin    Chronic GERD 6/7/2017    Chronic

## 2024-10-30 ENCOUNTER — HOSPITAL ENCOUNTER (OUTPATIENT)
Dept: MRI IMAGING | Age: 84
Discharge: HOME | End: 2024-10-30
Attending: NEUROLOGICAL SURGERY
Payer: MEDICARE

## 2024-10-30 DIAGNOSIS — G89.29 CHRONIC NECK PAIN: ICD-10-CM

## 2024-10-30 DIAGNOSIS — M54.2 CHRONIC NECK PAIN: ICD-10-CM

## 2024-10-30 DIAGNOSIS — M54.12 CERVICAL RADICULOPATHY: ICD-10-CM

## 2024-10-30 PROCEDURE — 72141 MRI NECK SPINE W/O DYE: CPT

## 2024-11-12 ENCOUNTER — OFFICE VISIT (OUTPATIENT)
Dept: NEUROSURGERY | Age: 84
End: 2024-11-12
Payer: MEDICARE

## 2024-11-12 VITALS
BODY MASS INDEX: 24.59 KG/M2 | HEART RATE: 77 BPM | WEIGHT: 166.01 LBS | SYSTOLIC BLOOD PRESSURE: 118 MMHG | HEIGHT: 69 IN | DIASTOLIC BLOOD PRESSURE: 78 MMHG

## 2024-11-12 DIAGNOSIS — M54.12 CERVICAL RADICULOPATHY: Primary | ICD-10-CM

## 2024-11-12 DIAGNOSIS — G89.29 CHRONIC RIGHT SHOULDER PAIN: ICD-10-CM

## 2024-11-12 DIAGNOSIS — G89.29 CHRONIC NECK PAIN: ICD-10-CM

## 2024-11-12 DIAGNOSIS — M25.511 CHRONIC RIGHT SHOULDER PAIN: ICD-10-CM

## 2024-11-12 DIAGNOSIS — M54.2 CHRONIC NECK PAIN: ICD-10-CM

## 2024-11-12 PROCEDURE — G8484 FLU IMMUNIZE NO ADMIN: HCPCS | Performed by: NEUROLOGICAL SURGERY

## 2024-11-12 PROCEDURE — 99215 OFFICE O/P EST HI 40 MIN: CPT | Performed by: NEUROLOGICAL SURGERY

## 2024-11-12 PROCEDURE — 1123F ACP DISCUSS/DSCN MKR DOCD: CPT | Performed by: NEUROLOGICAL SURGERY

## 2024-11-12 PROCEDURE — G8420 CALC BMI NORM PARAMETERS: HCPCS | Performed by: NEUROLOGICAL SURGERY

## 2024-11-12 PROCEDURE — 3078F DIAST BP <80 MM HG: CPT | Performed by: NEUROLOGICAL SURGERY

## 2024-11-12 PROCEDURE — 1159F MED LIST DOCD IN RCRD: CPT | Performed by: NEUROLOGICAL SURGERY

## 2024-11-12 PROCEDURE — G8427 DOCREV CUR MEDS BY ELIG CLIN: HCPCS | Performed by: NEUROLOGICAL SURGERY

## 2024-11-12 PROCEDURE — 3074F SYST BP LT 130 MM HG: CPT | Performed by: NEUROLOGICAL SURGERY

## 2024-11-12 PROCEDURE — 1036F TOBACCO NON-USER: CPT | Performed by: NEUROLOGICAL SURGERY

## 2024-11-12 ASSESSMENT — ENCOUNTER SYMPTOMS
EYES NEGATIVE: 1
RESPIRATORY NEGATIVE: 1
GASTROINTESTINAL NEGATIVE: 1

## 2024-11-12 NOTE — PROGRESS NOTES
Review of Systems   Constitutional: Negative.    HENT: Negative.     Eyes: Negative.    Respiratory: Negative.     Cardiovascular: Negative.    Gastrointestinal: Negative.    Genitourinary: Negative.    Musculoskeletal:  Positive for joint pain, myalgias and neck pain.   Skin: Negative.    Neurological:  Positive for tingling and weakness.   Endo/Heme/Allergies: Negative.    Psychiatric/Behavioral: Negative.        
significant degenerative changes with evidence of spondylolisthesis and instability at C4/5 and C5/6.  His MRI demonstrates moderate spinal stenosis at C4/5 and mild spinal stenosis at C5/6 with severe right-sided foraminal stenosis at C4/5 and severe left-sided foraminal stenosis at C5/6.  I advised him that it is likely that he has pain arising from both pathology in his cervical spina and his right shoulder, however the fact that he did not respond at all to intra-articular steroid injections in the shoulder may speak to the cervical spine as his primary pain generator.  I do believe he would benefit from surgery, C4/5 and C5/6 ACDF.  The procedure was explained to him in detail using anatomic models.  Specific risks of bleeding, infection, neurologic injury/paralysis, dysphagia/esophageal perforation, vocal cord paralysis, hardware failure/nonunion, adjacent segment disease and cardiopulmonary complications were all discussed.  He states that he wants to discuss my recommendations with Dr. Richardson before deciding how to proceed and he will contact me after their next visit.          Desmond Frost MD

## 2024-11-20 ENCOUNTER — OFFICE VISIT (OUTPATIENT)
Age: 84
End: 2024-11-20
Payer: MEDICARE

## 2024-11-20 VITALS — BODY MASS INDEX: 25.03 KG/M2 | HEIGHT: 69 IN | WEIGHT: 169 LBS

## 2024-11-20 DIAGNOSIS — M75.121 NONTRAUMATIC COMPLETE TEAR OF RIGHT ROTATOR CUFF: Primary | ICD-10-CM

## 2024-11-20 PROCEDURE — 1036F TOBACCO NON-USER: CPT | Performed by: ORTHOPAEDIC SURGERY

## 2024-11-20 PROCEDURE — G8484 FLU IMMUNIZE NO ADMIN: HCPCS | Performed by: ORTHOPAEDIC SURGERY

## 2024-11-20 PROCEDURE — G8427 DOCREV CUR MEDS BY ELIG CLIN: HCPCS | Performed by: ORTHOPAEDIC SURGERY

## 2024-11-20 PROCEDURE — G8420 CALC BMI NORM PARAMETERS: HCPCS | Performed by: ORTHOPAEDIC SURGERY

## 2024-11-20 PROCEDURE — 99213 OFFICE O/P EST LOW 20 MIN: CPT | Performed by: ORTHOPAEDIC SURGERY

## 2024-11-20 PROCEDURE — 1159F MED LIST DOCD IN RCRD: CPT | Performed by: ORTHOPAEDIC SURGERY

## 2024-11-20 PROCEDURE — 1123F ACP DISCUSS/DSCN MKR DOCD: CPT | Performed by: ORTHOPAEDIC SURGERY

## 2024-11-20 NOTE — PROGRESS NOTES
recommendations to follow.     Electronically signed by Inder Richardson MD on 11/20/2024 at 11:18 AM

## 2024-12-03 ENCOUNTER — HOSPITAL ENCOUNTER (OUTPATIENT)
Dept: MRI IMAGING | Age: 84
Discharge: HOME | End: 2024-12-03
Attending: ORTHOPAEDIC SURGERY
Payer: MEDICARE

## 2024-12-03 DIAGNOSIS — M75.121 NONTRAUMATIC COMPLETE TEAR OF RIGHT ROTATOR CUFF: ICD-10-CM

## 2024-12-03 PROCEDURE — 73221 MRI JOINT UPR EXTREM W/O DYE: CPT

## 2024-12-16 ENCOUNTER — OFFICE VISIT (OUTPATIENT)
Age: 84
End: 2024-12-16
Payer: MEDICARE

## 2024-12-16 VITALS — HEIGHT: 69 IN | BODY MASS INDEX: 24.26 KG/M2 | WEIGHT: 163.8 LBS

## 2024-12-16 DIAGNOSIS — M12.811 RIGHT ROTATOR CUFF TEAR ARTHROPATHY: Primary | ICD-10-CM

## 2024-12-16 DIAGNOSIS — M75.101 RIGHT ROTATOR CUFF TEAR ARTHROPATHY: Primary | ICD-10-CM

## 2024-12-16 PROCEDURE — 1123F ACP DISCUSS/DSCN MKR DOCD: CPT | Performed by: ORTHOPAEDIC SURGERY

## 2024-12-16 PROCEDURE — G8427 DOCREV CUR MEDS BY ELIG CLIN: HCPCS | Performed by: ORTHOPAEDIC SURGERY

## 2024-12-16 PROCEDURE — G8420 CALC BMI NORM PARAMETERS: HCPCS | Performed by: ORTHOPAEDIC SURGERY

## 2024-12-16 PROCEDURE — G8484 FLU IMMUNIZE NO ADMIN: HCPCS | Performed by: ORTHOPAEDIC SURGERY

## 2024-12-16 PROCEDURE — 99214 OFFICE O/P EST MOD 30 MIN: CPT | Performed by: ORTHOPAEDIC SURGERY

## 2024-12-16 PROCEDURE — 1159F MED LIST DOCD IN RCRD: CPT | Performed by: ORTHOPAEDIC SURGERY

## 2024-12-16 PROCEDURE — 1036F TOBACCO NON-USER: CPT | Performed by: ORTHOPAEDIC SURGERY

## 2024-12-16 NOTE — PROGRESS NOTES
[facility] Clinic Note - Established Patient    NAME:  Tao Cotton   : 1940  MRN: [id number]    2024    CHIEF COMPLAINT:  follow up for right shoulder pain    HISTORY OF PRESENT ILLNESS:   The patient is a 84 y.o. male who returns today for follow up of right shoulder pain and to review his MRI results.  He received an injection several weeks ago.  He was referred to neurosurgery as well.  The injection of his shoulder did not help significantly.  I did review the note by Dr. Frost.  He is found to have fairly significant stenosis and foraminal narrowing at C5 and C6 which certainly could be the cause of his right shoulder pain.  He states he has pain as well as weakness in his shoulder.  He continues to feel as if his shoulder is the major pathology that he is dealing with and therefore an MRI was ordered.  He continues to complain of significant pain.    Past Medical History:        Diagnosis Date    Allergic rhinitis     Anemia, unspecified     Anxiety     Asthma     BPH (benign prostatic hyperplasia)     Dr Hernandez    CAD (coronary artery disease)     Dr ROSMERY Reeves    Cancer (HCC)     skin-non-melanoma    Carotid artery stenosis     Isabel Rivas, YVON    Chronic GERD 2017    Chronic kidney disease, stage III (moderate) (HCC)     Colon polyps     Cough     Decreased white blood cell count, unspecified     Disease of immune system (HCC)     IGG 4 Auto Immune Disease    Dysphagia 2017     Updating Deprecated Diagnoses    Enlarged lymph nodes, unspecified     Esophageal dysphagia 2017    Esophageal stricture     GERD (gastroesophageal reflux disease)     Hemorrhoids 2012    HIGH CHOLESTEROL     History of colon polyps 2012    History of esophageal stricture 2016    Hypertension     IgG monoclonal gammopathy     Insomnia     Iron deficiency anemia 2017    Iron deficiency anemia 2/3/2023    Iron deficiency anemia due to chronic blood loss     Leukocytosis     Mediastinal

## 2024-12-19 ENCOUNTER — PREP FOR PROCEDURE (OUTPATIENT)
Age: 84
End: 2024-12-19

## 2024-12-19 ENCOUNTER — HOSPITAL ENCOUNTER (OUTPATIENT)
Dept: INFUSION THERAPY | Age: 84
Discharge: HOME | End: 2024-12-19
Payer: MEDICARE

## 2024-12-19 DIAGNOSIS — M75.101 RIGHT ROTATOR CUFF TEAR ARTHROPATHY: Primary | ICD-10-CM

## 2024-12-19 DIAGNOSIS — M12.811 ROTATOR CUFF TEAR ARTHROPATHY OF RIGHT SHOULDER: ICD-10-CM

## 2024-12-19 DIAGNOSIS — M12.811 RIGHT ROTATOR CUFF TEAR ARTHROPATHY: Primary | ICD-10-CM

## 2024-12-19 DIAGNOSIS — D89.84 IGG4 RELATED DISEASE (HCC): ICD-10-CM

## 2024-12-19 DIAGNOSIS — M75.101 ROTATOR CUFF TEAR ARTHROPATHY OF RIGHT SHOULDER: ICD-10-CM

## 2024-12-19 DIAGNOSIS — D47.2 IGG MONOCLONAL GAMMOPATHY: ICD-10-CM

## 2024-12-19 LAB
ALBUMIN SERPL-MCNC: 4.1 G/DL (ref 3.5–5.2)
ALP SERPL-CCNC: 101 U/L (ref 40–129)
ALT SERPL-CCNC: 18 U/L (ref 5–41)
ANION GAP SERPL CALCULATED.3IONS-SCNC: 7 MMOL/L (ref 7–19)
AST SERPL-CCNC: 23 U/L (ref 5–40)
BASOPHILS # BLD: 0.02 K/UL (ref 0.01–0.08)
BASOPHILS NFR BLD: 0.3 % (ref 0.1–1.2)
BILIRUB SERPL-MCNC: 0.8 MG/DL (ref 0–1.2)
BUN SERPL-MCNC: 18 MG/DL (ref 8–23)
CALCIUM SERPL-MCNC: 11.1 MG/DL (ref 8.8–10.2)
CHLORIDE SERPL-SCNC: 107 MMOL/L (ref 98–107)
CO2 SERPL-SCNC: 25 MMOL/L (ref 22–29)
CREAT SERPL-MCNC: 1.6 MG/DL (ref 0.7–1.2)
EOSINOPHIL # BLD: 0.1 K/UL (ref 0.04–0.54)
EOSINOPHIL NFR BLD: 1.6 % (ref 0.7–7)
ERYTHROCYTE [DISTWIDTH] IN BLOOD BY AUTOMATED COUNT: 13.9 % (ref 11.6–14.4)
GLUCOSE SERPL-MCNC: 87 MG/DL (ref 70–99)
HCT VFR BLD AUTO: 38.9 % (ref 40.1–51)
HGB BLD-MCNC: 13 G/DL (ref 13.7–17.5)
LYMPHOCYTES # BLD: 0.46 K/UL (ref 1.18–3.74)
LYMPHOCYTES NFR BLD: 7.6 % (ref 19.3–53.1)
MCH RBC QN AUTO: 31.9 PG (ref 25.7–32.2)
MCHC RBC AUTO-ENTMCNC: 33.4 G/DL (ref 32.3–36.5)
MCV RBC AUTO: 95.3 FL (ref 79–92.2)
MONOCYTES # BLD: 0.93 K/UL (ref 0.24–0.82)
MONOCYTES NFR BLD: 15.3 % (ref 4.7–12.5)
NEUTROPHILS # BLD: 4.52 K/UL (ref 1.56–6.13)
NEUTS SEG NFR BLD: 74.5 % (ref 34–71.1)
PLATELET # BLD AUTO: 186 K/UL (ref 163–337)
PMV BLD AUTO: 10.6 FL (ref 7.4–10.4)
POTASSIUM SERPL-SCNC: 4.2 MMOL/L (ref 3.5–5.1)
PROT SERPL-MCNC: 6.3 G/DL (ref 6.4–8.3)
RBC # BLD AUTO: 4.08 M/UL (ref 4.63–6.08)
SODIUM SERPL-SCNC: 139 MMOL/L (ref 136–145)
WBC # BLD AUTO: 6.07 K/UL (ref 4.23–9.07)

## 2024-12-19 PROCEDURE — 86334 IMMUNOFIX E-PHORESIS SERUM: CPT

## 2024-12-19 PROCEDURE — 83521 IG LIGHT CHAINS FREE EACH: CPT

## 2024-12-19 PROCEDURE — 82784 ASSAY IGA/IGD/IGG/IGM EACH: CPT

## 2024-12-19 PROCEDURE — 83883 ASSAY NEPHELOMETRY NOT SPEC: CPT

## 2024-12-19 PROCEDURE — 82232 ASSAY OF BETA-2 PROTEIN: CPT

## 2024-12-19 PROCEDURE — 82787 IGG 1 2 3 OR 4 EACH: CPT

## 2024-12-19 PROCEDURE — 84155 ASSAY OF PROTEIN SERUM: CPT

## 2024-12-19 PROCEDURE — 85025 COMPLETE CBC W/AUTO DIFF WBC: CPT

## 2024-12-19 PROCEDURE — 80053 COMPREHEN METABOLIC PANEL: CPT

## 2024-12-19 PROCEDURE — 84165 PROTEIN E-PHORESIS SERUM: CPT

## 2024-12-19 PROCEDURE — 36415 COLL VENOUS BLD VENIPUNCTURE: CPT

## 2024-12-19 RX ORDER — SODIUM CHLORIDE 9 MG/ML
INJECTION, SOLUTION INTRAVENOUS PRN
Status: CANCELLED | OUTPATIENT
Start: 2025-01-02

## 2024-12-19 RX ORDER — SODIUM CHLORIDE 0.9 % (FLUSH) 0.9 %
5-40 SYRINGE (ML) INJECTION EVERY 12 HOURS SCHEDULED
Status: CANCELLED | OUTPATIENT
Start: 2025-01-02

## 2024-12-19 RX ORDER — SODIUM CHLORIDE 0.9 % (FLUSH) 0.9 %
5-40 SYRINGE (ML) INJECTION PRN
Status: CANCELLED | OUTPATIENT
Start: 2025-01-02

## 2024-12-20 ENCOUNTER — TELEPHONE (OUTPATIENT)
Dept: HEMATOLOGY | Age: 84
End: 2024-12-20

## 2024-12-20 ENCOUNTER — HOSPITAL ENCOUNTER (OUTPATIENT)
Dept: PREADMISSION TESTING | Age: 84
Discharge: HOME | End: 2024-12-24
Payer: MEDICARE

## 2024-12-20 LAB
B2 MICROGLOB SERPL-MCNC: 3.6 MG/L
MRSA DNA SPEC QL NAA+PROBE: NOT DETECTED

## 2024-12-20 PROCEDURE — 87641 MR-STAPH DNA AMP PROBE: CPT

## 2024-12-20 RX ORDER — ASCORBIC ACID 500 MG
500 TABLET ORAL DAILY
COMMUNITY

## 2024-12-20 RX ORDER — PANTOPRAZOLE SODIUM 40 MG/1
40 FOR SUSPENSION ORAL
COMMUNITY

## 2024-12-20 NOTE — DISCHARGE INSTRUCTIONS
The day before surgery you will receive a phone call from the surgery nurse to let you know what time to arrive on the day of surgery. This call will usually be between 2-4 PM. If you do not receive a phone call by 4 PM the day before your surgery please call [phone] and let them know you have not received an arrival time. If your surgery is on Monday, your call will be on the Friday before your Monday surgery. Please check your voicemail as they may leave a message with that information.    The morning of surgery take the following medications:  Amlodipine  Pantoprazole        Chlorhexidine Gluconate 4% Solution    Patient should shower with this soap a minimum of 3 consecutive showers (2 nights before surgery, the night before surgery and the morning of surgery) washing from the neck down (avoiding contact with genitalia).      DO NOT WASH YOUR HAIR OR FACE WITH THIS SOAP.  When washing with this soap, apply enough to suds up the body thoroughly, turn the water away from your body and allow the soap suds to remain on the body for 2 full minutes, then rinse body completely.      After using this soap on the body, please do not apply powders or lotions to your body.  After the shower the night before surgery, please dry off with a new towel, sleep in new freshly laundered pj's, and change your bed linen before going to sleep.      IF YOU HAVE A PET IN YOUR HOME, please do not allow your pet to sleep in the bed with you after you have showered with your surgery prep soap.     Please remember that it is not recommended to allow your pet to sleep with you post op, until your incision has healed.  This can increase your risk of post op infection.    Hair removal for your procedure will be necessary. Great care will be taken to protect your privacy and dignity during this process.    The hair clipping will occur in the same day surgery room you are assigned.    If you normally shave your body hair, please refrain

## 2024-12-20 NOTE — TELEPHONE ENCOUNTER

## 2024-12-23 ENCOUNTER — HOSPITAL ENCOUNTER (OUTPATIENT)
Dept: ULTRASOUND IMAGING | Age: 84
Discharge: HOME | End: 2024-12-23
Payer: MEDICARE

## 2024-12-23 ENCOUNTER — OFFICE VISIT (OUTPATIENT)
Dept: VASCULAR SURGERY | Age: 84
End: 2024-12-23
Payer: MEDICARE

## 2024-12-23 ENCOUNTER — TELEPHONE (OUTPATIENT)
Age: 84
End: 2024-12-23

## 2024-12-23 VITALS
HEART RATE: 77 BPM | OXYGEN SATURATION: 97 % | DIASTOLIC BLOOD PRESSURE: 88 MMHG | TEMPERATURE: 97.1 F | SYSTOLIC BLOOD PRESSURE: 138 MMHG

## 2024-12-23 DIAGNOSIS — I71.43 INFRARENAL ABDOMINAL AORTIC ANEURYSM (AAA) WITHOUT RUPTURE (HCC): Primary | ICD-10-CM

## 2024-12-23 DIAGNOSIS — I65.23 BILATERAL CAROTID ARTERY STENOSIS: ICD-10-CM

## 2024-12-23 DIAGNOSIS — I71.43 INFRARENAL ABDOMINAL AORTIC ANEURYSM (AAA) WITHOUT RUPTURE (HCC): ICD-10-CM

## 2024-12-23 PROCEDURE — 93976 VASCULAR STUDY: CPT

## 2024-12-23 PROCEDURE — 1123F ACP DISCUSS/DSCN MKR DOCD: CPT | Performed by: NURSE PRACTITIONER

## 2024-12-23 PROCEDURE — 1159F MED LIST DOCD IN RCRD: CPT | Performed by: NURSE PRACTITIONER

## 2024-12-23 PROCEDURE — 3079F DIAST BP 80-89 MM HG: CPT | Performed by: NURSE PRACTITIONER

## 2024-12-23 PROCEDURE — G8420 CALC BMI NORM PARAMETERS: HCPCS | Performed by: NURSE PRACTITIONER

## 2024-12-23 PROCEDURE — G8484 FLU IMMUNIZE NO ADMIN: HCPCS | Performed by: NURSE PRACTITIONER

## 2024-12-23 PROCEDURE — 99214 OFFICE O/P EST MOD 30 MIN: CPT | Performed by: NURSE PRACTITIONER

## 2024-12-23 PROCEDURE — G8427 DOCREV CUR MEDS BY ELIG CLIN: HCPCS | Performed by: NURSE PRACTITIONER

## 2024-12-23 PROCEDURE — 1036F TOBACCO NON-USER: CPT | Performed by: NURSE PRACTITIONER

## 2024-12-23 PROCEDURE — 3075F SYST BP GE 130 - 139MM HG: CPT | Performed by: NURSE PRACTITIONER

## 2024-12-23 NOTE — TELEPHONE ENCOUNTER
Pt daughter called and she needs to talk to a nurse due to her being his care giver and he is having surgery but she is unsure on what all he needs and she also has a few questions about his after care

## 2024-12-23 NOTE — PROGRESS NOTES
Tao Cotton (:  1940) is a 84 y.o. male,Established patient, here for evaluation of the following chief complaint(s):  1 Year Follow Up ( scan of aorta)            SUBJECTIVE/OBJECTIVE:  He has a known history of abdominal aortic aneurysm for 1 - 5 years.    He has not had abdominal pain.  He has not had back pain in the cervical spine, thoracic spine, lumbar spine, and sacral spine region. This pain is unchanged since the last visit. Pain is rated as 0.         I have personally reviewed the following: problem list, current meds, allergies, PMH, PSH, family hx, and social hx  Tao Cotton is a 84 y.o. male with the following history as recorded in [facility]:  Patient Active Problem List    Diagnosis Date Noted    Iron deficiency anemia 2023    Rotator cuff tear arthropathy of right shoulder 2024    Right rotator cuff tear arthropathy 2024    Nontraumatic complete tear of right rotator cuff 2024    Carotid artery stenosis 10/24/2012    HIGH CHOLESTEROL     Hypertension      Current Outpatient Medications   Medication Sig Dispense Refill    vitamin C (ASCORBIC ACID) 500 MG tablet Take 1 tablet by mouth daily      pantoprazole sodium (PROTONIX) 40 MG PACK packet Take 1 packet by mouth 2 times daily (before meals)      HYDROcodone-acetaminophen (NORCO) 7.5-325 MG per tablet Take 1 tablet by mouth every 6 hours as needed for Pain.      losartan (COZAAR) 25 MG tablet Take 1 tablet by mouth daily      sodium bicarbonate 325 MG tablet Take 1 tablet by mouth in the morning and at bedtime      predniSONE (DELTASONE) 5 MG tablet Take 1 tablet by mouth daily      amLODIPine (NORVASC) 5 MG tablet Take 1 tablet by mouth daily      atorvastatin (LIPITOR) 40 MG tablet Take 1 tablet by mouth daily      cetirizine (ZYRTEC) 5 MG tablet Take 1 tablet by mouth daily      aspirin 81 MG tablet Take 1 tablet by mouth daily       No current facility-administered medications for this visit.

## 2024-12-26 ENCOUNTER — OFFICE VISIT (OUTPATIENT)
Dept: HEMATOLOGY | Age: 84
End: 2024-12-26

## 2024-12-26 ENCOUNTER — TRANSCRIBE ORDERS (OUTPATIENT)
Dept: ADMINISTRATIVE | Facility: HOSPITAL | Age: 84
End: 2024-12-26
Payer: MEDICARE

## 2024-12-26 ENCOUNTER — HOSPITAL ENCOUNTER (OUTPATIENT)
Dept: INFUSION THERAPY | Age: 84
Discharge: HOME | End: 2024-12-26

## 2024-12-26 VITALS
WEIGHT: 165.5 LBS | SYSTOLIC BLOOD PRESSURE: 124 MMHG | TEMPERATURE: 97.9 F | DIASTOLIC BLOOD PRESSURE: 62 MMHG | HEIGHT: 69 IN | BODY MASS INDEX: 24.51 KG/M2 | OXYGEN SATURATION: 98 % | HEART RATE: 71 BPM

## 2024-12-26 DIAGNOSIS — R59.0 ENLARGED LYMPH NODE IN NECK: ICD-10-CM

## 2024-12-26 DIAGNOSIS — D89.84 IGG4 RELATED DISEASE: ICD-10-CM

## 2024-12-26 DIAGNOSIS — D47.2 IGG MONOCLONAL GAMMOPATHY: ICD-10-CM

## 2024-12-26 DIAGNOSIS — R91.8 LUNG NODULES: ICD-10-CM

## 2024-12-26 DIAGNOSIS — D89.84 IGG4 RELATED DISEASE (HCC): Primary | ICD-10-CM

## 2024-12-26 DIAGNOSIS — D47.2 IGG MONOCLONAL GAMMOPATHY: Primary | ICD-10-CM

## 2024-12-26 NOTE — PROGRESS NOTES
PROGRESS NOTE  Patient:  Tao Cotton  YOB: 1940  Date of Service: 2024  MRN: [id number]    Primary Care Physician: Ten Lockwood MD    Chief Complaint   Patient presents with    Follow-up     IgG monoclonal gammopathy       Patient Seen, Chart, Consults notes, Labs, Radiology studies reviewed.    Subjective:    Tao Cotton is an 84 year old  gentleman managed with primary and secondary diagnoses as outlined:  Resection of left retroperitoneal mass and radical left nephrectomy by Dr. Acosta Matt at [facility] on 10/31/2018. Pathology revealed IgG4 related disease.  Multifactorial anemia  IgG kappa MGUS  Right lower extremity DVT on Eliquis bid by Dr. Lockwood    His ex-wife  in 2021, and their son  in 2021.  He is still grieving.  Tao' 66-year-old wife of 24 years left him and cleaned out all the furniture in the house with a moving company while he was at [facility] in 2022.  He moved out of his condo and has had counseling regarding this the pressing issue and has improved significantly over the course of time.    Mr. Cotton receives weekly Rituxan x 4 periodically for exacerbations of his IgG4 related disease directed by Dr. Kings Chatman.     Mr. Cotton last received 4 weekly doses of RITUXAN between in 2023    He he is currently on prednisone 5 mg daily in treatment of joint pains pleuritic-like chest discomfort, rib pain and fatigue. per his rheumatologist Dr. Chatman.   IgG4 level was 45 on 2024.  He receives Rituxan with by Dr. Chatman    Tao' weight is back up another 2 pounds.  He seems to be doing well now.          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 Ten Lockwood for evaluation of leukopenia and anemia.    CBC on 2018 showed a WBC of 3.37, hemoglobin 9.7

## 2024-12-30 ENCOUNTER — TELEPHONE (OUTPATIENT)
Age: 84
End: 2024-12-30

## 2024-12-30 NOTE — TELEPHONE ENCOUNTER
After speaking with Jasmin and Dr. Richardson I informed patients daughter that if patient qualified he would admit him after the surgery and they could work on getting him services after surgery. Patients daughter verbalized understanding

## 2024-12-30 NOTE — TELEPHONE ENCOUNTER
I returned call to patients daughter and apologized for being out of the office. I informed her I would speak with Dr. Richardson and get back with her. I explained that we recommend someone be with the patient for at least 4 days after surgery due to anesthesia and pain medication. Patient daughter verbalized understanding and I explained that he will not have therapy until his 2 week post op appt. Patients daughter verbalized understanding . I informed her that I would return call to her. She verbalized understanding

## 2025-01-01 NOTE — DISCHARGE INSTRUCTIONS
UPPER EXTREMITY POST-OP INSTRUCTIONS - DR. BARKLEY    IMPORTANT PHONE NUMBERS:   For emergencies, please call [phone]   You may reach Dr. Barkley and clinical staff at [phone]- M-F 8:00 am-5:00 pm   Call immediately if you have any of the following symptoms:     Elevated temperature above 101.5 degrees for more than 48 hours after surgery     Persistent drainage from wound     Severe pain around surgical site    Ice: Ice your surgery site 5-6 times per day for 20 minutes at a time with dressing in place. You should wait at least 30 minutes before icing again to avoid ice irritation. It may be difficult at first to ice the surgery area due to the amount of dressing, but continue to be diligent with icing.      Sling use: The sling is provided for your comfort and to ensure proper healing of your repair following surgery. Please place the abduction pillow with the curved side against your side and the sling on the side of the pillow. Your surgery requires that you wear the sling if noted below.  ____ For comfort. Remove sling 24 hours and begin range of motion exercises  __x__ At all times except bathing, dressing, and therapy. Also wear the sling during sleep.  ____ No sling required    Bathing:  ___No bandages, no restrictions!!  _x__You may remove your dressing and shower on the 3rd day after surgery (Ex. Tues surgery, shower on Friday)  ** if you are told to it is ok to remove your dressing and shower, DO NOT SOAK your incisions in a tub.  ___Keep splint clean, dry, and intact. DO NOT place foreign objects into your splint.    DRIVING:  absolutely no driving while taking pain medication.  This will be discussed at your first postop visit.    Dressings: Keep dressing/splint intact unless instructed otherwise below. SOME DRAINAGE IS NORMAL!     DO NOT touch or apply ointment to the incision.     DO NOT remove the steri-strips over the incisions (if you have steri-strips). They will          generally fall off on their own or can be removed 1 weeksafter surgery.     If you have yellow gauze and it comes off, do not worry about it. Leave them off.    Signs of infection that warrant a phone call to our clinical line:     o Excessive drainage or redness     o Red streaking coming away from the incision  o Increased pain  o Increased temperature above 101.5 degrees      Physical Therapy:        *  Your physical therapy status will be discussed with you postoperatively and at your first post-op appointment. Some injuries will not require physical therapy.      *  If you have a shoulder manipulation, please schedule therapy for the next day      Medications: You will be discharged with the appropriate medications following your surgery. Fill these at the pharmacy and take them as directed on the label. Not all of the medications below may be prescribed. Occasionally, other medications may be prescribed with specific instructions.    Percocet/Norco (oxycodone/hydrocodone with tylenol) - Pain Medication, will cause drowsiness, possibly itchiness (this is NOT an allergy - use benadryl or an over the counter allergy medication such as Claritin or Zyrtec)     o Take 1-2 tablets every 4-6 hours. DO NOT EXCEED 4,000mg of Tylenol in 24 hours.  **DO NOT MIX WITH ALCOHOL, DRIVE WHILE TAKING, OR TAKE with extra TYLENOL**     *  After the 2nd day of surgery, begin weaning pain medication (less pills, increased timeframe)     *  ALL narcotics will cause constipation - take a stool softener frequently.  If you become constipated consider taking Milk of Magnesia as directed on the bottle.    Colace (Docusate) - stool softener, used for constipation    Zofran (Ondansetron) or Phenergan - Anti-nausea medication, will cause drowsiness      **If you are running low on pain medications, please notify us if you need a refill 24-48 hours prior to when you run out, so we can make arrangements to refill the prescription for you if

## 2025-01-01 NOTE — OP NOTE
none    FINDINGS: see op note    PROCEDURE in DETAIL:  The patient was seen in the preoperative holding room, once again the informed consent was reviewed with the patient and signed.  The site of surgery was marked with the patient's agreement.  After being transported to the operating room, a timeout was performed identifying the correct patient as well as the operative site.  Dose appropriate IV antibiotics were given prior to incision.  The patient was positioned in the beach chair position, all bony prominences were protected and a sterile prep and drape was performed.  The surgical site was draped with ioban dressing.    A deltopectoral approach to the shoulder joint was utilized as soft tissue was dissected down the level of the cephalic vein which was taken laterally along with the deltoid.  The biceps tendon was located, tenodesed to the superior border of the pectoralis major tendon insertion.  The rotator interval was opened.  A tagging stitch was placed in the subscapularis and with progressive external rotation of the shoulder, the tendon and underlying capsule were peeled from the less tuberosity.  The humeral head was dislocated from the glenoid and strategic retractors were placed to protect the surrounding soft tissue.  The axillary nerve was palpated and protected, verified with a \"tug test.\"    Beginning a the apex of the humeral head, a starting reamer was introduced into the shaft of the humerus.  The proximal humeral osteotomy guide was inserted and an osteotomy was performed and 135 degrees in 20 degrees of retroversion.  A protective plate was placed on the osteotomy site and attention was turned to the glenoid.      Again, strategic retractors were placed surround the glenoid, the axillary nerve was protected, and a complete capsulectomy was performed.  The labrum was excised.  A guidepin was placed in the inferior-central aspect of the glenoid, following by a reaming the central peg hole

## 2025-01-02 ENCOUNTER — APPOINTMENT (OUTPATIENT)
Dept: GENERAL RADIOLOGY | Age: 85
End: 2025-01-02
Attending: ORTHOPAEDIC SURGERY
Payer: MEDICARE

## 2025-01-02 ENCOUNTER — HOSPITAL ENCOUNTER (OUTPATIENT)
Age: 85
Setting detail: OUTPATIENT SURGERY
Discharge: HOME | End: 2025-01-02
Attending: ORTHOPAEDIC SURGERY | Admitting: ORTHOPAEDIC SURGERY
Payer: MEDICARE

## 2025-01-02 ENCOUNTER — ANESTHESIA (OUTPATIENT)
Dept: OPERATING ROOM | Age: 85
End: 2025-01-02
Payer: MEDICARE

## 2025-01-02 ENCOUNTER — ANESTHESIA EVENT (OUTPATIENT)
Dept: OPERATING ROOM | Age: 85
End: 2025-01-02
Payer: MEDICARE

## 2025-01-02 VITALS
TEMPERATURE: 97.8 F | OXYGEN SATURATION: 95 % | RESPIRATION RATE: 20 BRPM | WEIGHT: 165 LBS | BODY MASS INDEX: 24.44 KG/M2 | DIASTOLIC BLOOD PRESSURE: 86 MMHG | HEART RATE: 75 BPM | HEIGHT: 69 IN | SYSTOLIC BLOOD PRESSURE: 135 MMHG

## 2025-01-02 DIAGNOSIS — M75.101 ROTATOR CUFF TEAR ARTHROPATHY OF RIGHT SHOULDER: Primary | ICD-10-CM

## 2025-01-02 DIAGNOSIS — M12.811 ROTATOR CUFF TEAR ARTHROPATHY OF RIGHT SHOULDER: Primary | ICD-10-CM

## 2025-01-02 DIAGNOSIS — M75.101 RIGHT ROTATOR CUFF TEAR ARTHROPATHY: ICD-10-CM

## 2025-01-02 DIAGNOSIS — M12.811 RIGHT ROTATOR CUFF TEAR ARTHROPATHY: ICD-10-CM

## 2025-01-02 PROCEDURE — 2720000010 HC SURG SUPPLY STERILE: Performed by: ORTHOPAEDIC SURGERY

## 2025-01-02 PROCEDURE — 6360000002 HC RX W HCPCS: Performed by: ANESTHESIOLOGY

## 2025-01-02 PROCEDURE — C1776 JOINT DEVICE (IMPLANTABLE): HCPCS | Performed by: ORTHOPAEDIC SURGERY

## 2025-01-02 PROCEDURE — 7100000010 HC PHASE II RECOVERY - FIRST 15 MIN: Performed by: ORTHOPAEDIC SURGERY

## 2025-01-02 PROCEDURE — 7100000011 HC PHASE II RECOVERY - ADDTL 15 MIN: Performed by: ORTHOPAEDIC SURGERY

## 2025-01-02 PROCEDURE — 6360000002 HC RX W HCPCS

## 2025-01-02 PROCEDURE — C1769 GUIDE WIRE: HCPCS | Performed by: ORTHOPAEDIC SURGERY

## 2025-01-02 PROCEDURE — 3600000005 HC SURGERY LEVEL 5 BASE: Performed by: ORTHOPAEDIC SURGERY

## 2025-01-02 PROCEDURE — 2500000003 HC RX 250 WO HCPCS

## 2025-01-02 PROCEDURE — 23472 RECONSTRUCT SHOULDER JOINT: CPT | Performed by: ORTHOPAEDIC SURGERY

## 2025-01-02 PROCEDURE — 2580000003 HC RX 258: Performed by: ANESTHESIOLOGY

## 2025-01-02 PROCEDURE — 73030 X-RAY EXAM OF SHOULDER: CPT

## 2025-01-02 PROCEDURE — 64415 NJX AA&/STRD BRCH PLXS IMG: CPT

## 2025-01-02 PROCEDURE — 6360000002 HC RX W HCPCS: Performed by: ORTHOPAEDIC SURGERY

## 2025-01-02 PROCEDURE — 2580000003 HC RX 258: Performed by: ORTHOPAEDIC SURGERY

## 2025-01-02 PROCEDURE — C1713 ANCHOR/SCREW BN/BN,TIS/BN: HCPCS | Performed by: ORTHOPAEDIC SURGERY

## 2025-01-02 PROCEDURE — 2709999900 HC NON-CHARGEABLE SUPPLY: Performed by: ORTHOPAEDIC SURGERY

## 2025-01-02 PROCEDURE — 3700000000 HC ANESTHESIA ATTENDED CARE: Performed by: ORTHOPAEDIC SURGERY

## 2025-01-02 PROCEDURE — 3700000001 HC ADD 15 MINUTES (ANESTHESIA): Performed by: ORTHOPAEDIC SURGERY

## 2025-01-02 PROCEDURE — 2500000003 HC RX 250 WO HCPCS: Performed by: ANESTHESIOLOGY

## 2025-01-02 PROCEDURE — 7100000000 HC PACU RECOVERY - FIRST 15 MIN: Performed by: ORTHOPAEDIC SURGERY

## 2025-01-02 PROCEDURE — 3600000015 HC SURGERY LEVEL 5 ADDTL 15MIN: Performed by: ORTHOPAEDIC SURGERY

## 2025-01-02 PROCEDURE — 7100000001 HC PACU RECOVERY - ADDTL 15 MIN: Performed by: ORTHOPAEDIC SURGERY

## 2025-01-02 DEVICE — SCREW BNE L20MM DIA6.5MM UNIV SHLDR CTRL UNIVERSE REVERS: Type: IMPLANTABLE DEVICE | Status: FUNCTIONAL

## 2025-01-02 DEVICE — SCREW BNE L36MM DIA4.5MM UNIV SHLDR TI PERIPH LOK UNIVERSE: Type: IMPLANTABLE DEVICE | Status: FUNCTIONAL

## 2025-01-02 DEVICE — SPHERE GLEN M DIA39MM +4MM OFFSET LAT SHLDR UNIVERS REVERS: Type: IMPLANTABLE DEVICE | Status: FUNCTIONAL

## 2025-01-02 DEVICE — BASEPLATE GLEN MED SHLDR POROUS COAT REVERS: Type: IMPLANTABLE DEVICE | Status: FUNCTIONAL

## 2025-01-02 DEVICE — SCREW BNE L30MM DIA4.5MM UNIV SHLDR TI PERIPH LOK UNIVERSE: Type: IMPLANTABLE DEVICE | Status: FUNCTIONAL

## 2025-01-02 DEVICE — SPACER CEM DIA39MM +9MM OFFSET HUM TI UNIVERS REVERS: Type: IMPLANTABLE DEVICE | Status: FUNCTIONAL

## 2025-01-02 DEVICE — STEM HUM SZ 10 SHLDR UNIVERS REVERS: Type: IMPLANTABLE DEVICE | Status: FUNCTIONAL

## 2025-01-02 DEVICE — LINER HUM DIA39MM +3MM OFFSET SHLDR UHMWPE CONSTRN UNIVERS: Type: IMPLANTABLE DEVICE | Status: FUNCTIONAL

## 2025-01-02 DEVICE — CUP HUM DIA39MM SHLDR NEUT SUT UNIVERS REVERS: Type: IMPLANTABLE DEVICE | Status: FUNCTIONAL

## 2025-01-02 RX ORDER — ROCURONIUM BROMIDE 10 MG/ML
INJECTION, SOLUTION INTRAVENOUS
Status: DISCONTINUED | OUTPATIENT
Start: 2025-01-02 | End: 2025-01-02 | Stop reason: SDUPTHER

## 2025-01-02 RX ORDER — ROPIVACAINE HYDROCHLORIDE 5 MG/ML
30 INJECTION, SOLUTION EPIDURAL; INFILTRATION; PERINEURAL ONCE
Status: DISCONTINUED | OUTPATIENT
Start: 2025-01-02 | End: 2025-01-02

## 2025-01-02 RX ORDER — PROPOFOL 10 MG/ML
INJECTION, EMULSION INTRAVENOUS
Status: DISCONTINUED | OUTPATIENT
Start: 2025-01-02 | End: 2025-01-02 | Stop reason: SDUPTHER

## 2025-01-02 RX ORDER — SODIUM CHLORIDE 0.9 % (FLUSH) 0.9 %
5-40 SYRINGE (ML) INJECTION EVERY 12 HOURS SCHEDULED
Status: DISCONTINUED | OUTPATIENT
Start: 2025-01-02 | End: 2025-01-02 | Stop reason: HOSPADM

## 2025-01-02 RX ORDER — HYDROMORPHONE HYDROCHLORIDE 1 MG/ML
0.25 INJECTION, SOLUTION INTRAMUSCULAR; INTRAVENOUS; SUBCUTANEOUS EVERY 5 MIN PRN
Status: DISCONTINUED | OUTPATIENT
Start: 2025-01-02 | End: 2025-01-02 | Stop reason: HOSPADM

## 2025-01-02 RX ORDER — NALOXONE HYDROCHLORIDE 0.4 MG/ML
INJECTION, SOLUTION INTRAMUSCULAR; INTRAVENOUS; SUBCUTANEOUS PRN
Status: DISCONTINUED | OUTPATIENT
Start: 2025-01-02 | End: 2025-01-02 | Stop reason: HOSPADM

## 2025-01-02 RX ORDER — SODIUM CHLORIDE 9 MG/ML
INJECTION, SOLUTION INTRAVENOUS PRN
Status: DISCONTINUED | OUTPATIENT
Start: 2025-01-02 | End: 2025-01-02 | Stop reason: HOSPADM

## 2025-01-02 RX ORDER — SODIUM CHLORIDE, SODIUM LACTATE, POTASSIUM CHLORIDE, CALCIUM CHLORIDE 600; 310; 30; 20 MG/100ML; MG/100ML; MG/100ML; MG/100ML
INJECTION, SOLUTION INTRAVENOUS CONTINUOUS
Status: DISCONTINUED | OUTPATIENT
Start: 2025-01-02 | End: 2025-01-02 | Stop reason: HOSPADM

## 2025-01-02 RX ORDER — BUPIVACAINE HYDROCHLORIDE 5 MG/ML
15 INJECTION, SOLUTION EPIDURAL; INTRACAUDAL ONCE
Status: DISCONTINUED | OUTPATIENT
Start: 2025-01-02 | End: 2025-01-02 | Stop reason: HOSPADM

## 2025-01-02 RX ORDER — SODIUM CHLORIDE 0.9 % (FLUSH) 0.9 %
5-40 SYRINGE (ML) INJECTION PRN
Status: DISCONTINUED | OUTPATIENT
Start: 2025-01-02 | End: 2025-01-02 | Stop reason: HOSPADM

## 2025-01-02 RX ORDER — FENTANYL CITRATE 50 UG/ML
INJECTION, SOLUTION INTRAMUSCULAR; INTRAVENOUS
Status: DISCONTINUED | OUTPATIENT
Start: 2025-01-02 | End: 2025-01-02 | Stop reason: SDUPTHER

## 2025-01-02 RX ORDER — BUPIVACAINE HYDROCHLORIDE 5 MG/ML
INJECTION, SOLUTION EPIDURAL; INTRACAUDAL
Status: COMPLETED | OUTPATIENT
Start: 2025-01-02 | End: 2025-01-02

## 2025-01-02 RX ORDER — FENTANYL CITRATE 50 UG/ML
100 INJECTION, SOLUTION INTRAMUSCULAR; INTRAVENOUS ONCE
Status: COMPLETED | OUTPATIENT
Start: 2025-01-02 | End: 2025-01-02

## 2025-01-02 RX ORDER — EPHEDRINE SULFATE/0.9% NACL/PF 25 MG/5 ML
SYRINGE (ML) INTRAVENOUS
Status: DISCONTINUED | OUTPATIENT
Start: 2025-01-02 | End: 2025-01-02 | Stop reason: SDUPTHER

## 2025-01-02 RX ORDER — OXYCODONE AND ACETAMINOPHEN 10; 325 MG/1; MG/1
1 TABLET ORAL EVERY 6 HOURS PRN
Qty: 20 TABLET | Refills: 0 | Status: SHIPPED | OUTPATIENT
Start: 2025-01-02 | End: 2025-01-07

## 2025-01-02 RX ORDER — VANCOMYCIN 1 G/200ML
1000 INJECTION, SOLUTION INTRAVENOUS ONCE
Status: COMPLETED | OUTPATIENT
Start: 2025-01-02 | End: 2025-01-02

## 2025-01-02 RX ORDER — HYDROMORPHONE HYDROCHLORIDE 1 MG/ML
0.5 INJECTION, SOLUTION INTRAMUSCULAR; INTRAVENOUS; SUBCUTANEOUS EVERY 5 MIN PRN
Status: DISCONTINUED | OUTPATIENT
Start: 2025-01-02 | End: 2025-01-02 | Stop reason: HOSPADM

## 2025-01-02 RX ORDER — ONDANSETRON 2 MG/ML
4 INJECTION INTRAMUSCULAR; INTRAVENOUS
Status: DISCONTINUED | OUTPATIENT
Start: 2025-01-02 | End: 2025-01-02 | Stop reason: HOSPADM

## 2025-01-02 RX ORDER — LIDOCAINE HYDROCHLORIDE 10 MG/ML
INJECTION, SOLUTION INFILTRATION; PERINEURAL
Status: DISCONTINUED | OUTPATIENT
Start: 2025-01-02 | End: 2025-01-02 | Stop reason: SDUPTHER

## 2025-01-02 RX ORDER — DEXAMETHASONE SODIUM PHOSPHATE 4 MG/ML
4 INJECTION, SOLUTION INTRA-ARTICULAR; INTRALESIONAL; INTRAMUSCULAR; INTRAVENOUS; SOFT TISSUE ONCE
Status: COMPLETED | OUTPATIENT
Start: 2025-01-02 | End: 2025-01-02

## 2025-01-02 RX ORDER — ONDANSETRON 4 MG/1
4 TABLET, FILM COATED ORAL EVERY 8 HOURS PRN
Qty: 10 TABLET | Refills: 0 | Status: SHIPPED | OUTPATIENT
Start: 2025-01-02

## 2025-01-02 RX ORDER — ONDANSETRON 2 MG/ML
INJECTION INTRAMUSCULAR; INTRAVENOUS
Status: DISCONTINUED | OUTPATIENT
Start: 2025-01-02 | End: 2025-01-02 | Stop reason: SDUPTHER

## 2025-01-02 RX ADMIN — BUPIVACAINE 10 ML: 13.3 INJECTION, SUSPENSION, LIPOSOMAL INFILTRATION at 16:56

## 2025-01-02 RX ADMIN — VANCOMYCIN 1000 MG: 1 INJECTION, SOLUTION INTRAVENOUS at 15:53

## 2025-01-02 RX ADMIN — ROCURONIUM BROMIDE 50 MG: 10 INJECTION, SOLUTION INTRAVENOUS at 17:39

## 2025-01-02 RX ADMIN — FENTANYL CITRATE 50 MCG: 50 INJECTION INTRAMUSCULAR; INTRAVENOUS at 16:54

## 2025-01-02 RX ADMIN — FAMOTIDINE 20 MG: 10 INJECTION, SOLUTION INTRAVENOUS at 14:13

## 2025-01-02 RX ADMIN — SODIUM CHLORIDE, POTASSIUM CHLORIDE, SODIUM LACTATE AND CALCIUM CHLORIDE: 600; 310; 30; 20 INJECTION, SOLUTION INTRAVENOUS at 14:04

## 2025-01-02 RX ADMIN — BUPIVACAINE HYDROCHLORIDE 10 ML: 5 INJECTION, SOLUTION EPIDURAL; INTRACAUDAL; PERINEURAL at 16:56

## 2025-01-02 RX ADMIN — PROPOFOL 140 MG: 10 INJECTION, EMULSION INTRAVENOUS at 17:39

## 2025-01-02 RX ADMIN — GENTAMICIN SULFATE 371.6 MG: 40 INJECTION, SOLUTION INTRAMUSCULAR; INTRAVENOUS at 17:52

## 2025-01-02 RX ADMIN — LIDOCAINE HYDROCHLORIDE 50 MG: 10 INJECTION, SOLUTION INFILTRATION; PERINEURAL at 17:39

## 2025-01-02 RX ADMIN — EPHEDRINE SULFATE 10 MG: 5 INJECTION INTRAVENOUS at 17:58

## 2025-01-02 RX ADMIN — FENTANYL CITRATE 100 MCG: 0.05 INJECTION, SOLUTION INTRAMUSCULAR; INTRAVENOUS at 17:39

## 2025-01-02 RX ADMIN — SUGAMMADEX 200 MG: 100 INJECTION, SOLUTION INTRAVENOUS at 18:56

## 2025-01-02 RX ADMIN — DEXAMETHASONE SODIUM PHOSPHATE 4 MG: 4 INJECTION INTRA-ARTICULAR; INTRALESIONAL; INTRAMUSCULAR; INTRAVENOUS; SOFT TISSUE at 14:14

## 2025-01-02 RX ADMIN — ONDANSETRON 4 MG: 2 INJECTION INTRAMUSCULAR; INTRAVENOUS at 18:50

## 2025-01-02 ASSESSMENT — PAIN DESCRIPTION - DESCRIPTORS: DESCRIPTORS: ACHING;DISCOMFORT

## 2025-01-02 ASSESSMENT — PAIN - FUNCTIONAL ASSESSMENT
PAIN_FUNCTIONAL_ASSESSMENT: 0-10
PAIN_FUNCTIONAL_ASSESSMENT: ACTIVITIES ARE NOT PREVENTED
PAIN_FUNCTIONAL_ASSESSMENT: NONE - DENIES PAIN

## 2025-01-02 ASSESSMENT — PAIN SCALES - GENERAL: PAINLEVEL_OUTOF10: 4

## 2025-01-02 ASSESSMENT — LIFESTYLE VARIABLES: SMOKING_STATUS: 0

## 2025-01-02 NOTE — ANESTHESIA PRE PROCEDURE
Department of Anesthesiology  Preprocedure Note       Name:  Tao Cotton   Age:  84 y.o.  :  1940                                          MRN:  [id number]         Date:  2025      Surgeon: Surgeon(s):  Inder Richardson MD    Procedure: Procedure(s):  RIGHT REVERSE TOTAL SHOULDER ARTHROPLASTY    Medications prior to admission:   Prior to Admission medications    Medication Sig Start Date End Date Taking? Authorizing Provider   vitamin C (ASCORBIC ACID) 500 MG tablet Take 1 tablet by mouth daily    Eduardo Cantor MD   pantoprazole sodium (PROTONIX) 40 MG PACK packet Take 1 packet by mouth 2 times daily (before meals)    Eduardo Cantor MD   HYDROcodone-acetaminophen (NORCO) 7.5-325 MG per tablet Take 1 tablet by mouth every 6 hours as needed for Pain.    Eduardo Cantor MD   losartan (COZAAR) 25 MG tablet Take 1 tablet by mouth daily    Eduardo Cantor MD   sodium bicarbonate 325 MG tablet Take 1 tablet by mouth in the morning and at bedtime 22   Eduardo Cantor MD   predniSONE (DELTASONE) 5 MG tablet Take 1 tablet by mouth daily    Eduardo Cantor MD   amLODIPine (NORVASC) 5 MG tablet Take 1 tablet by mouth daily    Eduardo Cantor MD   atorvastatin (LIPITOR) 40 MG tablet Take 1 tablet by mouth daily 24   Eduardo Cantor MD   cetirizine (ZYRTEC) 5 MG tablet Take 1 tablet by mouth daily    Eduardo Cantor MD   aspirin 81 MG tablet Take 1 tablet by mouth daily    Eduardo Cantor MD       Current medications:    No current facility-administered medications for this encounter.       Allergies:    Allergies   Allergen Reactions   • Lyrica [Pregabalin] Other (See Comments)     Pt was unable to breathe when taking medication.   • Sulfa Antibiotics    • Penicillins        Problem List:    Patient Active Problem List   Diagnosis Code   • HIGH CHOLESTEROL    • Hypertension I10   • Carotid artery stenosis I65.29   • Iron deficiency anemia D50.9

## 2025-01-02 NOTE — ANESTHESIA PROCEDURE NOTES
Peripheral Block    Patient location during procedure: holding area  Reason for block: post-op pain management  Start time: 1/2/2025 4:56 PM  End time: 1/2/2025 4:58 PM  Staffing  Performed: anesthesiologist   Anesthesiologist: Myles Andrade MD  Performed by: Myles Andrade MD  Authorized by: Myles Andrade MD    Preanesthetic Checklist  Completed: patient identified, IV checked, site marked, risks and benefits discussed, surgical/procedural consents, equipment checked, pre-op evaluation, timeout performed, anesthesia consent given, oxygen available, monitors applied/VS acknowledged, fire risk safety assessment completed and verbalized and blood product R/B/A discussed and consented  Peripheral Block   Patient position: supine  Prep: ChloraPrep  Provider prep: mask and sterile gloves  Patient monitoring: continuous pulse ox and frequent blood pressure checks  Block type: Brachial plexus  Interscalene  Laterality: right  Injection technique: single-shot  Guidance: ultrasound guided  Local infiltration: lidocaine  Local infiltration: lidocaine    Needle   Needle type: Quincke   Needle gauge: 20 G  Needle localization: ultrasound guidance  Needle length: 10 cm  Assessment   Injection assessment: negative aspiration for heme, no paresthesia on injection, local visualized surrounding nerve on ultrasound and no intravascular symptoms  Paresthesia pain: none  Slow fractionated injection: yes  Hemodynamics: stable  Outcomes: uncomplicated and patient tolerated procedure well    Medications Administered  BUPivacaine (MARCAINE) PF injection 0.5% - Perineural   10 mL - 1/2/2025 4:56:00 PM  BUPivacaine liposome (EXPAREL) injection 1.3% - Perineural   10 mL - 1/2/2025 4:56:00 PM

## 2025-01-02 NOTE — H&P
Patient Name: Tao Cotton  MRN: [id number]  YOB: 1940  Date of evaluation: 1/2/2025    H&P including current review of systems was updated in the paper chart and/or the document previously scanned into the record.  There have been no significant changes or new problems since the original evaluation.  The patient's problems continue and indications for contemplated procedure have not changed.    Electronically signed by Inder Richardson MD on 1/2/2025 at 2:14 PM.

## 2025-01-03 NOTE — BRIEF OP NOTE
Brief Postoperative Note      Patient: Tao Cotton  YOB: 1940  MRN: [id number]    Date of Procedure: 1/2/2025    Pre-Op Diagnosis Codes:      * Rotator cuff tear arthropathy of right shoulder [M75.101, M12.811]    Post-Op Diagnosis: Same       Procedure(s):  RIGHT REVERSE TOTAL SHOULDER ARTHROPLASTY    Surgeon(s):  Inder Richardson MD    Assistant:  * No surgical staff found *    Anesthesia: Choice    Estimated Blood Loss (mL): less than 100     Complications: None    Specimens:   * No specimens in log *    Implants:  Implant Name Type Inv. Item Serial No.  Lot No. LRB No. Used Action   BASEPLATE TAD MED SHLDR POROUS COAT REVERS - [id number]  BASEPLATE TAD MED SHLDR POROUS COAT REVERS  [employer]- [id number] Right 1 Implanted   SPHERE TAD M [id number] +4MM OFFSET LAT SHLDR UNIVERS [facility] - [id number]  SPHERE TAD M [id number] +4MM OFFSET LAT SHLDR UNIVERS [facility]  [employer]- [id number] Right 1 Implanted   SCREW BNE L20MM DIA6.5MM UNIV SHLDR CTRL UNIVERSE [facility] - [id number]  SCREW BNE L20MM DIA6.5MM UNIV SHLDR CTRL UNIVERSE REVERS  [employer]- [id number] Right 1 Implanted   SCREW BNE L36MM DIA4.5MM UNIV SHLDR TI PERIP LATONIA UNIVERSE - [id number]  SCREW BNE L36MM DIA4.5MM UNIV SHLDR TI PERIPH LATONIA UNIVERSE  [employer]- [id number] Right 1 Implanted   SCREW BNE L30MM DIA4.5MM UNIV SHLDR TI PERIPH LATONIA UNIVERSE - [id number]  SCREW BNE L30MM DIA4.5MM UNIV SHLDR TI PERIPH LATONIA UNIVERSE  [employer]- [id number] Right 1 Implanted   LINER HUM [id number] +3MM OFFSET SHLDR UHMWPE CONSTRN UNIVERS - [id number]  LINER HUM [id number] +3MM OFFSET SHLDR UHMWPE CONSTRN UNIVERS  [employer]- [id number] Right 1 Implanted   CUP HUM [id number] SHLDR NEUT SUT UNIVERS [facility] - [id number]  CUP HUM [id number] SHLDR NEUT SUT UNIVERS [facility]  [employer]- [id number] Right 1 Implanted   SPACER KHARI [id number] +9MM OFFSET HUM TI UNIVERS REVERS - [id number]  SPACER KHARI [id number] +9MM OFFSET HUM TI UNIVERS REVERS  [employer]-WD [id number]

## 2025-01-03 NOTE — PROGRESS NOTES
Patient to room5. He is alert, oriented and able to make his needs known. VSS. Daughter has been here and will be staying the night with her family. Pain a min and patient does not want any pain meds.

## 2025-01-03 NOTE — ANESTHESIA POSTPROCEDURE EVALUATION
Department of Anesthesiology  Postprocedure Note    Patient: Tao Cotton  MRN: [id number]  YOB: 1940  Date of evaluation: 1/2/2025    Procedure Summary       Date: 01/02/25 Room / Location: [address]    Anesthesia Start: 1734 Anesthesia Stop: 1904    Procedure: RIGHT REVERSE TOTAL SHOULDER ARTHROPLASTY (Right) Diagnosis:       Rotator cuff tear arthropathy of right shoulder      (Rotator cuff tear arthropathy of right shoulder [M75.101, M12.811])    Surgeons: Inder Richardson MD Responsible Provider: Ekaterina Emerson APRN - CRNA    Anesthesia Type: general, regional ASA Status: 3            Anesthesia Type: No value filed.    Yrn Phase I: Yrn Score: 6    Yrn Phase II:      Anesthesia Post Evaluation    Patient location during evaluation: PACU  Patient participation: waiting for patient participation  Level of consciousness: sleepy but conscious  Pain score: 0  Airway patency: patent  Nausea & Vomiting: no nausea and no vomiting  Cardiovascular status: hemodynamically stable  Respiratory status: acceptable and spontaneous ventilation  Hydration status: stable  Comments: /77   Pulse 70   Temp 97.4 °F (36.3 °C) (Temporal) Comment: Simultaneous filing. User may not have seen previous data.  Resp 13   Ht 1.753 m (5' 9\")   Wt 74.8 kg (165 lb)   SpO2 95%   BMI 24.37 kg/m²     Pain management: adequate    No notable events documented.

## 2025-01-10 ENCOUNTER — OFFICE VISIT (OUTPATIENT)
Age: 85
End: 2025-01-10

## 2025-01-10 VITALS — BODY MASS INDEX: 24.44 KG/M2 | HEIGHT: 69 IN | WEIGHT: 165 LBS

## 2025-01-10 DIAGNOSIS — Z96.611 STATUS POST REVERSE ARTHROPLASTY OF SHOULDER, RIGHT: ICD-10-CM

## 2025-01-10 DIAGNOSIS — M75.101 RIGHT ROTATOR CUFF TEAR ARTHROPATHY: Primary | ICD-10-CM

## 2025-01-10 DIAGNOSIS — M12.811 RIGHT ROTATOR CUFF TEAR ARTHROPATHY: Primary | ICD-10-CM

## 2025-01-10 NOTE — PROGRESS NOTES
Orthopaedic Clinic Note    NAME:  Tao Cotton   : 1940  MRN: [id number]      1/10/2025     CHIEF COMPLAINT: Status post right Reverse TSA, 2025    HISTORY OF PRESENT ILLNESS:   Tao returns today for follow up of the right shoulder.  Pain is controlled.  He had several falls postop.  He has been unsteady on his feet likely secondary to being overmedicated.    Past Medical History:        Diagnosis Date    Allergic rhinitis     Anemia, unspecified     Anxiety     Asthma     BPH (benign prostatic hyperplasia)     Dr Hernandez    CAD (coronary artery disease)     Dr ROSMERY Reeves    Cancer ([facility])     skin-non-melanoma    Carotid artery stenosis     Isabel Rivas, APRN    Chronic GERD 2017    Chronic kidney disease, stage III (moderate) (HCC)     Colon polyps     Cough     Decreased white blood cell count, unspecified     Disease of immune system (HCC)     IGG 4 Auto Immune Disease    Dysphagia 2017     Updating Deprecated Diagnoses    Enlarged lymph nodes, unspecified     Esophageal dysphagia 2017    Esophageal stricture     GERD (gastroesophageal reflux disease)     Hemorrhoids 2012    HIGH CHOLESTEROL     History of colon polyps 2012    History of esophageal stricture 2016    Hypertension     IgG monoclonal gammopathy     Insomnia     Iron deficiency anemia 2017    Iron deficiency anemia 2/3/2023    Iron deficiency anemia due to chronic blood loss     Leukocytosis     Mediastinal lymphadenopathy     Monoclonal gammopathy     Monocytosis (symptomatic)     Night sweats     Normocytic normochromic anemia     Osteoarthritis     Other iron deficiency anemias     GI blood loss, 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 anemias     Peptic ulcer disease     Pill dysphagia 2016    Restless leg syndrome     Rheumatoid arthritis (HCC)     S/P coronary artery stent placement 2016    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

## 2025-01-17 DIAGNOSIS — Z96.611 STATUS POST REVERSE ARTHROPLASTY OF SHOULDER, RIGHT: Primary | ICD-10-CM

## 2025-01-17 NOTE — TELEPHONE ENCOUNTER
Dr. Richardson is not in clinic today so I am routing to JANN Mora to address patient's pain concern.

## 2025-01-17 NOTE — TELEPHONE ENCOUNTER
Called patient, he states that the only time he does not seem to have pain is when he is sleeping. He did state that he has been taking the sling off when he is sleeping and he has not had an issue. The top of his shoulder is hurting but the area that is burning is the bicep area. He states he has not been using Ice the last couple days so I told him to try using some ice to the area to see if that does help the pain and burning a little. Let him know that a prescription was pended for Dr. Richardson to sign and send to his pharmacy. Patient stated understanding and appreciation.

## 2025-01-17 NOTE — TELEPHONE ENCOUNTER
Baljit Vaz PA [place], [place], [place]  Caller: Unspecified (Today, 12:57 PM)  Order has been pended for Dr Richardson. He can also use Vit E oil and rub over the incision to help improve the incision site.

## 2025-01-19 RX ORDER — OXYCODONE AND ACETAMINOPHEN 5; 325 MG/1; MG/1
1 TABLET ORAL EVERY 6 HOURS PRN
Qty: 20 TABLET | Refills: 0 | Status: SHIPPED | OUTPATIENT
Start: 2025-01-19 | End: 2025-01-24

## 2025-01-29 ENCOUNTER — TELEPHONE (OUTPATIENT)
Age: 85
End: 2025-01-29

## 2025-01-29 NOTE — TELEPHONE ENCOUNTER
PT wants Dr. Richardson to know he fell just a few minutes ago and landed on his chest/belly. Pt's arm was in a sling and he didn't land on the arm. Bus is concerned and wand Dr. Richardson to be aware

## 2025-01-30 NOTE — TELEPHONE ENCOUNTER
Called patient and let him know of Dr. Richardson's response. Patient states that there is not anymore pain then he has been but he does have some burning in his bicep which has been there since he had the surgery. But states no increased pain or deformity at this time.  He states he will call if anything changes. No other questions or concerns at this time.

## 2025-01-30 NOTE — TELEPHONE ENCOUNTER
Inder Richardson MD [place], [place], [place]  Caller: Unspecified (Yesterday,  2:16 PM)  Ok, if increased pain or deformity, get an X-ray  SR

## 2025-02-09 NOTE — PROGRESS NOTES
Orthopaedic Clinic Note    NAME:  Tao Cotton   : 1940  MRN: [id number]      2/10/2025     CHIEF COMPLAINT: Status post right Reverse TSA, 2025    HISTORY OF PRESENT ILLNESS:   Tao returns today for follow up of the right shoulder.  Pain is controlled.  He had several falls postop related to medication and a severe deformity of the right ankle.  He is currently doing extremely well with doing therapy on his own.    Past Medical History:        Diagnosis Date    Allergic rhinitis     Anemia, unspecified     Anxiety     Asthma     BPH (benign prostatic hyperplasia)     Dr Hernandez    CAD (coronary artery disease)     Dr ROSMERY Reeves    Cancer ([facility])     skin-non-melanoma    Carotid artery stenosis     Isabel Rivas, APRN    Chronic GERD 2017    Chronic kidney disease, stage III (moderate) ([facility])     Colon polyps     Cough     Decreased white blood cell count, unspecified     Disease of immune system ([facility])     IGG 4 Auto Immune Disease    Dysphagia 2017     Updating Deprecated Diagnoses    Enlarged lymph nodes, unspecified     Esophageal dysphagia 2017    Esophageal stricture     GERD (gastroesophageal reflux disease)     Hemorrhoids 2012    HIGH CHOLESTEROL     History of colon polyps 2012    History of esophageal stricture 2016    Hypertension     IgG monoclonal gammopathy     Insomnia     Iron deficiency anemia 2017    Iron deficiency anemia 2/3/2023    Iron deficiency anemia due to chronic blood loss     Leukocytosis     Mediastinal lymphadenopathy     Monoclonal gammopathy     Monocytosis (symptomatic)     Night sweats     Normocytic normochromic anemia     Osteoarthritis     Other iron deficiency anemias     GI blood loss, 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 anemias     Peptic ulcer disease     Pill dysphagia 2016    Restless leg syndrome     Rheumatoid arthritis (HCC)     S/P coronary artery stent placement 2016

## 2025-02-10 ENCOUNTER — OFFICE VISIT (OUTPATIENT)
Age: 85
End: 2025-02-10

## 2025-02-10 VITALS — BODY MASS INDEX: 24.44 KG/M2 | HEIGHT: 69 IN | WEIGHT: 165 LBS

## 2025-02-10 DIAGNOSIS — M75.101 RIGHT ROTATOR CUFF TEAR ARTHROPATHY: ICD-10-CM

## 2025-02-10 DIAGNOSIS — Z96.611 STATUS POST REVERSE ARTHROPLASTY OF SHOULDER, RIGHT: Primary | ICD-10-CM

## 2025-02-10 DIAGNOSIS — M12.811 RIGHT ROTATOR CUFF TEAR ARTHROPATHY: ICD-10-CM

## 2025-02-10 PROCEDURE — 99024 POSTOP FOLLOW-UP VISIT: CPT | Performed by: ORTHOPAEDIC SURGERY

## 2025-02-10 RX ORDER — OXYCODONE AND ACETAMINOPHEN 7.5; 325 MG/1; MG/1
1 TABLET ORAL EVERY 6 HOURS PRN
COMMUNITY

## 2025-02-10 RX ORDER — CLONAZEPAM 2 MG/1
2 TABLET ORAL NIGHTLY
COMMUNITY
Start: 2025-01-29

## 2025-03-04 ENCOUNTER — TELEPHONE (OUTPATIENT)
Age: 85
End: 2025-03-04

## 2025-03-04 NOTE — TELEPHONE ENCOUNTER
Returned call to patient and patient reported that he has been having \"cramp like feeling in my bicep\" for 2 weeks. Patient reports it goes away when he lays in bed and straightens arm out.He reports his right hand small finger goes numb at times. Patient reported he would like me to speak with Dr. Richardson concerning this. I informed patient that his PA's were in the office and Dr. Richardson was in surgery today but would be in tomorrow . I encouraged patient to use ice more frequently and patient reported he only likes to put ice on shoulder when he is lying in bed.I again encouraged patient to use ice which will help aid in pain comfort. Patient verbalized understanding and in again informed would speak with Dr. Richardson in the AM and would call him back once I speak to Dr. Richardson. Patient verbalized understanding

## 2025-03-04 NOTE — TELEPHONE ENCOUNTER
Pt states he had sx about 5 weeks ago on rt shoulder.   Pt states he's having like a cramping pain in his shoulder. He would like to speak to someone in clinic   Please call pt back.

## 2025-03-06 ENCOUNTER — TELEPHONE (OUTPATIENT)
Age: 85
End: 2025-03-06

## 2025-03-06 NOTE — TELEPHONE ENCOUNTER
Returned call to patient after speaking with Dr. Richardson. Dr. Richardson encourages patient to continue to use ice and heat and if this does not help for him to call the office for any concerns . Patient verbalized understanding

## 2025-03-25 ENCOUNTER — HOSPITAL ENCOUNTER (OUTPATIENT)
Dept: INFUSION THERAPY | Age: 85
Discharge: HOME | End: 2025-03-25
Payer: MEDICARE

## 2025-03-25 DIAGNOSIS — D89.84 IGG4 RELATED DISEASE (HCC): ICD-10-CM

## 2025-03-25 DIAGNOSIS — R91.8 LUNG NODULES: ICD-10-CM

## 2025-03-25 DIAGNOSIS — R59.0 ENLARGED LYMPH NODE IN NECK: ICD-10-CM

## 2025-03-25 DIAGNOSIS — D47.2 IGG MONOCLONAL GAMMOPATHY: ICD-10-CM

## 2025-03-25 LAB
ALBUMIN SERPL-MCNC: 3.7 G/DL (ref 3.5–5.2)
ALP SERPL-CCNC: 120 U/L (ref 40–129)
ALT SERPL-CCNC: 22 U/L (ref 5–41)
ANION GAP SERPL CALCULATED.3IONS-SCNC: 10 MMOL/L (ref 7–19)
AST SERPL-CCNC: 28 U/L (ref 5–40)
BASOPHILS # BLD: 0.02 K/UL (ref 0–0.2)
BASOPHILS NFR BLD: 0.4 % (ref 0–1)
BILIRUB SERPL-MCNC: 0.4 MG/DL (ref 0–1.2)
BUN SERPL-MCNC: 22 MG/DL (ref 8–23)
CALCIUM SERPL-MCNC: 10.4 MG/DL (ref 8.8–10.2)
CHLORIDE SERPL-SCNC: 107 MMOL/L (ref 98–107)
CO2 SERPL-SCNC: 22 MMOL/L (ref 22–29)
CREAT SERPL-MCNC: 1.8 MG/DL (ref 0.7–1.2)
EOSINOPHIL # BLD: 0.16 K/UL (ref 0–0.6)
EOSINOPHIL NFR BLD: 2.9 % (ref 0–5)
ERYTHROCYTE [DISTWIDTH] IN BLOOD BY AUTOMATED COUNT: 13.8 % (ref 11.5–14.5)
GLUCOSE SERPL-MCNC: 85 MG/DL (ref 70–99)
HCT VFR BLD AUTO: 32.4 % (ref 42–52)
HGB BLD-MCNC: 10.4 G/DL (ref 14–18)
LYMPHOCYTES # BLD: 0.41 K/UL (ref 1.1–4.5)
LYMPHOCYTES NFR BLD: 7.4 % (ref 20–40)
MCH RBC QN AUTO: 29.2 PG (ref 27–31)
MCHC RBC AUTO-ENTMCNC: 32.1 G/DL (ref 33–37)
MCV RBC AUTO: 91 FL (ref 80–94)
MONOCYTES # BLD: 0.81 K/UL (ref 0–0.9)
MONOCYTES NFR BLD: 14.6 % (ref 1–10)
NEUTROPHILS # BLD: 4.1 K/UL (ref 1.5–7.5)
NEUTS SEG NFR BLD: 74 % (ref 50–65)
PLATELET # BLD AUTO: 207 K/UL (ref 130–400)
PMV BLD AUTO: 10 FL (ref 9.4–12.4)
POTASSIUM SERPL-SCNC: 4.2 MMOL/L (ref 3.5–5.1)
PROT SERPL-MCNC: 5.9 G/DL (ref 6.4–8.3)
RBC # BLD AUTO: 3.56 M/UL (ref 4.7–6.1)
SODIUM SERPL-SCNC: 139 MMOL/L (ref 136–145)
WBC # BLD AUTO: 5.54 K/UL (ref 4.8–10.8)

## 2025-03-25 PROCEDURE — 82784 ASSAY IGA/IGD/IGG/IGM EACH: CPT

## 2025-03-25 PROCEDURE — 83883 ASSAY NEPHELOMETRY NOT SPEC: CPT

## 2025-03-25 PROCEDURE — 86334 IMMUNOFIX E-PHORESIS SERUM: CPT

## 2025-03-25 PROCEDURE — 36415 COLL VENOUS BLD VENIPUNCTURE: CPT

## 2025-03-25 PROCEDURE — 82232 ASSAY OF BETA-2 PROTEIN: CPT

## 2025-03-25 PROCEDURE — 84165 PROTEIN E-PHORESIS SERUM: CPT

## 2025-03-25 PROCEDURE — 85025 COMPLETE CBC W/AUTO DIFF WBC: CPT

## 2025-03-25 PROCEDURE — 83521 IG LIGHT CHAINS FREE EACH: CPT

## 2025-03-25 PROCEDURE — 84155 ASSAY OF PROTEIN SERUM: CPT

## 2025-03-25 PROCEDURE — 80053 COMPREHEN METABOLIC PANEL: CPT

## 2025-03-25 PROCEDURE — 82787 IGG 1 2 3 OR 4 EACH: CPT

## 2025-03-26 ENCOUNTER — RESULTS FOLLOW-UP (OUTPATIENT)
Dept: HEMATOLOGY | Age: 85
End: 2025-03-26

## 2025-03-26 LAB
B2 MICROGLOB SERPL-MCNC: 3.8 MG/L
IGG4 SER-MCNC: 30 MG/DL (ref 1–123)

## 2025-03-26 NOTE — RESULT ENCOUNTER NOTE
Spoke with Mr Cotton about elevated Creatinine.  He requested that results be faxed to Dr Barone, Nephrologist and his PCP Dr Lockwood.  Results faxed as requested.

## 2025-03-27 LAB
ALBUMIN SERPL-MCNC: 3.56 G/DL (ref 3.75–5.01)
ALPHA1 GLOB SERPL ELPH-MCNC: 0.33 G/DL (ref 0.19–0.46)
ALPHA2 GLOB SERPL ELPH-MCNC: 0.75 G/DL (ref 0.48–1.05)
B-GLOBULIN SERPL ELPH-MCNC: 0.67 G/DL (ref 0.48–1.1)
DEPRECATED KAPPA LC FREE/LAMBDA SER: 1.69 {RATIO} (ref 0.26–1.65)
EER MONOCLONAL PROTEIN AND FLC, SERUM: ABNORMAL
GAMMA GLOB SERPL ELPH-MCNC: 0.5 G/DL (ref 0.62–1.51)
IGA SERPL-MCNC: 132 MG/DL (ref 68–408)
IGG SERPL-MCNC: 487 MG/DL (ref 768–1632)
IGM SERPL-MCNC: 78 MG/DL (ref 35–263)
INTERPRETATION SERPL IFE-IMP: ABNORMAL
INTERPRETATION SERPL IFE-IMP: ABNORMAL
KAPPA LC FREE SER-MCNC: 28.93 MG/L (ref 3.3–19.4)
LAMBDA LC FREE SERPL-MCNC: 17.16 MG/L (ref 5.71–26.3)
MONOCLONAL PROTEIN, SERUM: ABNORMAL G/DL
PROT SERPL-MCNC: 5.8 G/DL (ref 6.3–8.2)

## 2025-03-27 NOTE — PROGRESS NOTES
Orthopaedic Clinic Note    NAME:  Tao Cotton   : 1940  MRN: [id number]      3/28/2025     CHIEF COMPLAINT: Status post right Reverse TSA, 2025    HISTORY OF PRESENT ILLNESS:   Tao returns today for follow up of the right shoulder.  Pain is controlled.  He had several falls postop related to medication and a severe deformity of the right ankle.  He is currently doing extremely well with doing therapy on his own.    Past Medical History:        Diagnosis Date    Allergic rhinitis     Anemia, unspecified     Anxiety     Asthma     BPH (benign prostatic hyperplasia)     Dr Hernandez    CAD (coronary artery disease)     Dr ROSMERY Reeves    Cancer ([facility])     skin-non-melanoma    Carotid artery stenosis     Isabel Rivas, APRN    Chronic GERD 2017    Chronic kidney disease, stage III (moderate) ([facility])     Colon polyps     Cough     Decreased white blood cell count, unspecified     Disease of immune system     IGG 4 Auto Immune Disease    Dysphagia 2017     Updating Deprecated Diagnoses    Enlarged lymph nodes, unspecified     Esophageal dysphagia 2017    Esophageal stricture     GERD (gastroesophageal reflux disease)     Hemorrhoids 2012    HIGH CHOLESTEROL     History of colon polyps 2012    History of esophageal stricture 2016    Hypertension     IgG monoclonal gammopathy     Insomnia     Iron deficiency anemia 2017    Iron deficiency anemia 2/3/2023    Iron deficiency anemia due to chronic blood loss     Leukocytosis     Mediastinal lymphadenopathy     Monoclonal gammopathy     Monocytosis (symptomatic)     Night sweats     Normocytic normochromic anemia     Osteoarthritis     Other iron deficiency anemias     GI blood loss, 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 anemias     Peptic ulcer disease     Pill dysphagia 2016    Restless leg syndrome     Rheumatoid arthritis (HCC)     S/P coronary artery stent placement 2016

## 2025-03-28 ENCOUNTER — OFFICE VISIT (OUTPATIENT)
Age: 85
End: 2025-03-28

## 2025-03-28 VITALS — WEIGHT: 163 LBS | BODY MASS INDEX: 24.14 KG/M2 | HEIGHT: 69 IN

## 2025-03-28 DIAGNOSIS — Z96.611 STATUS POST REVERSE ARTHROPLASTY OF SHOULDER, RIGHT: Primary | ICD-10-CM

## 2025-03-28 PROCEDURE — 99024 POSTOP FOLLOW-UP VISIT: CPT | Performed by: ORTHOPAEDIC SURGERY

## 2025-04-02 ENCOUNTER — OFFICE VISIT (OUTPATIENT)
Age: 85
End: 2025-04-02
Payer: MEDICARE

## 2025-04-02 VITALS — BODY MASS INDEX: 24.29 KG/M2 | WEIGHT: 164 LBS | HEIGHT: 69 IN

## 2025-04-02 DIAGNOSIS — Z96.611 STATUS POST REVERSE ARTHROPLASTY OF SHOULDER, RIGHT: Primary | ICD-10-CM

## 2025-04-02 DIAGNOSIS — S46.011D TRAUMATIC INCOMPLETE TEAR OF RIGHT ROTATOR CUFF, SUBSEQUENT ENCOUNTER: ICD-10-CM

## 2025-04-02 PROCEDURE — 1159F MED LIST DOCD IN RCRD: CPT | Performed by: ORTHOPAEDIC SURGERY

## 2025-04-02 PROCEDURE — G8420 CALC BMI NORM PARAMETERS: HCPCS | Performed by: ORTHOPAEDIC SURGERY

## 2025-04-02 PROCEDURE — 99213 OFFICE O/P EST LOW 20 MIN: CPT | Performed by: ORTHOPAEDIC SURGERY

## 2025-04-02 PROCEDURE — G8427 DOCREV CUR MEDS BY ELIG CLIN: HCPCS | Performed by: ORTHOPAEDIC SURGERY

## 2025-04-02 PROCEDURE — 1036F TOBACCO NON-USER: CPT | Performed by: ORTHOPAEDIC SURGERY

## 2025-04-02 PROCEDURE — 1123F ACP DISCUSS/DSCN MKR DOCD: CPT | Performed by: ORTHOPAEDIC SURGERY

## 2025-04-02 RX ORDER — HYDROCODONE BITARTRATE AND ACETAMINOPHEN 10; 325 MG/1; MG/1
1 TABLET ORAL EVERY 6 HOURS PRN
Qty: 10 TABLET | Refills: 0 | Status: SHIPPED | OUTPATIENT
Start: 2025-04-02 | End: 2025-04-07

## 2025-04-02 NOTE — PROGRESS NOTES
Orthopaedic Clinic Note    NAME:  Tao Cotton   : 1940  MRN: [id number]      2025     CHIEF COMPLAINT: Status post right Reverse TSA, 2025    HISTORY OF PRESENT ILLNESS:   Tao returns today for follow up of the right shoulder. I just saw him a few days ago and he was doing very well.  He reports a pop in the shoulder with a very simple movement.  He states he had his arms crossed turned toward the side and something popped.  He was in fairly severe pain yesterday but today this is improving.  He wanted to check with me today just to make sure there is nothing to be worried about.      Past Medical History:        Diagnosis Date    Allergic rhinitis     Anemia, unspecified     Anxiety     Asthma     BPH (benign prostatic hyperplasia)     Dr Hernandez    CAD (coronary artery disease)     Dr ROSMERY Reeves    Cancer (HCC)     skin-non-melanoma    Carotid artery stenosis     Isabel Rivas, APRN    Chronic GERD 2017    Chronic kidney disease, stage III (moderate) (HCC)     Colon polyps     Cough     Decreased white blood cell count, unspecified     Disease of immune system     IGG 4 Auto Immune Disease    Dysphagia 2017     Updating Deprecated Diagnoses    Enlarged lymph nodes, unspecified     Esophageal dysphagia 2017    Esophageal stricture     GERD (gastroesophageal reflux disease)     Hemorrhoids 2012    HIGH CHOLESTEROL     History of colon polyps 2012    History of esophageal stricture 2016    Hypertension     IgG monoclonal gammopathy     Insomnia     Iron deficiency anemia 2017    Iron deficiency anemia 2/3/2023    Iron deficiency anemia due to chronic blood loss     Leukocytosis     Mediastinal lymphadenopathy     Monoclonal gammopathy     Monocytosis (symptomatic)     Night sweats     Normocytic normochromic anemia     Osteoarthritis     Other iron deficiency anemias     GI blood loss, 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

## 2025-04-21 ENCOUNTER — HOSPITAL ENCOUNTER (OUTPATIENT)
Dept: INFUSION THERAPY | Age: 85
Discharge: HOME | End: 2025-04-21
Payer: MEDICARE

## 2025-04-21 DIAGNOSIS — D64.9 ANEMIA, UNSPECIFIED TYPE: ICD-10-CM

## 2025-04-21 DIAGNOSIS — D89.84 IGG4 RELATED DISEASE (HCC): ICD-10-CM

## 2025-04-21 DIAGNOSIS — D47.2 IGG MONOCLONAL GAMMOPATHY: ICD-10-CM

## 2025-04-21 DIAGNOSIS — D64.9 ANEMIA, UNSPECIFIED TYPE: Primary | ICD-10-CM

## 2025-04-21 LAB
ALBUMIN SERPL-MCNC: 4.1 G/DL (ref 3.5–5.2)
ALP SERPL-CCNC: 129 U/L (ref 40–129)
ALT SERPL-CCNC: 16 U/L (ref 5–41)
ANION GAP SERPL CALCULATED.3IONS-SCNC: 12 MMOL/L (ref 7–19)
AST SERPL-CCNC: 24 U/L (ref 5–40)
BASOPHILS # BLD: 0.03 K/UL (ref 0–0.2)
BASOPHILS NFR BLD: 0.5 % (ref 0–1)
BILIRUB SERPL-MCNC: 0.8 MG/DL (ref 0–1.2)
BUN SERPL-MCNC: 17 MG/DL (ref 8–23)
CALCIUM SERPL-MCNC: 10.9 MG/DL (ref 8.8–10.2)
CHLORIDE SERPL-SCNC: 102 MMOL/L (ref 98–107)
CO2 SERPL-SCNC: 19 MMOL/L (ref 22–29)
CREAT SERPL-MCNC: 1.3 MG/DL (ref 0.7–1.2)
EOSINOPHIL # BLD: 0.16 K/UL (ref 0–0.6)
EOSINOPHIL NFR BLD: 2.9 % (ref 0–5)
ERYTHROCYTE [DISTWIDTH] IN BLOOD BY AUTOMATED COUNT: 14.3 % (ref 11.5–14.5)
GLUCOSE SERPL-MCNC: 113 MG/DL (ref 70–99)
HCT VFR BLD AUTO: 32.4 % (ref 42–52)
HGB BLD-MCNC: 10.7 G/DL (ref 14–18)
IRON SATN MFR SERPL: 10 % (ref 20–50)
IRON SERPL-MCNC: 35 UG/DL (ref 59–158)
LYMPHOCYTES # BLD: 0.48 K/UL (ref 1.1–4.5)
LYMPHOCYTES NFR BLD: 8.6 % (ref 20–40)
MCH RBC QN AUTO: 28 PG (ref 27–31)
MCHC RBC AUTO-ENTMCNC: 33 G/DL (ref 33–37)
MCV RBC AUTO: 84.8 FL (ref 80–94)
MONOCYTES # BLD: 0.79 K/UL (ref 0–0.9)
MONOCYTES NFR BLD: 14.1 % (ref 1–10)
NEUTROPHILS # BLD: 4.13 K/UL (ref 1.5–7.5)
NEUTS SEG NFR BLD: 73.5 % (ref 50–65)
PLATELET # BLD AUTO: 226 K/UL (ref 130–400)
PMV BLD AUTO: 10.2 FL (ref 9.4–12.4)
POTASSIUM SERPL-SCNC: 3.8 MMOL/L (ref 3.5–5.1)
PROT SERPL-MCNC: 6.4 G/DL (ref 6.4–8.3)
RBC # BLD AUTO: 3.82 M/UL (ref 4.7–6.1)
SODIUM SERPL-SCNC: 133 MMOL/L (ref 136–145)
TIBC SERPL-MCNC: 343 UG/DL (ref 250–400)
WBC # BLD AUTO: 5.61 K/UL (ref 4.8–10.8)

## 2025-04-21 PROCEDURE — 86334 IMMUNOFIX E-PHORESIS SERUM: CPT

## 2025-04-21 PROCEDURE — 84165 PROTEIN E-PHORESIS SERUM: CPT

## 2025-04-21 PROCEDURE — 83883 ASSAY NEPHELOMETRY NOT SPEC: CPT

## 2025-04-21 PROCEDURE — 82784 ASSAY IGA/IGD/IGG/IGM EACH: CPT

## 2025-04-21 PROCEDURE — 84155 ASSAY OF PROTEIN SERUM: CPT

## 2025-04-21 PROCEDURE — 82787 IGG 1 2 3 OR 4 EACH: CPT

## 2025-04-21 PROCEDURE — 83540 ASSAY OF IRON: CPT

## 2025-04-21 PROCEDURE — 80053 COMPREHEN METABOLIC PANEL: CPT

## 2025-04-21 PROCEDURE — 85025 COMPLETE CBC W/AUTO DIFF WBC: CPT

## 2025-04-21 PROCEDURE — 83550 IRON BINDING TEST: CPT

## 2025-04-21 PROCEDURE — 36415 COLL VENOUS BLD VENIPUNCTURE: CPT

## 2025-04-21 PROCEDURE — 83521 IG LIGHT CHAINS FREE EACH: CPT

## 2025-04-21 PROCEDURE — 82232 ASSAY OF BETA-2 PROTEIN: CPT

## 2025-04-21 NOTE — PROGRESS NOTES
Spoke with [facility] with request to and iron panel to blood collection from today, 4/21/25.  Mr Cotton reports significant fatigue and cbc reveals Hgb 10.7.

## 2025-04-22 ENCOUNTER — RESULTS FOLLOW-UP (OUTPATIENT)
Dept: HEMATOLOGY | Age: 85
End: 2025-04-22

## 2025-04-22 DIAGNOSIS — K90.9 MALABSORPTION OF IRON: Primary | ICD-10-CM

## 2025-04-22 LAB — IGG4 SER-MCNC: 32 MG/DL (ref 1–123)

## 2025-04-22 NOTE — PROGRESS NOTES
Spoke with Mr Cotton regarding serology collected in clinic 4/22/25 revealing iron deficiency/anemia and Dr Aceves's recommendation for iron repletion as indicated.   Plan submitted for insurance authorization for iron infusions.  Mr Cotton understands to expect call to arrange infusions at his convenience when authorization is verified.

## 2025-04-23 ENCOUNTER — TELEPHONE (OUTPATIENT)
Dept: HEMATOLOGY | Age: 85
End: 2025-04-23

## 2025-04-23 LAB
ALBUMIN SERPL-MCNC: 3.9 G/DL (ref 3.75–5.01)
ALPHA1 GLOB SERPL ELPH-MCNC: 0.31 G/DL (ref 0.19–0.46)
ALPHA2 GLOB SERPL ELPH-MCNC: 0.7 G/DL (ref 0.48–1.05)
B-GLOBULIN SERPL ELPH-MCNC: 0.67 G/DL (ref 0.48–1.1)
B2 MICROGLOB SERPL-MCNC: 3.9 MG/L
DEPRECATED KAPPA LC FREE/LAMBDA SER: 1.52 {RATIO} (ref 0.26–1.65)
EER MONOCLONAL PROTEIN AND FLC, SERUM: ABNORMAL
GAMMA GLOB SERPL ELPH-MCNC: 0.52 G/DL (ref 0.62–1.51)
IGA SERPL-MCNC: 138 MG/DL (ref 68–408)
IGG SERPL-MCNC: 516 MG/DL (ref 768–1632)
IGM SERPL-MCNC: 78 MG/DL (ref 35–263)
INTERPRETATION SERPL IFE-IMP: ABNORMAL
INTERPRETATION SERPL IFE-IMP: ABNORMAL
KAPPA LC FREE SER-MCNC: 28.75 MG/L (ref 3.3–19.4)
LAMBDA LC FREE SERPL-MCNC: 18.89 MG/L (ref 5.71–26.3)
MONOCLONAL PROTEIN, SERUM: ABNORMAL G/DL
PROT SERPL-MCNC: 6.1 G/DL (ref 6.3–8.2)

## 2025-04-23 NOTE — TELEPHONE ENCOUNTER

## 2025-04-23 NOTE — RESULT ENCOUNTER NOTE
Results faxed to Dr Chatman ([facility]).   Mr Yury states he began weekly rituxan x 4 on 4/22/25.

## 2025-04-24 NOTE — PROGRESS NOTES
satisfactory at 10.7.      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 Ten Lockwood for evaluation of leukopenia and anemia.    CBC on 2018 showed a WBC of 3.37, hemoglobin 9.7 with MCV 87.8 and platelet count 160,000.  Neutrophils were 56.3%, lymphocytes 9.5% and monocytes 20.5%.  CMP included a creatinine of 1.94, GFR 41. Ca+ 8.8, TP 9.6.    He is followed by Dr. CARLOS MANUEL Chatman for rheumatoid arthritis and possible Sjogren disease. He has a known history of CKD, as well as iron deficiency anemia from GI blood loss.    Endoscopy 2017 by Dr. SHAMEKA Osullivan revealed a small bowel AVM that was cauterized. A moderate sized hiatal hernia with mild Jake's erosions was noted as well.  Colonoscopy 17 was appreciable for cecal AVM, also cauterized as well as internal hemorrhoids.    M2A capsule endoscopy 1/3/2018 by Dr. Osullivan revealed one irritated area in the colon, though no signs of bleeding or stigmata of small bowel blood loss. Should evidence of GI blood loss persists, consideration should be given for repeat EGD/colonoscopy due to evidence of blood loss on procedures in  with possible need for repeat cauterization.    Tao was previously on antiplatelet therapy for CAD, now treated with aspirin only.  He denies melena or hematochezia at this time.  In addition to the above, Tao has been followed by Dr. MIKEY Antonio for thoracic adenopathy.    Contrasted Chest CT 1/10/2018 at [facility] documented an interval increase in the size of intrathoracic lymph nodes compared to 2016; 2016; CTA chest 2016 including the followin mm precarinal LN   10 mm hilar LN   14 mm subcarinal LN   17 left hilar LN, previously 10 mm   4 mm medial ALIYA nodule, more conspicuous   4 mm lingular nodule, new   7 mm left major fissure nodule, stable   7 mm LLL

## 2025-04-28 ENCOUNTER — HOSPITAL ENCOUNTER (OUTPATIENT)
Dept: INFUSION THERAPY | Age: 85
Setting detail: INFUSION SERIES
Discharge: HOME | End: 2025-04-28
Payer: MEDICARE

## 2025-04-28 ENCOUNTER — OFFICE VISIT (OUTPATIENT)
Age: 85
End: 2025-04-28
Payer: MEDICARE

## 2025-04-28 ENCOUNTER — HOSPITAL ENCOUNTER (OUTPATIENT)
Dept: INFUSION THERAPY | Age: 85
Discharge: HOME | End: 2025-04-28
Payer: MEDICARE

## 2025-04-28 ENCOUNTER — TELEPHONE (OUTPATIENT)
Dept: HEMATOLOGY | Age: 85
End: 2025-04-28

## 2025-04-28 ENCOUNTER — OFFICE VISIT (OUTPATIENT)
Dept: HEMATOLOGY | Age: 85
End: 2025-04-28
Payer: MEDICARE

## 2025-04-28 VITALS
BODY MASS INDEX: 24.05 KG/M2 | HEART RATE: 70 BPM | OXYGEN SATURATION: 97 % | HEIGHT: 69 IN | SYSTOLIC BLOOD PRESSURE: 126 MMHG | WEIGHT: 162.4 LBS | RESPIRATION RATE: 18 BRPM | DIASTOLIC BLOOD PRESSURE: 72 MMHG | TEMPERATURE: 97.9 F

## 2025-04-28 VITALS
HEART RATE: 78 BPM | OXYGEN SATURATION: 96 % | TEMPERATURE: 98 F | SYSTOLIC BLOOD PRESSURE: 141 MMHG | RESPIRATION RATE: 18 BRPM | DIASTOLIC BLOOD PRESSURE: 80 MMHG

## 2025-04-28 VITALS — BODY MASS INDEX: 23.99 KG/M2 | WEIGHT: 162 LBS | HEIGHT: 69 IN

## 2025-04-28 DIAGNOSIS — D64.9 ANEMIA, UNSPECIFIED TYPE: ICD-10-CM

## 2025-04-28 DIAGNOSIS — K90.9 MALABSORPTION OF IRON: Primary | ICD-10-CM

## 2025-04-28 DIAGNOSIS — D50.9 IRON DEFICIENCY ANEMIA, UNSPECIFIED IRON DEFICIENCY ANEMIA TYPE: ICD-10-CM

## 2025-04-28 DIAGNOSIS — Z96.611 STATUS POST REVERSE ARTHROPLASTY OF SHOULDER, RIGHT: Primary | ICD-10-CM

## 2025-04-28 DIAGNOSIS — Z00.00 HEALTH CARE MAINTENANCE: ICD-10-CM

## 2025-04-28 DIAGNOSIS — D89.84 IGG4 RELATED DISEASE (HCC): ICD-10-CM

## 2025-04-28 DIAGNOSIS — D47.2 IGG MONOCLONAL GAMMOPATHY: Primary | ICD-10-CM

## 2025-04-28 PROCEDURE — 2580000003 HC RX 258: Performed by: INTERNAL MEDICINE

## 2025-04-28 PROCEDURE — G8427 DOCREV CUR MEDS BY ELIG CLIN: HCPCS | Performed by: INTERNAL MEDICINE

## 2025-04-28 PROCEDURE — G8420 CALC BMI NORM PARAMETERS: HCPCS | Performed by: INTERNAL MEDICINE

## 2025-04-28 PROCEDURE — 1036F TOBACCO NON-USER: CPT | Performed by: ORTHOPAEDIC SURGERY

## 2025-04-28 PROCEDURE — 3074F SYST BP LT 130 MM HG: CPT | Performed by: INTERNAL MEDICINE

## 2025-04-28 PROCEDURE — 99212 OFFICE O/P EST SF 10 MIN: CPT

## 2025-04-28 PROCEDURE — G8427 DOCREV CUR MEDS BY ELIG CLIN: HCPCS | Performed by: ORTHOPAEDIC SURGERY

## 2025-04-28 PROCEDURE — 1159F MED LIST DOCD IN RCRD: CPT | Performed by: INTERNAL MEDICINE

## 2025-04-28 PROCEDURE — 6360000002 HC RX W HCPCS: Performed by: INTERNAL MEDICINE

## 2025-04-28 PROCEDURE — 99214 OFFICE O/P EST MOD 30 MIN: CPT | Performed by: INTERNAL MEDICINE

## 2025-04-28 PROCEDURE — 1123F ACP DISCUSS/DSCN MKR DOCD: CPT | Performed by: ORTHOPAEDIC SURGERY

## 2025-04-28 PROCEDURE — 96365 THER/PROPH/DIAG IV INF INIT: CPT

## 2025-04-28 PROCEDURE — 1125F AMNT PAIN NOTED PAIN PRSNT: CPT | Performed by: INTERNAL MEDICINE

## 2025-04-28 PROCEDURE — 3078F DIAST BP <80 MM HG: CPT | Performed by: INTERNAL MEDICINE

## 2025-04-28 PROCEDURE — 1036F TOBACCO NON-USER: CPT | Performed by: INTERNAL MEDICINE

## 2025-04-28 PROCEDURE — 99213 OFFICE O/P EST LOW 20 MIN: CPT | Performed by: ORTHOPAEDIC SURGERY

## 2025-04-28 PROCEDURE — G8420 CALC BMI NORM PARAMETERS: HCPCS | Performed by: ORTHOPAEDIC SURGERY

## 2025-04-28 PROCEDURE — 1123F ACP DISCUSS/DSCN MKR DOCD: CPT | Performed by: INTERNAL MEDICINE

## 2025-04-28 PROCEDURE — 1159F MED LIST DOCD IN RCRD: CPT | Performed by: ORTHOPAEDIC SURGERY

## 2025-04-28 RX ORDER — SODIUM CHLORIDE 9 MG/ML
5-250 INJECTION, SOLUTION INTRAVENOUS PRN
Status: DISCONTINUED | OUTPATIENT
Start: 2025-04-28 | End: 2025-04-29 | Stop reason: HOSPADM

## 2025-04-28 RX ORDER — DIPHENHYDRAMINE HYDROCHLORIDE 50 MG/ML
50 INJECTION, SOLUTION INTRAMUSCULAR; INTRAVENOUS
OUTPATIENT
Start: 2025-05-05

## 2025-04-28 RX ORDER — ACETAMINOPHEN 325 MG/1
650 TABLET ORAL
OUTPATIENT
Start: 2025-04-28

## 2025-04-28 RX ORDER — HEPARIN 100 UNIT/ML
500 SYRINGE INTRAVENOUS PRN
OUTPATIENT
Start: 2025-04-28

## 2025-04-28 RX ORDER — EPINEPHRINE 1 MG/ML
0.3 INJECTION, SOLUTION, CONCENTRATE INTRAVENOUS PRN
OUTPATIENT
Start: 2025-05-05

## 2025-04-28 RX ORDER — SODIUM CHLORIDE 9 MG/ML
5-250 INJECTION, SOLUTION INTRAVENOUS PRN
OUTPATIENT
Start: 2025-05-05

## 2025-04-28 RX ORDER — SODIUM CHLORIDE 9 MG/ML
INJECTION, SOLUTION INTRAVENOUS CONTINUOUS
OUTPATIENT
Start: 2025-05-05

## 2025-04-28 RX ORDER — SODIUM CHLORIDE 0.9 % (FLUSH) 0.9 %
5-40 SYRINGE (ML) INJECTION PRN
OUTPATIENT
Start: 2025-05-05

## 2025-04-28 RX ORDER — SODIUM CHLORIDE 9 MG/ML
5-250 INJECTION, SOLUTION INTRAVENOUS PRN
OUTPATIENT
Start: 2025-04-28

## 2025-04-28 RX ORDER — HEPARIN 100 UNIT/ML
500 SYRINGE INTRAVENOUS PRN
OUTPATIENT
Start: 2025-05-05

## 2025-04-28 RX ORDER — SODIUM CHLORIDE 0.9 % (FLUSH) 0.9 %
5-40 SYRINGE (ML) INJECTION PRN
Status: DISCONTINUED | OUTPATIENT
Start: 2025-04-28 | End: 2025-04-29 | Stop reason: HOSPADM

## 2025-04-28 RX ORDER — EPINEPHRINE 1 MG/ML
0.3 INJECTION, SOLUTION, CONCENTRATE INTRAVENOUS PRN
OUTPATIENT
Start: 2025-04-28

## 2025-04-28 RX ORDER — DIPHENHYDRAMINE HYDROCHLORIDE 50 MG/ML
50 INJECTION, SOLUTION INTRAMUSCULAR; INTRAVENOUS
OUTPATIENT
Start: 2025-04-28

## 2025-04-28 RX ORDER — ALBUTEROL SULFATE 90 UG/1
4 INHALANT RESPIRATORY (INHALATION) PRN
OUTPATIENT
Start: 2025-04-28

## 2025-04-28 RX ORDER — HYDROCORTISONE SODIUM SUCCINATE 100 MG/2ML
100 INJECTION INTRAMUSCULAR; INTRAVENOUS
OUTPATIENT
Start: 2025-05-05

## 2025-04-28 RX ORDER — SODIUM CHLORIDE 9 MG/ML
INJECTION, SOLUTION INTRAVENOUS CONTINUOUS
OUTPATIENT
Start: 2025-04-28

## 2025-04-28 RX ORDER — ACETAMINOPHEN 325 MG/1
650 TABLET ORAL
OUTPATIENT
Start: 2025-05-05

## 2025-04-28 RX ORDER — ALBUTEROL SULFATE 90 UG/1
4 INHALANT RESPIRATORY (INHALATION) PRN
OUTPATIENT
Start: 2025-05-05

## 2025-04-28 RX ORDER — ONDANSETRON 2 MG/ML
8 INJECTION INTRAMUSCULAR; INTRAVENOUS
OUTPATIENT
Start: 2025-05-05

## 2025-04-28 RX ORDER — HYDROCORTISONE SODIUM SUCCINATE 100 MG/2ML
100 INJECTION INTRAMUSCULAR; INTRAVENOUS
OUTPATIENT
Start: 2025-04-28

## 2025-04-28 RX ORDER — ONDANSETRON 2 MG/ML
8 INJECTION INTRAMUSCULAR; INTRAVENOUS
OUTPATIENT
Start: 2025-04-28

## 2025-04-28 RX ADMIN — FERUMOXYTOL 510 MG: 510 INJECTION INTRAVENOUS at 09:30

## 2025-04-28 NOTE — PROGRESS NOTES
Pt arrived to [facility] and PIV placed. Feraheme 510mg administered. Pt tolerated well.     Electronically signed by Maira Peña RN on 4/28/2025 at 10:04 AM

## 2025-04-28 NOTE — PROGRESS NOTES
mouth daily   Yes Provider, MD Eduardo   aspirin 81 MG tablet Take 1 tablet by mouth daily   Yes Provider, MD Eduardo       Allergies:  Lyrica [pregabalin], Sulfa antibiotics, and Penicillins    PHYSICAL EXAM:    Right Shoulder:  Incision intact, appropriate bruising and swelling.    No signs of infection.   Neurovascularly intact.  Motion: 0-170 degrees of flexion, abduction 90 degrees, internal rotation to T7  Strength: 4/5 global strength    Radiology:   AP Internal and AP external rotation views of the right shoulder were obtained in the office on today's visit, reviewed and interpreted by me.   Imaging quality is adequate for review.  There is a well-placed reverse total shoulder arthroplasty without signs of loosening or malalignment.      Assessment:   Encounter Diagnosis   Name Primary?    Status post reverse arthroplasty of shoulder, right Yes          Plan:  -I have assured him I see nothing on his radiographs that is worrisome after his most recent fall.  He will continue to strengthen his shoulder over time.  I will see him back in the future as needed.  All questions answered.      Return if symptoms worsen or fail to improve.    Electronically signed by Inder Richardson MD on 4/28/2025 at 3:01 PM.

## 2025-05-05 ENCOUNTER — HOSPITAL ENCOUNTER (OUTPATIENT)
Dept: INFUSION THERAPY | Age: 85
Setting detail: INFUSION SERIES
Discharge: HOME | End: 2025-05-05
Payer: MEDICARE

## 2025-05-05 VITALS
RESPIRATION RATE: 18 BRPM | SYSTOLIC BLOOD PRESSURE: 128 MMHG | HEART RATE: 72 BPM | OXYGEN SATURATION: 100 % | TEMPERATURE: 98 F | DIASTOLIC BLOOD PRESSURE: 78 MMHG

## 2025-05-05 DIAGNOSIS — K90.9 MALABSORPTION OF IRON: Primary | ICD-10-CM

## 2025-05-05 DIAGNOSIS — D50.9 IRON DEFICIENCY ANEMIA, UNSPECIFIED IRON DEFICIENCY ANEMIA TYPE: ICD-10-CM

## 2025-05-05 PROCEDURE — 2580000003 HC RX 258: Performed by: INTERNAL MEDICINE

## 2025-05-05 PROCEDURE — 2500000003 HC RX 250 WO HCPCS: Performed by: INTERNAL MEDICINE

## 2025-05-05 PROCEDURE — 6360000002 HC RX W HCPCS: Performed by: INTERNAL MEDICINE

## 2025-05-05 PROCEDURE — 96365 THER/PROPH/DIAG IV INF INIT: CPT

## 2025-05-05 RX ORDER — SODIUM CHLORIDE 0.9 % (FLUSH) 0.9 %
5-40 SYRINGE (ML) INJECTION PRN
Status: DISCONTINUED | OUTPATIENT
Start: 2025-05-05 | End: 2025-05-06 | Stop reason: HOSPADM

## 2025-05-05 RX ORDER — HYDROCORTISONE SODIUM SUCCINATE 100 MG/2ML
100 INJECTION INTRAMUSCULAR; INTRAVENOUS
OUTPATIENT
Start: 2025-05-05

## 2025-05-05 RX ORDER — ONDANSETRON 2 MG/ML
8 INJECTION INTRAMUSCULAR; INTRAVENOUS
OUTPATIENT
Start: 2025-05-05

## 2025-05-05 RX ORDER — HEPARIN 100 UNIT/ML
500 SYRINGE INTRAVENOUS PRN
OUTPATIENT
Start: 2025-05-05

## 2025-05-05 RX ORDER — SODIUM CHLORIDE 0.9 % (FLUSH) 0.9 %
5-40 SYRINGE (ML) INJECTION PRN
OUTPATIENT
Start: 2025-05-05

## 2025-05-05 RX ORDER — ACETAMINOPHEN 325 MG/1
650 TABLET ORAL
OUTPATIENT
Start: 2025-05-05

## 2025-05-05 RX ORDER — DIPHENHYDRAMINE HYDROCHLORIDE 50 MG/ML
50 INJECTION, SOLUTION INTRAMUSCULAR; INTRAVENOUS
OUTPATIENT
Start: 2025-05-05

## 2025-05-05 RX ORDER — SODIUM CHLORIDE 9 MG/ML
5-250 INJECTION, SOLUTION INTRAVENOUS PRN
OUTPATIENT
Start: 2025-05-05

## 2025-05-05 RX ORDER — EPINEPHRINE 1 MG/ML
0.3 INJECTION, SOLUTION, CONCENTRATE INTRAVENOUS PRN
OUTPATIENT
Start: 2025-05-05

## 2025-05-05 RX ORDER — ALBUTEROL SULFATE 90 UG/1
4 INHALANT RESPIRATORY (INHALATION) PRN
OUTPATIENT
Start: 2025-05-05

## 2025-05-05 RX ORDER — SODIUM CHLORIDE 9 MG/ML
INJECTION, SOLUTION INTRAVENOUS CONTINUOUS
OUTPATIENT
Start: 2025-05-05

## 2025-05-05 RX ADMIN — SODIUM CHLORIDE, PRESERVATIVE FREE 10 ML: 5 INJECTION INTRAVENOUS at 12:48

## 2025-05-05 RX ADMIN — FERUMOXYTOL 510 MG: 510 INJECTION INTRAVENOUS at 11:47

## 2025-05-05 NOTE — FLOWSHEET NOTE
05/05/25 [id number]   AVS Reviewed   AVS & discharge instructions reviewed with patient and/or representative? Yes   Reviewed instructions with Patient   Level of Understanding Questions answered;Verbalized understanding     Feraheme 510mg given as ordered and pt tolerated well

## 2025-05-22 ENCOUNTER — HOSPITAL ENCOUNTER (OUTPATIENT)
Dept: CT IMAGING | Facility: HOSPITAL | Age: 85
Discharge: HOME | End: 2025-05-22
Admitting: INTERNAL MEDICINE
Payer: MEDICARE

## 2025-05-22 DIAGNOSIS — D47.2 IGG MONOCLONAL GAMMOPATHY: ICD-10-CM

## 2025-05-22 DIAGNOSIS — R91.8 LUNG NODULES: ICD-10-CM

## 2025-05-22 DIAGNOSIS — D89.84 IGG4 RELATED DISEASE: ICD-10-CM

## 2025-05-22 DIAGNOSIS — R59.0 ENLARGED LYMPH NODE IN NECK: ICD-10-CM

## 2025-05-22 PROCEDURE — 74176 CT ABD & PELVIS W/O CONTRAST: CPT

## 2025-05-22 PROCEDURE — 70490 CT SOFT TISSUE NECK W/O DYE: CPT

## 2025-05-22 PROCEDURE — 71250 CT THORAX DX C-: CPT

## 2025-06-09 ENCOUNTER — OFFICE VISIT (OUTPATIENT)
Age: 85
End: 2025-06-09
Payer: MEDICARE

## 2025-06-09 VITALS — BODY MASS INDEX: 23.85 KG/M2 | WEIGHT: 161 LBS | HEIGHT: 69 IN

## 2025-06-09 DIAGNOSIS — Z96.611 STATUS POST REVERSE ARTHROPLASTY OF SHOULDER, RIGHT: Primary | ICD-10-CM

## 2025-06-09 PROCEDURE — 1159F MED LIST DOCD IN RCRD: CPT

## 2025-06-09 PROCEDURE — 99213 OFFICE O/P EST LOW 20 MIN: CPT

## 2025-06-09 PROCEDURE — G8427 DOCREV CUR MEDS BY ELIG CLIN: HCPCS

## 2025-06-09 PROCEDURE — 1036F TOBACCO NON-USER: CPT

## 2025-06-09 PROCEDURE — 1123F ACP DISCUSS/DSCN MKR DOCD: CPT

## 2025-06-09 PROCEDURE — G8420 CALC BMI NORM PARAMETERS: HCPCS

## 2025-06-09 PROCEDURE — 1160F RVW MEDS BY RX/DR IN RCRD: CPT

## 2025-06-24 ENCOUNTER — OFFICE VISIT (OUTPATIENT)
Dept: CARDIOLOGY | Facility: CLINIC | Age: 85
End: 2025-06-24
Payer: MEDICARE

## 2025-06-24 VITALS
BODY MASS INDEX: 23.99 KG/M2 | OXYGEN SATURATION: 98 % | SYSTOLIC BLOOD PRESSURE: 136 MMHG | WEIGHT: 162 LBS | HEIGHT: 69 IN | HEART RATE: 53 BPM | DIASTOLIC BLOOD PRESSURE: 72 MMHG

## 2025-06-24 DIAGNOSIS — D89.84 IGG4 RELATED DISEASE: ICD-10-CM

## 2025-06-24 DIAGNOSIS — I25.10 CORONARY ARTERY DISEASE INVOLVING NATIVE CORONARY ARTERY OF NATIVE HEART WITHOUT ANGINA PECTORIS: Primary | ICD-10-CM

## 2025-06-24 DIAGNOSIS — I10 PRIMARY HYPERTENSION: ICD-10-CM

## 2025-06-24 DIAGNOSIS — E78.2 MIXED HYPERLIPIDEMIA: ICD-10-CM

## 2025-06-24 PROCEDURE — 99214 OFFICE O/P EST MOD 30 MIN: CPT | Performed by: NURSE PRACTITIONER

## 2025-06-24 PROCEDURE — 3078F DIAST BP <80 MM HG: CPT | Performed by: NURSE PRACTITIONER

## 2025-06-24 PROCEDURE — 1160F RVW MEDS BY RX/DR IN RCRD: CPT | Performed by: NURSE PRACTITIONER

## 2025-06-24 PROCEDURE — 1159F MED LIST DOCD IN RCRD: CPT | Performed by: NURSE PRACTITIONER

## 2025-06-24 PROCEDURE — 93000 ELECTROCARDIOGRAM COMPLETE: CPT | Performed by: NURSE PRACTITIONER

## 2025-06-24 PROCEDURE — 3075F SYST BP GE 130 - 139MM HG: CPT | Performed by: NURSE PRACTITIONER

## 2025-06-24 RX ORDER — ASCORBIC ACID 500 MG
500 TABLET ORAL DAILY
COMMUNITY

## 2025-06-24 RX ORDER — RITUXIMAB 10 MG/ML
INJECTION, SOLUTION INTRAVENOUS
COMMUNITY

## 2025-06-24 NOTE — PROGRESS NOTES
"    Subjective:     Encounter Date:06/24/2025      Patient ID: Zeb Adams is a 85 y.o. male     Chief Complaint:\"no complaints\"  Coronary Artery Disease  Presents for follow-up visit. Pertinent negatives include no chest pain, dizziness, leg swelling, palpitations, shortness of breath or weight gain. Risk factors include hyperlipidemia. The symptoms have been stable.   Hyperlipidemia  This is a chronic problem. The current episode started more than 1 year ago. The problem is uncontrolled. Recent lipid tests were reviewed and are high. Pertinent negatives include no chest pain or shortness of breath.   Hypertension  Pertinent negatives include no chest pain, malaise/fatigue, orthopnea, palpitations, PND or shortness of breath.     Patient presents today as a follow up. Patient is followed for CAD s/p TAYLOR to the ostium and mid RCA in 2016. He had a low risk stress test in 11/2023 that was completed after he noticed exertional dyspnea after chasing his dog who had escaped.    He notes he has been well. He reports he has a new shoulder since his last visit and is in need of a new ankle. Dr. Christianson increased his lipitor from 10mg daily to 40mg daily in March. 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 He denies chest pain, dyspnea, edema and palpitations.     The following portions of the patient's history were reviewed and updated as appropriate: allergies, current medications, past family history, past medical history, past social history, past surgical history and problem list.    Allergies   Allergen Reactions    Pregabalin Anaphylaxis    Penicillins Rash    Sulfa Antibiotics Rash    Elemental Sulfur Unknown - High Severity    Lisinopril Cough       Current Outpatient Medications:     amLODIPine (NORVASC) 5 MG tablet, Take 1 tablet by mouth Daily., Disp: , Rfl:     aspirin 81 MG tablet, Take 81 mg by mouth daily.  LAST DOSE " , Disp: , Rfl:     atorvastatin (LIPITOR) 40 MG tablet, Take 1 tablet by mouth Daily., Disp: 90 tablet, Rfl: 3    losartan (COZAAR) 25 MG tablet, Take 1 tablet by mouth Daily., Disp: , Rfl:     pantoprazole (PROTONIX) 40 MG EC tablet, 2 (Two) Times a Day., Disp: , Rfl:     predniSONE (DELTASONE) 5 MG tablet, Take 1 tablet by mouth Daily., Disp: , Rfl:     riTUXimab (Rituxan) 500 MG/50ML solution injection, as directed Intravenous, Disp: , Rfl:     sodium bicarbonate 325 MG tablet, Take 1 tablet by mouth 2 (Two) Times a Day., Disp: , Rfl:     vitamin C (ASCORBIC ACID) 500 MG tablet, Take 1 tablet by mouth Daily., Disp: , Rfl:   Past Medical History:   Diagnosis Date    Allergic rhinitis     Anxiety     Arthritis     Asthma     Benign neoplasm of submandibular gland     BPH with urinary obstruction     Chest pain     Chronic laryngitis     Chronic obstructive lung disease 2023    Chronic rhinitis     Epistaxis     Esophageal stricture     GERD (gastroesophageal reflux disease)     Hiatal hernia     Histoplasmosis     Hypercholesterolemia     Hypertension     IgG4 deficiency     IgG4 related disease     Impotence of organic origin     Kidney disease     stage 3    LPRD (laryngopharyngeal reflux disease)     Lung collapse     Mediastinal adenopathy     Poor urinary stream     Presence of stent in coronary artery in patient with coronary artery disease     RLS (restless legs syndrome)     Sialoadenitis     Skin cancer     SCALP    SOB (shortness of breath) on exertion     Stroke     sometime in his life he has had a mini stroke found on a ct scan ewcently       Social History     Socioeconomic History    Marital status:    Tobacco Use    Smoking status: Former     Current packs/day: 0.00     Types: Cigarettes     Start date:      Quit date:      Years since quittin.5     Passive exposure: Past    Smokeless tobacco: Never    Tobacco comments:     30 yrs ago   Vaping Use    Vaping  status: Never Used   Substance and Sexual Activity    Alcohol use: Never    Drug use: Never    Sexual activity: Defer       Review of Systems   Constitutional: Negative for malaise/fatigue, weight gain and weight loss.   Cardiovascular:  Negative for chest pain, dyspnea on exertion, irregular heartbeat, leg swelling, near-syncope, orthopnea, palpitations, paroxysmal nocturnal dyspnea and syncope.   Respiratory:  Negative for cough, shortness of breath, sleep disturbances due to breathing, sputum production and wheezing.    Skin:  Negative for dry skin, flushing, itching and rash.   Gastrointestinal:  Negative for hematemesis and hematochezia.   Neurological:  Negative for dizziness, light-headedness, loss of balance and weakness.   All other systems reviewed and are negative.         Objective:     Vitals reviewed.   Constitutional:       General: Not in acute distress.     Appearance: Healthy appearance. Well-developed. Not diaphoretic.   Eyes:      General: No scleral icterus.     Conjunctiva/sclera: Conjunctivae normal.      Pupils: Pupils are equal, round, and reactive to light.   HENT:      Head: Normocephalic.    Mouth/Throat:      Pharynx: No oropharyngeal exudate.   Neck:      Vascular: No JVR.   Pulmonary:      Effort: Pulmonary effort is normal. No respiratory distress.      Breath sounds: Examination of the right-lower field reveals rales. Examination of the left-lower field reveals rales. No wheezing. No rhonchi. Rales (minimal) present.   Chest:      Chest wall: Not tender to palpatation.   Cardiovascular:      Normal rate. Regular rhythm.   Pulses:     Intact distal pulses.   Edema:     Peripheral edema present.     Ankle: 1+ non-pitting edema of the right ankle.  Abdominal:      General: Bowel sounds are normal. There is no distension.      Palpations: Abdomen is soft.      Tenderness: There is no abdominal tenderness.   Musculoskeletal: Normal range of motion.      Cervical back: Normal range of  "motion and neck supple. Skin:     General: Skin is warm and dry.      Coloration: Skin is not pale.      Findings: No erythema or rash.   Neurological:      Mental Status: Alert, oriented to person, place, and time and oriented to person, place and time.      Deep Tendon Reflexes: Reflexes are normal and symmetric.   Psychiatric:         Behavior: Behavior normal.             ECG 12 Lead    Date/Time: 6/24/2025 12:22 PM  Performed by: Sharon Barros APRN    Authorized by: Sharon Barros APRN  Comparison: compared with previous ECG from 6/18/2024  Similar to previous ECG  Rhythm: sinus bradycardia  Rate: bradycardic  BPM: 53  Conduction: conduction normal  Q waves: II, III, V5 and V6    T inversion: III  QRS axis: normal  Other findings: T wave abnormality    Clinical impression: abnormal EKG        /72 (BP Location: Left arm, Patient Position: Sitting, Cuff Size: Adult)   Pulse 53   Ht 174 cm (68.5\")   Wt 73.5 kg (162 lb)   SpO2 98%   BMI 24.27 kg/m²     Lab Review:   I have reviewed previous office notes, recent labs and recent cardiac testing.       Results for orders placed during the hospital encounter of 11/21/23    Adult Stress Echo W/ Cont or Stress Agent if Necessary Per Protocol    Interpretation Summary    Normal stress echo with no significant echocardiographic evidence for myocardial ischemia.    Poor excercise tolerance    Low risk for ischemia    Cath 3/2016:  CONCLUSION:  1.  Successful stent placement to the ostium and mid right coronary artery with  drug-eluting stents.   2.  Nonobstructive disease in the left coronary system.   3.  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   4.  Small infrarenal abdominal aortic aneurysm.          Assessment:          Diagnosis Plan   1. Coronary artery disease involving native coronary artery of native heart without angina pectoris        2. Primary hypertension        3. Mixed hyperlipidemia        4. IgG4 related disease      "              Plan:       1. CAD-  stable. S/p TAYLOR to ostial and mid RCA in 2016. low risk stress echo completed in 11/2023. Continue ASA 81mg daily, norvasc 5mg daily, losartan 25mg daily and lipitor 40mg daily.  2. HTN- controlled. Followed by PCP.   3. HLD- last LDL on file from 8/2024 and was uncontrolled at 104. Lipitor was increased by Dr. Christianson in March. Will obtain lipid panel results in August and call with medication adjustment if needed.    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 Noyola. On chronic steroids.      Follow up in 1 year or sooner if symptoms worse. He would like for Dr. Taylor to be his cardiologist going forward.    I spent 30 minutes caring for Zeb on this date of service. This time includes time spent by me in the following activities:preparing for the visit, reviewing tests, obtaining and/or reviewing a separately obtained history, performing a medically appropriate examination and/or evaluation , counseling and educating the patient/family/caregiver, ordering medications, tests, or procedures, documenting information in the medical record, independently interpreting results and communicating that information with the patient/family/caregiver and care coordination    Advance Care Planning   ACP discussion was declined by the patient. Patient does not have an advance directive, declines further assistance.       I spent 2 minutes on the separately reported service of EKG interpretation. This time is not included in the time used to support the E/M service also reported today.

## 2025-08-19 ENCOUNTER — HOSPITAL ENCOUNTER (OUTPATIENT)
Dept: INFUSION THERAPY | Age: 85
Discharge: HOME | End: 2025-08-19
Payer: MEDICARE

## 2025-08-19 DIAGNOSIS — D64.9 ANEMIA, UNSPECIFIED TYPE: ICD-10-CM

## 2025-08-19 DIAGNOSIS — D47.2 IGG MONOCLONAL GAMMOPATHY: ICD-10-CM

## 2025-08-19 DIAGNOSIS — D89.84 IGG4 RELATED DISEASE (HCC): ICD-10-CM

## 2025-08-19 LAB
ALBUMIN SERPL-MCNC: 3.8 G/DL (ref 3.5–5.2)
ALP SERPL-CCNC: 106 U/L (ref 40–129)
ALT SERPL-CCNC: 14 U/L (ref 5–41)
ANION GAP SERPL CALCULATED.3IONS-SCNC: 10 MMOL/L (ref 7–19)
AST SERPL-CCNC: 21 U/L (ref 5–40)
BASOPHILS # BLD: 0.04 K/UL (ref 0–0.2)
BASOPHILS NFR BLD: 0.6 % (ref 0–1)
BILIRUB SERPL-MCNC: 0.5 MG/DL (ref 0–1.2)
BUN SERPL-MCNC: 25 MG/DL (ref 8–23)
CALCIUM SERPL-MCNC: 11.1 MG/DL (ref 8.8–10.2)
CHLORIDE SERPL-SCNC: 104 MMOL/L (ref 98–107)
CO2 SERPL-SCNC: 22 MMOL/L (ref 22–29)
CREAT SERPL-MCNC: 1.6 MG/DL (ref 0.7–1.2)
EOSINOPHIL # BLD: 0.11 K/UL (ref 0–0.6)
EOSINOPHIL NFR BLD: 1.6 % (ref 0–5)
ERYTHROCYTE [DISTWIDTH] IN BLOOD BY AUTOMATED COUNT: 15 % (ref 11.5–14.5)
GLUCOSE SERPL-MCNC: 87 MG/DL (ref 70–99)
HCT VFR BLD AUTO: 34.4 % (ref 42–52)
HGB BLD-MCNC: 11.1 G/DL (ref 14–18)
IRON SATN MFR SERPL: 11 % (ref 20–50)
IRON SERPL-MCNC: 34 UG/DL (ref 59–158)
LYMPHOCYTES # BLD: 0.47 K/UL (ref 1.1–4.5)
LYMPHOCYTES NFR BLD: 7 % (ref 20–40)
MCH RBC QN AUTO: 29.8 PG (ref 27–31)
MCHC RBC AUTO-ENTMCNC: 32.3 G/DL (ref 33–37)
MCV RBC AUTO: 92.2 FL (ref 80–94)
MONOCYTES # BLD: 1.23 K/UL (ref 0–0.9)
MONOCYTES NFR BLD: 18.2 % (ref 1–10)
NEUTROPHILS # BLD: 4.82 K/UL (ref 1.5–7.5)
NEUTS SEG NFR BLD: 71.4 % (ref 50–65)
PLATELET # BLD AUTO: 215 K/UL (ref 130–400)
PMV BLD AUTO: 10.7 FL (ref 9.4–12.4)
POTASSIUM SERPL-SCNC: 4.5 MMOL/L (ref 3.5–5.1)
PROT SERPL-MCNC: 5.9 G/DL (ref 6.4–8.3)
RBC # BLD AUTO: 3.73 M/UL (ref 4.7–6.1)
SODIUM SERPL-SCNC: 136 MMOL/L (ref 136–145)
TIBC SERPL-MCNC: 317 UG/DL (ref 250–400)
WBC # BLD AUTO: 6.75 K/UL (ref 4.8–10.8)

## 2025-08-19 PROCEDURE — 84155 ASSAY OF PROTEIN SERUM: CPT

## 2025-08-19 PROCEDURE — 83550 IRON BINDING TEST: CPT

## 2025-08-19 PROCEDURE — 85025 COMPLETE CBC W/AUTO DIFF WBC: CPT

## 2025-08-19 PROCEDURE — 82787 IGG 1 2 3 OR 4 EACH: CPT

## 2025-08-19 PROCEDURE — 83883 ASSAY NEPHELOMETRY NOT SPEC: CPT

## 2025-08-19 PROCEDURE — 82784 ASSAY IGA/IGD/IGG/IGM EACH: CPT

## 2025-08-19 PROCEDURE — 36415 COLL VENOUS BLD VENIPUNCTURE: CPT

## 2025-08-19 PROCEDURE — 82232 ASSAY OF BETA-2 PROTEIN: CPT

## 2025-08-19 PROCEDURE — 84165 PROTEIN E-PHORESIS SERUM: CPT

## 2025-08-19 PROCEDURE — 83521 IG LIGHT CHAINS FREE EACH: CPT

## 2025-08-19 PROCEDURE — 80053 COMPREHEN METABOLIC PANEL: CPT

## 2025-08-19 PROCEDURE — 83540 ASSAY OF IRON: CPT

## 2025-08-19 PROCEDURE — 86334 IMMUNOFIX E-PHORESIS SERUM: CPT

## 2025-08-20 ENCOUNTER — TRANSCRIBE ORDERS (OUTPATIENT)
Dept: ADMINISTRATIVE | Facility: HOSPITAL | Age: 85
End: 2025-08-20
Payer: MEDICARE

## 2025-08-20 ENCOUNTER — TELEPHONE (OUTPATIENT)
Dept: HEMATOLOGY | Age: 85
End: 2025-08-20

## 2025-08-20 DIAGNOSIS — R91.8 LUNG NODULES: ICD-10-CM

## 2025-08-20 DIAGNOSIS — R59.1 LYMPHADENOPATHY: ICD-10-CM

## 2025-08-20 DIAGNOSIS — R91.8 LUNG NODULES: Primary | ICD-10-CM

## 2025-08-20 DIAGNOSIS — R59.0 ENLARGED LYMPH NODE IN NECK: ICD-10-CM

## 2025-08-20 DIAGNOSIS — D47.2 MONOCLONAL GAMMOPATHY: ICD-10-CM

## 2025-08-20 DIAGNOSIS — D47.2 IGG MONOCLONAL GAMMOPATHY: ICD-10-CM

## 2025-08-20 DIAGNOSIS — R59.1 LYMPHADENOPATHY: Primary | ICD-10-CM

## 2025-08-20 LAB — IGG4 SER-MCNC: 28 MG/DL (ref 1–123)

## 2025-08-21 LAB — B2 MICROGLOB SERPL-MCNC: 4.2 MG/L

## 2025-08-22 ENCOUNTER — HOSPITAL ENCOUNTER (OUTPATIENT)
Dept: CT IMAGING | Facility: HOSPITAL | Age: 85
Discharge: HOME | End: 2025-08-22
Payer: MEDICARE

## 2025-08-22 DIAGNOSIS — R59.1 LYMPHADENOPATHY: ICD-10-CM

## 2025-08-22 DIAGNOSIS — D47.2 MONOCLONAL GAMMOPATHY: ICD-10-CM

## 2025-08-22 DIAGNOSIS — R91.8 LUNG NODULES: ICD-10-CM

## 2025-08-22 LAB
ALBUMIN SERPL-MCNC: 3.67 G/DL (ref 3.75–5.01)
ALPHA1 GLOB SERPL ELPH-MCNC: 0.25 G/DL (ref 0.19–0.46)
ALPHA2 GLOB SERPL ELPH-MCNC: 0.62 G/DL (ref 0.48–1.05)
B-GLOBULIN SERPL ELPH-MCNC: 0.59 G/DL (ref 0.48–1.1)
DEPRECATED KAPPA LC FREE/LAMBDA SER: 1.62 {RATIO} (ref 0.26–1.65)
EER MONOCLONAL PROTEIN AND FLC, SERUM: ABNORMAL
GAMMA GLOB SERPL ELPH-MCNC: 0.48 G/DL (ref 0.62–1.51)
IGA SERPL-MCNC: 126 MG/DL (ref 68–408)
IGG SERPL-MCNC: 509 MG/DL (ref 768–1632)
IGM SERPL-MCNC: 75 MG/DL (ref 35–263)
INTERPRETATION SERPL IFE-IMP: ABNORMAL
INTERPRETATION SERPL IFE-IMP: ABNORMAL
KAPPA LC FREE SER-MCNC: 24.42 MG/L (ref 3.3–19.4)
LAMBDA LC FREE SERPL-MCNC: 15.05 MG/L (ref 5.71–26.3)
MONOCLONAL PROTEIN, SERUM: ABNORMAL G/DL
PROT SERPL-MCNC: 5.6 G/DL (ref 6.3–8.2)

## 2025-08-22 PROCEDURE — 71250 CT THORAX DX C-: CPT

## 2025-08-22 PROCEDURE — 74176 CT ABD & PELVIS W/O CONTRAST: CPT

## 2025-08-22 PROCEDURE — 70490 CT SOFT TISSUE NECK W/O DYE: CPT

## 2025-08-26 ENCOUNTER — TRANSCRIBE ORDERS (OUTPATIENT)
Dept: ADMINISTRATIVE | Facility: HOSPITAL | Age: 85
End: 2025-08-26
Payer: MEDICARE

## 2025-08-26 ENCOUNTER — HOSPITAL ENCOUNTER (OUTPATIENT)
Dept: INFUSION THERAPY | Age: 85
Discharge: HOME | End: 2025-08-26
Payer: MEDICARE

## 2025-08-26 ENCOUNTER — OFFICE VISIT (OUTPATIENT)
Dept: HEMATOLOGY | Age: 85
End: 2025-08-26
Payer: MEDICARE

## 2025-08-26 VITALS
OXYGEN SATURATION: 99 % | TEMPERATURE: 97.7 F | HEART RATE: 63 BPM | HEIGHT: 69 IN | BODY MASS INDEX: 24.97 KG/M2 | DIASTOLIC BLOOD PRESSURE: 78 MMHG | WEIGHT: 168.6 LBS | SYSTOLIC BLOOD PRESSURE: 140 MMHG | RESPIRATION RATE: 18 BRPM

## 2025-08-26 VITALS
SYSTOLIC BLOOD PRESSURE: 139 MMHG | OXYGEN SATURATION: 96 % | TEMPERATURE: 97.5 F | DIASTOLIC BLOOD PRESSURE: 78 MMHG | RESPIRATION RATE: 16 BRPM | HEART RATE: 60 BPM

## 2025-08-26 DIAGNOSIS — D47.2 IGG MONOCLONAL GAMMOPATHY: ICD-10-CM

## 2025-08-26 DIAGNOSIS — K90.9 MALABSORPTION OF IRON: ICD-10-CM

## 2025-08-26 DIAGNOSIS — E83.52 HYPERCALCEMIA: Primary | ICD-10-CM

## 2025-08-26 DIAGNOSIS — R79.89 ELEVATED PTHRP LEVEL: ICD-10-CM

## 2025-08-26 DIAGNOSIS — E83.52 HYPERCALCEMIA: ICD-10-CM

## 2025-08-26 DIAGNOSIS — D64.9 ANEMIA, UNSPECIFIED TYPE: ICD-10-CM

## 2025-08-26 DIAGNOSIS — D89.84 IGG4 RELATED DISEASE (HCC): ICD-10-CM

## 2025-08-26 DIAGNOSIS — D64.9 ANEMIA, UNSPECIFIED TYPE: Primary | ICD-10-CM

## 2025-08-26 DIAGNOSIS — D50.9 IRON DEFICIENCY ANEMIA, UNSPECIFIED IRON DEFICIENCY ANEMIA TYPE: Primary | ICD-10-CM

## 2025-08-26 LAB
ALBUMIN SERPL-MCNC: 4 G/DL (ref 3.5–5.2)
ALP SERPL-CCNC: 115 U/L (ref 40–129)
ALT SERPL-CCNC: 14 U/L (ref 5–41)
ANION GAP SERPL CALCULATED.3IONS-SCNC: 10 MMOL/L (ref 7–19)
AST SERPL-CCNC: 21 U/L (ref 5–40)
BASOPHILS # BLD: 0.04 K/UL (ref 0–0.2)
BASOPHILS NFR BLD: 0.6 % (ref 0–1)
BILIRUB SERPL-MCNC: 0.9 MG/DL (ref 0–1.2)
BUN SERPL-MCNC: 24 MG/DL (ref 8–23)
CALCIUM SERPL-MCNC: 11 MG/DL (ref 8.8–10.2)
CHLORIDE SERPL-SCNC: 105 MMOL/L (ref 98–107)
CO2 SERPL-SCNC: 21 MMOL/L (ref 22–29)
CREAT SERPL-MCNC: 1.6 MG/DL (ref 0.7–1.2)
EOSINOPHIL # BLD: 0.14 K/UL (ref 0–0.6)
EOSINOPHIL NFR BLD: 2.1 % (ref 0–5)
ERYTHROCYTE [DISTWIDTH] IN BLOOD BY AUTOMATED COUNT: 14.6 % (ref 11.5–14.5)
GLUCOSE SERPL-MCNC: 100 MG/DL (ref 70–99)
HCT VFR BLD AUTO: 34.8 % (ref 42–52)
HGB BLD-MCNC: 11.4 G/DL (ref 14–18)
LYMPHOCYTES # BLD: 0.4 K/UL (ref 1.1–4.5)
LYMPHOCYTES NFR BLD: 5.9 % (ref 20–40)
MCH RBC QN AUTO: 29.9 PG (ref 27–31)
MCHC RBC AUTO-ENTMCNC: 32.8 G/DL (ref 33–37)
MCV RBC AUTO: 91.3 FL (ref 80–94)
MONOCYTES # BLD: 1.26 K/UL (ref 0–0.9)
MONOCYTES NFR BLD: 18.6 % (ref 1–10)
NEUTROPHILS # BLD: 4.91 K/UL (ref 1.5–7.5)
NEUTS SEG NFR BLD: 72.2 % (ref 50–65)
PLATELET # BLD AUTO: 230 K/UL (ref 130–400)
PMV BLD AUTO: 10.7 FL (ref 9.4–12.4)
POTASSIUM SERPL-SCNC: 4.4 MMOL/L (ref 3.5–5.1)
PROT SERPL-MCNC: 6.1 G/DL (ref 6.4–8.3)
PTH-INTACT SERPL-MCNC: 109 PG/ML (ref 15–65)
RBC # BLD AUTO: 3.81 M/UL (ref 4.7–6.1)
SODIUM SERPL-SCNC: 136 MMOL/L (ref 136–145)
WBC # BLD AUTO: 6.79 K/UL (ref 4.8–10.8)

## 2025-08-26 PROCEDURE — 6360000002 HC RX W HCPCS: Performed by: INTERNAL MEDICINE

## 2025-08-26 PROCEDURE — 2580000003 HC RX 258: Performed by: INTERNAL MEDICINE

## 2025-08-26 PROCEDURE — 99215 OFFICE O/P EST HI 40 MIN: CPT | Performed by: INTERNAL MEDICINE

## 2025-08-26 PROCEDURE — G8428 CUR MEDS NOT DOCUMENT: HCPCS | Performed by: INTERNAL MEDICINE

## 2025-08-26 PROCEDURE — 1123F ACP DISCUSS/DSCN MKR DOCD: CPT | Performed by: INTERNAL MEDICINE

## 2025-08-26 PROCEDURE — 85025 COMPLETE CBC W/AUTO DIFF WBC: CPT

## 2025-08-26 PROCEDURE — 36415 COLL VENOUS BLD VENIPUNCTURE: CPT

## 2025-08-26 PROCEDURE — 1036F TOBACCO NON-USER: CPT | Performed by: INTERNAL MEDICINE

## 2025-08-26 PROCEDURE — G2211 COMPLEX E/M VISIT ADD ON: HCPCS | Performed by: INTERNAL MEDICINE

## 2025-08-26 PROCEDURE — 80053 COMPREHEN METABOLIC PANEL: CPT

## 2025-08-26 PROCEDURE — G8420 CALC BMI NORM PARAMETERS: HCPCS | Performed by: INTERNAL MEDICINE

## 2025-08-26 PROCEDURE — 96365 THER/PROPH/DIAG IV INF INIT: CPT

## 2025-08-26 PROCEDURE — 83970 ASSAY OF PARATHORMONE: CPT

## 2025-08-26 RX ORDER — HEPARIN 100 UNIT/ML
500 SYRINGE INTRAVENOUS PRN
OUTPATIENT
Start: 2025-09-02

## 2025-08-26 RX ORDER — SODIUM CHLORIDE 9 MG/ML
INJECTION, SOLUTION INTRAVENOUS PRN
Status: CANCELLED | OUTPATIENT
Start: 2025-08-26

## 2025-08-26 RX ORDER — DIPHENHYDRAMINE HYDROCHLORIDE 50 MG/ML
50 INJECTION, SOLUTION INTRAMUSCULAR; INTRAVENOUS
OUTPATIENT
Start: 2025-09-02

## 2025-08-26 RX ORDER — SODIUM CHLORIDE 9 MG/ML
5-250 INJECTION, SOLUTION INTRAVENOUS PRN
Status: CANCELLED | OUTPATIENT
Start: 2025-08-26

## 2025-08-26 RX ORDER — SODIUM CHLORIDE 0.9 % (FLUSH) 0.9 %
5-40 SYRINGE (ML) INJECTION PRN
Status: CANCELLED | OUTPATIENT
Start: 2025-08-26

## 2025-08-26 RX ORDER — SODIUM CHLORIDE 9 MG/ML
5-250 INJECTION, SOLUTION INTRAVENOUS PRN
OUTPATIENT
Start: 2025-09-02

## 2025-08-26 RX ORDER — ALBUTEROL SULFATE 90 UG/1
4 INHALANT RESPIRATORY (INHALATION) PRN
OUTPATIENT
Start: 2025-09-02

## 2025-08-26 RX ORDER — ALBUTEROL SULFATE 90 UG/1
4 INHALANT RESPIRATORY (INHALATION) PRN
Status: CANCELLED | OUTPATIENT
Start: 2025-08-26

## 2025-08-26 RX ORDER — FAMOTIDINE 10 MG/ML
20 INJECTION, SOLUTION INTRAVENOUS
OUTPATIENT
Start: 2025-09-02

## 2025-08-26 RX ORDER — HEPARIN 100 UNIT/ML
500 SYRINGE INTRAVENOUS PRN
Status: CANCELLED | OUTPATIENT
Start: 2025-08-26

## 2025-08-26 RX ORDER — HYDROCORTISONE SODIUM SUCCINATE 100 MG/2ML
100 INJECTION INTRAMUSCULAR; INTRAVENOUS
Status: CANCELLED | OUTPATIENT
Start: 2025-08-26

## 2025-08-26 RX ORDER — ONDANSETRON 2 MG/ML
8 INJECTION INTRAMUSCULAR; INTRAVENOUS
OUTPATIENT
Start: 2025-09-02

## 2025-08-26 RX ORDER — ONDANSETRON 2 MG/ML
8 INJECTION INTRAMUSCULAR; INTRAVENOUS
Status: CANCELLED | OUTPATIENT
Start: 2025-08-26

## 2025-08-26 RX ORDER — SODIUM CHLORIDE 9 MG/ML
5-250 INJECTION, SOLUTION INTRAVENOUS PRN
Status: DISCONTINUED | OUTPATIENT
Start: 2025-08-26 | End: 2025-08-27 | Stop reason: HOSPADM

## 2025-08-26 RX ORDER — HYDROCORTISONE SODIUM SUCCINATE 100 MG/2ML
100 INJECTION INTRAMUSCULAR; INTRAVENOUS
OUTPATIENT
Start: 2025-09-02

## 2025-08-26 RX ORDER — EPINEPHRINE 1 MG/ML
0.3 INJECTION, SOLUTION, CONCENTRATE INTRAVENOUS PRN
OUTPATIENT
Start: 2025-09-02

## 2025-08-26 RX ORDER — ACETAMINOPHEN 325 MG/1
650 TABLET ORAL
OUTPATIENT
Start: 2025-09-02

## 2025-08-26 RX ORDER — SODIUM CHLORIDE 9 MG/ML
5-250 INJECTION, SOLUTION INTRAVENOUS PRN
Status: CANCELLED | OUTPATIENT
Start: 2025-09-02

## 2025-08-26 RX ORDER — DIPHENHYDRAMINE HYDROCHLORIDE 50 MG/ML
50 INJECTION, SOLUTION INTRAMUSCULAR; INTRAVENOUS
Status: CANCELLED | OUTPATIENT
Start: 2025-08-26

## 2025-08-26 RX ORDER — EPINEPHRINE 1 MG/ML
0.3 INJECTION, SOLUTION, CONCENTRATE INTRAVENOUS PRN
Status: CANCELLED | OUTPATIENT
Start: 2025-08-26

## 2025-08-26 RX ORDER — SODIUM CHLORIDE 0.9 % (FLUSH) 0.9 %
5-40 SYRINGE (ML) INJECTION PRN
OUTPATIENT
Start: 2025-09-02

## 2025-08-26 RX ORDER — SODIUM CHLORIDE 9 MG/ML
INJECTION, SOLUTION INTRAVENOUS PRN
OUTPATIENT
Start: 2025-09-02

## 2025-08-26 RX ORDER — ACETAMINOPHEN 325 MG/1
650 TABLET ORAL
Status: CANCELLED | OUTPATIENT
Start: 2025-08-26

## 2025-08-26 RX ORDER — FAMOTIDINE 10 MG/ML
20 INJECTION, SOLUTION INTRAVENOUS
Status: CANCELLED | OUTPATIENT
Start: 2025-08-26

## 2025-08-26 RX ADMIN — FERUMOXYTOL 510 MG: 510 INJECTION INTRAVENOUS at 08:46

## 2025-08-26 RX ADMIN — SODIUM CHLORIDE 150 ML/HR: 0.9 INJECTION, SOLUTION INTRAVENOUS at 08:46

## 2025-08-29 RX ORDER — EZETIMIBE 10 MG/1
10 TABLET ORAL DAILY
Qty: 90 TABLET | Refills: 3 | Status: SHIPPED | OUTPATIENT
Start: 2025-08-29

## 2025-09-04 ENCOUNTER — HOSPITAL ENCOUNTER (OUTPATIENT)
Dept: INFUSION THERAPY | Age: 85
Discharge: HOME | End: 2025-09-04
Payer: MEDICARE

## 2025-09-04 VITALS
WEIGHT: 169 LBS | OXYGEN SATURATION: 95 % | DIASTOLIC BLOOD PRESSURE: 75 MMHG | SYSTOLIC BLOOD PRESSURE: 140 MMHG | BODY MASS INDEX: 25.03 KG/M2 | TEMPERATURE: 98.7 F | RESPIRATION RATE: 18 BRPM | HEART RATE: 62 BPM | HEIGHT: 69 IN

## 2025-09-04 DIAGNOSIS — D50.9 IRON DEFICIENCY ANEMIA, UNSPECIFIED IRON DEFICIENCY ANEMIA TYPE: Primary | ICD-10-CM

## 2025-09-04 DIAGNOSIS — E83.52 HYPERCALCEMIA: ICD-10-CM

## 2025-09-04 DIAGNOSIS — D89.84 IGG4 RELATED DISEASE (HCC): ICD-10-CM

## 2025-09-04 DIAGNOSIS — D47.2 IGG MONOCLONAL GAMMOPATHY: ICD-10-CM

## 2025-09-04 DIAGNOSIS — K90.9 MALABSORPTION OF IRON: ICD-10-CM

## 2025-09-04 DIAGNOSIS — D47.2 IGG MONOCLONAL GAMMOPATHY: Primary | ICD-10-CM

## 2025-09-04 LAB
ALBUMIN SERPL-MCNC: 3.8 G/DL (ref 3.5–5.2)
ALP SERPL-CCNC: 118 U/L (ref 40–129)
ALT SERPL-CCNC: 18 U/L (ref 5–41)
ANION GAP SERPL CALCULATED.3IONS-SCNC: 10 MMOL/L (ref 7–19)
AST SERPL-CCNC: 23 U/L (ref 5–40)
BASOPHILS # BLD: 0.04 K/UL (ref 0–0.2)
BASOPHILS NFR BLD: 0.7 % (ref 0–1)
BILIRUB SERPL-MCNC: 0.5 MG/DL (ref 0–1.2)
BUN SERPL-MCNC: 27 MG/DL (ref 8–23)
CALCIUM SERPL-MCNC: 10.8 MG/DL (ref 8.8–10.2)
CHLORIDE SERPL-SCNC: 104 MMOL/L (ref 98–107)
CO2 SERPL-SCNC: 22 MMOL/L (ref 22–29)
CREAT SERPL-MCNC: 1.6 MG/DL (ref 0.7–1.2)
EOSINOPHIL # BLD: 0.14 K/UL (ref 0–0.6)
EOSINOPHIL NFR BLD: 2.5 % (ref 0–5)
ERYTHROCYTE [DISTWIDTH] IN BLOOD BY AUTOMATED COUNT: 15.3 % (ref 11.5–14.5)
GLUCOSE SERPL-MCNC: 94 MG/DL (ref 70–99)
HCT VFR BLD AUTO: 35.8 % (ref 42–52)
HGB BLD-MCNC: 11.8 G/DL (ref 14–18)
LYMPHOCYTES # BLD: 0.45 K/UL (ref 1.1–4.5)
LYMPHOCYTES NFR BLD: 7.9 % (ref 20–40)
MCH RBC QN AUTO: 30.6 PG (ref 27–31)
MCHC RBC AUTO-ENTMCNC: 33 G/DL (ref 33–37)
MCV RBC AUTO: 92.7 FL (ref 80–94)
MONOCYTES # BLD: 0.94 K/UL (ref 0–0.9)
MONOCYTES NFR BLD: 16.6 % (ref 1–10)
NEUTROPHILS # BLD: 4.04 K/UL (ref 1.5–7.5)
NEUTS SEG NFR BLD: 71.2 % (ref 50–65)
PLATELET # BLD AUTO: 200 K/UL (ref 130–400)
PMV BLD AUTO: 11.1 FL (ref 9.4–12.4)
POTASSIUM SERPL-SCNC: 4.4 MMOL/L (ref 3.5–5.1)
PROT SERPL-MCNC: 5.9 G/DL (ref 6.4–8.3)
RBC # BLD AUTO: 3.86 M/UL (ref 4.7–6.1)
SODIUM SERPL-SCNC: 136 MMOL/L (ref 136–145)
WBC # BLD AUTO: 5.67 K/UL (ref 4.8–10.8)

## 2025-09-04 PROCEDURE — 85025 COMPLETE CBC W/AUTO DIFF WBC: CPT

## 2025-09-04 PROCEDURE — 36415 COLL VENOUS BLD VENIPUNCTURE: CPT

## 2025-09-04 PROCEDURE — 2580000003 HC RX 258: Performed by: INTERNAL MEDICINE

## 2025-09-04 PROCEDURE — 80053 COMPREHEN METABOLIC PANEL: CPT

## 2025-09-04 PROCEDURE — 6360000002 HC RX W HCPCS: Performed by: INTERNAL MEDICINE

## 2025-09-04 PROCEDURE — 96367 TX/PROPH/DG ADDL SEQ IV INF: CPT

## 2025-09-04 RX ORDER — DIPHENHYDRAMINE HYDROCHLORIDE 50 MG/ML
50 INJECTION, SOLUTION INTRAMUSCULAR; INTRAVENOUS
OUTPATIENT
Start: 2025-09-09

## 2025-09-04 RX ORDER — EPINEPHRINE 1 MG/ML
0.3 INJECTION, SOLUTION, CONCENTRATE INTRAVENOUS PRN
OUTPATIENT
Start: 2025-09-09

## 2025-09-04 RX ORDER — FAMOTIDINE 10 MG/ML
20 INJECTION, SOLUTION INTRAVENOUS
OUTPATIENT
Start: 2025-09-09

## 2025-09-04 RX ORDER — ONDANSETRON 2 MG/ML
8 INJECTION INTRAMUSCULAR; INTRAVENOUS
OUTPATIENT
Start: 2025-09-09

## 2025-09-04 RX ORDER — SODIUM CHLORIDE 9 MG/ML
5-250 INJECTION, SOLUTION INTRAVENOUS PRN
Status: CANCELLED | OUTPATIENT
Start: 2025-09-09

## 2025-09-04 RX ORDER — ALBUTEROL SULFATE 90 UG/1
4 INHALANT RESPIRATORY (INHALATION) PRN
OUTPATIENT
Start: 2025-09-09

## 2025-09-04 RX ORDER — SODIUM CHLORIDE 9 MG/ML
5-250 INJECTION, SOLUTION INTRAVENOUS PRN
OUTPATIENT
Start: 2025-09-09

## 2025-09-04 RX ORDER — SODIUM CHLORIDE 9 MG/ML
5-250 INJECTION, SOLUTION INTRAVENOUS PRN
Status: DISCONTINUED | OUTPATIENT
Start: 2025-09-04 | End: 2025-09-05 | Stop reason: HOSPADM

## 2025-09-04 RX ORDER — SODIUM CHLORIDE 9 MG/ML
INJECTION, SOLUTION INTRAVENOUS PRN
OUTPATIENT
Start: 2025-09-09

## 2025-09-04 RX ORDER — SODIUM CHLORIDE 0.9 % (FLUSH) 0.9 %
5-40 SYRINGE (ML) INJECTION PRN
OUTPATIENT
Start: 2025-09-09

## 2025-09-04 RX ORDER — ACETAMINOPHEN 325 MG/1
650 TABLET ORAL
OUTPATIENT
Start: 2025-09-09

## 2025-09-04 RX ORDER — HEPARIN 100 UNIT/ML
500 SYRINGE INTRAVENOUS PRN
OUTPATIENT
Start: 2025-09-09

## 2025-09-04 RX ORDER — HYDROCORTISONE SODIUM SUCCINATE 100 MG/2ML
100 INJECTION INTRAMUSCULAR; INTRAVENOUS
OUTPATIENT
Start: 2025-09-09

## 2025-09-04 RX ADMIN — SODIUM CHLORIDE 30 ML/HR: 0.9 INJECTION, SOLUTION INTRAVENOUS at 08:41

## 2025-09-04 RX ADMIN — FERUMOXYTOL 510 MG: 510 INJECTION INTRAVENOUS at 08:43

## (undated) DEVICE — TWIST DRILL 4.7MM DIA 127.0MM LONG

## (undated) DEVICE — COVER,TABLE,44X90,STERILE: Brand: MEDLINE

## (undated) DEVICE — SINGLE PORT MANIFOLD: Brand: NEPTUNE 2

## (undated) DEVICE — PAD,ARMBOARD,CONV,FOAM,2X8X20",12PR/CS: Brand: MEDLINE

## (undated) DEVICE — Device

## (undated) DEVICE — ADAPT SWVL FIBROPTIC BRONCH

## (undated) DEVICE — SINGLE USE BIOPSY VALVE MAJ-210: Brand: SINGLE USE BIOPSY VALVE (STERILE)

## (undated) DEVICE — HANDPIECE SET WITH COAXIAL MULTI-ORIFICE TIP AND SUCTION TUBE: Brand: INTERPULSE

## (undated) DEVICE — GLOVE SURG SZ 8 CRM LTX FREE POLYISOPRENE POLYMER BEAD ANTI

## (undated) DEVICE — ULTRACLEAN ACCESSORY ELECTRODE 1" (2.54 CM) COATED BLADE: Brand: ULTRACLEAN

## (undated) DEVICE — TUBE ET 8MM NSL ORAL BASIC CUF INTMED MURPHY EYE RADPQ MRK

## (undated) DEVICE — ENDO KIT: Brand: MEDLINE INDUSTRIES, INC.

## (undated) DEVICE — UNDERGLOVE SURG SZ 8 FNGR THK0.21MIL GRN LTX BEAD CUF

## (undated) DEVICE — FORCEPS BX 240CM 2.4MM L NDL RAD JAW 4 M00513334

## (undated) DEVICE — PENCIL BTTN S S CAUT TIP W HOLSTER 25 50

## (undated) DEVICE — Device: Brand: BALLOON

## (undated) DEVICE — DUAL CUT SAGITTAL BLADE

## (undated) DEVICE — TBG PRESS EXT

## (undated) DEVICE — SET GUIDEPIN DIA2.4MM DRL TIP DIA2.4MM GWIRE DIA2.8MM FOR

## (undated) DEVICE — SUTURE ETHIBOND EXCEL SZ 5 L30IN NONABSORBABLE GRN L40MM V-37 MB66G

## (undated) DEVICE — TBG SMPL FLTR LINE NASL 02/C02 A/ BX/100

## (undated) DEVICE — DRAPE,SHOULDER,BEACH CHAIR,STERILE: Brand: MEDLINE

## (undated) DEVICE — T-MAX DISPOSABLE FACE MASK 8 PER BOX

## (undated) DEVICE — BANDAGE GZ W2XL75IN ST RAYON POLY CNFRM STRTCH LTWT

## (undated) DEVICE — SHEET,DRAPE,53X77,STERILE: Brand: MEDLINE

## (undated) DEVICE — DRESSING BORDERED ADH GZ UNIV GEN USE 8INX4IN AND 6INX2IN

## (undated) DEVICE — ENDO KIT,LOURDES HOSPITAL: Brand: MEDLINE INDUSTRIES, INC.

## (undated) DEVICE — SINGLE USE SUCTION VALVE MAJ-209: Brand: SINGLE USE SUCTION VALVE (STERILE)

## (undated) DEVICE — COVER LT HNDL PLAS RIG 2 PER PK

## (undated) DEVICE — CANNULA NSL AD L7FT DIV O2 CO2 W/ M LUERLOCK TRMPT CONN

## (undated) DEVICE — SOLUTION IRRIG 3000ML 0.9% SOD CHL USP UROMATIC PLAS CONT

## (undated) DEVICE — TRAP,MUCUS SPECIMEN, 80CC: Brand: MEDLINE

## (undated) DEVICE — BIPOLAR SEALER 23-112-1 AQM 6.0: Brand: AQUAMANTYS ®

## (undated) DEVICE — BIPOLAR ELECTROHEMOSTASIS CATHETER: Brand: INJECTION GOLD PROBE

## (undated) DEVICE — SUTURE VICRYL + SZ 2-0 L36IN ABSRB UD L36MM CT-1 1/2 CIR VCP945H

## (undated) DEVICE — BITE BLOCK ENDOSCP AD 60 FR W/ ADJ STRP PLAS GRN BLOX

## (undated) DEVICE — SHOULDER STABILIZATION KIT,                                    DISPOSABLE 12 PER BOX

## (undated) DEVICE — SENSR O2 OXIMAX FNGR A/ 18IN NONSTR

## (undated) DEVICE — THE CHANNEL CLEANING BRUSH IS A NYLON FLEXI BRUSH ATTACHED TO A FLEXIBLE PLASTIC SHEATH DESIGNED TO SAFELY REMOVE DEBRIS FROM FLEXIBLE ENDOSCOPES.

## (undated) DEVICE — BLANKET WRM W40.2XL55.9IN IORT LO BODY + MISTRAL AIR

## (undated) DEVICE — SUTURE VICRYL + SZ 0 L27IN ABSRB UD CT-1 L36MM 1/2 CIR TAPR VCP260H

## (undated) DEVICE — LIQUIBAND RAPID ADHESIVE 36/CS 0.8ML: Brand: MEDLINE

## (undated) DEVICE — 3M™ IOBAN™ 2 ANTIMICROBIAL INCISE DRAPE 6651EZ: Brand: IOBAN™ 2

## (undated) DEVICE — CURAVIEW LED LARYNGOSCOPE BLADE & HANDLE,DISPOSABLE,MAC 3.5: Brand: CURAPLEX